# Patient Record
Sex: MALE | Race: WHITE | NOT HISPANIC OR LATINO | Employment: OTHER | URBAN - METROPOLITAN AREA
[De-identification: names, ages, dates, MRNs, and addresses within clinical notes are randomized per-mention and may not be internally consistent; named-entity substitution may affect disease eponyms.]

---

## 2017-02-20 ENCOUNTER — GENERIC CONVERSION - ENCOUNTER (OUTPATIENT)
Dept: OTHER | Facility: OTHER | Age: 62
End: 2017-02-20

## 2017-04-10 ENCOUNTER — GENERIC CONVERSION - ENCOUNTER (OUTPATIENT)
Dept: OTHER | Facility: OTHER | Age: 62
End: 2017-04-10

## 2017-05-22 ENCOUNTER — GENERIC CONVERSION - ENCOUNTER (OUTPATIENT)
Dept: OTHER | Facility: OTHER | Age: 62
End: 2017-05-22

## 2017-10-10 ENCOUNTER — GENERIC CONVERSION - ENCOUNTER (OUTPATIENT)
Dept: OTHER | Facility: OTHER | Age: 62
End: 2017-10-10

## 2017-10-20 ENCOUNTER — GENERIC CONVERSION - ENCOUNTER (OUTPATIENT)
Dept: INTERNAL MEDICINE CLINIC | Facility: CLINIC | Age: 62
End: 2017-10-20

## 2017-10-23 DIAGNOSIS — Z79.899 OTHER LONG TERM (CURRENT) DRUG THERAPY: ICD-10-CM

## 2017-10-23 DIAGNOSIS — E11.9 TYPE 2 DIABETES MELLITUS WITHOUT COMPLICATIONS (HCC): ICD-10-CM

## 2017-11-24 ENCOUNTER — GENERIC CONVERSION - ENCOUNTER (OUTPATIENT)
Dept: OTHER | Facility: OTHER | Age: 62
End: 2017-11-24

## 2017-12-22 ENCOUNTER — ALLSCRIPTS OFFICE VISIT (OUTPATIENT)
Dept: OTHER | Facility: OTHER | Age: 62
End: 2017-12-22

## 2017-12-22 DIAGNOSIS — I25.10 ATHEROSCLEROTIC HEART DISEASE OF NATIVE CORONARY ARTERY WITHOUT ANGINA PECTORIS: ICD-10-CM

## 2017-12-23 NOTE — PROGRESS NOTES
Assessment   1  Arteriosclerotic cardiovascular disease (429 2,440 9) (I25 10)   2  Controlled diabetes mellitus (250 00) (E11 9)   3  Esophageal reflux (530 81) (K21 9)   4  Generalized anxiety disorder (300 02) (F41 1)   5  Insomnia (780 52) (G47 00)   6  Ischemic cardiomyopathy (414 8) (I25 5)   7  Obstructive sleep apnea (327 23) (G47 33)   · cannot tolerate CPAP   8  S/P CABG (coronary artery bypass graft) (V45 81) (Z95 1)   9  ICD (implantable cardioverter-defibrillator) in place (V45 02) (Z95 810)    Plan   Controlled diabetes mellitus    · *VB - Foot Exam; Status:Complete;   Done: 35SZC4667 08:16AM  Insomnia    · Eszopiclone 2 MG Oral Tablet   · Lunesta 2 MG Oral Tablet (Eszopiclone)   · TraZODone HCl - 50 MG Oral Tablet; TAKE 1 TABLET qhs  Need for prophylactic vaccination and inoculation against influenza    · Fluzone Quadrivalent 0 5 ML Intramuscular Suspension Prefilled Syringe    Discussion/Summary   Discussion Summary:    ASCVD- managed by Dr Emilia Norwood is on ASA coreg (not sure if he is on metoprolol as well, he will check) Sotalol appears controlled on Farxiga Januvia Metformin and Lantus is on Lansoprazole for GERD controlled on Lexapro have asked him to start trazodone for sleep Lunesta not compliant with CPAP 6months      Counseling Documentation With Imm: The patient was counseled regarding impressions  Medication SE Review and Pt Understands Tx: Possible side effects of new medications were reviewed with the patient/guardian today  The treatment plan was reviewed with the patient/guardian  The patient/guardian understands and agrees with the treatment plan      Chief Complaint   Chief Complaint Chronic Condition  [facility]: Patient is here today for follow up of chronic conditions described in HPI        History of Present Illness   HPI: I cannot sleep at night is on Lunesta and melatonin without relief  to marijuana use which helps but he does not want to get it anymore illegally sees Dr Emilia Norwood every 4 months-s/p CABG 1994, has an ICD; EF 35% in April  last visit carotid US recommended, he ahs yet to get this done sees Dr Ivan Corey for his diabetes, A1C 7 2 a few months ago labs yesterday      Review of Systems   Complete-Male:      Constitutional: recent weight loss, but-- no fever-- and-- no chills  Cardiovascular: no chest pain-- and-- no palpitations  Respiratory: no cough  Gastrointestinal: no abdominal pain,-- no constipation-- and-- no diarrhea  Psychiatric: sleep disturbances, but-- as noted in HPI  Active Problems   1  Arteriosclerotic cardiovascular disease (429 2,440 9) (I25 10)   2  CABG   3  Controlled diabetes mellitus (250 00) (E11 9)   4  Esophageal reflux (530 81) (K21 9)   5  Generalized anxiety disorder (300 02) (F41 1)   6  Insomnia (780 52) (G47 00)   7  Ischemic cardiomyopathy (414 8) (I25 5)   8  Long term use of drug (V58 69) (Z79 899)   9  Need for prophylactic vaccination against single diseases (V05 9) (Z23)   10  Need for prophylactic vaccination and inoculation against influenza (V04 81) (Z23)   11  Obstructive sleep apnea (327 23) (G47 33)   12  Osteophyte of olecranon process (726 91) (M25 70)   13  S/P CABG (coronary artery bypass graft) (V45 81) (Z95 1)   14  S/P drug eluting coronary stent placement (V45 82) (Z95 5)   15  Screening PSA (prostate specific antigen) (V76 44) (Z12 5)   16  Special screening examination for neoplasm of prostate (V76 44) (Z12 5)   17  Squamous cell carcinoma of skin (173 92) (C44 92)    Past Medical History   1  History of Neck pain on left side (723 1) (M54 2)   2  Need for prophylactic vaccination against single diseases (V05 9) (Z23)   3  Need for prophylactic vaccination and inoculation against influenza (V04 81) (Z23)   4  History of Olecranon bursitis of right elbow (726 33) (M70 21)   5  History of Rectal hemorrhage (569 3) (K62 5)  Active Problems And Past Medical History Reviewed:     The active problems and past medical history were reviewed and updated today  Surgical History   1  CABG   2  History of Elbow Surgery  Surgical History Reviewed: The surgical history was reviewed and updated today  Family History   Mother    1  Family history of Mother  At Age ___  Father    2  Family history of Cancer   3  Family history of Coronary Artery Disease (V17 49)   4  Family history of Father  At Age ___  Family History Reviewed: The family history was reviewed and updated today  Social History    · Being A Social Drinker   · Denied: History of Drug Use   · Never A Smoker   · Non smoker (V49 89) (Z78 9)  Social History Reviewed: The social history was reviewed and updated today  Current Meds    1  Aspirin 81 MG Oral Tablet Delayed Release; TAKE 1 TABLET DAILY; Therapy: 21Dsn1518 to Recorded   2  Atorvastatin Calcium 40 MG Oral Tablet; TAKE 1 TABLET DAILY; Therapy: 80LQI5819 to (Evaluate:2014)  Requested for: 46SKO7156 Recorded   3  Coreg 12 5 MG Oral Tablet; TAKE 1 TABLET TWICE DAILY; Therapy: (Recorded:22Ygl6989) to Recorded   4  Effient 10 MG Oral Tablet; Take 1 tablet daily; Therapy: 35Jjd6167 to (Evaluate:2017) Recorded   5  Escitalopram Oxalate 20 MG Oral Tablet; Take 1 tablet daily; Therapy: 61SUB5385 to (Last Rx:2017)  Requested for: 2017 Ordered   6  Eszopiclone 2 MG Oral Tablet; Take 1 tablet at bedtime as needed for sleep  Must have 8 hours to     sleep; Therapy: 67HRU1243 to (Evaluate:2017)  Requested for: 84ANB1915; Last Rx:81Ekl0136     Ordered   7  Farxiga 10 MG Oral Tablet; 1 tab PO daily; Therapy: 15Kid9722 to (Evaluate:2017) Recorded   8  Januvia 100 MG Oral Tablet; Take 1 tablet once daily; Therapy: 61VHQ2114 to (Evaluate:2014)  Requested for: 67Yys5762 Recorded   9  Lansoprazole 30 MG Oral Capsule Delayed Release; take 1 capsule daily;      Therapy: 82QCB1349 to (Last Rx:2017)  Requested for: 52TUS4487 Ordered   10  Lantus SoloStar 100 UNIT/ML Subcutaneous Solution Pen-injector; INJECT 50 UNITS TWICE A DAY; Therapy: 42EAV7171 to (Last Rx:23Nov2017)  Requested for: 23Nov2017 Ordered   11  Lisinopril 20 MG Oral Tablet; TAKE 1 TABLET DAILY; Therapy: 34MQH7556 to (Evaluate:30Fhr9236)  Requested for: 77Cxj6574 Recorded   12  Lovaza 1 GM Oral Capsule; TAKE 2 CAPSULES TWICE A DAY; Therapy: 43DAT1530 to (Evaluate:14Jun2014)  Requested for: 87Idg3173 Recorded   13  Lunesta 2 MG Oral Tablet; take 1 tablet at bedtime as needed for sleep  BRAND NECESSARY; Therapy: 92JPW7713 to (Evaluate:26Apr2017)  Requested for: 28Oct2016; Last Rx:28Oct2016      Ordered   14  MetFORMIN HCl - 500 MG Oral Tablet; Take 2 tablets twice daily with meals  Hold if not eating; Therapy: 00EMP6105 to (457 8326)  Requested for: 33Qom1610 Recorded   15  Metoprolol Succinate ER 50 MG Oral Tablet Extended Release 24 Hour; TAKE 1 TABLET DAILY; Therapy: 45KKJ2666 to (Evaluate:16Mar2014)  Requested for: 09Fhy3366 Recorded   16  Sotalol HCl - 80 MG Oral Tablet; TAKE 1 TABLET TWICE DAILY; Therapy: (Recorded:86Thd7279) to Recorded   17  Spironolactone 25 MG Oral Tablet; TAKE 1 TABLET DAILY; Therapy: (Recorded:98Edm4507) to Recorded   18  Zetia 10 MG Oral Tablet; TAKE 1 TABLET DAILY; Therapy: 43AIF4296 to (Evaluate:84Xsq0546)  Requested for: 04Ggv4565 Recorded  Medication List Reviewed: The medication list was reviewed and updated today  Allergies   1  No Known Drug Allergies  2  No Known Environmental Allergies   3  No Known Food Allergies    Vitals   Vital Signs    Recorded: 22Dec2017 08:13AM   Temperature 98 4 F   Respiration 20   Systolic 509, RUE, Sitting   Diastolic 74, RUE, Sitting   BP CUFF SIZE Large   Height 5 ft 7 75 in   Weight 208 lb 0 8 oz   BMI Calculated 31 87   BSA Calculated 2 07   O2 Saturation 98     Physical Exam   Socks and shoes removed, Right Foot Findings: normal foot   The toes were normal  The sensory exam showed normal vibratory sensation at the level of the toes  Socks and Shoes removed, Left Foot Findings: normal foot  The toes were normal  The sensory exam showed normal vibratory sensation at the level of the toes  Monofilament Testing: normal tactile sensation with monofilament testing throughout both feet  Vascular: Pulses: 1+ in the dorsalis pedis  Pulses: 1+ in the dorsalis pedis  Assign Risk Category: 0: No loss of protective sensation, no deformity  No present risk  Constitutional      General appearance: No acute distress, well appearing and well nourished  obese  Ears, Nose, Mouth, and Throat      Otoscopic examination: Abnormal   The right external canal had a cerumen impaction  The left external canal had a cerumen impaction  Oropharynx: Normal with no erythema, edema, exudate or lesions  Pulmonary      Respiratory effort: No increased work of breathing or signs of respiratory distress  Auscultation of lungs: Clear to auscultation, equal breath sounds bilaterally, no wheezes, no rales, no rhonci  Cardiovascular      Auscultation of heart: Normal rate and rhythm, normal S1 and S2, without murmurs  Examination of extremities for edema and/or varicosities: Normal        Abdomen      Abdomen: Non-tender, no masses  Lymphatic      Palpation of lymph nodes in neck: No lymphadenopathy  Musculoskeletal      Gait and station: Normal        Psychiatric      Orientation to person, place and time: Normal        Mood and affect: Normal        Additional Exam:  carotid bruit Left           Results/Data   *VB - Foot Exam [id number] 08:16AM Iglesia Smith      Test Name Result Flag Reference   FOOT EXAM [id number]        PHQ-2 Adult Depression Screening 37Wra0570 08:15AM Anselmo Haile      Test Name Result Flag Reference   PHQ-2 Adult Depression Score 0     Over the last two weeks, how often have you been bothered by any of the following problems? Little interest or pleasure in doing things: Not at all - 0     Feeling down, depressed, or hopeless: Not at all - 0   PHQ-2 Adult Depression Screening Negative          Health Management   Diabetes Mellitus   (1) HEMOGLOBIN A1C; every 1 year; Last 83RLJ4984; Next Due: 19Ftp8341; Overdue  *VB - Eye Exam; every 1 year; Last 88WIN1801; Next Due: Y5573681; Overdue    Future Appointments      Date/Time Provider Specialty Site   06/21/2018 08:30 AM ERICA Luciano   Internal [facility]     Signatures    Electronically signed by : ERICA Morris ; Dec 22 2017 10:52AM EST                       (Author)

## 2018-01-14 NOTE — MISCELLANEOUS
Provider Comments  Provider Comments:   Pt was a n/s  Called and LMOM to make appt        Signatures   Electronically signed by : ERICA Bryant ; Sep 13 2016 12:43PM EST                       (Author)

## 2018-01-17 NOTE — PROGRESS NOTES
Assessment    1  Encounter for preventive health examination (V70 0) (Z00 00)   2  S/P CABG (coronary artery bypass graft) (V45 81) (Z95 1)   3  S/P drug eluting coronary stent placement (V45 82) (Z95 5)   4  History of Elbow Surgery   · right, bursitis   5  Non smoker (V49 89) (Z78 9)   6  Controlled diabetes mellitus (250 00) (E11 9)   7  Need for prophylactic vaccination and inoculation against influenza (V04 81) (Z23)    Plan  Controlled diabetes mellitus    · *VB - Eye Exam; Status:Active; Requested for:83Zqn8117;    · *VB - Foot Exam; Status:Active; Requested for:03Znk7549;   Esophageal reflux    · Lansoprazole 30 MG Oral Capsule Delayed Release; take 1 capsule daily  Generalized anxiety disorder    · Escitalopram Oxalate 20 MG Oral Tablet (Lexapro); Take 1 tablet daily  Insomnia    · From  Lunesta 2 MG Oral Tablet Take 1 tablet at bedtime as needed for sleep  Must have 8 hours to sleep To Eszopiclone 2 MG Oral Tablet Take 1 tablet at bedtime as  needed for sleep  Must have 8 hours to sleep  Need for prophylactic vaccination and inoculation against influenza    · Fluzone Quadrivalent 0 5 ML Intramuscular Suspension; INJECT 0 5  ML  Intramuscular; To Be Done: 07LCP1816    Discussion/Summary  Impression: health maintenance visit  Currently, he eats an adequate diet and has an inadequate exercise regimen  Colorectal cancer screening: colorectal cancer screening is current and colorectal cancer screening is managed by Dr Kait Mcdermott  The immunizations will be given as outlined in the orders  RTO 6months  The patient was counseled regarding instructions for management, impressions  Chief Complaint  PT is here today for a yearly physical       History of Present Illness  HM, Adult Male: The patient is being seen for a health maintenance evaluation  The last health maintenance visit was 2 year(s) ago  Social History: Household members include spouse  He is   Work status: retired     General Health: The patient's health since the last visit is described as fair  He has regular dental visits  He complains of vision problems  Vision care includes wearing glasses and an eye examination more than a year ago  Immunizations status: up to date The patient needs the following immunization(s): influenza vaccine  Lifestyle:  He consumes a diverse and healthy diet  He does not have any weight concerns  He does not exercise regularly  He does not use tobacco  He consumes alcohol  He reports occasional alcohol use  Reproductive health:  the patient is sexually active  Screening: Prostate cancer screening includes uncertain timing of prostate-specific antigen testing  Colorectal cancer screening includes last colonoscopy done 2011  metabolic screening reviewed and current  Cardiovascular risk factors: hypertension, diabetes, obesity and family history of cardiovascular disease, but no tobacco use  Review of Systems    Constitutional: no chills    The patient presents with complaints of recent weight loss (since starting [place])  Eyes: eyesight problems  Cardiovascular: no chest pain, no intermittent leg claudication, no palpitations and no extremity edema  Respiratory: no shortness of breath and no cough  Gastrointestinal: no abdominal pain, no diarrhea and no blood in stools  Musculoskeletal: right elbow pain  Psychiatric: sleep disturbances and would like to resume LUnesta  Active Problems    1  Arteriosclerotic cardiovascular disease (429 2,440 9) (I25 10)   2  CABG   3  Esophageal reflux (530 81) (K21 9)   4  Generalized anxiety disorder (300 02) (F41 1)   5  Insomnia (780 52) (G47 00)   6  Ischemic cardiomyopathy (414 8) (I25 5)   7  Long term use of drug (V58 69) (Z79 899)   8  Need for prophylactic vaccination against single diseases (V05 9) (Z23)   9  Need for prophylactic vaccination and inoculation against influenza (V04 81) (Z23)   10   Obstructive sleep apnea (327 23) (G47 33) 11  Osteophyte of olecranon process (726 91) (M25 70)   12  Screening PSA (prostate specific antigen) (V76 44) (Z12 5)   13  Special screening examination for neoplasm of prostate (V76 44) (Z12 5)   14  Squamous cell carcinoma of skin (173 92) (C44 92)    Past Medical History    · History of Neck pain on left side (723 1) (M54 2)   · Need for prophylactic vaccination against single diseases (V05 9) (Z23)   · Need for prophylactic vaccination and inoculation against influenza (V04 81) (Z23)   · History of Olecranon bursitis of right elbow (726 33) (M70 21)   · History of Rectal hemorrhage (569 3) (K62 5)    Surgical History    · CABG   · History of Elbow Surgery    Family History  Mother    · Family history of Mother  At Age ___  Father    · Family history of Cancer   · Family history of Coronary Artery Disease (V17 49)   · Family history of Father  At Age ___    Social History    · Being A Social Drinker   · Denied: History of Drug Use   · Never A Smoker   · Non smoker (V49 89) (Z78 9)    Current Meds   1  AmLODIPine Besylate 5 MG Oral Tablet; TAKE 1 TABLET EVERY DAY; Therapy: 39XEZ5850 to (Jhonny Lemons)  Requested for: 34KWD0513; Last   Rx:78Kqd0558 Ordered   2  Aspirin 81 MG Oral Tablet Delayed Release; TAKE 1 TABLET DAILY; Therapy: 42Ytg8873 to Recorded   3  Atorvastatin Calcium 40 MG Oral Tablet; TAKE 1 TABLET DAILY; Therapy: 28KNK6531 to (Evaluate:2014)  Requested for: 83MXA1033 Recorded   4  Coreg 12 5 MG Oral Tablet; TAKE 1 TABLET TWICE DAILY; Therapy: (Recorded:34Vro5004) to Recorded   5  Effient 10 MG Oral Tablet; Take 1 tablet daily; Therapy: 69Ndb2989 to (Evaluate:2017) Recorded   6  Escitalopram Oxalate 20 MG Oral Tablet; Take 1 tablet daily; Therapy: 30IQL3782 to (Evaluate:66Oxg1972)  Requested for: 38ULF0168; Last   Rx:20Ejx2090 Ordered   7  Farxiga 10 MG Oral Tablet; 1 tab PO daily; Therapy: 99Siq2206 to (Evaluate:2017) Recorded   8   Januvia 100 MG Oral Tablet; Take 1 tablet once daily; Therapy: 76MFU8567 to (Evaluate:16Mar2014)  Requested for: 62Bch1465 Recorded   9  Lansoprazole 30 MG Oral Capsule Delayed Release; take 1 capsule daily; Therapy: 04Iyz7738 to (Evaluate:17Oct2016)  Requested for: 32FOZ1739; Last   Rx:73Hil3615 Ordered   10  Lantus SoloStar 100 UNIT/ML Subcutaneous Solution Pen-injector; INJECT 50 UNITS    SUBCUTANEOUSLY TWICE DAILY; Therapy: 58PGR6055 to (Evaluate:06Wjs8522)  Requested for: 71Fvu9594 Recorded   11  Lisinopril 20 MG Oral Tablet; TAKE 1 TABLET DAILY; Therapy: 95VEO4409 to (Evaluate:09Ire4234)  Requested for: 94Qdn2082 Recorded   12  Lovaza 1 GM Oral Capsule; TAKE 2 CAPSULES TWICE A DAY; Therapy: 37WNL1898 to (Evaluate:14Jun2014)  Requested for: 02Vep8493 Recorded   13  Lunesta 2 MG Oral Tablet; Take 1 tablet at bedtime as needed for sleep  Must have 8    hours to sleep; Therapy: 11OCL8096 to (Evaluate:97Wbn6104)  Requested for: 93YDC6222; Last    Rx:14Jan2014 Ordered   14  Lunesta 2 MG Oral Tablet; take 1 tablet at bedtime as needed for sleep; Therapy: 09FVI2495 to (Evaluate:44Mta6155)  Requested for: 53QQT8311; Last    Rx:79Gio8851 Ordered   15  MetFORMIN HCl - 500 MG Oral Tablet; Take 2 tablets twice daily with meals  Hold if not    eating; Therapy: 56HLF6462 to (96 417368)  Requested for: 82Eud4632 Recorded   16  Metoprolol Succinate ER 50 MG Oral Tablet Extended Release 24 Hour; TAKE 1 TABLET    DAILY; Therapy: 68CHW8172 to (Evaluate:16Mar2014)  Requested for: 36Dlg1145 Recorded   17  Sotalol HCl - 80 MG Oral Tablet; TAKE 1 TABLET TWICE DAILY; Therapy: (Recorded:56Ubw2764) to Recorded   18  Spironolactone 25 MG Oral Tablet; TAKE 1 TABLET DAILY; Therapy: (Recorded:31Dcr9091) to Recorded   19  Zetia 10 MG Oral Tablet; TAKE 1 TABLET DAILY; Therapy: 37BQY8487 to (Evaluate:80Hjz5513)  Requested for: 06SPC1441 Recorded    Allergies    1  No Known Drug Allergies    2   No Known Environmental Allergies   3  No Known Food Allergies    Vitals   Recorded: 94MGE1725 96:00YZ   Systolic 179   Diastolic 70   Height 5 ft 7 75 in   Weight 210 lb 12 8 oz   BMI Calculated 32 29   BSA Calculated 2 08     Physical Exam    Constitutional   General appearance: No acute distress, well appearing and well nourished  Eyes   Conjunctiva and lids: No erythema, swelling or discharge  Ears, Nose, Mouth, and Throat   Otoscopic examination: Tympanic membranes translucent with normal light reflex  Canals patent without erythema  Oropharynx: Normal with no erythema, edema, exudate or lesions  Neck   Neck: Supple, symmetric, trachea midline, no masses  Pulmonary   Respiratory effort: No increased work of breathing or signs of respiratory distress  Auscultation of lungs: Clear to auscultation  Cardiovascular   Auscultation of heart: Normal rate and rhythm, normal S1 and S2, no murmurs  Examination of extremities for edema and/or varicosities: Normal     Abdomen   Abdomen: Non-tender, no masses  Diabetic Foot Exam: Right Foot Findings: normal foot  The toes were normal  Left Foot Findings: normal foot  The toes were normal    Monofilament Testing: normal tactile sensation with monofilament testing throughout both feet  Vascular: Pulses: 2+ in the dorsalis pedis  Pulses: 2+ in the dorsalis pedis  Assign Risk Category: 0: No loss of protective sensation, no deformity  No present risk  Psychiatric   Orientation to person, place and time: Normal     Mood and affect: Normal        Results/Data  PHQ-2 Adult Depression Screening 36Egj9831 01:37PM User, Anselmo     Test Name Result Flag Reference   PHQ-2 Adult Depression Score 0     Over the last two weeks, how often have you been bothered by any of the following problems?   Little interest or pleasure in doing things: Not at all - 0  Feeling down, depressed, or hopeless: Not at all - 0   PHQ-2 Adult Depression Screening Negative         Health Management  Diabetes Mellitus   (1) HEMOGLOBIN A1C; every 1 year; Next Due: 82Ccs7057; Overdue  *VB - Eye Exam; every 1 year; Last 00BTB1452; Next Due: X5674025;  Active    Signatures   Electronically signed by : ERICA Lan ; Sep 15 2016  2:09PM EST                       (Author)

## 2018-01-18 NOTE — MISCELLANEOUS
Provider Comments  Provider Comments:   Patient did not come to his scheduled appointment today 03/04/16 so a message was left for him to call  the office to reschedule another appointment  Signatures   Electronically signed by :  Jj Kelley MD; Mar  5 2016 10:29AM EST                       (Author)

## 2018-01-23 VITALS
OXYGEN SATURATION: 98 % | BODY MASS INDEX: 31.53 KG/M2 | RESPIRATION RATE: 20 BRPM | HEIGHT: 68 IN | TEMPERATURE: 98.4 F | DIASTOLIC BLOOD PRESSURE: 74 MMHG | WEIGHT: 208.05 LBS | SYSTOLIC BLOOD PRESSURE: 124 MMHG

## 2018-02-14 LAB
LEFT EYE DIABETIC RETINOPATHY: NORMAL
RIGHT EYE DIABETIC RETINOPATHY: NORMAL

## 2018-04-09 ENCOUNTER — OFFICE VISIT (OUTPATIENT)
Dept: INTERNAL MEDICINE CLINIC | Facility: CLINIC | Age: 63
End: 2018-04-09
Payer: COMMERCIAL

## 2018-04-09 VITALS
WEIGHT: 200 LBS | TEMPERATURE: 97.7 F | SYSTOLIC BLOOD PRESSURE: 124 MMHG | HEART RATE: 85 BPM | DIASTOLIC BLOOD PRESSURE: 78 MMHG | OXYGEN SATURATION: 95 % | BODY MASS INDEX: 30.64 KG/M2

## 2018-04-09 DIAGNOSIS — J20.8 ACUTE BRONCHITIS DUE TO OTHER SPECIFIED ORGANISMS: Primary | ICD-10-CM

## 2018-04-09 PROCEDURE — 99213 OFFICE O/P EST LOW 20 MIN: CPT | Performed by: INTERNAL MEDICINE

## 2018-04-09 RX ORDER — LANSOPRAZOLE 30 MG/1
1 CAPSULE, DELAYED RELEASE ORAL DAILY
COMMUNITY
Start: 2013-04-29 | End: 2018-11-18 | Stop reason: SDUPTHER

## 2018-04-09 RX ORDER — OMEGA-3-ACID ETHYL ESTERS 1 G/1
2 CAPSULE, LIQUID FILLED ORAL 2 TIMES DAILY
COMMUNITY
Start: 2011-11-03

## 2018-04-09 RX ORDER — METOPROLOL SUCCINATE 50 MG/1
1 TABLET, EXTENDED RELEASE ORAL DAILY
COMMUNITY
Start: 2011-11-03 | End: 2019-06-07 | Stop reason: ALTCHOICE

## 2018-04-09 RX ORDER — LISINOPRIL 20 MG/1
1 TABLET ORAL DAILY
COMMUNITY
Start: 2011-09-25 | End: 2021-04-12 | Stop reason: ALTCHOICE

## 2018-04-09 RX ORDER — SOTALOL HYDROCHLORIDE 80 MG/1
1 TABLET ORAL 2 TIMES DAILY
COMMUNITY

## 2018-04-09 RX ORDER — LORAZEPAM 1 MG/1
1 TABLET ORAL EVERY 6 HOURS
COMMUNITY
Start: 2015-08-16 | End: 2020-02-18 | Stop reason: SDUPTHER

## 2018-04-09 RX ORDER — SPIRONOLACTONE 25 MG/1
1 TABLET ORAL DAILY
COMMUNITY

## 2018-04-09 RX ORDER — PEN NEEDLE, DIABETIC 29 G X1/2"
NEEDLE, DISPOSABLE MISCELLANEOUS
COMMUNITY
Start: 2018-01-17

## 2018-04-09 RX ORDER — CARVEDILOL 12.5 MG/1
1 TABLET ORAL 2 TIMES DAILY
COMMUNITY

## 2018-04-09 RX ORDER — TRAZODONE HYDROCHLORIDE 50 MG/1
1 TABLET ORAL
COMMUNITY
Start: 2017-12-22 | End: 2019-06-07 | Stop reason: ALTCHOICE

## 2018-04-09 RX ORDER — ESCITALOPRAM OXALATE 20 MG/1
1 TABLET ORAL DAILY
COMMUNITY
Start: 2012-01-19 | End: 2018-05-07 | Stop reason: SDUPTHER

## 2018-04-09 RX ORDER — LANCETS 30 GAUGE
EACH MISCELLANEOUS
COMMUNITY
Start: 2017-07-12

## 2018-04-09 RX ORDER — CLOPIDOGREL BISULFATE 75 MG/1
75 TABLET ORAL
COMMUNITY
End: 2021-04-12 | Stop reason: ALTCHOICE

## 2018-04-09 RX ORDER — ESZOPICLONE 2 MG/1
TABLET, FILM COATED ORAL
COMMUNITY
Start: 2011-03-31 | End: 2019-12-04 | Stop reason: SDUPTHER

## 2018-04-09 RX ORDER — ATORVASTATIN CALCIUM 40 MG/1
1 TABLET, FILM COATED ORAL DAILY
COMMUNITY
Start: 2011-11-03

## 2018-04-09 RX ORDER — EZETIMIBE 10 MG/1
1 TABLET ORAL DAILY
COMMUNITY
Start: 2011-09-14

## 2018-04-09 RX ORDER — PRASUGREL 10 MG/1
10 TABLET, FILM COATED ORAL DAILY
COMMUNITY
Start: 2016-09-15 | End: 2021-04-12 | Stop reason: ALTCHOICE

## 2018-04-09 RX ORDER — AMOXICILLIN AND CLAVULANATE POTASSIUM 875; 125 MG/1; MG/1
1 TABLET, FILM COATED ORAL EVERY 12 HOURS SCHEDULED
Qty: 20 TABLET | Refills: 0 | Status: SHIPPED | OUTPATIENT
Start: 2018-04-09 | End: 2018-04-19

## 2018-04-09 RX ORDER — EZETIMIBE 10 MG/1
TABLET ORAL
COMMUNITY
Start: 2018-03-15 | End: 2019-06-07 | Stop reason: SDUPTHER

## 2018-04-09 RX ORDER — PEN NEEDLE, DIABETIC 31 GX5/16"
NEEDLE, DISPOSABLE MISCELLANEOUS
COMMUNITY
Start: 2018-01-17

## 2018-04-09 NOTE — PROGRESS NOTES
Assessment/Plan:    Inc oral fluids  Start OTC Coricidin  Call if not improving  Call if fever develops       Problem List Items Addressed This Visit     Acute bronchitis due to other specified organisms - Primary    Relevant Medications    amoxicillin-clavulanate (AUGMENTIN) 875-125 mg per tablet            Subjective:      Patient ID: Luciana Amanda is a 58 y o  male  HPI  4 days of a cough, chest congestion  No fever + chills  No sore throat sinus congestion drainage  No vomiting diarrhea  Tried Sudafed Nyquil    The following portions of the patient's history were reviewed and updated as appropriate: allergies, current medications, past family history, past medical history, past social history, past surgical history and problem list     Review of Systems   Constitutional: Negative for fatigue, fever and unexpected weight change  HENT: Negative for ear pain, hearing loss, sinus pain, sinus pressure and sore throat  Respiratory: Positive for cough  Negative for shortness of breath and wheezing  Cardiovascular: Negative for chest pain, palpitations and leg swelling  Gastrointestinal: Negative for abdominal pain, constipation, diarrhea, nausea and vomiting  Musculoskeletal: Positive for arthralgias (left elbow)  Negative for myalgias  Objective:      /78   Pulse 85   Temp 97 7 °F (36 5 °C)   Wt 90 7 kg (200 lb)   SpO2 95%   BMI 30 64 kg/m²          Physical Exam   Constitutional: He is oriented to person, place, and time  He appears well-developed and well-nourished  HENT:   Head: Normocephalic and atraumatic  Right Ear: External ear normal    Left Ear: External ear normal    Mouth/Throat: Oropharynx is clear and moist    Eyes: Conjunctivae are normal    Neck: Neck supple  Cardiovascular: Normal rate, regular rhythm and normal heart sounds  No murmur heard  Pulmonary/Chest: Effort normal and breath sounds normal  No respiratory distress  He has no wheezes  He has no rales  Musculoskeletal: Normal range of motion  Neurological: He is alert and oriented to person, place, and time  Skin: Skin is warm and dry  Psychiatric: He has a normal mood and affect   His behavior is normal  Judgment and thought content normal

## 2018-05-07 DIAGNOSIS — F32.A DEPRESSION, UNSPECIFIED DEPRESSION TYPE: Primary | ICD-10-CM

## 2018-05-08 RX ORDER — ESCITALOPRAM OXALATE 20 MG/1
20 TABLET ORAL DAILY
Qty: 90 TABLET | Refills: 1 | Status: SHIPPED | OUTPATIENT
Start: 2018-05-08 | End: 2018-12-26 | Stop reason: SDUPTHER

## 2018-09-24 ENCOUNTER — OFFICE VISIT (OUTPATIENT)
Dept: INTERNAL MEDICINE CLINIC | Facility: CLINIC | Age: 63
End: 2018-09-24
Payer: COMMERCIAL

## 2018-09-24 ENCOUNTER — APPOINTMENT (OUTPATIENT)
Dept: RADIOLOGY | Age: 63
End: 2018-09-24
Payer: COMMERCIAL

## 2018-09-24 VITALS
HEIGHT: 69 IN | DIASTOLIC BLOOD PRESSURE: 80 MMHG | SYSTOLIC BLOOD PRESSURE: 150 MMHG | HEART RATE: 81 BPM | OXYGEN SATURATION: 98 % | BODY MASS INDEX: 29.59 KG/M2 | WEIGHT: 199.8 LBS

## 2018-09-24 DIAGNOSIS — M54.2 NECK PAIN ON LEFT SIDE: Primary | ICD-10-CM

## 2018-09-24 DIAGNOSIS — M54.2 NECK PAIN ON LEFT SIDE: ICD-10-CM

## 2018-09-24 PROBLEM — J20.8 ACUTE BRONCHITIS DUE TO OTHER SPECIFIED ORGANISMS: Status: RESOLVED | Noted: 2018-04-09 | Resolved: 2018-09-24

## 2018-09-24 PROCEDURE — 99213 OFFICE O/P EST LOW 20 MIN: CPT | Performed by: INTERNAL MEDICINE

## 2018-09-24 PROCEDURE — 72050 X-RAY EXAM NECK SPINE 4/5VWS: CPT

## 2018-09-24 PROCEDURE — 3008F BODY MASS INDEX DOCD: CPT | Performed by: INTERNAL MEDICINE

## 2018-09-24 RX ORDER — METHOCARBAMOL 500 MG/1
500 TABLET, FILM COATED ORAL 3 TIMES DAILY PRN
Qty: 30 TABLET | Refills: 1 | Status: SHIPPED | OUTPATIENT
Start: 2018-09-24 | End: 2019-06-07 | Stop reason: ALTCHOICE

## 2018-09-24 RX ORDER — TRAMADOL HYDROCHLORIDE 50 MG/1
50 TABLET ORAL EVERY 6 HOURS PRN
Qty: 30 TABLET | Refills: 0 | Status: SHIPPED | OUTPATIENT
Start: 2018-09-24 | End: 2019-06-07 | Stop reason: ALTCHOICE

## 2018-09-24 NOTE — PROGRESS NOTES
Assessment/Plan:       Problem List Items Addressed This Visit     None      Visit Diagnoses     Neck pain on left side    -  Primary    Relevant Medications    methocarbamol (ROBAXIN) 500 mg tablet    traMADol (ULTRAM) 50 mg tablet    Other Relevant Orders    XR spine cervical complete 4 or 5 vw non injury    Ambulatory referral to Physical Therapy            Subjective:      Patient ID: Diamond Gomez is a 61 y o  male  HPI  1 month of neck pain on the left side radiating to the left shoulder  Started a few days after he carried paddle boards  10/10  No relief from OTC meds like Aleve and topical creams, Lidoderm patch  Denies weakness in the arms, numbness or tingling  He has trouble raising his left arm because it triggers the pain in the neck  He has trouble turning his head to the left or right      The following portions of the patient's history were reviewed and updated as appropriate: allergies, current medications, past family history, past medical history, past social history, past surgical history and problem list     Review of Systems   Constitutional: Negative for fatigue, fever and unexpected weight change  HENT: Negative for sinus pain, sinus pressure and sore throat  Respiratory: Negative for cough, shortness of breath and wheezing  Cardiovascular: Negative for chest pain, palpitations and leg swelling  Gastrointestinal: Negative for abdominal pain  Musculoskeletal: Positive for neck pain and neck stiffness  Negative for myalgias  Neurological: Negative for dizziness and headaches  Objective:      /80   Pulse 81   Ht 5' 9" (1 753 m)   Wt 90 6 kg (199 lb 12 8 oz)   SpO2 98%   BMI 29 51 kg/m²          Physical Exam   Constitutional: He is oriented to person, place, and time  He appears well-developed and well-nourished  HENT:   Head: Normocephalic and atraumatic  Eyes: Conjunctivae are normal    Neck: Neck supple     Cardiovascular: Normal rate, regular rhythm and normal heart sounds  No murmur heard  Pulmonary/Chest: Effort normal and breath sounds normal  No respiratory distress  He has no wheezes  He has no rales  Musculoskeletal: He exhibits tenderness (cervical paraspinal muscles on the left side and left trapezius muscle)  5/5 in both UE  Limited left shoulder abduction bec of pain in the neck  Limited lateral movement of the neck to both sides   Neurological: He is alert and oriented to person, place, and time  Skin: Skin is warm and dry  Psychiatric: He has a normal mood and affect   His behavior is normal  Judgment and thought content normal

## 2018-11-18 DIAGNOSIS — K21.9 GASTROESOPHAGEAL REFLUX DISEASE, ESOPHAGITIS PRESENCE NOT SPECIFIED: Primary | ICD-10-CM

## 2018-11-18 RX ORDER — LANSOPRAZOLE 30 MG/1
CAPSULE, DELAYED RELEASE ORAL
Qty: 90 CAPSULE | Refills: 3 | Status: SHIPPED | OUTPATIENT
Start: 2018-11-18 | End: 2019-04-10 | Stop reason: SDUPTHER

## 2018-12-26 DIAGNOSIS — F32.A DEPRESSION, UNSPECIFIED DEPRESSION TYPE: ICD-10-CM

## 2018-12-26 RX ORDER — ESCITALOPRAM OXALATE 20 MG/1
20 TABLET ORAL DAILY
Qty: 90 TABLET | Refills: 0 | Status: SHIPPED | OUTPATIENT
Start: 2018-12-26 | End: 2019-04-10 | Stop reason: SDUPTHER

## 2019-03-19 LAB
LEFT EYE DIABETIC RETINOPATHY: NORMAL
RIGHT EYE DIABETIC RETINOPATHY: NORMAL

## 2019-04-10 DIAGNOSIS — K21.9 GASTROESOPHAGEAL REFLUX DISEASE, ESOPHAGITIS PRESENCE NOT SPECIFIED: ICD-10-CM

## 2019-04-10 DIAGNOSIS — F32.A DEPRESSION, UNSPECIFIED DEPRESSION TYPE: ICD-10-CM

## 2019-04-10 RX ORDER — LANSOPRAZOLE 30 MG/1
30 CAPSULE, DELAYED RELEASE ORAL DAILY
Qty: 90 CAPSULE | Refills: 3 | Status: SHIPPED | OUTPATIENT
Start: 2019-04-10 | End: 2019-12-04 | Stop reason: SDUPTHER

## 2019-04-10 RX ORDER — ESCITALOPRAM OXALATE 20 MG/1
20 TABLET ORAL DAILY
Qty: 90 TABLET | Refills: 3 | Status: SHIPPED | OUTPATIENT
Start: 2019-04-10 | End: 2019-09-04 | Stop reason: SDUPTHER

## 2019-06-07 ENCOUNTER — OFFICE VISIT (OUTPATIENT)
Dept: INTERNAL MEDICINE CLINIC | Facility: CLINIC | Age: 64
End: 2019-06-07
Payer: COMMERCIAL

## 2019-06-07 VITALS
HEIGHT: 69 IN | DIASTOLIC BLOOD PRESSURE: 88 MMHG | SYSTOLIC BLOOD PRESSURE: 130 MMHG | RESPIRATION RATE: 16 BRPM | OXYGEN SATURATION: 98 % | HEART RATE: 71 BPM | WEIGHT: 192.2 LBS | BODY MASS INDEX: 28.47 KG/M2

## 2019-06-07 DIAGNOSIS — Z79.4 CONTROLLED TYPE 2 DIABETES MELLITUS WITH COMPLICATION, WITH LONG-TERM CURRENT USE OF INSULIN (HCC): ICD-10-CM

## 2019-06-07 DIAGNOSIS — Z11.59 NEED FOR HEPATITIS C SCREENING TEST: ICD-10-CM

## 2019-06-07 DIAGNOSIS — I10 BENIGN ESSENTIAL HYPERTENSION: ICD-10-CM

## 2019-06-07 DIAGNOSIS — I25.5 ISCHEMIC CARDIOMYOPATHY: ICD-10-CM

## 2019-06-07 DIAGNOSIS — E11.8 CONTROLLED TYPE 2 DIABETES MELLITUS WITH COMPLICATION, WITH LONG-TERM CURRENT USE OF INSULIN (HCC): ICD-10-CM

## 2019-06-07 DIAGNOSIS — E78.2 MIXED HYPERLIPIDEMIA: ICD-10-CM

## 2019-06-07 DIAGNOSIS — M54.2 NECK PAIN ON LEFT SIDE: Primary | ICD-10-CM

## 2019-06-07 DIAGNOSIS — E11.65 UNCONTROLLED TYPE 2 DIABETES MELLITUS WITH HYPERGLYCEMIA (HCC): ICD-10-CM

## 2019-06-07 DIAGNOSIS — K21.9 GASTROESOPHAGEAL REFLUX DISEASE, ESOPHAGITIS PRESENCE NOT SPECIFIED: ICD-10-CM

## 2019-06-07 PROCEDURE — 99214 OFFICE O/P EST MOD 30 MIN: CPT | Performed by: INTERNAL MEDICINE

## 2019-08-02 LAB
CREAT ?TM UR-SCNC: 38 UMOL/L
EXT MICROALBUMIN URINE RANDOM: 0.3
HBA1C MFR BLD HPLC: 8.8 %
MICROALBUMIN/CREAT UR: 8 MG/G{CREAT}

## 2019-09-04 DIAGNOSIS — F32.A DEPRESSION, UNSPECIFIED DEPRESSION TYPE: ICD-10-CM

## 2019-09-05 RX ORDER — ESCITALOPRAM OXALATE 20 MG/1
20 TABLET ORAL DAILY
Qty: 90 TABLET | Refills: 3 | Status: SHIPPED | OUTPATIENT
Start: 2019-09-05 | End: 2019-09-17 | Stop reason: SDUPTHER

## 2019-09-17 DIAGNOSIS — F32.A DEPRESSION, UNSPECIFIED DEPRESSION TYPE: ICD-10-CM

## 2019-09-17 RX ORDER — ESCITALOPRAM OXALATE 20 MG/1
20 TABLET ORAL DAILY
Qty: 90 TABLET | Refills: 3 | Status: SHIPPED | OUTPATIENT
Start: 2019-09-17 | End: 2020-08-11

## 2019-12-04 ENCOUNTER — OFFICE VISIT (OUTPATIENT)
Dept: INTERNAL MEDICINE CLINIC | Facility: CLINIC | Age: 64
End: 2019-12-04
Payer: COMMERCIAL

## 2019-12-04 VITALS
BODY MASS INDEX: 29.71 KG/M2 | WEIGHT: 200.6 LBS | SYSTOLIC BLOOD PRESSURE: 130 MMHG | OXYGEN SATURATION: 94 % | DIASTOLIC BLOOD PRESSURE: 80 MMHG | HEIGHT: 69 IN | HEART RATE: 70 BPM

## 2019-12-04 DIAGNOSIS — E11.65 UNCONTROLLED TYPE 2 DIABETES MELLITUS WITH HYPERGLYCEMIA (HCC): Primary | ICD-10-CM

## 2019-12-04 DIAGNOSIS — E78.2 MIXED HYPERLIPIDEMIA: ICD-10-CM

## 2019-12-04 DIAGNOSIS — G47.00 INSOMNIA, UNSPECIFIED TYPE: ICD-10-CM

## 2019-12-04 DIAGNOSIS — F41.1 GENERALIZED ANXIETY DISORDER: ICD-10-CM

## 2019-12-04 DIAGNOSIS — I25.5 ISCHEMIC CARDIOMYOPATHY: ICD-10-CM

## 2019-12-04 DIAGNOSIS — Z95.810 ICD (IMPLANTABLE CARDIOVERTER-DEFIBRILLATOR) IN PLACE: ICD-10-CM

## 2019-12-04 DIAGNOSIS — Z11.59 NEED FOR HEPATITIS C SCREENING TEST: ICD-10-CM

## 2019-12-04 DIAGNOSIS — I10 BENIGN ESSENTIAL HYPERTENSION: ICD-10-CM

## 2019-12-04 DIAGNOSIS — I25.10 ATHEROSCLEROSIS OF NATIVE CORONARY ARTERY OF NATIVE HEART WITHOUT ANGINA PECTORIS: ICD-10-CM

## 2019-12-04 DIAGNOSIS — K21.9 GASTROESOPHAGEAL REFLUX DISEASE, ESOPHAGITIS PRESENCE NOT SPECIFIED: ICD-10-CM

## 2019-12-04 PROCEDURE — 99214 OFFICE O/P EST MOD 30 MIN: CPT | Performed by: INTERNAL MEDICINE

## 2019-12-04 RX ORDER — CANDESARTAN 16 MG/1
16 TABLET ORAL DAILY
Start: 2019-12-04

## 2019-12-04 RX ORDER — FLASH GLUCOSE SENSOR
1 KIT MISCELLANEOUS
COMMUNITY
Start: 2019-10-01

## 2019-12-04 RX ORDER — LANSOPRAZOLE 30 MG/1
30 CAPSULE, DELAYED RELEASE ORAL DAILY
Qty: 90 CAPSULE | Refills: 3 | Status: SHIPPED | OUTPATIENT
Start: 2019-12-04 | End: 2020-02-06

## 2019-12-04 RX ORDER — ESZOPICLONE 2 MG/1
2 TABLET, FILM COATED ORAL
Qty: 90 TABLET | Refills: 0 | Status: SHIPPED | OUTPATIENT
Start: 2019-12-04 | End: 2020-06-05 | Stop reason: SDUPTHER

## 2019-12-04 RX ORDER — ISOSORBIDE MONONITRATE 30 MG/1
30 TABLET, EXTENDED RELEASE ORAL
Refills: 3 | COMMUNITY
Start: 2019-11-30

## 2019-12-04 NOTE — ASSESSMENT & PLAN NOTE
Stable on Lexapro  Requesting refill of Lunesta for sleep which he used to take PRN but ran out a long time ago  No improvement of sleep with OTC meds including melatonin

## 2019-12-04 NOTE — PROGRESS NOTES
Assessment/Plan:    Esophageal reflux  Controlled on lansoprazole    Uncontrolled type 2 diabetes mellitus with hyperglycemia [facility])  Managed by endocrinology  He is on Tresiba [id number] a day Trulicity weekly Jardiance 25mg daily and metformin 1g BID  Overdue for eye exam ([facility])  BMI Counseling: Body mass index is 29 62 kg/m²  The BMI is above normal  Nutrition recommendations include consuming healthier snacks, decreasing soda and/or juice intake and moderation in carbohydrate intake  Lab Results   Component Value Date    HGBA1C 8 8 08/02/2019       Benign essential hypertension  Acceptable readings on candesartan and carvedilol    Coronary atherosclerosis  Taking ASA betablocker, ARB and statin  Also on Imdur  Euvolemic on spironolactone    Ischemic cardiomyopathy  Sees Dr Moe Tinoco regularly  ICD in place    Generalized anxiety disorder  Stable on Lexapro  Requesting refill of Lunesta for sleep which he used to take PRN but ran out a long time ago  No improvement of sleep with OTC meds including melatonin    Mixed hyperlipidemia  August's TG >400  Discussed dietary changes  Continue atorvastatin Lovaza and Zetia         Problem List Items Addressed This Visit        Digestive    Esophageal reflux     Controlled on lansoprazole         Relevant Medications    lansoprazole (PREVACID) 30 mg capsule       Endocrine    Uncontrolled type 2 diabetes mellitus with hyperglycemia ([facility] [address] ) - Primary     Managed by endocrinology  He is on Tresiba [id number] a day Trulicity weekly Jardiance 25mg daily and metformin 1g BID  Overdue for eye exam [facility])  BMI Counseling: Body mass index is 29 62 kg/m²  The BMI is above normal  Nutrition recommendations include consuming healthier snacks, decreasing soda and/or juice intake and moderation in carbohydrate intake      Lab Results   Component Value Date    HGBA1C 8 8 08/02/2019            Relevant Medications    insulin degludec (TRESIBA FLEXTOUCH) 100 units/mL injection pen    Other Relevant Orders    CBC and differential       Cardiovascular and Mediastinum    Benign essential hypertension     Acceptable readings on candesartan and carvedilol         Relevant Medications    candesartan (ATACAND) 16 mg tablet    Other Relevant Orders    CBC and differential    Coronary atherosclerosis     Taking ASA betablocker, ARB and statin  Also on Imdur  Euvolemic on spironolactone         Relevant Medications    isosorbide mononitrate (IMDUR) 30 mg 24 hr tablet    Ischemic cardiomyopathy     Sees Dr Joyce Lockett regularly  ICD in place         Relevant Medications    isosorbide mononitrate (IMDUR) 30 mg 24 hr tablet       Other    Generalized anxiety disorder     Stable on Lexapro  Requesting refill of Lunesta for sleep which he used to take PRN but ran out a long time ago  No improvement of sleep with OTC meds including melatonin         Relevant Medications    eszopiclone (LUNESTA) 2 mg tablet    Mixed hyperlipidemia     August's TG >400  Discussed dietary changes  Continue atorvastatin Lovaza and Zetia         Relevant Orders    CBC and differential    ICD (implantable cardioverter-defibrillator) in place      Other Visit Diagnoses     Insomnia, unspecified type        Relevant Medications    eszopiclone (LUNESTA) 2 mg tablet    Need for hepatitis C screening test        Relevant Orders    Hepatitis C antibody            Subjective:      Patient ID: Erasmo Munoz is a 59 y o  male  HPI  Here for a follow up  Feeling well, no complaints  DM managed by endocrinology  Last A1C was 8 8   Tresiba increased and he is getting 115units daily (was on 110)  He is also on metformin [place] and Trulicity  High cholesterol and in August, TG was >400  He is taking atorvastatin Lovaza and Zetia  He denies having a high fat diet  He drinks a can of soda a day  He denies alcohol use  CAD< ischemic cardiomyopathy with ICD   He sees Dr Joyce Lockett       The following portions of the patient's history were reviewed and updated as appropriate: allergies, past family history, past medical history, past social history, past surgical history and problem list     Review of Systems   Constitutional: Negative for fatigue, fever and unexpected weight change  HENT: Negative for congestion, sinus pressure and sore throat  Respiratory: Negative for cough, shortness of breath and wheezing  Cardiovascular: Negative for chest pain, palpitations and leg swelling  Gastrointestinal: Negative for abdominal pain, blood in stool, constipation, diarrhea, nausea and vomiting  Endocrine: Negative for polydipsia and polyuria  Genitourinary: Negative for difficulty urinating and hematuria  Musculoskeletal: Positive for neck pain (sees a chiropractor)  Negative for arthralgias and myalgias  Neurological: Negative for dizziness and headaches  Psychiatric/Behavioral: Positive for sleep disturbance  Objective:      /80   Pulse 70   Ht 5' 9" (1 753 m)   Wt 91 kg (200 lb 9 6 oz)   SpO2 94%   BMI 29 62 kg/m²          Physical Exam   Constitutional: He is oriented to person, place, and time  He appears well-developed and well-nourished  HENT:   Head: Normocephalic and atraumatic  Right Ear: External ear normal    Left Ear: External ear normal    Mouth/Throat: Oropharynx is clear and moist    Eyes: Conjunctivae are normal    Neck: Neck supple  Cardiovascular: Normal rate, regular rhythm and normal heart sounds  No murmur heard  Pulmonary/Chest: Effort normal and breath sounds normal  No respiratory distress  He has no wheezes  He has no rales  Abdominal: Soft  He exhibits no distension and no mass  There is no tenderness  There is no rebound and no guarding  Neurological: He is alert and oriented to person, place, and time  Skin: Skin is warm and dry  Psychiatric: He has a normal mood and affect   His behavior is normal  Judgment and thought content normal

## 2019-12-04 NOTE — ASSESSMENT & PLAN NOTE
Managed by endocrinology  He is on Tresiba [id number] a day Trulicity weekly Jardiance 25mg daily and metformin 1g BID  Overdue for eye exam ([facility])  BMI Counseling: Body mass index is 29 62 kg/m²  The BMI is above normal  Nutrition recommendations include consuming healthier snacks, decreasing soda and/or juice intake and moderation in carbohydrate intake      Lab Results   Component Value Date    HGBA1C 8 8 08/02/2019

## 2020-02-05 DIAGNOSIS — K21.9 GASTROESOPHAGEAL REFLUX DISEASE, ESOPHAGITIS PRESENCE NOT SPECIFIED: ICD-10-CM

## 2020-02-06 RX ORDER — LANSOPRAZOLE 30 MG/1
CAPSULE, DELAYED RELEASE ORAL
Qty: 90 CAPSULE | Refills: 1 | Status: SHIPPED | OUTPATIENT
Start: 2020-02-06 | End: 2020-07-26

## 2020-02-17 ENCOUNTER — TELEPHONE (OUTPATIENT)
Dept: INTERNAL MEDICINE CLINIC | Facility: CLINIC | Age: 65
End: 2020-02-17

## 2020-02-17 NOTE — TELEPHONE ENCOUNTER
Pt's wife calling in on pt's behalf asking if you can prescribe an anti-anxiety medication    Advised this may require an OV and she is also aware you are out of the office and says it can wait/non-urgent    Please advise/send    Jarett Ayala

## 2020-02-18 DIAGNOSIS — F41.1 GENERALIZED ANXIETY DISORDER: Primary | ICD-10-CM

## 2020-02-18 RX ORDER — LORAZEPAM 1 MG/1
1 TABLET ORAL DAILY PRN
Qty: 10 TABLET | Refills: 0 | Status: SHIPPED | OUTPATIENT
Start: 2020-02-18 | End: 2020-06-05 | Stop reason: SDUPTHER

## 2020-02-18 NOTE — TELEPHONE ENCOUNTER
Please call the patient  Lorazepam is on his list so I will refill it  He should schedule a follow up earlier if his anxiety is not well controlled since his visit is not until [place]

## 2020-05-06 LAB — HBA1C MFR BLD HPLC: 9.5 %

## 2020-05-07 LAB
CREAT ?TM UR-SCNC: 49 UMOL/L
EXT MICROALBUMIN URINE RANDOM: 0.5
HCV AB S/CO SERPL IA: 0.37
HCV AB SERPL QL IA: NORMAL
MICROALBUMIN/CREAT UR: 10 MG/G{CREAT}

## 2020-05-09 LAB
LEFT EYE DIABETIC RETINOPATHY: NORMAL
RIGHT EYE DIABETIC RETINOPATHY: NORMAL

## 2020-06-05 ENCOUNTER — TELEMEDICINE (OUTPATIENT)
Dept: INTERNAL MEDICINE CLINIC | Facility: CLINIC | Age: 65
End: 2020-06-05
Payer: COMMERCIAL

## 2020-06-05 DIAGNOSIS — I25.10 ATHEROSCLEROSIS OF NATIVE CORONARY ARTERY OF NATIVE HEART WITHOUT ANGINA PECTORIS: ICD-10-CM

## 2020-06-05 DIAGNOSIS — Z95.810 ICD (IMPLANTABLE CARDIOVERTER-DEFIBRILLATOR) IN PLACE: ICD-10-CM

## 2020-06-05 DIAGNOSIS — F41.1 GENERALIZED ANXIETY DISORDER: ICD-10-CM

## 2020-06-05 DIAGNOSIS — I25.5 ISCHEMIC CARDIOMYOPATHY: ICD-10-CM

## 2020-06-05 DIAGNOSIS — E78.2 MIXED HYPERLIPIDEMIA: ICD-10-CM

## 2020-06-05 DIAGNOSIS — E11.65 UNCONTROLLED TYPE 2 DIABETES MELLITUS WITH HYPERGLYCEMIA (HCC): Primary | ICD-10-CM

## 2020-06-05 DIAGNOSIS — I10 BENIGN ESSENTIAL HYPERTENSION: ICD-10-CM

## 2020-06-05 DIAGNOSIS — G47.00 INSOMNIA, UNSPECIFIED TYPE: ICD-10-CM

## 2020-06-05 DIAGNOSIS — K21.9 GASTROESOPHAGEAL REFLUX DISEASE, ESOPHAGITIS PRESENCE NOT SPECIFIED: ICD-10-CM

## 2020-06-05 PROCEDURE — 99213 OFFICE O/P EST LOW 20 MIN: CPT | Performed by: INTERNAL MEDICINE

## 2020-06-05 RX ORDER — LORAZEPAM 1 MG/1
1 TABLET ORAL DAILY PRN
Qty: 10 TABLET | Refills: 1 | Status: SHIPPED | OUTPATIENT
Start: 2020-06-05 | End: 2020-08-17 | Stop reason: SDUPTHER

## 2020-06-05 RX ORDER — ESZOPICLONE 2 MG/1
2 TABLET, FILM COATED ORAL
Qty: 90 TABLET | Refills: 0 | Status: SHIPPED | OUTPATIENT
Start: 2020-06-05

## 2020-07-25 DIAGNOSIS — K21.9 GASTROESOPHAGEAL REFLUX DISEASE, ESOPHAGITIS PRESENCE NOT SPECIFIED: ICD-10-CM

## 2020-07-26 RX ORDER — LANSOPRAZOLE 30 MG/1
CAPSULE, DELAYED RELEASE ORAL
Qty: 90 CAPSULE | Refills: 3 | Status: SHIPPED | OUTPATIENT
Start: 2020-07-26 | End: 2021-08-09

## 2020-08-10 DIAGNOSIS — F32.A DEPRESSION, UNSPECIFIED DEPRESSION TYPE: ICD-10-CM

## 2020-08-11 RX ORDER — ESCITALOPRAM OXALATE 20 MG/1
TABLET ORAL
Qty: 90 TABLET | Refills: 3 | Status: SHIPPED | OUTPATIENT
Start: 2020-08-11 | End: 2021-10-15

## 2020-08-17 ENCOUNTER — TELEPHONE (OUTPATIENT)
Dept: INTERNAL MEDICINE CLINIC | Facility: CLINIC | Age: 65
End: 2020-08-17

## 2020-08-17 DIAGNOSIS — F41.1 GENERALIZED ANXIETY DISORDER: ICD-10-CM

## 2020-08-17 RX ORDER — LORAZEPAM 1 MG/1
1 TABLET ORAL DAILY PRN
Qty: 30 TABLET | Refills: 0 | Status: SHIPPED | OUTPATIENT
Start: 2020-08-17 | End: 2020-11-09 | Stop reason: SDUPTHER

## 2020-08-17 NOTE — TELEPHONE ENCOUNTER
Pt's wife called in and said the patient is getting his ICD pacemaker changed on 9/21 and the patient is very anxious  She said you give him a small script for lorazepam to take as needed  She said the patient has been taking one a day since he found out about the surgery on 9/21 and is very anxious  She said he will need a refill, a standing order, for at least one a day, until 9/21  [facility] on [address]  Please advise

## 2020-10-01 RX ORDER — OXYCODONE HYDROCHLORIDE AND ACETAMINOPHEN 5; 325 MG/1; MG/1
1 TABLET ORAL EVERY 6 HOURS PRN
COMMUNITY
Start: 2020-09-28 | End: 2020-10-05 | Stop reason: ALTCHOICE

## 2020-10-05 ENCOUNTER — OFFICE VISIT (OUTPATIENT)
Dept: INTERNAL MEDICINE CLINIC | Facility: CLINIC | Age: 65
End: 2020-10-05
Payer: COMMERCIAL

## 2020-10-05 VITALS
HEIGHT: 69 IN | TEMPERATURE: 98.5 F | DIASTOLIC BLOOD PRESSURE: 70 MMHG | WEIGHT: 195.8 LBS | SYSTOLIC BLOOD PRESSURE: 120 MMHG | BODY MASS INDEX: 29 KG/M2 | HEART RATE: 76 BPM | OXYGEN SATURATION: 96 %

## 2020-10-05 DIAGNOSIS — E11.65 UNCONTROLLED TYPE 2 DIABETES MELLITUS WITH HYPERGLYCEMIA (HCC): Primary | ICD-10-CM

## 2020-10-05 DIAGNOSIS — I10 BENIGN ESSENTIAL HYPERTENSION: ICD-10-CM

## 2020-10-05 DIAGNOSIS — K21.9 GASTROESOPHAGEAL REFLUX DISEASE, UNSPECIFIED WHETHER ESOPHAGITIS PRESENT: ICD-10-CM

## 2020-10-05 DIAGNOSIS — I25.5 ISCHEMIC CARDIOMYOPATHY: ICD-10-CM

## 2020-10-05 DIAGNOSIS — I25.10 ATHEROSCLEROSIS OF NATIVE CORONARY ARTERY OF NATIVE HEART WITHOUT ANGINA PECTORIS: ICD-10-CM

## 2020-10-05 DIAGNOSIS — Z95.810 ICD (IMPLANTABLE CARDIOVERTER-DEFIBRILLATOR) IN PLACE: ICD-10-CM

## 2020-10-05 DIAGNOSIS — Z12.5 SCREENING PSA (PROSTATE SPECIFIC ANTIGEN): ICD-10-CM

## 2020-10-05 DIAGNOSIS — F41.1 GENERALIZED ANXIETY DISORDER: ICD-10-CM

## 2020-10-05 DIAGNOSIS — E78.2 MIXED HYPERLIPIDEMIA: ICD-10-CM

## 2020-10-05 PROCEDURE — 99214 OFFICE O/P EST MOD 30 MIN: CPT | Performed by: INTERNAL MEDICINE

## 2020-10-15 LAB
ALBUMIN SERPL-MCNC: 4 G/DL (ref 3.6–5.1)
ALBUMIN/GLOB SERPL: 1.3 (CALC) (ref 1–2.5)
ALP SERPL-CCNC: 59 U/L (ref 35–144)
ALT SERPL-CCNC: 14 U/L (ref 9–46)
AST SERPL-CCNC: 12 U/L (ref 10–35)
BASOPHILS # BLD AUTO: 66 CELLS/UL (ref 0–200)
BASOPHILS NFR BLD AUTO: 0.8 %
BILIRUB SERPL-MCNC: 0.3 MG/DL (ref 0.2–1.2)
BUN SERPL-MCNC: 20 MG/DL (ref 7–25)
BUN/CREAT SERPL: 31 (CALC) (ref 6–22)
CALCIUM SERPL-MCNC: 9.6 MG/DL (ref 8.6–10.3)
CHLORIDE SERPL-SCNC: 103 MMOL/L (ref 98–110)
CHOLEST SERPL-MCNC: 154 MG/DL
CHOLEST/HDLC SERPL: 4.8 (CALC)
CO2 SERPL-SCNC: 26 MMOL/L (ref 20–32)
CREAT SERPL-MCNC: 0.64 MG/DL (ref 0.7–1.25)
EOSINOPHIL # BLD AUTO: 457 CELLS/UL (ref 15–500)
EOSINOPHIL NFR BLD AUTO: 5.5 %
ERYTHROCYTE [DISTWIDTH] IN BLOOD BY AUTOMATED COUNT: 12.9 % (ref 11–15)
EST. AVERAGE GLUCOSE BLD GHB EST-MCNC: 240 (CALC)
EST. AVERAGE GLUCOSE BLD GHB EST-SCNC: 13.3 (CALC)
GLOBULIN SER CALC-MCNC: 3 G/DL (CALC) (ref 1.9–3.7)
GLUCOSE SERPL-MCNC: 233 MG/DL (ref 65–99)
HBA1C MFR BLD: 10 % OF TOTAL HGB
HCT VFR BLD AUTO: 41.7 % (ref 38.5–50)
HDLC SERPL-MCNC: 32 MG/DL
HGB BLD-MCNC: 13.7 G/DL (ref 13.2–17.1)
LDLC SERPL CALC-MCNC: 89 MG/DL (CALC)
LYMPHOCYTES # BLD AUTO: 3063 CELLS/UL (ref 850–3900)
LYMPHOCYTES NFR BLD AUTO: 36.9 %
MCH RBC QN AUTO: 30.7 PG (ref 27–33)
MCHC RBC AUTO-ENTMCNC: 32.9 G/DL (ref 32–36)
MCV RBC AUTO: 93.5 FL (ref 80–100)
MONOCYTES # BLD AUTO: 540 CELLS/UL (ref 200–950)
MONOCYTES NFR BLD AUTO: 6.5 %
NEUTROPHILS # BLD AUTO: 4175 CELLS/UL (ref 1500–7800)
NEUTROPHILS NFR BLD AUTO: 50.3 %
NONHDLC SERPL-MCNC: 122 MG/DL (CALC)
PLATELET # BLD AUTO: 295 THOUSAND/UL (ref 140–400)
PMV BLD REES-ECKER: 10 FL (ref 7.5–12.5)
POTASSIUM SERPL-SCNC: 4.6 MMOL/L (ref 3.5–5.3)
PROT SERPL-MCNC: 7 G/DL (ref 6.1–8.1)
PSA SERPL-MCNC: 0.3 NG/ML
RBC # BLD AUTO: 4.46 MILLION/UL (ref 4.2–5.8)
SL AMB EGFR AFRICAN AMERICAN: 119 ML/MIN/1.73M2
SL AMB EGFR NON AFRICAN AMERICAN: 103 ML/MIN/1.73M2
SODIUM SERPL-SCNC: 138 MMOL/L (ref 135–146)
TRIGL SERPL-MCNC: 237 MG/DL
WBC # BLD AUTO: 8.3 THOUSAND/UL (ref 3.8–10.8)

## 2020-11-05 ENCOUNTER — TELEMEDICINE (OUTPATIENT)
Dept: INTERNAL MEDICINE CLINIC | Facility: CLINIC | Age: 65
End: 2020-11-05
Payer: COMMERCIAL

## 2020-11-05 DIAGNOSIS — Z11.59 ENCOUNTER FOR SCREENING FOR OTHER VIRAL DISEASES: Primary | ICD-10-CM

## 2020-11-05 DIAGNOSIS — Z11.59 ENCOUNTER FOR SCREENING FOR OTHER VIRAL DISEASES: ICD-10-CM

## 2020-11-05 PROCEDURE — 99441 PR PHYS/QHP TELEPHONE EVALUATION 5-10 MIN: CPT | Performed by: INTERNAL MEDICINE

## 2020-11-05 PROCEDURE — U0003 INFECTIOUS AGENT DETECTION BY NUCLEIC ACID (DNA OR RNA); SEVERE ACUTE RESPIRATORY SYNDROME CORONAVIRUS 2 (SARS-COV-2) (CORONAVIRUS DISEASE [COVID-19]), AMPLIFIED PROBE TECHNIQUE, MAKING USE OF HIGH THROUGHPUT TECHNOLOGIES AS DESCRIBED BY CMS-2020-01-R: HCPCS | Performed by: INTERNAL MEDICINE

## 2020-11-06 ENCOUNTER — DOCUMENTATION (OUTPATIENT)
Dept: INTERNAL MEDICINE CLINIC | Facility: CLINIC | Age: 65
End: 2020-11-06

## 2020-11-06 LAB — SARS-COV-2 RNA SPEC QL NAA+PROBE: NOT DETECTED

## 2020-11-09 DIAGNOSIS — F41.1 GENERALIZED ANXIETY DISORDER: ICD-10-CM

## 2020-11-10 RX ORDER — LORAZEPAM 1 MG/1
1 TABLET ORAL DAILY PRN
Qty: 30 TABLET | Refills: 0 | Status: SHIPPED | OUTPATIENT
Start: 2020-11-10 | End: 2021-03-23 | Stop reason: SDUPTHER

## 2021-03-23 DIAGNOSIS — F41.1 GENERALIZED ANXIETY DISORDER: ICD-10-CM

## 2021-03-23 RX ORDER — LORAZEPAM 1 MG/1
1 TABLET ORAL DAILY PRN
Qty: 30 TABLET | Refills: 0 | Status: SHIPPED | OUTPATIENT
Start: 2021-03-23 | End: 2022-07-21 | Stop reason: SDUPTHER

## 2021-04-12 ENCOUNTER — OFFICE VISIT (OUTPATIENT)
Dept: INTERNAL MEDICINE CLINIC | Facility: CLINIC | Age: 66
End: 2021-04-12
Payer: COMMERCIAL

## 2021-04-12 VITALS
OXYGEN SATURATION: 96 % | HEART RATE: 70 BPM | RESPIRATION RATE: 16 BRPM | BODY MASS INDEX: 29.77 KG/M2 | SYSTOLIC BLOOD PRESSURE: 126 MMHG | WEIGHT: 201 LBS | DIASTOLIC BLOOD PRESSURE: 74 MMHG | HEIGHT: 69 IN

## 2021-04-12 DIAGNOSIS — Z12.11 SCREENING FOR COLORECTAL CANCER: ICD-10-CM

## 2021-04-12 DIAGNOSIS — I50.22 CHRONIC SYSTOLIC CHF (CONGESTIVE HEART FAILURE) (HCC): ICD-10-CM

## 2021-04-12 DIAGNOSIS — Z95.810 ICD (IMPLANTABLE CARDIOVERTER-DEFIBRILLATOR) IN PLACE: ICD-10-CM

## 2021-04-12 DIAGNOSIS — F41.1 GENERALIZED ANXIETY DISORDER: ICD-10-CM

## 2021-04-12 DIAGNOSIS — I47.2 VENTRICULAR TACHYCARDIA (HCC): ICD-10-CM

## 2021-04-12 DIAGNOSIS — E11.65 UNCONTROLLED TYPE 2 DIABETES MELLITUS WITH HYPERGLYCEMIA (HCC): Primary | ICD-10-CM

## 2021-04-12 DIAGNOSIS — E78.2 MIXED HYPERLIPIDEMIA: ICD-10-CM

## 2021-04-12 DIAGNOSIS — Z11.4 SCREENING FOR HIV (HUMAN IMMUNODEFICIENCY VIRUS): ICD-10-CM

## 2021-04-12 DIAGNOSIS — Z12.12 SCREENING FOR COLORECTAL CANCER: ICD-10-CM

## 2021-04-12 PROBLEM — I47.20 VENTRICULAR TACHYCARDIA: Status: ACTIVE | Noted: 2021-04-12

## 2021-04-12 PROCEDURE — 99214 OFFICE O/P EST MOD 30 MIN: CPT | Performed by: INTERNAL MEDICINE

## 2021-04-12 RX ORDER — INSULIN GLARGINE 300 U/ML
130 INJECTION, SOLUTION SUBCUTANEOUS DAILY
Start: 2021-04-12

## 2021-04-12 NOTE — ASSESSMENT & PLAN NOTE
Wt Readings from Last 3 Encounters:   04/12/21 91 2 kg (201 lb)   10/05/20 88 8 kg (195 lb 12 8 oz)   12/04/19 91 kg (200 lb 9 6 oz)         Euvolemic, stable weight  F/u with cardiology

## 2021-04-12 NOTE — PROGRESS NOTES
Assessment/Plan:    Uncontrolled type 2 diabetes mellitus with hyperglycemia ([facility])    Lab Results   Component Value Date    HGBA1C 10 0 (H) 10/14/2020   BMI Counseling: Body mass index is 29 68 kg/m²  The BMI is above normal  Nutrition recommendations include decreasing overall calorie intake, consuming healthier snacks and moderation in carbohydrate intake  Continue current medications  Schedule eye exam for May      Chronic systolic CHF (congestive heart failure) ([facility])  Wt Readings from Last 3 Encounters:   04/12/21 91 2 kg (201 lb)   10/05/20 88 8 kg (195 lb 12 8 oz)   12/04/19 91 kg (200 lb 9 6 oz)         Euvolemic, stable weight  F/u with cardiology    Ventricular tachycardia ([address] )  ICD in place    Generalized anxiety disorder  Stable on Lexapro and takes lorazepam PRN    Discussed pneumococcal vaccine at next visit (since he is in the middle of getting the COVID vaccines)     Problem List Items Addressed This Visit        Endocrine    Uncontrolled type 2 diabetes mellitus with hyperglycemia ([facility] [address] ) - Primary       Lab Results   Component Value Date    HGBA1C 10 0 (H) 10/14/2020   BMI Counseling: Body mass index is 29 68 kg/m²  The BMI is above normal  Nutrition recommendations include decreasing overall calorie intake, consuming healthier snacks and moderation in carbohydrate intake     Continue current medications  Schedule eye exam for May           Relevant Medications    insulin glargine (Toujeo SoloStar) 300 units/mL CONCENTRATED U-300 injection pen (1-unit dial)       Cardiovascular and Mediastinum    Chronic systolic CHF (congestive heart failure) ([facility])     Wt Readings from Last 3 Encounters:   04/12/21 91 2 kg (201 lb)   10/05/20 88 8 kg (195 lb 12 8 oz)   12/04/19 91 kg (200 lb 9 6 oz)         Euvolemic, stable weight  F/u with cardiology         Ventricular tachycardia ([address] )     ICD in place            Other    ICD (implantable cardioverter-defibrillator) in place    Mixed hyperlipidemia Generalized anxiety disorder     Stable on Lexapro and takes lorazepam PRN           Other Visit Diagnoses     Screening for HIV (human immunodeficiency virus)        Relevant Orders    HIV 1/2 Antigen/Antibody (4th Generation) w Reflex [facility]    Screening for colorectal cancer        Relevant Orders    Ambulatory referral to Gastroenterology            Subjective:      Patient ID: Fide Corado is a 72 y o  male  HPI  Here for a follow up  Feeling well overall  No new issues  DM poorly controlled managed by endocrinology  He is on  Metformin Jardiance Trulicity and [id number] units at night  Sugars up in the 220s  He denies hypoglycemic spells  Diet is poor  Due for eye exam this May  TG remain high on Lovaza Zetia and atorvastatin  He has received his first COVID vaccine    The following portions of the patient's history were reviewed and updated as appropriate: allergies, current medications, past family history, past medical history, past social history, past surgical history and problem list     Review of Systems   Constitutional: Negative for chills and fever  HENT: Negative for congestion, rhinorrhea and sore throat  Respiratory: Negative for cough and shortness of breath  Cardiovascular: Negative for chest pain, palpitations and leg swelling  Gastrointestinal: Negative for abdominal pain, constipation and diarrhea  Genitourinary: Negative for difficulty urinating  Musculoskeletal: Positive for arthralgias (left shoulder)  Neurological: Negative for dizziness and headaches  Objective:      /74   Pulse 70   Resp 16   Ht 5' 9" (1 753 m)   Wt 91 2 kg (201 lb)   SpO2 96%   BMI 29 68 kg/m²          Physical Exam  Constitutional:       General: He is not in acute distress  Appearance: He is well-developed  He is not ill-appearing, toxic-appearing or diaphoretic  HENT:      Head: Normocephalic and atraumatic     Eyes:      Conjunctiva/sclera: Conjunctivae normal    Neck: Musculoskeletal: Neck supple  Cardiovascular:      Rate and Rhythm: Normal rate and regular rhythm  Pulses: Pulses are weak  Dorsalis pedis pulses are 1+ on the right side and 1+ on the left side  Heart sounds: Normal heart sounds  No murmur  Pulmonary:      Effort: Pulmonary effort is normal  No respiratory distress  Breath sounds: Normal breath sounds  No wheezing or rales  Abdominal:      General: There is no distension  Palpations: Abdomen is soft  There is no mass  Tenderness: There is no abdominal tenderness  There is no guarding or rebound  Musculoskeletal: Normal range of motion  Comments: Full ROM left shoulder   Feet:      Right foot:      Skin integrity: No ulcer, skin breakdown, erythema, warmth, callus or dry skin  Left foot:      Skin integrity: No ulcer, skin breakdown, erythema, warmth, callus or dry skin  Skin:     General: Skin is warm and dry  Neurological:      Mental Status: He is alert and oriented to person, place, and time  Psychiatric:         Behavior: Behavior normal          Thought Content: Thought content normal          Judgment: Judgment normal          Patient's shoes and socks removed  Right Foot/Ankle   Right Foot Inspection  Skin Exam: skin normal and skin intact no dry skin, no warmth, no callus, no erythema, no maceration, no abnormal color, no pre-ulcer, no ulcer and no callus                          Toe Exam: ROM and strength within normal limits  Sensory       Monofilament testing: intact  Vascular    The right DP pulse is 1+  Left Foot/Ankle  Left Foot Inspection  Skin Exam: skin normal and skin intactno dry skin, no warmth, no erythema, no maceration, normal color, no pre-ulcer, no ulcer and no callus                         Toe Exam: ROM and strength within normal limits                   Sensory       Monofilament: intact  Vascular    The left DP pulse is 1+  Assign Risk Category:  No deformity present;  No loss of protective sensation; Weak pulses       Risk: 1

## 2021-04-12 NOTE — ASSESSMENT & PLAN NOTE
Lab Results   Component Value Date    HGBA1C 10 0 (H) 10/14/2020   BMI Counseling: Body mass index is 29 68 kg/m²  The BMI is above normal  Nutrition recommendations include decreasing overall calorie intake, consuming healthier snacks and moderation in carbohydrate intake     Continue current medications  Schedule eye exam for May

## 2021-08-06 DIAGNOSIS — K21.9 GASTROESOPHAGEAL REFLUX DISEASE: ICD-10-CM

## 2021-08-09 RX ORDER — LANSOPRAZOLE 30 MG/1
CAPSULE, DELAYED RELEASE ORAL
Qty: 90 CAPSULE | Refills: 3 | Status: SHIPPED | OUTPATIENT
Start: 2021-08-09

## 2021-10-12 ENCOUNTER — OFFICE VISIT (OUTPATIENT)
Dept: INTERNAL MEDICINE CLINIC | Facility: CLINIC | Age: 66
End: 2021-10-12
Payer: COMMERCIAL

## 2021-10-12 VITALS
HEART RATE: 70 BPM | RESPIRATION RATE: 16 BRPM | HEIGHT: 69 IN | DIASTOLIC BLOOD PRESSURE: 78 MMHG | SYSTOLIC BLOOD PRESSURE: 130 MMHG | WEIGHT: 199 LBS | OXYGEN SATURATION: 97 % | BODY MASS INDEX: 29.47 KG/M2

## 2021-10-12 DIAGNOSIS — Z12.12 SCREENING FOR COLORECTAL CANCER: ICD-10-CM

## 2021-10-12 DIAGNOSIS — Z12.11 SCREENING FOR COLORECTAL CANCER: ICD-10-CM

## 2021-10-12 DIAGNOSIS — L03.012 PARONYCHIA OF LEFT THUMB: ICD-10-CM

## 2021-10-12 DIAGNOSIS — Z00.00 MEDICARE ANNUAL WELLNESS VISIT, SUBSEQUENT: Primary | ICD-10-CM

## 2021-10-12 DIAGNOSIS — Z12.5 SCREENING PSA (PROSTATE SPECIFIC ANTIGEN): ICD-10-CM

## 2021-10-12 PROCEDURE — G0438 PPPS, INITIAL VISIT: HCPCS | Performed by: INTERNAL MEDICINE

## 2021-10-12 PROCEDURE — 1123F ACP DISCUSS/DSCN MKR DOCD: CPT | Performed by: INTERNAL MEDICINE

## 2021-10-12 RX ORDER — INSULIN LISPRO 200 [IU]/ML
INJECTION, SOLUTION SUBCUTANEOUS
COMMUNITY
Start: 2021-09-02

## 2021-10-14 ENCOUNTER — TELEPHONE (OUTPATIENT)
Dept: INTERNAL MEDICINE CLINIC | Facility: CLINIC | Age: 66
End: 2021-10-14

## 2021-10-15 DIAGNOSIS — F32.A DEPRESSION, UNSPECIFIED DEPRESSION TYPE: ICD-10-CM

## 2021-10-15 RX ORDER — ESCITALOPRAM OXALATE 20 MG/1
TABLET ORAL
Qty: 90 TABLET | Refills: 3 | Status: SHIPPED | OUTPATIENT
Start: 2021-10-15 | End: 2022-05-31 | Stop reason: SDUPTHER

## 2021-12-30 LAB — PSA SERPL-MCNC: 0.28 NG/ML

## 2022-05-11 ENCOUNTER — TELEPHONE (OUTPATIENT)
Dept: INTERNAL MEDICINE CLINIC | Facility: CLINIC | Age: 67
End: 2022-05-11

## 2022-05-31 DIAGNOSIS — F32.A DEPRESSION, UNSPECIFIED DEPRESSION TYPE: ICD-10-CM

## 2022-06-01 RX ORDER — ESCITALOPRAM OXALATE 20 MG/1
20 TABLET ORAL DAILY
Qty: 90 TABLET | Refills: 0 | Status: SHIPPED | OUTPATIENT
Start: 2022-06-01 | End: 2022-08-03

## 2022-07-21 DIAGNOSIS — F41.1 GENERALIZED ANXIETY DISORDER: ICD-10-CM

## 2022-07-21 RX ORDER — LORAZEPAM 1 MG/1
1 TABLET ORAL DAILY PRN
Qty: 30 TABLET | Refills: 0 | Status: SHIPPED | OUTPATIENT
Start: 2022-07-21

## 2022-08-03 DIAGNOSIS — F32.A DEPRESSION, UNSPECIFIED DEPRESSION TYPE: ICD-10-CM

## 2022-08-03 RX ORDER — ESCITALOPRAM OXALATE 20 MG/1
TABLET ORAL
Qty: 90 TABLET | Refills: 3 | Status: SHIPPED | OUTPATIENT
Start: 2022-08-03 | End: 2022-09-06 | Stop reason: SDUPTHER

## 2022-08-26 ENCOUNTER — PREPPED CHART (OUTPATIENT)
Dept: URBAN - METROPOLITAN AREA CLINIC 6 | Facility: CLINIC | Age: 67
End: 2022-08-26

## 2022-09-06 DIAGNOSIS — F32.A DEPRESSION, UNSPECIFIED DEPRESSION TYPE: ICD-10-CM

## 2022-09-06 RX ORDER — ESCITALOPRAM OXALATE 20 MG/1
20 TABLET ORAL DAILY
Qty: 90 TABLET | Refills: 3 | Status: SHIPPED | OUTPATIENT
Start: 2022-09-06 | End: 2022-09-07 | Stop reason: SDUPTHER

## 2022-09-07 DIAGNOSIS — F32.A DEPRESSION, UNSPECIFIED DEPRESSION TYPE: ICD-10-CM

## 2022-09-07 RX ORDER — ESCITALOPRAM OXALATE 20 MG/1
20 TABLET ORAL DAILY
Qty: 90 TABLET | Refills: 3 | Status: SHIPPED | OUTPATIENT
Start: 2022-09-07

## 2022-10-03 ENCOUNTER — OFFICE VISIT (OUTPATIENT)
Dept: URGENT CARE | Age: 67
End: 2022-10-03
Payer: MEDICARE

## 2022-10-03 ENCOUNTER — APPOINTMENT (OUTPATIENT)
Dept: RADIOLOGY | Age: 67
End: 2022-10-03
Payer: MEDICARE

## 2022-10-03 VITALS
RESPIRATION RATE: 18 BRPM | OXYGEN SATURATION: 98 % | DIASTOLIC BLOOD PRESSURE: 76 MMHG | BODY MASS INDEX: 29.39 KG/M2 | WEIGHT: 199 LBS | SYSTOLIC BLOOD PRESSURE: 136 MMHG | TEMPERATURE: 97.1 F | HEART RATE: 70 BPM

## 2022-10-03 DIAGNOSIS — T14.90XA INJURY: Primary | ICD-10-CM

## 2022-10-03 DIAGNOSIS — T14.90XA INJURY: ICD-10-CM

## 2022-10-03 PROCEDURE — 73110 X-RAY EXAM OF WRIST: CPT

## 2022-10-03 PROCEDURE — G0463 HOSPITAL OUTPT CLINIC VISIT: HCPCS | Performed by: STUDENT IN AN ORGANIZED HEALTH CARE EDUCATION/TRAINING PROGRAM

## 2022-10-03 PROCEDURE — 99213 OFFICE O/P EST LOW 20 MIN: CPT | Performed by: STUDENT IN AN ORGANIZED HEALTH CARE EDUCATION/TRAINING PROGRAM

## 2022-10-03 NOTE — PROGRESS NOTES
[address] [employer] [facility]        NAME: Mary Gill is a 79 y o  male  : 1955    MRN: [id number]  DATE: October 3, 2022  TIME: 3:00 PM    Assessment and Plan   Injury [T14 90XA]  1  Injury  XR wrist 3+ vw right     RICE terapy     Patient Instructions       Follow up with PCP in 3-5 days  Proceed to  ER if symptoms worsen  Chief Complaint     Chief Complaint   Patient presents with    Wrist Pain    Wrist Injury     Patient stated he fell down hurting right wrist it happen 5 days ago  History of Present Illness       HPI  Patient sustained a fall onto his right wrist about 5 days ago  Since then he has been having a lot of worse pain having difficulty when he is moving it  Patient denies any weakness numbness tingling straw sensation    Review of Systems   Review of Systems  Per HPI    Current Medications       Current Outpatient Medications:     escitalopram (LEXAPRO) 20 mg tablet, Take 1 tablet (20 mg total) by mouth daily, Disp: 90 tablet, Rfl: 3    aspirin 81 MG tablet, Take 1 tablet by mouth 2 (two) times a day , Disp: , Rfl:     atorvastatin (LIPITOR) 40 mg tablet, Take 1 tablet by mouth daily, Disp: , Rfl:     B-D ULTRAFINE III SHORT PEN 31G X 8 MM MISC, , Disp: , Rfl:     BD ULTRA-FINE PEN NEEDLES 29G X 12 7MM MISC, , Disp: , Rfl:     candesartan (ATACAND) 16 mg tablet, Take 1 tablet (16 mg total) by mouth daily, Disp: , Rfl:     carvedilol (COREG) 12 5 mg tablet, Take 1 tablet by mouth 2 (two) times a day, Disp: , Rfl:     Continuous Blood Gluc Sensor (FREESTYLE DRU SENSOR SYSTEM) MISC, 1 each by Does not apply route every 14 (fourteen) days, Disp: , Rfl:     Dulaglutide (TRULICITY) 1 5 TP/8 1AE SOPN, Inject 0 5 mL (1 5 mg total) under the skin once a week, Disp: , Rfl:     Empagliflozin (JARDIANCE) 25 MG TABS, Take 1 tablet (25 mg total) by mouth every morning, Disp: , Rfl:     eszopiclone (LUNESTA) 2 mg tablet, Take 1 tablet (2 mg total) by mouth daily at bedtime as needed for sleep Take immediately before bedtime, Disp: 90 tablet, Rfl: 0    ezetimibe (ZETIA) 10 mg tablet, Take 1 tablet by mouth daily, Disp: , Rfl:     insulin glargine (Toujeo SoloStar) 300 units/mL CONCENTRATED U-300 injection pen (1-unit dial), Inject 130 Units under the skin daily, Disp: , Rfl:     Insulin Pen Needle (PEN NEEDLES 29GX1/2") 29G X 12MM MISC, 2 X daily subcutaneously dx: E11 65, Disp: , Rfl:     insuln lispro (HumaLOG KwikPen) 200 units/mL CONCENTRATED U-200 injection pen, 10 units lunch and dinner, Disp: , Rfl:     isosorbide mononitrate (IMDUR) 30 mg 24 hr tablet, Take 30 mg by mouth daily at bedtime, Disp: , Rfl: 3    Lancets MISC, test once times daily E11 65, Disp: , Rfl:     lansoprazole (PREVACID) 30 mg capsule, TAKE 1 CAPSULE BY MOUTH  DAILY, Disp: 90 capsule, Rfl: 3    LORazepam (ATIVAN) 1 mg tablet, Take 1 tablet (1 mg total) by mouth daily as needed for anxiety, Disp: 30 tablet, Rfl: 0    metFORMIN (GLUCOPHAGE) 1000 MG tablet, , Disp: , Rfl:     metFORMIN (GLUCOPHAGE) 500 mg tablet, Take 1,000 mg by mouth 2 (two) times a day, Disp: , Rfl:     Misc Natural Products (BLOOD SUGAR 360 PO), ONETOUCH ULTRA TEST STRIPS Test once daily E11 65, Disp: , Rfl:     omega-3-acid ethyl esters (LOVAZA) 1 g capsule, Take 2 g by mouth 2 (two) times a day , Disp: , Rfl:     sotalol (BETAPACE) 80 mg tablet, Take 1 tablet by mouth 2 (two) times a day, Disp: , Rfl:     spironolactone (ALDACTONE) 25 mg tablet, Take 1 tablet by mouth daily, Disp: , Rfl:     Current Allergies     Allergies as of 10/03/2022 - Reviewed 10/03/2022   Allergen Reaction Noted    Ace inhibitors Cough 10/06/2020            The following portions of the patient's history were reviewed and updated as appropriate: allergies, current medications, past family history, past medical history, past social history, past surgical history and problem list      Past Medical History:   Diagnosis Date    Rectal hemorrhage     last assessed: May 29, 2015       Past Surgical History:   Procedure Laterality Date    CORONARY ARTERY BYPASS GRAFT      ELBOW SURGERY Right     Bursitis - last assessed: Sept 15, 2016       Family History   Problem Relation Age of Onset    No Known Problems Mother     Cancer Father     Coronary artery disease Father          Medications have been verified  Objective   /76   Pulse 70   Temp (!) 97 1 °F (36 2 °C) (Temporal)   Resp 18   Wt 90 3 kg (199 lb)   SpO2 98%   BMI 29 39 kg/m²   No LMP for male patient  Physical Exam     Physical Exam  Constitutional:       General: He is not in acute distress  Appearance: He is well-developed  He is not diaphoretic  HENT:      Head: Normocephalic and atraumatic  Right Ear: External ear normal       Left Ear: External ear normal    Cardiovascular:      Rate and Rhythm: Normal rate and regular rhythm  Heart sounds: Normal heart sounds  Pulmonary:      Effort: Pulmonary effort is normal  No respiratory distress  Breath sounds: Normal breath sounds  No wheezing  Abdominal:      General: Bowel sounds are normal  There is no distension  Palpations: Abdomen is soft  There is no mass  Tenderness: There is no abdominal tenderness  Musculoskeletal:         General: Tenderness present  Cervical back: Normal range of motion  Comments: Slightly limited range of motion to the right wrist, no swelling no erythema or bruising, sent strength of sufficient   Lymphadenopathy:      Cervical: No cervical adenopathy  Neurological:      Mental Status: He is alert and oriented to person, place, and time

## 2022-10-17 ENCOUNTER — TELEPHONE (OUTPATIENT)
Dept: INTERNAL MEDICINE CLINIC | Facility: CLINIC | Age: 67
End: 2022-10-17

## 2022-10-17 DIAGNOSIS — K21.9 GASTROESOPHAGEAL REFLUX DISEASE: ICD-10-CM

## 2022-10-17 RX ORDER — LANSOPRAZOLE 30 MG/1
30 CAPSULE, DELAYED RELEASE ORAL DAILY
Qty: 90 CAPSULE | Refills: 3 | Status: SHIPPED | OUTPATIENT
Start: 2022-10-17

## 2022-10-17 NOTE — TELEPHONE ENCOUNTER
Request for Lansoprazole (prevacid) 30 mg 1 capsule daily, 90 day supply to [facility]

## 2022-10-28 ENCOUNTER — RA CDI HCC (OUTPATIENT)
Dept: OTHER | Facility: HOSPITAL | Age: 67
End: 2022-10-28

## 2022-10-28 NOTE — PROGRESS NOTES
I11 0  [facility]  coding opportunities          Chart Reviewed number of suggestions sent to Provider: 1     Patients Insurance     Medicare Insurance: [employer]

## 2023-01-20 ENCOUNTER — RA CDI HCC (OUTPATIENT)
Dept: OTHER | Facility: HOSPITAL | Age: 68
End: 2023-01-20

## 2023-01-20 NOTE — PROGRESS NOTES
I11 0 for 2023  [facility]  coding opportunities          Chart Reviewed number of suggestions sent to Provider: 1     Patients Insurance     Medicare Insurance: [employer]

## 2023-01-27 ENCOUNTER — OFFICE VISIT (OUTPATIENT)
Dept: INTERNAL MEDICINE CLINIC | Facility: CLINIC | Age: 68
End: 2023-01-27

## 2023-01-27 VITALS
WEIGHT: 202 LBS | BODY MASS INDEX: 29.92 KG/M2 | SYSTOLIC BLOOD PRESSURE: 112 MMHG | DIASTOLIC BLOOD PRESSURE: 60 MMHG | OXYGEN SATURATION: 99 % | HEART RATE: 69 BPM | HEIGHT: 69 IN

## 2023-01-27 DIAGNOSIS — Z00.00 MEDICARE ANNUAL WELLNESS VISIT, SUBSEQUENT: Primary | ICD-10-CM

## 2023-01-27 DIAGNOSIS — I50.22 CHRONIC SYSTOLIC CHF (CONGESTIVE HEART FAILURE) (HCC): ICD-10-CM

## 2023-01-27 DIAGNOSIS — F41.1 GENERALIZED ANXIETY DISORDER: ICD-10-CM

## 2023-01-27 DIAGNOSIS — E11.65 UNCONTROLLED TYPE 2 DIABETES MELLITUS WITH HYPERGLYCEMIA (HCC): ICD-10-CM

## 2023-01-27 LAB
CREAT UR-MCNC: 24 MG/DL
HBA1C MFR BLD HPLC: 12.3 %
MICROALBUMIN UR-MCNC: <5 MG/L (ref 0–20)
MICROALBUMIN/CREAT 24H UR: <21 MG/G CREATININE (ref 0–30)

## 2023-01-27 PROCEDURE — 3046F HEMOGLOBIN A1C LEVEL >9.0%: CPT | Performed by: INTERNAL MEDICINE

## 2023-01-27 RX ORDER — DULAGLUTIDE 3 MG/.5ML
3 INJECTION, SOLUTION SUBCUTANEOUS
Start: 2023-01-27

## 2023-01-27 RX ORDER — LORAZEPAM 1 MG/1
1 TABLET ORAL DAILY PRN
Qty: 30 TABLET | Refills: 0 | Status: SHIPPED | OUTPATIENT
Start: 2023-01-27

## 2023-01-27 NOTE — PATIENT INSTRUCTIONS
Medicare Preventive Visit Patient Instructions  Thank you for completing your Welcome to Medicare Visit or Medicare Annual Wellness Visit today  Your next wellness visit will be due in one year (1/28/2024)  The screening/preventive services that you may require over the next 5-10 years are detailed below  Some tests may not apply to you based off risk factors and/or age  Screening tests ordered at today's visit but not completed yet may show as past due  Also, please note that scanned in results may not display below  Preventive Screenings:  Service Recommendations Previous Testing/Comments   Colorectal Cancer Screening  · Colonoscopy    · Fecal Occult Blood Test (FOBT)/Fecal Immunochemical Test (FIT)  · Fecal DNA/Cologuard Test  · Flexible Sigmoidoscopy Age: 39-70 years old   Colonoscopy: every 10 years (May be performed more frequently if at higher risk)  OR  FOBT/FIT: every 1 year  OR  Cologuard: every 3 years  OR  Sigmoidoscopy: every 5 years  Screening may be recommended earlier than age 39 if at higher risk for colorectal cancer  Also, an individualized decision between you and your healthcare provider will decide whether screening between the ages of 74-80 would be appropriate   Colonoscopy: 10/20/2021  FOBT/FIT: Not on file  Cologuard: 10/20/2021  Sigmoidoscopy: Not on file          Prostate Cancer Screening Individualized decision between patient and health care provider in men between ages of 53-78   Medicare will cover every 12 months beginning on the day after your 50th birthday PSA: 0 28 ng/mL           Hepatitis C Screening Once for adults born between 1945 and 1965  More frequently in patients at high risk for Hepatitis C Hep C Antibody: 05/06/2020        Diabetes Screening 1-2 times per year if you're at risk for diabetes or have pre-diabetes Fasting glucose: No results in last 5 years (No results in last 5 years)  A1C: 10 0 % of total Hgb (10/14/2020)      Cholesterol Screening Once every 5 years if you don't have a lipid disorder  May order more often based on risk factors  Lipid panel: 07/21/2021         Other Preventive Screenings Covered by Medicare:  1  Abdominal Aortic Aneurysm (AAA) Screening: covered once if your at risk  You're considered to be at risk if you have a family history of AAA or a male between the age of 73-68 who smoking at least 100 cigarettes in your lifetime  2  Lung Cancer Screening: covers low dose CT scan once per year if you meet all of the following conditions: (1) Age 50-69; (2) No signs or symptoms of lung cancer; (3) Current smoker or have quit smoking within the last 15 years; (4) You have a tobacco smoking history of at least 20 pack years (packs per day x number of years you smoked); (5) You get a written order from a healthcare provider  3  Glaucoma Screening: covered annually if you're considered high risk: (1) You have diabetes OR (2) Family history of glaucoma OR (3)  aged 48 and older OR (3)  American aged 72 and older  3  Osteoporosis Screening: covered every 2 years if you meet one of the following conditions: (1) Have a vertebral abnormality; (2) On glucocorticoid therapy for more than 3 months; (3) Have primary hyperparathyroidism; (4) On osteoporosis medications and need to assess response to drug therapy  5  HIV Screening: covered annually if you're between the age of 12-76  Also covered annually if you are younger than 13 and older than 72 with risk factors for HIV infection  For pregnant patients, it is covered up to 3 times per pregnancy      Immunizations:  Immunization Recommendations   Influenza Vaccine Annual influenza vaccination during flu season is recommended for all persons aged >= 6 months who do not have contraindications   Pneumococcal Vaccine   * Pneumococcal conjugate vaccine = PCV13 (Prevnar 13), PCV15 (Vaxneuvance), PCV20 (Prevnar 20)  * Pneumococcal polysaccharide vaccine = PPSV23 (Pneumovax) Adults 25-60 years old: 1-3 doses may be recommended based on certain risk factors  Adults 72 years old: 1-2 doses may be recommended based off what pneumonia vaccine you previously received   Hepatitis B Vaccine 3 dose series if at intermediate or high risk (ex: diabetes, end stage renal disease, liver disease)   Tetanus (Td) Vaccine - COST NOT COVERED BY MEDICARE PART B Following completion of primary series, a booster dose should be given every 10 years to maintain immunity against tetanus  Td may also be given as tetanus wound prophylaxis  Tdap Vaccine - COST NOT COVERED BY MEDICARE PART B Recommended at least once for all adults  For pregnant patients, recommended with each pregnancy  Shingles Vaccine (Shingrix) - COST NOT COVERED BY MEDICARE PART B  2 shot series recommended in those aged 48 and above     Health Maintenance Due:      Topic Date Due   • Colorectal Cancer Screening  10/20/2024   • Hepatitis C Screening  Completed     Immunizations Due:      Topic Date Due   • COVID-19 Vaccine (1) Never done   • Pneumococcal Vaccine: 65+ Years (1 - PCV) Never done   • Influenza Vaccine (1) 09/01/2022     Advance Directives   What are advance directives? Advance directives are legal documents that state your wishes and plans for medical care  These plans are made ahead of time in case you lose your ability to make decisions for yourself  Advance directives can apply to any medical decision, such as the treatments you want, and if you want to donate organs  What are the types of advance directives? There are many types of advance directives, and each state has rules about how to use them  You may choose a combination of any of the following:  · Living will: This is a written record of the treatment you want  You can also choose which treatments you do not want, which to limit, and which to stop at a certain time  This includes surgery, medicine, IV fluid, and tube feedings     · Durable power of  for healthcare [facility]): This is a written record that states who you want to make healthcare choices for you when you are unable to make them for yourself  This person, called a proxy, is usually a family member or a friend  You may choose more than 1 proxy  · Do not resuscitate (DNR) order:  A DNR order is used in case your heart stops beating or you stop breathing  It is a request not to have certain forms of treatment, such as CPR  A DNR order may be included in other types of advance directives  · Medical directive: This covers the care that you want if you are in a coma, near death, or unable to make decisions for yourself  You can list the treatments you want for each condition  Treatment may include pain medicine, surgery, blood transfusions, dialysis, IV or tube feedings, and a ventilator (breathing machine)  · Values history: This document has questions about your views, beliefs, and how you feel and think about life  This information can help others choose the care that you would choose  Why are advance directives important? An advance directive helps you control your care  Although spoken wishes may be used, it is better to have your wishes written down  Spoken wishes can be misunderstood, or not followed  Treatments may be given even if you do not want them  An advance directive may make it easier for your family to make difficult choices about your care  Weight Management   Why it is important to manage your weight:  Being overweight increases your risk of health conditions such as heart disease, high blood pressure, type 2 diabetes, and certain types of cancer  It can also increase your risk for osteoarthritis, sleep apnea, and other respiratory problems  Aim for a slow, steady weight loss  Even a small amount of weight loss can lower your risk of health problems  How to lose weight safely:  A safe and healthy way to lose weight is to eat fewer calories and get regular exercise   You can lose up about 1 pound a week by decreasing the number of calories you eat by 500 calories each day  Healthy meal plan for weight management:  A healthy meal plan includes a variety of foods, contains fewer calories, and helps you stay healthy  A healthy meal plan includes the following:  · Eat whole-grain foods more often  A healthy meal plan should contain fiber  Fiber is the part of grains, fruits, and vegetables that is not broken down by your body  Whole-grain foods are healthy and provide extra fiber in your diet  Some examples of whole-grain foods are whole-wheat breads and pastas, oatmeal, brown rice, and bulgur  · Eat a variety of vegetables every day  Include dark, leafy greens such as spinach, kale, nestor greens, and mustard greens  Eat yellow and orange vegetables such as carrots, sweet potatoes, and winter squash  · Eat a variety of fruits every day  Choose fresh or canned fruit (canned in its own juice or light syrup) instead of juice  Fruit juice has very little or no fiber  · Eat low-fat dairy foods  Drink fat-free (skim) milk or 1% milk  Eat fat-free yogurt and low-fat cottage cheese  Try low-fat cheeses such as mozzarella and other reduced-fat cheeses  · Choose meat and other protein foods that are low in fat  Choose beans or other legumes such as split peas or lentils  Choose fish, skinless poultry (chicken or turkey), or lean cuts of red meat (beef or pork)  Before you cook meat or poultry, cut off any visible fat  · Use less fat and oil  Try baking foods instead of frying them  Add less fat, such as margarine, sour cream, regular salad dressing and mayonnaise to foods  Eat fewer high-fat foods  Some examples of high-fat foods include french fries, doughnuts, ice cream, and cakes  · Eat fewer sweets  Limit foods and drinks that are high in sugar  This includes candy, cookies, regular soda, and sweetened drinks  Exercise:  Exercise at least 30 minutes per day on most days of the week   Some examples of exercise include walking, biking, dancing, and swimming  You can also fit in more physical activity by taking the stairs instead of the elevator or parking farther away from stores  Ask your healthcare provider about the best exercise plan for you  © Copyright [employer] 2018 Information is for End User's use only and may not be sold, redistributed or otherwise used for commercial purposes   All illustrations and images included in CareNotes® are the copyrighted property of A D A M , Inc  or [address] [place] [address]

## 2023-01-27 NOTE — PROGRESS NOTES
Assessment and Plan:   I asked that he match our med list with what he is taking at home  Best to make a MyChart account as well  F/U with endocrine as scheduled  Urine microalbumin obtained today  I will defer A1C testing to endo if it was not done today when he went for labs  He just saw his cardiologist and went for blood work today  Will obtain copy of these labs  Eye exam report requested [facility])      Problem List Items Addressed This Visit        Endocrine    Uncontrolled type 2 diabetes mellitus with hyperglycemia ([address] )    Relevant Medications    dulaglutide (Trulicity) 3 TT/8 0AQ injection    Other Relevant Orders    Urine Microalbumin/creatinine ratio       Cardiovascular and Mediastinum    Chronic systolic CHF (congestive heart failure) (HCC)       Other    Generalized anxiety disorder    Relevant Medications    LORazepam (ATIVAN) 1 mg tablet   Other Visit Diagnoses     Medicare annual wellness visit, subsequent    -  Primary           Preventive health issues were discussed with patient, and age appropriate screening tests were ordered as noted in patient's After Visit Summary  Personalized health advice and appropriate referrals for health education or preventive services given if needed, as noted in patient's After Visit Summary  History of Present Illness:     Patient presents for a Medicare Wellness Visit    HPI   Patient Care Team:  Jess Scanlon MD as PCP - [address], DO  MD Willam Johnson MD Junie Hammers, MD     Review of Systems:     Review of Systems   Constitutional: Negative for chills, fever and unexpected weight change  Respiratory: Negative for cough and shortness of breath  Cardiovascular: Negative for chest pain, palpitations and leg swelling  Denies claudication   Gastrointestinal: Negative for abdominal pain, constipation and diarrhea  Genitourinary: Negative for difficulty urinating     Neurological: Negative for dizziness and headaches  Psychiatric/Behavioral: Positive for sleep disturbance  The patient is nervous/anxious (son going through drug and marital issues)  Problem List:     Patient Active Problem List   Diagnosis   • Benign essential hypertension   • Uncontrolled type 2 diabetes mellitus with hyperglycemia (HCC)   • Coronary atherosclerosis   • Esophageal reflux   • Generalized anxiety disorder   • Ischemic cardiomyopathy   • Mixed hyperlipidemia   • ICD (implantable cardioverter-defibrillator) in place   • Chronic systolic CHF (congestive heart failure) ([facility])   • Ventricular tachycardia      Past Medical and Surgical History:     Past Medical History:   Diagnosis Date   • Rectal hemorrhage     last assessed: May 29, 2015     Past Surgical History:   Procedure Laterality Date   • CORONARY ARTERY BYPASS GRAFT     • ELBOW SURGERY Right     Bursitis - last assessed: Sept 15, 2016      Family History:     Family History   Problem Relation Age of Onset   • No Known Problems Mother    • Cancer Father    • Coronary artery disease Father       Social History:     Social History     Socioeconomic History   • Marital status: /Civil Union     Spouse name: None   • Number of children: None   • Years of education: None   • Highest education level: None   Occupational History   • None   Tobacco Use   • Smoking status: Never   • Smokeless tobacco: Never   Substance and Sexual Activity   • Alcohol use: No     Comment: being a social drinker noted in "[employer]"    • Drug use: No   • Sexual activity: None   Other Topics Concern   • None   Social History Narrative   • None     Social Determinants of Health     Financial Resource Strain: Low Risk    • Difficulty of Paying Living Expenses: Not hard at all   Food Insecurity: Not on file   Transportation Needs: No Transportation Needs   • Lack of Transportation (Medical): No   • Lack of Transportation (Non-Medical):  No   Physical Activity: Not on file   Stress: Not on file Social Connections: Not on file   Intimate Partner Violence: Not on file   Housing Stability: Not on file      Medications and Allergies:     Current Outpatient Medications   Medication Sig Dispense Refill   • aspirin 81 MG tablet Take 1 tablet by mouth 2 (two) times a day      • atorvastatin (LIPITOR) 40 mg tablet Take 1 tablet by mouth daily     • B-D ULTRAFINE III SHORT PEN 31G X 8 MM MISC      • BD ULTRA-FINE PEN NEEDLES 29G X 12 7MM MISC      • candesartan (ATACAND) 16 mg tablet Take 1 tablet (16 mg total) by mouth daily     • carvedilol (COREG) 12 5 mg tablet Take 1 tablet by mouth 2 (two) times a day     • Continuous Blood Gluc Sensor ([address] [place] [address]) MISC 1 each by Does not apply route every 14 (fourteen) days     • dulaglutide (Trulicity) 3 XT/1 0ZF injection Inject 0 5 mL (3 mg total) under the skin every 7 days     • Empagliflozin (JARDIANCE) 25 MG TABS Take 1 tablet (25 mg total) by mouth every morning     • escitalopram (LEXAPRO) 20 mg tablet Take 1 tablet (20 mg total) by mouth daily 90 tablet 3   • ezetimibe (ZETIA) 10 mg tablet Take 1 tablet by mouth daily     • fenofibrate (TRICOR) 145 mg tablet Take 145 mg by mouth daily     • insulin glargine (Toujeo SoloStar) 300 units/mL CONCENTRATED U-300 injection pen (1-unit dial) Inject 130 Units under the skin daily     • Insulin Pen Needle (PEN NEEDLES 29GX1/2") 29G X 12MM MISC 2 X daily subcutaneously dx: E11 65     • insuln lispro (HumaLOG KwikPen) 200 units/mL CONCENTRATED U-200 injection pen 30 Units Once a day with lunch or supper     • isosorbide mononitrate (IMDUR) 30 mg 24 hr tablet Take 30 mg by mouth daily at bedtime  3   • Lancets MISC test once times daily E11 65     • lansoprazole (PREVACID) 30 mg capsule Take 1 capsule (30 mg total) by mouth daily 90 capsule 3   • LORazepam (ATIVAN) 1 mg tablet Take 1 tablet (1 mg total) by mouth daily as needed for anxiety 30 tablet 0   • metFORMIN (GLUCOPHAGE) 500 mg tablet Take 1,000 mg by mouth 2 (two) times a day     • Misc Natural Products (BLOOD SUGAR 360 PO) ONETOUCH ULTRA TEST STRIPS Test once daily E11 65     • omega-3-acid ethyl esters (LOVAZA) 1 g capsule Take 2 g by mouth 2 (two) times a day      • sotalol (BETAPACE) 80 mg tablet Take 1 tablet by mouth 2 (two) times a day     • spironolactone (ALDACTONE) 25 mg tablet Take 1 tablet by mouth daily       No current facility-administered medications for this visit  Allergies   Allergen Reactions   • Ace Inhibitors Cough      Immunizations:     Immunization History   Administered Date(s) Administered   • Fluzone Split Quad 0 5 mL 10/26/2016   • Influenza Quadrivalent Preservative Free 3 years and older IM 09/18/2014, 09/15/2016, 12/22/2017   • Influenza, seasonal, injectable 09/17/2013   • Tdap 09/17/2013      Health Maintenance:         Topic Date Due   • Colorectal Cancer Screening  10/20/2024   • Hepatitis C Screening  Completed         Topic Date Due   • COVID-19 Vaccine (1) Never done   • Influenza Vaccine (1) 09/01/2022      Medicare Screening Tests and Risk Assessments:     Curry Sol is here for his Subsequent Wellness visit  Health Risk Assessment:   Patient rates overall health as very good  Patient feels that their physical health rating is same  Patient is satisfied with their life  Eyesight was rated as same  Hearing was rated as same  Patient feels that their emotional and mental health rating is same  Patients states they are never, rarely angry  Patient states they are never, rarely unusually tired/fatigued  Pain experienced in the last 7 days has been none  Patient states that he has experienced no weight loss or gain in last 6 months  Depression Screening:   PHQ-2 Score: 0      Fall Risk Screening:    In the past year, patient has experienced: history of falling in past year    Number of falls: 1  Injured during fall?: Yes    Feels unsteady when standing or walking?: No    Worried about falling?: No      Home Safety:  Patient does not have trouble with stairs inside or outside of their home  Patient has working smoke alarms and has working carbon monoxide detector  Home safety hazards include: none  Nutrition:   Current diet is Regular  Medications:   Patient is not currently taking any over-the-counter supplements  Patient is able to manage medications  Activities of Daily Living (ADLs)/Instrumental Activities of Daily Living (IADLs):   Walk and transfer into and out of bed and chair?: Yes  Dress and groom yourself?: Yes    Bathe or shower yourself?: Yes    Feed yourself? Yes  Do your laundry/housekeeping?: Yes  Manage your money, pay your bills and track your expenses?: Yes  Make your own meals?: Yes    Do your own shopping?: Yes    Previous Hospitalizations:   Any hospitalizations or ED visits within the last 12 months?: No      Advance Care Planning:   Living will: Yes    Durable POA for healthcare: Yes    Advanced directive: Yes      Cognitive Screening:   Provider or family/friend/caregiver concerned regarding cognition?: No    PREVENTIVE SCREENINGS      Cardiovascular Screening:    General: Screening Not Indicated and History Lipid Disorder    Due for: Lipid Panel      Diabetes Screening:     General: Screening Not Indicated and History Diabetes      Colorectal Cancer Screening:     General: Screening Current      Abdominal Aortic Aneurysm (AAA) Screening:    Risk factors include: age between 73-67 yo        General: Screening Not Indicated      Lung Cancer Screening:     General: Screening Not Indicated      Hepatitis C Screening:    General: Screening Current    Screening, Brief Intervention, and Referral to Treatment (SBIRT)    Screening  Typical number of drinks in a day: 0  Typical number of drinks in a week: 0  Interpretation: Low risk drinking behavior      Single Item Drug Screening:  How often have you used an illegal drug (including marijuana) or a prescription medication for non-medical reasons in the past year? never    Single Item Drug Screen Score: 0  Interpretation: Negative screen for possible drug use disorder    No results found  Physical Exam:     /60 (BP Location: Left arm, Patient Position: Sitting, Cuff Size: Large)   Pulse 69   Ht 5' 9" (1 753 m)   Wt 91 6 kg (202 lb)   SpO2 99%   BMI 29 83 kg/m²     Physical Exam  Vitals and nursing note reviewed  Constitutional:       General: He is not in acute distress  Appearance: He is well-developed  He is not ill-appearing, toxic-appearing or diaphoretic  Eyes:      Conjunctiva/sclera: Conjunctivae normal    Cardiovascular:      Rate and Rhythm: Normal rate and regular rhythm  Pulses: Pulses are weak  Dorsalis pedis pulses are 1+ on the right side and 1+ on the left side  Heart sounds: No murmur heard  Pulmonary:      Effort: No respiratory distress  Breath sounds: Wheezing present  Abdominal:      Palpations: Abdomen is soft  Tenderness: There is no abdominal tenderness  Musculoskeletal:         General: No swelling  Cervical back: Neck supple  Feet:      Right foot:      Skin integrity: No ulcer, skin breakdown, erythema, warmth, callus or dry skin  Left foot:      Skin integrity: No ulcer, skin breakdown, erythema, warmth, callus or dry skin  Skin:     General: Skin is warm and dry  Capillary Refill: Capillary refill takes less than 2 seconds  Neurological:      Mental Status: He is alert  Psychiatric:         Mood and Affect: Mood normal          Behavior: Behavior normal           Patient's shoes and socks removed  Right Foot/Ankle   Right Foot Inspection  Skin Exam: skin normal and skin intact  No dry skin, no warmth, no callus, no erythema, no maceration, no abnormal color, no pre-ulcer, no ulcer and no callus  Toe Exam: ROM and strength within normal limits  Sensory   Monofilament testing: intact    Vascular  The right DP pulse is 1+       Left Foot/Ankle  Left Foot Inspection  Skin Exam: skin normal and skin intact  No dry skin, no warmth, no erythema, no maceration, normal color, no pre-ulcer, no ulcer and no callus  Toe Exam: ROM and strength within normal limits  Sensory   Monofilament testing: intact    Vascular  The left DP pulse is 1+       Assign Risk Category  No deformity present  No loss of protective sensation  Weak pulses  Risk: 2      Manuela Yanez MD

## 2023-01-30 ENCOUNTER — TELEPHONE (OUTPATIENT)
Dept: ADMINISTRATIVE | Facility: OTHER | Age: 68
End: 2023-01-30

## 2023-01-30 NOTE — LETTER
Diabetic Eye Exam Form / Final Attempt    Date Requested: 23  Patient: Guadalupe Brown  Patient : 1955   Referring Provider: Libia Smith MD      DIABETIC Eye Exam Date _______________________________      Type of Exam MUST be documented for Diabetic Eye Exams  Please CHECK ONE  Retinal Exam       Dilated Retinal Exam       OCT       Optomap-Iris Exam      Fundus Photography       Left Eye - Please check Retinopathy or No Retinopathy        Exam did show retinopathy    Exam did not show retinopathy       Right Eye - Please check Retinopathy or No Retinopathy       Exam did show retinopathy    Exam did not show retinopathy       Comments __________________________________________________________    Practice Providing Exam ______________________________________________    Exam Performed By (print name) _______________________________________      Provider Signature ___________________________________________________      These reports are needed for  compliance  Please fax this completed form and a copy of the Diabetic Eye Exam report to our office located at [place] [address] as soon as possible via Fax [phone] [address]: Phone [phone]  We thank you for your assistance in treating our mutual patient

## 2023-01-30 NOTE — TELEPHONE ENCOUNTER
----- Message from Renee Maxwell MD sent at 1/27/2023  3:10 PM EST -----  01/27/23 3:11 PM    Hello, our patient Fred Mojica has had Diabetic Eye Exam completed/performed  Please assist in updating the patient chart by making an External outreach to Dr Michelle Jay facility located in [place]   The date of service is 2022      Thank you,  Renee Maxwell MD  [facility]

## 2023-01-31 NOTE — TELEPHONE ENCOUNTER
Upon review of the In Basket request and the patient's chart, initial outreach has been made via fax to facility  Please see Contacts section for details       Thank you  Wilmer Bautista MA

## 2023-02-23 NOTE — TELEPHONE ENCOUNTER
As a follow-up, a second attempt has been made for outreach via telephone call to facility  Please see Contacts section for details      Thank you  Maurizio Woodward, MA

## 2023-02-23 NOTE — TELEPHONE ENCOUNTER
As a final attempt, a third outreach has been made via fax to facility  Please see Contacts section for details  This encounter will be closed and completed by end of day  Should we receive the requested information because of previous outreach attempts, the requested patient's chart will be updated appropriately       Thank you  Kaelyn Morales MA       Office called back stating not there in 2022 - has appt in march of this year

## 2023-03-05 DIAGNOSIS — F41.1 GENERALIZED ANXIETY DISORDER: ICD-10-CM

## 2023-03-06 RX ORDER — LORAZEPAM 1 MG/1
TABLET ORAL
Qty: 30 TABLET | Refills: 3 | Status: SHIPPED | OUTPATIENT
Start: 2023-03-06

## 2023-03-31 ENCOUNTER — ESTABLISHED COMPREHENSIVE EXAM (OUTPATIENT)
Dept: URBAN - METROPOLITAN AREA CLINIC 6 | Facility: CLINIC | Age: 68
End: 2023-03-31

## 2023-03-31 DIAGNOSIS — H25.043: ICD-10-CM

## 2023-03-31 DIAGNOSIS — E11.9: ICD-10-CM

## 2023-03-31 DIAGNOSIS — Z79.4: ICD-10-CM

## 2023-03-31 DIAGNOSIS — H40.013: ICD-10-CM

## 2023-03-31 LAB
LEFT EYE DIABETIC RETINOPATHY: NORMAL
RIGHT EYE DIABETIC RETINOPATHY: NORMAL

## 2023-03-31 PROCEDURE — 92133 CPTRZD OPH DX IMG PST SGM ON: CPT

## 2023-03-31 PROCEDURE — 92014 COMPRE OPH EXAM EST PT 1/>: CPT

## 2023-03-31 PROCEDURE — 76514 ECHO EXAM OF EYE THICKNESS: CPT

## 2023-03-31 ASSESSMENT — VISUAL ACUITY
OD_PH: 20/25
OD_SC: 20/40-2
OS_SC: 20/30

## 2023-03-31 ASSESSMENT — PACHYMETRY
OS_CT_UM: 521
OD_CT_UM: 515

## 2023-03-31 ASSESSMENT — TONOMETRY
OS_IOP_MMHG: 14
OD_IOP_MMHG: 15

## 2023-05-03 LAB
ALBUMIN/CREAT UR: 10 MCG/MG CREAT
CREAT UR-MCNC: 72 MG/DL (ref 20–320)
MICROALBUMIN UR-MCNC: 0.7 MG/DL

## 2023-05-21 ENCOUNTER — RA CDI HCC (OUTPATIENT)
Dept: OTHER | Facility: HOSPITAL | Age: 68
End: 2023-05-21

## 2023-05-21 NOTE — PROGRESS NOTES
I11 0  [facility] 75  coding opportunities          Chart Reviewed number of suggestions sent to Provider: 1     Patients Insurance     Medicare Insurance: [employer]

## 2023-08-13 ENCOUNTER — ANESTHESIA (EMERGENCY)
Dept: PERIOP | Facility: HOSPITAL | Age: 68
End: 2023-08-13
Payer: COMMERCIAL

## 2023-08-13 ENCOUNTER — APPOINTMENT (EMERGENCY)
Dept: RADIOLOGY | Facility: HOSPITAL | Age: 68
End: 2023-08-13
Payer: COMMERCIAL

## 2023-08-13 ENCOUNTER — HOSPITAL ENCOUNTER (INPATIENT)
Facility: HOSPITAL | Age: 68
LOS: 18 days | Discharge: HOME HEALTH | DRG: 853 | End: 2023-08-31
Attending: SURGERY | Admitting: SURGERY
Payer: COMMERCIAL

## 2023-08-13 ENCOUNTER — APPOINTMENT (OUTPATIENT)
Dept: RADIOLOGY | Facility: HOSPITAL | Age: 68
DRG: 853 | End: 2023-08-13
Payer: COMMERCIAL

## 2023-08-13 ENCOUNTER — ANESTHESIA EVENT (EMERGENCY)
Dept: PERIOP | Facility: HOSPITAL | Age: 68
End: 2023-08-13
Payer: COMMERCIAL

## 2023-08-13 ENCOUNTER — HOSPITAL ENCOUNTER (EMERGENCY)
Facility: HOSPITAL | Age: 68
Discharge: ANOTHER HOSPITAL | End: 2023-08-13
Attending: EMERGENCY MEDICINE
Payer: COMMERCIAL

## 2023-08-13 ENCOUNTER — APPOINTMENT (EMERGENCY)
Dept: CT IMAGING | Facility: HOSPITAL | Age: 68
End: 2023-08-13
Payer: COMMERCIAL

## 2023-08-13 VITALS
OXYGEN SATURATION: 96 % | HEIGHT: 69 IN | DIASTOLIC BLOOD PRESSURE: 62 MMHG | HEART RATE: 82 BPM | RESPIRATION RATE: 20 BRPM | SYSTOLIC BLOOD PRESSURE: 135 MMHG | BODY MASS INDEX: 29.71 KG/M2 | TEMPERATURE: 97.7 F | WEIGHT: 200.62 LBS

## 2023-08-13 DIAGNOSIS — F32.A DEPRESSION, UNSPECIFIED DEPRESSION TYPE: ICD-10-CM

## 2023-08-13 DIAGNOSIS — E11.65 UNCONTROLLED TYPE 2 DIABETES MELLITUS WITH HYPERGLYCEMIA (HCC): ICD-10-CM

## 2023-08-13 DIAGNOSIS — N49.3 FOURNIER'S GANGRENE: ICD-10-CM

## 2023-08-13 DIAGNOSIS — M79.89 NECROTIZING SOFT TISSUE INFECTION: Primary | ICD-10-CM

## 2023-08-13 DIAGNOSIS — T82.7XXD INFECTION INVOLVING IMPLANTABLE CARDIOVERTER-DEFIBRILLATOR (ICD), SUBSEQUENT ENCOUNTER: ICD-10-CM

## 2023-08-13 DIAGNOSIS — I47.20 VENTRICULAR TACHYCARDIA (HCC): ICD-10-CM

## 2023-08-13 DIAGNOSIS — N49.3 FOURNIER GANGRENE: Primary | ICD-10-CM

## 2023-08-13 DIAGNOSIS — Z95.810 ICD (IMPLANTABLE CARDIOVERTER-DEFIBRILLATOR) IN PLACE: ICD-10-CM

## 2023-08-13 PROBLEM — K80.20 CHOLELITHIASIS: Status: ACTIVE | Noted: 2023-08-13

## 2023-08-13 PROBLEM — Z86.79 HISTORY OF ISCHEMIC CARDIOMYOPATHY: Status: ACTIVE | Noted: 2023-08-13

## 2023-08-13 LAB
2HR DELTA HS TROPONIN: 2 NG/L
ABO GROUP BLD: NORMAL
ALBUMIN SERPL BCP-MCNC: 2.3 G/DL (ref 3.5–5)
ALBUMIN SERPL BCP-MCNC: 3.2 G/DL (ref 3.5–5)
ALBUMIN SERPL BCP-MCNC: 4 G/DL (ref 3.5–5)
ALP SERPL-CCNC: 67 U/L (ref 34–104)
ALP SERPL-CCNC: 78 U/L (ref 46–116)
ALP SERPL-CCNC: 86 U/L (ref 34–104)
ALT SERPL W P-5'-P-CCNC: 11 U/L (ref 12–78)
ALT SERPL W P-5'-P-CCNC: 7 U/L (ref 7–52)
ALT SERPL W P-5'-P-CCNC: 9 U/L (ref 7–52)
ANION GAP SERPL CALCULATED.3IONS-SCNC: 13 MMOL/L
ANION GAP SERPL CALCULATED.3IONS-SCNC: 20 MMOL/L
ANION GAP SERPL CALCULATED.3IONS-SCNC: 8 MMOL/L
APTT PPP: 29 SECONDS (ref 23–37)
AST SERPL W P-5'-P-CCNC: 10 U/L (ref 13–39)
AST SERPL W P-5'-P-CCNC: 8 U/L (ref 13–39)
AST SERPL W P-5'-P-CCNC: 9 U/L (ref 5–45)
ATRIAL RATE: 97 BPM
ATRIAL RATE: 98 BPM
BACTERIA UR QL AUTO: ABNORMAL /HPF
BACTERIA UR QL AUTO: NORMAL /HPF
BASE EX.OXY STD BLDV CALC-SCNC: 72.9 % (ref 60–80)
BASE EX.OXY STD BLDV CALC-SCNC: 92.4 % (ref 60–80)
BASE EXCESS BLDA CALC-SCNC: -10 MMOL/L (ref -2–3)
BASE EXCESS BLDA CALC-SCNC: -5 MMOL/L
BASE EXCESS BLDA CALC-SCNC: -7 MMOL/L (ref -2–3)
BASE EXCESS BLDA CALC-SCNC: -8.6 MMOL/L
BASE EXCESS BLDV CALC-SCNC: -4.9 MMOL/L
BASE EXCESS BLDV CALC-SCNC: -5.3 MMOL/L
BASOPHILS # BLD AUTO: 0.03 THOUSANDS/ÂΜL (ref 0–0.1)
BASOPHILS # BLD AUTO: 0.04 THOUSANDS/ÂΜL (ref 0–0.1)
BASOPHILS NFR BLD AUTO: 0 % (ref 0–1)
BASOPHILS NFR BLD AUTO: 0 % (ref 0–1)
BETA-HYDROXYBUTYRATE: 1.3 MMOL/L
BETA-HYDROXYBUTYRATE: 4.9 MMOL/L
BILIRUB SERPL-MCNC: 0.51 MG/DL (ref 0.2–1)
BILIRUB SERPL-MCNC: 0.64 MG/DL (ref 0.2–1)
BILIRUB SERPL-MCNC: 0.94 MG/DL (ref 0.2–1)
BILIRUB UR QL STRIP: NEGATIVE
BILIRUB UR QL STRIP: NEGATIVE
BLD GP AB SCN SERPL QL: NEGATIVE
BNP SERPL-MCNC: 366 PG/ML (ref 0–100)
BUN SERPL-MCNC: 15 MG/DL (ref 5–25)
BUN SERPL-MCNC: 15 MG/DL (ref 5–25)
BUN SERPL-MCNC: 17 MG/DL (ref 5–25)
CA-I BLD-SCNC: 1.17 MMOL/L (ref 1.12–1.32)
CA-I BLD-SCNC: 1.19 MMOL/L (ref 1.12–1.32)
CALCIUM ALBUM COR SERPL-MCNC: 8.7 MG/DL (ref 8.3–10.1)
CALCIUM ALBUM COR SERPL-MCNC: 9.7 MG/DL (ref 8.3–10.1)
CALCIUM SERPL-MCNC: 8.1 MG/DL (ref 8.4–10.2)
CALCIUM SERPL-MCNC: 8.3 MG/DL (ref 8.3–10.1)
CALCIUM SERPL-MCNC: 9.8 MG/DL (ref 8.4–10.2)
CARDIAC TROPONIN I PNL SERPL HS: 10 NG/L
CARDIAC TROPONIN I PNL SERPL HS: 10 NG/L (ref 8–18)
CARDIAC TROPONIN I PNL SERPL HS: 12 NG/L
CHLORIDE SERPL-SCNC: 101 MMOL/L (ref 96–108)
CHLORIDE SERPL-SCNC: 105 MMOL/L (ref 96–108)
CHLORIDE SERPL-SCNC: 90 MMOL/L (ref 96–108)
CLARITY UR: CLEAR
CLARITY UR: CLEAR
CO2 SERPL-SCNC: 18 MMOL/L (ref 21–32)
CO2 SERPL-SCNC: 19 MMOL/L (ref 21–32)
CO2 SERPL-SCNC: 22 MMOL/L (ref 21–32)
COLOR UR: ABNORMAL
COLOR UR: ABNORMAL
CREAT SERPL-MCNC: 0.57 MG/DL (ref 0.6–1.3)
CREAT SERPL-MCNC: 0.73 MG/DL (ref 0.6–1.3)
CREAT SERPL-MCNC: 0.81 MG/DL (ref 0.6–1.3)
EOSINOPHIL # BLD AUTO: 0 THOUSAND/ÂΜL (ref 0–0.61)
EOSINOPHIL # BLD AUTO: 0.01 THOUSAND/ÂΜL (ref 0–0.61)
EOSINOPHIL NFR BLD AUTO: 0 % (ref 0–6)
EOSINOPHIL NFR BLD AUTO: 0 % (ref 0–6)
ERYTHROCYTE [DISTWIDTH] IN BLOOD BY AUTOMATED COUNT: 13.2 % (ref 11.6–15.1)
GFR SERPL CREATININE-BSD FRML MDRD: 106 ML/MIN/1.73SQ M
GFR SERPL CREATININE-BSD FRML MDRD: 91 ML/MIN/1.73SQ M
GFR SERPL CREATININE-BSD FRML MDRD: 95 ML/MIN/1.73SQ M
GLUCOSE SERPL-MCNC: 274 MG/DL (ref 65–140)
GLUCOSE SERPL-MCNC: 279 MG/DL (ref 65–140)
GLUCOSE SERPL-MCNC: 291 MG/DL (ref 65–140)
GLUCOSE SERPL-MCNC: 316 MG/DL (ref 65–140)
GLUCOSE SERPL-MCNC: 323 MG/DL (ref 65–140)
GLUCOSE SERPL-MCNC: 412 MG/DL (ref 65–140)
GLUCOSE SERPL-MCNC: 422 MG/DL (ref 65–140)
GLUCOSE UR STRIP-MCNC: ABNORMAL MG/DL
GLUCOSE UR STRIP-MCNC: ABNORMAL MG/DL
GRAN CASTS #/AREA URNS LPF: ABNORMAL /[LPF]
HCO3 BLDA-SCNC: 17.1 MMOL/L (ref 22–28)
HCO3 BLDA-SCNC: 17.8 MMOL/L (ref 22–28)
HCO3 BLDA-SCNC: 20.4 MMOL/L (ref 22–28)
HCO3 BLDA-SCNC: 21.7 MMOL/L (ref 22–28)
HCO3 BLDV-SCNC: 17.6 MMOL/L (ref 24–30)
HCO3 BLDV-SCNC: 20.7 MMOL/L (ref 24–30)
HCT VFR BLD AUTO: 35.5 % (ref 36.5–49.3)
HCT VFR BLD AUTO: 37.2 % (ref 36.5–49.3)
HCT VFR BLD AUTO: 42.1 % (ref 36.5–49.3)
HCT VFR BLD CALC: 32 % (ref 36.5–49.3)
HCT VFR BLD CALC: 39 % (ref 36.5–49.3)
HGB BLD-MCNC: 11.8 G/DL (ref 12–17)
HGB BLD-MCNC: 12.2 G/DL (ref 12–17)
HGB BLD-MCNC: 14 G/DL (ref 12–17)
HGB BLDA-MCNC: 10.9 G/DL (ref 12–17)
HGB BLDA-MCNC: 13.3 G/DL (ref 12–17)
HGB UR QL STRIP.AUTO: ABNORMAL
HGB UR QL STRIP.AUTO: ABNORMAL
HYALINE CASTS #/AREA URNS LPF: ABNORMAL /LPF
IMM GRANULOCYTES # BLD AUTO: 0.09 THOUSAND/UL (ref 0–0.2)
IMM GRANULOCYTES # BLD AUTO: 0.13 THOUSAND/UL (ref 0–0.2)
IMM GRANULOCYTES NFR BLD AUTO: 1 % (ref 0–2)
IMM GRANULOCYTES NFR BLD AUTO: 1 % (ref 0–2)
INR PPP: 1.27 (ref 0.84–1.19)
INR PPP: 1.29 (ref 0.84–1.19)
KETONES UR STRIP-MCNC: ABNORMAL MG/DL
KETONES UR STRIP-MCNC: ABNORMAL MG/DL
LACTATE SERPL-SCNC: 1.4 MMOL/L (ref 0.5–2)
LACTATE SERPL-SCNC: 2.5 MMOL/L (ref 0.5–2)
LACTATE SERPL-SCNC: 2.8 MMOL/L (ref 0.5–2)
LEUKOCYTE ESTERASE UR QL STRIP: ABNORMAL
LEUKOCYTE ESTERASE UR QL STRIP: ABNORMAL
LYMPHOCYTES # BLD AUTO: 1.27 THOUSANDS/ÂΜL (ref 0.6–4.47)
LYMPHOCYTES # BLD AUTO: 2.5 THOUSANDS/ÂΜL (ref 0.6–4.47)
LYMPHOCYTES NFR BLD AUTO: 18 % (ref 14–44)
LYMPHOCYTES NFR BLD AUTO: 9 % (ref 14–44)
MAGNESIUM SERPL-MCNC: 1.5 MG/DL (ref 1.9–2.7)
MAGNESIUM SERPL-MCNC: 1.6 MG/DL (ref 1.9–2.7)
MCH RBC QN AUTO: 30.3 PG (ref 26.8–34.3)
MCH RBC QN AUTO: 30.4 PG (ref 26.8–34.3)
MCH RBC QN AUTO: 30.4 PG (ref 26.8–34.3)
MCHC RBC AUTO-ENTMCNC: 32.8 G/DL (ref 31.4–37.4)
MCHC RBC AUTO-ENTMCNC: 33.2 G/DL (ref 31.4–37.4)
MCHC RBC AUTO-ENTMCNC: 33.3 G/DL (ref 31.4–37.4)
MCV RBC AUTO: 91 FL (ref 82–98)
MCV RBC AUTO: 92 FL (ref 82–98)
MCV RBC AUTO: 92 FL (ref 82–98)
MONOCYTES # BLD AUTO: 1.34 THOUSAND/ÂΜL (ref 0.17–1.22)
MONOCYTES # BLD AUTO: 1.38 THOUSAND/ÂΜL (ref 0.17–1.22)
MONOCYTES NFR BLD AUTO: 10 % (ref 4–12)
MONOCYTES NFR BLD AUTO: 9 % (ref 4–12)
MUCOUS THREADS UR QL AUTO: ABNORMAL
NEUTROPHILS # BLD AUTO: 10.19 THOUSANDS/ÂΜL (ref 1.85–7.62)
NEUTROPHILS # BLD AUTO: 11.5 THOUSANDS/ÂΜL (ref 1.85–7.62)
NEUTS SEG NFR BLD AUTO: 71 % (ref 43–75)
NEUTS SEG NFR BLD AUTO: 81 % (ref 43–75)
NITRITE UR QL STRIP: NEGATIVE
NITRITE UR QL STRIP: NEGATIVE
NON-SQ EPI CELLS URNS QL MICRO: ABNORMAL /HPF
NON-SQ EPI CELLS URNS QL MICRO: NORMAL /HPF
NRBC BLD AUTO-RTO: 0 /100 WBCS
NRBC BLD AUTO-RTO: 0 /100 WBCS
O2 CT BLDA-SCNC: 17.5 ML/DL (ref 16–23)
O2 CT BLDA-SCNC: 17.7 ML/DL (ref 16–23)
O2 CT BLDV-SCNC: 12.8 ML/DL
O2 CT BLDV-SCNC: 18.2 ML/DL
OXYHGB MFR BLDA: 97.3 % (ref 94–97)
OXYHGB MFR BLDA: 98 % (ref 94–97)
P AXIS: 35 DEGREES
P AXIS: 63 DEGREES
PCO2 BLD: 19 MMOL/L (ref 21–32)
PCO2 BLD: 22 MMOL/L (ref 21–32)
PCO2 BLD: 45.2 MM HG (ref 36–44)
PCO2 BLD: 45.4 MM HG (ref 36–44)
PCO2 BLDA: 36.1 MM HG (ref 36–44)
PCO2 BLDA: 47.1 MM HG (ref 36–44)
PCO2 BLDV: 26.7 MM HG (ref 42–50)
PCO2 BLDV: 41.8 MM HG (ref 42–50)
PH BLD: 7.2 [PH] (ref 7.35–7.45)
PH BLD: 7.26 [PH] (ref 7.35–7.45)
PH BLDA: 7.28 [PH] (ref 7.35–7.45)
PH BLDA: 7.29 [PH] (ref 7.35–7.45)
PH BLDV: 7.31 [PH] (ref 7.3–7.4)
PH BLDV: 7.44 [PH] (ref 7.3–7.4)
PH UR STRIP.AUTO: 5 [PH]
PH UR STRIP.AUTO: 6 [PH]
PHOSPHATE SERPL-MCNC: 3 MG/DL (ref 2.3–4.1)
PLATELET # BLD AUTO: 251 THOUSANDS/UL (ref 149–390)
PLATELET # BLD AUTO: 282 THOUSANDS/UL (ref 149–390)
PLATELET # BLD AUTO: 316 THOUSANDS/UL (ref 149–390)
PMV BLD AUTO: 9.3 FL (ref 8.9–12.7)
PMV BLD AUTO: 9.6 FL (ref 8.9–12.7)
PMV BLD AUTO: 9.8 FL (ref 8.9–12.7)
PO2 BLD: 206 MM HG (ref 75–129)
PO2 BLD: >400 MM HG (ref 75–129)
PO2 BLDA: 135.6 MM HG (ref 75–129)
PO2 BLDA: 142.9 MM HG (ref 75–129)
PO2 BLDV: 43.3 MM HG (ref 35–45)
PO2 BLDV: 72.8 MM HG (ref 35–45)
POTASSIUM BLD-SCNC: 3.7 MMOL/L (ref 3.5–5.3)
POTASSIUM BLD-SCNC: 3.7 MMOL/L (ref 3.5–5.3)
POTASSIUM SERPL-SCNC: 3.6 MMOL/L (ref 3.5–5.3)
POTASSIUM SERPL-SCNC: 4 MMOL/L (ref 3.5–5.3)
POTASSIUM SERPL-SCNC: 4 MMOL/L (ref 3.5–5.3)
PR INTERVAL: 116 MS
PR INTERVAL: 126 MS
PROCALCITONIN SERPL-MCNC: 0.91 NG/ML
PROT SERPL-MCNC: 6.3 G/DL (ref 6.4–8.4)
PROT SERPL-MCNC: 6.6 G/DL (ref 6.4–8.4)
PROT SERPL-MCNC: 8.2 G/DL (ref 6.4–8.4)
PROT UR STRIP-MCNC: ABNORMAL MG/DL
PROT UR STRIP-MCNC: ABNORMAL MG/DL
PROTHROMBIN TIME: 15.9 SECONDS (ref 11.6–14.5)
PROTHROMBIN TIME: 16.1 SECONDS (ref 11.6–14.5)
QRS AXIS: 21 DEGREES
QRS AXIS: 36 DEGREES
QRSD INTERVAL: 110 MS
QRSD INTERVAL: 112 MS
QT INTERVAL: 356 MS
QT INTERVAL: 362 MS
QTC INTERVAL: 454 MS
QTC INTERVAL: 459 MS
RBC # BLD AUTO: 3.88 MILLION/UL (ref 3.88–5.62)
RBC # BLD AUTO: 4.03 MILLION/UL (ref 3.88–5.62)
RBC # BLD AUTO: 4.61 MILLION/UL (ref 3.88–5.62)
RBC #/AREA URNS AUTO: ABNORMAL /HPF
RBC #/AREA URNS AUTO: NORMAL /HPF
RH BLD: POSITIVE
SAO2 % BLD FROM PO2: 100 % (ref 60–85)
SODIUM BLD-SCNC: 132 MMOL/L (ref 136–145)
SODIUM BLD-SCNC: 133 MMOL/L (ref 136–145)
SODIUM SERPL-SCNC: 129 MMOL/L (ref 135–147)
SODIUM SERPL-SCNC: 132 MMOL/L (ref 135–147)
SODIUM SERPL-SCNC: 135 MMOL/L (ref 135–147)
SP GR UR STRIP.AUTO: 1.01 (ref 1–1.03)
SP GR UR STRIP.AUTO: 1.04 (ref 1–1.03)
SPECIMEN EXPIRATION DATE: NORMAL
SPECIMEN SOURCE: ABNORMAL
T WAVE AXIS: 55 DEGREES
T WAVE AXIS: 69 DEGREES
UROBILINOGEN UR QL STRIP.AUTO: 0.2 E.U./DL
UROBILINOGEN UR STRIP-ACNC: <2 MG/DL
VENTRICULAR RATE: 97 BPM
VENTRICULAR RATE: 98 BPM
WBC # BLD AUTO: 11.68 THOUSAND/UL (ref 4.31–10.16)
WBC # BLD AUTO: 14.21 THOUSAND/UL (ref 4.31–10.16)
WBC # BLD AUTO: 14.27 THOUSAND/UL (ref 4.31–10.16)
WBC #/AREA URNS AUTO: ABNORMAL /HPF
WBC #/AREA URNS AUTO: NORMAL /HPF

## 2023-08-13 PROCEDURE — 82803 BLOOD GASES ANY COMBINATION: CPT

## 2023-08-13 PROCEDURE — 36415 COLL VENOUS BLD VENIPUNCTURE: CPT | Performed by: EMERGENCY MEDICINE

## 2023-08-13 PROCEDURE — 87185 SC STD ENZYME DETCJ PER NZM: CPT | Performed by: STUDENT IN AN ORGANIZED HEALTH CARE EDUCATION/TRAINING PROGRAM

## 2023-08-13 PROCEDURE — 82805 BLOOD GASES W/O2 SATURATION: CPT

## 2023-08-13 PROCEDURE — 99291 CRITICAL CARE FIRST HOUR: CPT | Performed by: EMERGENCY MEDICINE

## 2023-08-13 PROCEDURE — 96366 THER/PROPH/DIAG IV INF ADDON: CPT

## 2023-08-13 PROCEDURE — 80053 COMPREHEN METABOLIC PANEL: CPT | Performed by: EMERGENCY MEDICINE

## 2023-08-13 PROCEDURE — 82805 BLOOD GASES W/O2 SATURATION: CPT | Performed by: ANESTHESIOLOGY

## 2023-08-13 PROCEDURE — 94760 N-INVAS EAR/PLS OXIMETRY 1: CPT

## 2023-08-13 PROCEDURE — 85014 HEMATOCRIT: CPT

## 2023-08-13 PROCEDURE — 71045 X-RAY EXAM CHEST 1 VIEW: CPT

## 2023-08-13 PROCEDURE — 86901 BLOOD TYPING SEROLOGIC RH(D): CPT

## 2023-08-13 PROCEDURE — 85027 COMPLETE CBC AUTOMATED: CPT | Performed by: ANESTHESIOLOGY

## 2023-08-13 PROCEDURE — NC001 PR NO CHARGE: Performed by: SURGERY

## 2023-08-13 PROCEDURE — 83605 ASSAY OF LACTIC ACID: CPT | Performed by: EMERGENCY MEDICINE

## 2023-08-13 PROCEDURE — 82805 BLOOD GASES W/O2 SATURATION: CPT | Performed by: EMERGENCY MEDICINE

## 2023-08-13 PROCEDURE — 84132 ASSAY OF SERUM POTASSIUM: CPT

## 2023-08-13 PROCEDURE — 85730 THROMBOPLASTIN TIME PARTIAL: CPT | Performed by: EMERGENCY MEDICINE

## 2023-08-13 PROCEDURE — 82948 REAGENT STRIP/BLOOD GLUCOSE: CPT

## 2023-08-13 PROCEDURE — 84484 ASSAY OF TROPONIN QUANT: CPT | Performed by: ANESTHESIOLOGY

## 2023-08-13 PROCEDURE — 80053 COMPREHEN METABOLIC PANEL: CPT

## 2023-08-13 PROCEDURE — 86850 RBC ANTIBODY SCREEN: CPT

## 2023-08-13 PROCEDURE — 74177 CT ABD & PELVIS W/CONTRAST: CPT

## 2023-08-13 PROCEDURE — 87154 CUL TYP ID BLD PTHGN 6+ TRGT: CPT | Performed by: EMERGENCY MEDICINE

## 2023-08-13 PROCEDURE — G1004 CDSM NDSC: HCPCS

## 2023-08-13 PROCEDURE — 85025 COMPLETE CBC W/AUTO DIFF WBC: CPT | Performed by: EMERGENCY MEDICINE

## 2023-08-13 PROCEDURE — 99245 OFF/OP CONSLTJ NEW/EST HI 55: CPT | Performed by: STUDENT IN AN ORGANIZED HEALTH CARE EDUCATION/TRAINING PROGRAM

## 2023-08-13 PROCEDURE — 96367 TX/PROPH/DG ADDL SEQ IV INF: CPT

## 2023-08-13 PROCEDURE — 83735 ASSAY OF MAGNESIUM: CPT | Performed by: ANESTHESIOLOGY

## 2023-08-13 PROCEDURE — 87181 SC STD AGAR DILUTION PER AGT: CPT | Performed by: EMERGENCY MEDICINE

## 2023-08-13 PROCEDURE — 84145 PROCALCITONIN (PCT): CPT | Performed by: EMERGENCY MEDICINE

## 2023-08-13 PROCEDURE — 93005 ELECTROCARDIOGRAM TRACING: CPT

## 2023-08-13 PROCEDURE — 83605 ASSAY OF LACTIC ACID: CPT

## 2023-08-13 PROCEDURE — 87205 SMEAR GRAM STAIN: CPT | Performed by: STUDENT IN AN ORGANIZED HEALTH CARE EDUCATION/TRAINING PROGRAM

## 2023-08-13 PROCEDURE — 84295 ASSAY OF SERUM SODIUM: CPT

## 2023-08-13 PROCEDURE — 86901 BLOOD TYPING SEROLOGIC RH(D): CPT | Performed by: STUDENT IN AN ORGANIZED HEALTH CARE EDUCATION/TRAINING PROGRAM

## 2023-08-13 PROCEDURE — 85610 PROTHROMBIN TIME: CPT | Performed by: EMERGENCY MEDICINE

## 2023-08-13 PROCEDURE — 94002 VENT MGMT INPAT INIT DAY: CPT

## 2023-08-13 PROCEDURE — 83735 ASSAY OF MAGNESIUM: CPT | Performed by: EMERGENCY MEDICINE

## 2023-08-13 PROCEDURE — 87040 BLOOD CULTURE FOR BACTERIA: CPT

## 2023-08-13 PROCEDURE — 99285 EMERGENCY DEPT VISIT HI MDM: CPT

## 2023-08-13 PROCEDURE — 87186 SC STD MICRODIL/AGAR DIL: CPT | Performed by: STUDENT IN AN ORGANIZED HEALTH CARE EDUCATION/TRAINING PROGRAM

## 2023-08-13 PROCEDURE — 87077 CULTURE AEROBIC IDENTIFY: CPT | Performed by: STUDENT IN AN ORGANIZED HEALTH CARE EDUCATION/TRAINING PROGRAM

## 2023-08-13 PROCEDURE — 86900 BLOOD TYPING SEROLOGIC ABO: CPT | Performed by: STUDENT IN AN ORGANIZED HEALTH CARE EDUCATION/TRAINING PROGRAM

## 2023-08-13 PROCEDURE — 82330 ASSAY OF CALCIUM: CPT

## 2023-08-13 PROCEDURE — 82947 ASSAY GLUCOSE BLOOD QUANT: CPT

## 2023-08-13 PROCEDURE — 5A1945Z RESPIRATORY VENTILATION, 24-96 CONSECUTIVE HOURS: ICD-10-PCS | Performed by: SURGERY

## 2023-08-13 PROCEDURE — 81001 URINALYSIS AUTO W/SCOPE: CPT | Performed by: EMERGENCY MEDICINE

## 2023-08-13 PROCEDURE — 85610 PROTHROMBIN TIME: CPT

## 2023-08-13 PROCEDURE — 84100 ASSAY OF PHOSPHORUS: CPT | Performed by: ANESTHESIOLOGY

## 2023-08-13 PROCEDURE — 83880 ASSAY OF NATRIURETIC PEPTIDE: CPT | Performed by: ANESTHESIOLOGY

## 2023-08-13 PROCEDURE — 82010 KETONE BODYS QUAN: CPT

## 2023-08-13 PROCEDURE — 93010 ELECTROCARDIOGRAM REPORT: CPT | Performed by: INTERNAL MEDICINE

## 2023-08-13 PROCEDURE — 11004 DBRDMT SKIN XTRNL GENT&PER: CPT | Performed by: STUDENT IN AN ORGANIZED HEALTH CARE EDUCATION/TRAINING PROGRAM

## 2023-08-13 PROCEDURE — 86850 RBC ANTIBODY SCREEN: CPT | Performed by: STUDENT IN AN ORGANIZED HEALTH CARE EDUCATION/TRAINING PROGRAM

## 2023-08-13 PROCEDURE — 80053 COMPREHEN METABOLIC PANEL: CPT | Performed by: ANESTHESIOLOGY

## 2023-08-13 PROCEDURE — 82010 KETONE BODYS QUAN: CPT | Performed by: EMERGENCY MEDICINE

## 2023-08-13 PROCEDURE — 87147 CULTURE TYPE IMMUNOLOGIC: CPT | Performed by: STUDENT IN AN ORGANIZED HEALTH CARE EDUCATION/TRAINING PROGRAM

## 2023-08-13 PROCEDURE — 96375 TX/PRO/DX INJ NEW DRUG ADDON: CPT

## 2023-08-13 PROCEDURE — 87040 BLOOD CULTURE FOR BACTERIA: CPT | Performed by: EMERGENCY MEDICINE

## 2023-08-13 PROCEDURE — 87075 CULTR BACTERIA EXCEPT BLOOD: CPT | Performed by: STUDENT IN AN ORGANIZED HEALTH CARE EDUCATION/TRAINING PROGRAM

## 2023-08-13 PROCEDURE — 96368 THER/DIAG CONCURRENT INF: CPT

## 2023-08-13 PROCEDURE — 87070 CULTURE OTHR SPECIMN AEROBIC: CPT | Performed by: STUDENT IN AN ORGANIZED HEALTH CARE EDUCATION/TRAINING PROGRAM

## 2023-08-13 PROCEDURE — 86900 BLOOD TYPING SEROLOGIC ABO: CPT

## 2023-08-13 PROCEDURE — 81001 URINALYSIS AUTO W/SCOPE: CPT

## 2023-08-13 PROCEDURE — 87077 CULTURE AEROBIC IDENTIFY: CPT | Performed by: EMERGENCY MEDICINE

## 2023-08-13 PROCEDURE — 85025 COMPLETE CBC W/AUTO DIFF WBC: CPT

## 2023-08-13 PROCEDURE — 96365 THER/PROPH/DIAG IV INF INIT: CPT

## 2023-08-13 PROCEDURE — 84484 ASSAY OF TROPONIN QUANT: CPT | Performed by: EMERGENCY MEDICINE

## 2023-08-13 RX ORDER — MORPHINE SULFATE 10 MG/ML
6 INJECTION, SOLUTION INTRAMUSCULAR; INTRAVENOUS ONCE
Status: COMPLETED | OUTPATIENT
Start: 2023-08-13 | End: 2023-08-13

## 2023-08-13 RX ORDER — LINEZOLID 2 MG/ML
600 INJECTION, SOLUTION INTRAVENOUS ONCE
Status: COMPLETED | OUTPATIENT
Start: 2023-08-13 | End: 2023-08-13

## 2023-08-13 RX ORDER — MAGNESIUM HYDROXIDE 1200 MG/15ML
LIQUID ORAL AS NEEDED
Status: DISCONTINUED | OUTPATIENT
Start: 2023-08-13 | End: 2023-08-13 | Stop reason: HOSPADM

## 2023-08-13 RX ORDER — SODIUM CHLORIDE, SODIUM LACTATE, POTASSIUM CHLORIDE, CALCIUM CHLORIDE 600; 310; 30; 20 MG/100ML; MG/100ML; MG/100ML; MG/100ML
125 INJECTION, SOLUTION INTRAVENOUS CONTINUOUS
Status: DISCONTINUED | OUTPATIENT
Start: 2023-08-13 | End: 2023-08-15

## 2023-08-13 RX ORDER — POTASSIUM CHLORIDE 14.9 MG/ML
INJECTION INTRAVENOUS CONTINUOUS PRN
Status: DISCONTINUED | OUTPATIENT
Start: 2023-08-13 | End: 2023-08-13

## 2023-08-13 RX ORDER — ROCURONIUM BROMIDE 10 MG/ML
INJECTION, SOLUTION INTRAVENOUS AS NEEDED
Status: DISCONTINUED | OUTPATIENT
Start: 2023-08-13 | End: 2023-08-13

## 2023-08-13 RX ORDER — FENTANYL CITRATE 50 UG/ML
INJECTION, SOLUTION INTRAMUSCULAR; INTRAVENOUS AS NEEDED
Status: DISCONTINUED | OUTPATIENT
Start: 2023-08-13 | End: 2023-08-13

## 2023-08-13 RX ORDER — CHLORHEXIDINE GLUCONATE 0.12 MG/ML
15 RINSE ORAL EVERY 12 HOURS SCHEDULED
Status: DISCONTINUED | OUTPATIENT
Start: 2023-08-13 | End: 2023-08-14

## 2023-08-13 RX ORDER — SODIUM CHLORIDE 9 MG/ML
INJECTION, SOLUTION INTRAVENOUS CONTINUOUS PRN
Status: DISCONTINUED | OUTPATIENT
Start: 2023-08-13 | End: 2023-08-13

## 2023-08-13 RX ORDER — CHLORHEXIDINE GLUCONATE 0.12 MG/ML
15 RINSE ORAL EVERY 12 HOURS SCHEDULED
Status: DISCONTINUED | OUTPATIENT
Start: 2023-08-13 | End: 2023-08-13 | Stop reason: SDUPTHER

## 2023-08-13 RX ORDER — ETOMIDATE 2 MG/ML
INJECTION INTRAVENOUS AS NEEDED
Status: DISCONTINUED | OUTPATIENT
Start: 2023-08-13 | End: 2023-08-13

## 2023-08-13 RX ORDER — HYDROMORPHONE HYDROCHLORIDE 2 MG/ML
INJECTION, SOLUTION INTRAMUSCULAR; INTRAVENOUS; SUBCUTANEOUS AS NEEDED
Status: DISCONTINUED | OUTPATIENT
Start: 2023-08-13 | End: 2023-08-13

## 2023-08-13 RX ORDER — ONDANSETRON 2 MG/ML
4 INJECTION INTRAMUSCULAR; INTRAVENOUS EVERY 6 HOURS PRN
Status: DISCONTINUED | OUTPATIENT
Start: 2023-08-13 | End: 2023-08-31 | Stop reason: HOSPADM

## 2023-08-13 RX ORDER — LINEZOLID 2 MG/ML
600 INJECTION, SOLUTION INTRAVENOUS EVERY 12 HOURS
Status: DISCONTINUED | OUTPATIENT
Start: 2023-08-14 | End: 2023-08-15

## 2023-08-13 RX ORDER — CEFEPIME HYDROCHLORIDE 2 G/50ML
2000 INJECTION, SOLUTION INTRAVENOUS ONCE
Status: COMPLETED | OUTPATIENT
Start: 2023-08-13 | End: 2023-08-13

## 2023-08-13 RX ORDER — NOREPINEPHRINE BITARTRATE 1 MG/ML
INJECTION, SOLUTION INTRAVENOUS
Status: DISPENSED
Start: 2023-08-13 | End: 2023-08-14

## 2023-08-13 RX ORDER — FENTANYL CITRATE 50 UG/ML
50 INJECTION, SOLUTION INTRAMUSCULAR; INTRAVENOUS ONCE
Status: COMPLETED | OUTPATIENT
Start: 2023-08-13 | End: 2023-08-13

## 2023-08-13 RX ORDER — LIDOCAINE HYDROCHLORIDE 20 MG/ML
1 JELLY TOPICAL ONCE
Status: DISCONTINUED | OUTPATIENT
Start: 2023-08-13 | End: 2023-08-13

## 2023-08-13 RX ORDER — MIDAZOLAM HYDROCHLORIDE 2 MG/2ML
INJECTION, SOLUTION INTRAMUSCULAR; INTRAVENOUS AS NEEDED
Status: DISCONTINUED | OUTPATIENT
Start: 2023-08-13 | End: 2023-08-13

## 2023-08-13 RX ORDER — PROPOFOL 10 MG/ML
INJECTION, EMULSION INTRAVENOUS CONTINUOUS PRN
Status: DISCONTINUED | OUTPATIENT
Start: 2023-08-13 | End: 2023-08-13

## 2023-08-13 RX ORDER — METRONIDAZOLE 500 MG/100ML
500 INJECTION, SOLUTION INTRAVENOUS ONCE
Status: COMPLETED | OUTPATIENT
Start: 2023-08-13 | End: 2023-08-13

## 2023-08-13 RX ORDER — HYDROMORPHONE HCL/PF 1 MG/ML
1 SYRINGE (ML) INJECTION EVERY 4 HOURS PRN
Status: DISCONTINUED | OUTPATIENT
Start: 2023-08-13 | End: 2023-08-15

## 2023-08-13 RX ORDER — PROPOFOL 10 MG/ML
5-50 INJECTION, EMULSION INTRAVENOUS
Status: DISCONTINUED | OUTPATIENT
Start: 2023-08-13 | End: 2023-08-15

## 2023-08-13 RX ORDER — ACETAMINOPHEN 160 MG
TABLET,DISINTEGRATING ORAL AS NEEDED
Status: DISCONTINUED | OUTPATIENT
Start: 2023-08-13 | End: 2023-08-13 | Stop reason: HOSPADM

## 2023-08-13 RX ORDER — CLINDAMYCIN PHOSPHATE 600 MG/50ML
600 INJECTION INTRAVENOUS ONCE
Status: DISCONTINUED | OUTPATIENT
Start: 2023-08-13 | End: 2023-08-13

## 2023-08-13 RX ORDER — PROPOFOL 10 MG/ML
INJECTION, EMULSION INTRAVENOUS AS NEEDED
Status: DISCONTINUED | OUTPATIENT
Start: 2023-08-13 | End: 2023-08-13

## 2023-08-13 RX ORDER — CHLORHEXIDINE GLUCONATE 0.12 MG/ML
15 RINSE ORAL ONCE
Status: CANCELLED | OUTPATIENT
Start: 2023-08-13 | End: 2023-08-13

## 2023-08-13 RX ORDER — HYDROMORPHONE HCL/PF 1 MG/ML
0.5 SYRINGE (ML) INJECTION EVERY 4 HOURS PRN
Status: DISCONTINUED | OUTPATIENT
Start: 2023-08-13 | End: 2023-08-15

## 2023-08-13 RX ORDER — FENTANYL CITRATE/PF 50 MCG/ML
50 SYRINGE (ML) INJECTION ONCE
Status: COMPLETED | OUTPATIENT
Start: 2023-08-13 | End: 2023-08-13

## 2023-08-13 RX ORDER — FENTANYL CITRATE 50 UG/ML
INJECTION, SOLUTION INTRAMUSCULAR; INTRAVENOUS
Status: COMPLETED
Start: 2023-08-13 | End: 2023-08-13

## 2023-08-13 RX ORDER — METRONIDAZOLE 500 MG/100ML
500 INJECTION, SOLUTION INTRAVENOUS EVERY 8 HOURS
Status: DISCONTINUED | OUTPATIENT
Start: 2023-08-14 | End: 2023-08-13

## 2023-08-13 RX ORDER — HEPARIN SODIUM 5000 [USP'U]/ML
5000 INJECTION, SOLUTION INTRAVENOUS; SUBCUTANEOUS EVERY 8 HOURS SCHEDULED
Status: DISCONTINUED | OUTPATIENT
Start: 2023-08-13 | End: 2023-08-14

## 2023-08-13 RX ORDER — SUCCINYLCHOLINE/SOD CL,ISO/PF 100 MG/5ML
SYRINGE (ML) INTRAVENOUS AS NEEDED
Status: DISCONTINUED | OUTPATIENT
Start: 2023-08-13 | End: 2023-08-13

## 2023-08-13 RX ADMIN — IOHEXOL 100 ML: 350 INJECTION, SOLUTION INTRAVENOUS at 16:21

## 2023-08-13 RX ADMIN — Medication 100 MG: at 18:51

## 2023-08-13 RX ADMIN — PROPOFOL 40 MCG/KG/MIN: 10 INJECTION, EMULSION INTRAVENOUS at 19:55

## 2023-08-13 RX ADMIN — FENTANYL CITRATE 50 MCG: 50 INJECTION INTRAMUSCULAR; INTRAVENOUS at 21:49

## 2023-08-13 RX ADMIN — MIDAZOLAM HYDROCHLORIDE 2 MG: 1 INJECTION, SOLUTION INTRAMUSCULAR; INTRAVENOUS at 18:41

## 2023-08-13 RX ADMIN — SODIUM CHLORIDE 1000 ML: 0.9 INJECTION, SOLUTION INTRAVENOUS at 15:29

## 2023-08-13 RX ADMIN — INSULIN HUMAN 5 UNITS: 100 INJECTION, SOLUTION PARENTERAL at 19:15

## 2023-08-13 RX ADMIN — LINEZOLID 600 MG: 600 INJECTION, SOLUTION INTRAVENOUS at 19:18

## 2023-08-13 RX ADMIN — MORPHINE SULFATE 6 MG: 10 INJECTION INTRAVENOUS at 15:32

## 2023-08-13 RX ADMIN — SODIUM CHLORIDE: 9 INJECTION, SOLUTION INTRAVENOUS at 18:42

## 2023-08-13 RX ADMIN — ROCURONIUM BROMIDE 50 MG: 10 INJECTION, SOLUTION INTRAVENOUS at 18:57

## 2023-08-13 RX ADMIN — HEPARIN SODIUM 5000 UNITS: 5000 INJECTION INTRAVENOUS; SUBCUTANEOUS at 23:00

## 2023-08-13 RX ADMIN — ROCURONIUM BROMIDE 50 MG: 10 INJECTION, SOLUTION INTRAVENOUS at 19:44

## 2023-08-13 RX ADMIN — FENTANYL CITRATE 100 MCG: 50 INJECTION, SOLUTION INTRAMUSCULAR; INTRAVENOUS at 18:49

## 2023-08-13 RX ADMIN — PROPOFOL 50 MCG/KG/MIN: 10 INJECTION, EMULSION INTRAVENOUS at 23:31

## 2023-08-13 RX ADMIN — SODIUM CHLORIDE: 0.9 INJECTION, SOLUTION INTRAVENOUS at 18:30

## 2023-08-13 RX ADMIN — FENTANYL CITRATE 50 MCG: 50 INJECTION, SOLUTION INTRAMUSCULAR; INTRAVENOUS at 23:25

## 2023-08-13 RX ADMIN — POTASSIUM CHLORIDE: 200 INJECTION, SOLUTION INTRAVENOUS at 19:25

## 2023-08-13 RX ADMIN — CHLORHEXIDINE GLUCONATE 15 ML: 1.2 SOLUTION ORAL at 23:00

## 2023-08-13 RX ADMIN — SODIUM CHLORIDE 5 UNITS/HR: 9 INJECTION, SOLUTION INTRAVENOUS at 19:15

## 2023-08-13 RX ADMIN — CEFEPIME HYDROCHLORIDE 2000 MG: 2 INJECTION, SOLUTION INTRAVENOUS at 15:31

## 2023-08-13 RX ADMIN — ETOMIDATE 15 MG: 20 INJECTION, SOLUTION INTRAVENOUS at 18:51

## 2023-08-13 RX ADMIN — FENTANYL CITRATE 50 MCG: 50 INJECTION INTRAMUSCULAR; INTRAVENOUS at 23:25

## 2023-08-13 RX ADMIN — SODIUM CHLORIDE 4 UNITS/HR: 9 INJECTION, SOLUTION INTRAVENOUS at 22:57

## 2023-08-13 RX ADMIN — FENTANYL CITRATE 50 MCG: 50 INJECTION, SOLUTION INTRAMUSCULAR; INTRAVENOUS at 23:53

## 2023-08-13 RX ADMIN — SODIUM CHLORIDE, SODIUM LACTATE, POTASSIUM CHLORIDE, AND CALCIUM CHLORIDE 125 ML/HR: .6; .31; .03; .02 INJECTION, SOLUTION INTRAVENOUS at 23:18

## 2023-08-13 RX ADMIN — METRONIDAZOLE 500 MG: 500 INJECTION, SOLUTION INTRAVENOUS at 18:10

## 2023-08-13 RX ADMIN — HYDROMORPHONE HYDROCHLORIDE 2 MG: 2 INJECTION, SOLUTION INTRAMUSCULAR; INTRAVENOUS; SUBCUTANEOUS at 19:18

## 2023-08-13 RX ADMIN — VANCOMYCIN HYDROCHLORIDE 1750 MG: 1 INJECTION, POWDER, LYOPHILIZED, FOR SOLUTION INTRAVENOUS at 16:08

## 2023-08-13 RX ADMIN — PROPOFOL 50 MG: 10 INJECTION, EMULSION INTRAVENOUS at 19:15

## 2023-08-13 NOTE — CONSULTS
Consultation - General Surgery   Eleazar Boateng 79 y.o. male MRN: [id number]  Unit/Bed#: ED 08 Encounter: [id number]    ASSESSMENT:  55-year-old male with necrotizing soft tissue infection and right perineum    PLAN:  -Patient presents due to right groin/perineal and scrotal swelling for the last 4 days  -Initially had nausea and vomiting and noticed swelling in this area that has slowly progressed  -He has significant tenderness in the right groin area  -States he has never had any infection like this before  -In the right perineal area, right groin, and toward the portion of the right testicle/scrotum there is significant redness and induration along with crepitus consistent with a necrotizing soft tissue infection  -CT scan of the abdomen and pelvis report and images reviewed, consistent with necrotizing soft tissue infection of the perineum  -White blood cell count noted to be 14.27 with an 81% shift  -Lactic acid 2.8  -Point-of-care glucose noted to be 412, last hemoglobin A1c on 1/27/2023 noted to be 12.3  -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 Soledad Alvarez and Dr. Milvia Castano    All risks, benefits, alternatives of the procedure were discussed at length with the patient. Risks include bleeding, infection, damage to surrounding structures, need for multiple debridements, death. All questions were answered to satisfaction.   The patient voiced understanding and signed consent.  _______________________________________________________________  Physician Requesting Consult: Trudy Alston DO    Reason for Consult / Principal Problem: Jamarcus's gangrene    HPI: Pau Gomez is a 79y.o. year old male who presents with right perineal pain and swelling for 4 days. Patient states he has had nausea and vomiting the last few days along with increased swelling and pain in his right perineum/groin area. He has never had an infection or cyst in this area that he knows of. He denies any drainage in the area. Historical Information   Past Medical History:   Diagnosis Date   • Rectal hemorrhage     last assessed: May 29, 2015     Past Surgical History:   Procedure Laterality Date   • CORONARY ARTERY BYPASS GRAFT     • ELBOW SURGERY Right     Bursitis - last assessed: Sept 15, 2016     Social History   Social History     Substance and Sexual Activity   Alcohol Use No    Comment: being a social drinker noted in "[employer]"      Social History     Substance and Sexual Activity   Drug Use No     Social History     Tobacco Use   Smoking Status Never   Smokeless Tobacco Never     Family History: non-contributory}    Meds/Allergies   Home meds:   Prior to Admission medications    Medication Sig Start Date End Date Taking?  Authorizing Provider   aspirin 81 MG tablet Take 1 tablet by mouth 2 (two) times a day  9/13/12   Historical Provider, MD   atorvastatin (LIPITOR) 40 mg tablet Take 1 tablet by mouth daily 11/3/11   Historical Provider, MD PARIKH-MAINE ULTRAFINE III SHORT PEN 31G X 8 MM MISC  1/17/18   Historical Provider, MD   BD ULTRA-FINE PEN NEEDLES 29G X 12.7MM MISC  1/17/18   Historical Provider, MD   candesartan (ATACAND) 16 mg tablet Take 1 tablet (16 mg total) by mouth daily 12/4/19   Nader Deleon MD   carvedilol (COREG) 12.5 mg tablet Take 1 tablet by mouth 2 (two) times a day    Historical Provider, MD   Continuous Blood Gluc Sensor ([address] [place] [address]) MISC 1 each by Does not apply route every 14 (fourteen) days 10/1/19   Historical Provider, MD   dulaglutide (Trulicity) 3 RV/3.5II injection Inject 0.5 mL (3 mg total) under the skin every 7 days 1/27/23   Soumya Rivera MD   Empagliflozin (JARDIANCE) 25 MG TABS Take 1 tablet (25 mg total) by mouth every morning 6/7/19   Soumya Rivera MD   escitalopram (LEXAPRO) 20 mg tablet Take 1 tablet (20 mg total) by mouth daily 9/7/22   Soumya Rivera MD   ezetimibe (ZETIA) 10 mg tablet Take 1 tablet by mouth daily 9/14/11   Historical Provider, MD   fenofibrate (TRICOR) 145 mg tablet Take 145 mg by mouth daily 8/9/22   Historical Provider, MD   insulin glargine (Toujeo SoloStar) 300 units/mL CONCENTRATED U-300 injection pen (1-unit dial) Inject 130 Units under the skin daily 4/12/21   Soumya Rivera MD   Insulin Pen Needle (PEN NEEDLES 29GX1/2") 29G X 12MM MISC 2 X daily subcutaneously dx: E11.65 1/17/18   Historical Provider, MD   insuln lispro (HumaLOG KwikPen) 200 units/mL CONCENTRATED U-200 injection pen 30 Units Once a day with lunch or supper 9/2/21   Historical Provider, MD   isosorbide mononitrate (IMDUR) 30 mg 24 hr tablet Take 30 mg by mouth daily at bedtime 11/30/19   Historical Provider, MD   Lancets MISC test once times daily E11.65 7/12/17   Historical Provider, MD   lansoprazole (PREVACID) 30 mg capsule Take 1 capsule (30 mg total) by mouth daily 10/17/22   Soumya Rivera MD   LORazepam (ATIVAN) 1 mg tablet TAKE 1 TABLET DAILY AS NEEDED FOR ANXIETY 3/6/23   Soumya Rivera MD   metFORMIN (GLUCOPHAGE) 500 mg tablet Take 1,000 mg by mouth 2 (two) times a day 5/6/11   Historical Provider, MD   Misc Natural Products (BLOOD SUGAR 360 PO) ONETOUCH ULTRA TEST STRIPS Test once daily E11.65 7/12/17   Historical Provider, MD   omega-3-acid ethyl esters (LOVAZA) 1 g capsule Take 2 g by mouth 2 (two) times a day  11/3/11   Historical Provider, MD   sotalol (BETAPACE) 80 mg tablet Take 1 tablet by mouth 2 (two) times a day    Historical Provider, MD   spironolactone (ALDACTONE) 25 mg tablet Take 1 tablet by mouth daily    Historical Provider, MD     Scheduled Meds:  Current Facility-Administered Medications   Medication Dose Route Frequency Provider Last Rate   • linezolid  600 mg Intravenous Once Delmar Schwab, DO     • metroNIDAZOLE  500 mg Intravenous Once Delmar Schwab,  mg (08/13/23 1810)     Continuous Infusions:   PRN Meds:      ALLERGIES:   Allergies   Allergen Reactions   • Ace Inhibitors Cough       Review of Systems:  Constitutional: Denies weight change, night sweats  HEENT: Denies headaches, hearing change, vision change, nasal congestion, sore throat  Cardiovascular: Denies chest pain, shortness of breath, dyspnea on exertion  Respiratory: Denies cough, dyspnea  Gastrointestinal: Denies abdominal pain; Positive bowel movement, flatus, nausea, vomiting  Genitourinary: Denies dysuria, hematuria  Musculoskeletal: Positive right groin/perineal pain and swelling  Neuro: Denies weakness, numbness, loss of consciousness  Heme: Denies easy bruising, bleeding  Endocrine: Denies polyuria, polydipsia      Objective   Vitals:  Blood pressure 135/62, pulse 97, temperature (!) 97.4 °F (36.3 °C), resp. rate 20, height 5' 9" (1.753 m), weight 91 kg (200 lb 9.9 oz), SpO2 93 %. Body mass index is 29.63 kg/m². I/Os:  I/O     None          Invasive Lines/Tubes:  Invasive Devices     Peripheral Intravenous Line  Duration           Peripheral IV Right;Ventral (anterior) Antecubital -- days    Peripheral IV 08/13/23 Left;Proximal;Ventral (anterior) Forearm <1 day                Physical Exam  Constitutional:       Appearance: Normal appearance. HENT:      Head: Normocephalic and atraumatic. Nose: Nose normal.   Eyes:      General: No scleral icterus. Conjunctiva/sclera: Conjunctivae normal.   Cardiovascular:      Rate and Rhythm: Normal rate and regular rhythm. Heart sounds: Normal heart sounds. Pulmonary:      Effort: Pulmonary effort is normal.      Breath sounds: Normal breath sounds. Abdominal:      General: There is no distension.       Palpations: Abdomen is soft. Tenderness: There is no abdominal tenderness. Musculoskeletal:         General: No signs of injury. Skin:     General: In the right perineal area, right groin, and toward the portion of the right testicle/scrotum there is significant redness and induration along with crepitus consistent with a necrotizing soft tissue infection  Neurological:      General: No focal deficit present. Mental Status: Patient is alert and oriented to person, place, and time. Psychiatric:         Mood and Affect: Mood normal.         Behavior: Behavior normal.    Lab Results and Cultures:   CBC:   Results from last 7 days   Lab Units 08/13/23  1519   WBC Thousand/uL 14.27*   HEMOGLOBIN g/dL 14.0   HEMATOCRIT % 42.1   PLATELETS Thousands/uL 316     BMP/CMP:  Results from last 7 days   Lab Units 08/13/23  1519   POTASSIUM mmol/L 4.0   CHLORIDE mmol/L 90*   CO2 mmol/L 19*   BUN mg/dL 17   CREATININE mg/dL 0.81   CALCIUM mg/dL 9.8     Coags:   Results from last 7 days   Lab Units 08/13/23  1528   INR  1.29*   PTT seconds 29     Lipid panel:     HgbA1c:   Lab Results   Component Value Date    HGBA1C 12.3 01/27/2023    HGBA1C 10.0 (H) 10/14/2020    HGBA1C 9.5 (H) 05/06/2020    HGBA1C 9.5 05/06/2020    HGBA1C 8.8 08/02/2019    HGBA1C 9.9 (H) 07/16/2018       Urinalysis:   Lab Results   Component Value Date    COLORU Light Yellow 08/13/2023    COLORU Yellow 05/20/2015    CLARITYU Clear 08/13/2023    CLARITYU Clear 05/20/2015    SPECGRAV 1.010 08/13/2023    SPECGRAV >=1.030 05/20/2015    PHUR 5.0 08/13/2023    PHUR 6.0 05/20/2015    LEUKOCYTESUR (A) 08/13/2023     Elevated glucose may cause decreased leukocyte values.  See urine microscopic for UWBC result    LEUKOCYTESUR Negative 05/20/2015    NITRITE Negative 08/13/2023    NITRITE Negative 05/20/2015    PROTEINUA Negative 05/20/2015    GLUCOSEU >=1000 (1%) (A) 08/13/2023    GLUCOSEU Negative 05/20/2015    KETONESU 40 (2+) (A) 08/13/2023    KETONESU Negative 05/20/2015    BILIRUBINUR Negative 08/13/2023    BILIRUBINUR Negative 05/20/2015    BLOODU Trace-Intact (A) 08/13/2023    BLOODU Negative 05/20/2015   ,   Urine Culture: No results found for: "URINECX"  Wound Culure: No results found for: "WOUNDCULT"  Blood Culture: No results found for: "Jorge Oreland"     Imaging Studies: I have personally reviewed pertinent films in PACS  EKG, Pathology, and Other Studies: I have personally reviewed pertinent reports.     VTE Prophylaxis: Reason for no pharmacologic prophylaxis OR    Inez Mean, DO  8/13/2023

## 2023-08-13 NOTE — Clinical Note
The site was marked. Prepped: right chest. Prepped with: ChloraPrep. The site was clipped. The patient was draped.

## 2023-08-13 NOTE — ANESTHESIA PROCEDURE NOTES
Central Line Insertion    Performed by: Ariella Villanueva DO  Authorized by: Ariella Villanueva DO    Date/Time: 8/13/2023 7:00 PM  Catheter Type:  triple lumen  Consent: Verbal consent obtained. Written consent obtained. Risks and benefits: risks, benefits and alternatives were discussed  Consent given by: patient and spouse  Patient understanding: patient states understanding of the procedure being performed  Patient consent: the patient's understanding of the procedure matches consent given  Procedure consent: procedure consent matches procedure scheduled  Relevant documents: relevant documents present and verified  Test results: test results available and properly labeled  Patient identity confirmed: arm band and hospital-assigned identification number  Time out: Immediately prior to procedure a "time out" was called to verify the correct patient, procedure, equipment, support staff and site/side marked as required. Indications: vascular access  Catheter size: 7 Fr  Patient position: Trendelenburg  Anesthesia: see MAR for details    Sedation:  Patient sedated: yes  Analgesia: see MAR for details  Vitals: Vital signs were monitored during sedation.     Assessment: blood return through all ports and free fluid flow  Preparation: skin prepped with ChloraPrep  Skin prep agent dried: skin prep agent completely dried prior to procedure  Sterile barriers: all five maximum sterile barriers used - cap, mask, sterile gown, sterile gloves, and large sterile sheet  Hand hygiene: hand hygiene performed prior to central venous catheter insertion  sterile gel and probe cover used in ultrasound-guided central venous catheter insertionultrasound permanent image saved  Pre-procedure: landmarks identified  Vessel of Catheter Tip End: SVC  Number of attempts: 1  Successful placement: yes  Post-procedure: chlorhexidine patch applied and line sutured  Patient tolerance: patient tolerated the procedure well with no immediate complications

## 2023-08-13 NOTE — ED PROVIDER NOTES
History  Chief Complaint   Patient presents with   • Abscess     Patient reports abscess to inner right thigh x several days. Patient c/o chills and vomiting. Patient is a 26-year-old male past medical history of hypertension, hyperlipidemia, diabetes, CAD, ventricular tachycardia with ICD, anxiety, GERD, CHF presenting with groin pain. Patient notes pain and enlarging "cyst" to the area between his scrotum and anus for the past 3 days. Notes vomiting for 4 days 2-3 episodes a day which she states resolved 1 to 2 days ago but still notes nausea and chills but denies fevers. Has not been able to tolerate his medications and has not been taking his insulin because he states he is not eating. Blood sugar was 426. States he has never had the symptoms before and is never had DKA before. Denies any fevers, abdominal pain, rectal pain, chest pain, shortness of breath, dizziness, rashes, vision changes, dysuria. Prior to Admission Medications   Prescriptions Last Dose Informant Patient Reported? Taking?    B-D ULTRAFINE III SHORT PEN 31G X 8 MM MISC   Yes No   BD ULTRA-FINE PEN NEEDLES 29G X 12.7MM MISC   Yes No   Continuous Blood Gluc Sensor ([address] [place] [address]) MISC   Yes No   Si each by Does not apply route every 14 (fourteen) days   Empagliflozin (JARDIANCE) 25 MG TABS   No No   Sig: Take 1 tablet (25 mg total) by mouth every morning   Insulin Pen Needle (PEN NEEDLES 29GX1/2") 29G X 12MM MISC   Yes No   Si X daily subcutaneously dx: E11.65   LORazepam (ATIVAN) 1 mg tablet   No No   Sig: TAKE 1 TABLET DAILY AS NEEDED FOR ANXIETY   Lancets MISC   Yes No   Sig: test once times daily E11.65   Misc Natural Products (BLOOD SUGAR 360 PO)   Yes No   Sig: ONETOUCH ULTRA TEST STRIPS Test once daily E11.65   aspirin 81 MG tablet   Yes No   Sig: Take 1 tablet by mouth 2 (two) times a day    atorvastatin (LIPITOR) 40 mg tablet   Yes No   Sig: Take 1 tablet by mouth daily   candesartan (ATACAND) 16 mg tablet   No No   Sig: Take 1 tablet (16 mg total) by mouth daily   carvedilol (COREG) 12.5 mg tablet   Yes No   Sig: Take 1 tablet by mouth 2 (two) times a day   dulaglutide (Trulicity) 3 TS/1.9TJ injection   No No   Sig: Inject 0.5 mL (3 mg total) under the skin every 7 days   escitalopram (LEXAPRO) 20 mg tablet   No No   Sig: Take 1 tablet (20 mg total) by mouth daily   ezetimibe (ZETIA) 10 mg tablet   Yes No   Sig: Take 1 tablet by mouth daily   fenofibrate (TRICOR) 145 mg tablet   Yes No   Sig: Take 145 mg by mouth daily   insulin glargine (Toujeo SoloStar) 300 units/mL CONCENTRATED U-300 injection pen (1-unit dial)   No No   Sig: Inject 130 Units under the skin daily   insuln lispro (HumaLOG KwikPen) 200 units/mL CONCENTRATED U-200 injection pen   Yes No   Si Units Once a day with lunch or supper   isosorbide mononitrate (IMDUR) 30 mg 24 hr tablet   Yes No   Sig: Take 30 mg by mouth daily at bedtime   lansoprazole (PREVACID) 30 mg capsule   No No   Sig: Take 1 capsule (30 mg total) by mouth daily   metFORMIN (GLUCOPHAGE) 500 mg tablet   Yes No   Sig: Take 1,000 mg by mouth 2 (two) times a day   omega-3-acid ethyl esters (LOVAZA) 1 g capsule   Yes No   Sig: Take 2 g by mouth 2 (two) times a day    sotalol (BETAPACE) 80 mg tablet   Yes No   Sig: Take 1 tablet by mouth 2 (two) times a day   spironolactone (ALDACTONE) 25 mg tablet   Yes No   Sig: Take 1 tablet by mouth daily      Facility-Administered Medications: None       Past Medical History:   Diagnosis Date   • Rectal hemorrhage     last assessed: May 29, 2015       Past Surgical History:   Procedure Laterality Date   • CORONARY ARTERY BYPASS GRAFT     • ELBOW SURGERY Right     Bursitis - last assessed: Sept 15, 2016       Family History   Problem Relation Age of Onset   • No Known Problems Mother    • Cancer Father    • Coronary artery disease Father      I have reviewed and agree with the history as documented.     E-Cigarette/Vaping E-Cigarette/Vaping Substances     Social History     Tobacco Use   • Smoking status: Never   • Smokeless tobacco: Never   Substance Use Topics   • Alcohol use: No     Comment: being a social drinker noted in "allscripts"    • Drug use: No       Review of Systems   All other systems reviewed and are negative. Physical Exam  Physical Exam  Vitals reviewed. Constitutional:       General: He is not in acute distress. Appearance: Normal appearance. He is not ill-appearing. HENT:      Mouth/Throat:      Mouth: Mucous membranes are moist.   Eyes:      Conjunctiva/sclera: Conjunctivae normal.   Cardiovascular:      Rate and Rhythm: Regular rhythm. Tachycardia present. Heart sounds: Normal heart sounds. Pulmonary:      Effort: Pulmonary effort is normal.      Breath sounds: Normal breath sounds. Abdominal:      General: Abdomen is flat. Palpations: Abdomen is soft. Tenderness: There is no abdominal tenderness. Genitourinary:     Comments: Indurated tenderness to the perineum without overlying erythema, no crepitus or fluctuance, no tenderness to the scrotum or testicles or penis  Musculoskeletal:         General: No swelling. Normal range of motion. Cervical back: Neck supple. Right lower leg: No edema. Left lower leg: No edema. Skin:     General: Skin is warm and dry. Neurological:      General: No focal deficit present. Mental Status: He is alert.    Psychiatric:         Mood and Affect: Mood normal.         Vital Signs  ED Triage Vitals   Temperature Pulse Respirations Blood Pressure SpO2   08/13/23 1427 08/13/23 1427 08/13/23 1427 08/13/23 1427 08/13/23 1427   (!) 97.4 °F (36.3 °C) 100 18 (!) 182/88 98 %      Temp src Heart Rate Source Patient Position - Orthostatic VS BP Location FiO2 (%)   -- 08/13/23 1427 08/13/23 1427 08/13/23 1427 --    Monitor Lying Right arm       Pain Score       08/13/23 1532       10 - Worst Possible Pain           Vitals:    08/13/23 1600 08/13/23 1630 08/13/23 1700 08/13/23 1730   BP: 160/71 (!) 179/85 (!) 157/113 135/62   Pulse: 98 101 102 97   Patient Position - Orthostatic VS:             Visual Acuity      ED Medications  Medications   vancomycin (VANCOCIN) 1,750 mg in sodium chloride 0.9 % 500 mL IVPB (1,750 mg Intravenous New Bag 8/13/23 1608)   linezolid (ZYVOX) IVPB (premix in dextrose) 600 mg 300 mL (has no administration in time range)   metroNIDAZOLE (FLAGYL) IVPB (premix) 500 mg 100 mL (has no administration in time range)   sodium chloride 0.9 % bolus 1,000 mL (1,000 mL Intravenous New Bag 8/13/23 1529)   cefepime (MAXIPIME) IVPB (premix in dextrose) 2,000 mg 50 mL (0 mg Intravenous Stopped 8/13/23 1555)   morphine injection 6 mg (6 mg Intravenous Given 8/13/23 1532)   iohexol (OMNIPAQUE) 350 MG/ML injection (SINGLE-DOSE) 100 mL (100 mL Intravenous Given 8/13/23 1621)       Diagnostic Studies  Results Reviewed     Procedure Component Value Units Date/Time    Urine Microscopic [[id number]]  (Normal) Collected: 08/13/23 1711    Lab Status: Final result Specimen: Urine, Clean Catch Updated: 08/13/23 1752     RBC, UA 0-1 /hpf      WBC, UA None Seen /hpf      Epithelial Cells None Seen /hpf      Bacteria, UA None Seen /hpf     HS Troponin I 2hr [[id number]] Collected: 08/13/23 1744    Lab Status: In process Specimen: Blood from Arm, Right Updated: 08/13/23 1750    UA w Reflex to Microscopic w Reflex to Culture [[id number]]  (Abnormal) Collected: 08/13/23 1711    Lab Status: Final result Specimen: Urine, Clean Catch Updated: 08/13/23 1744     Color, UA Light Yellow     Clarity, UA Clear     Specific Gravity, UA 1.010     pH, UA 5.0     Leukocytes, UA Elevated glucose may cause decreased leukocyte values.  See urine microscopic for UWBC result     Nitrite, UA Negative     Protein, UA Trace mg/dl      Glucose, UA >=1000 (1%) mg/dl      Ketones, UA 40 (2+) mg/dl      Urobilinogen, UA 0.2 E.U./dl      Bilirubin, UA Negative     Occult Blood, UA Trace-Intact    Lactic acid [[id number]]  (Abnormal) Collected: 08/13/23 1519    Lab Status: Final result Specimen: Blood from Arm, Left Updated: 08/13/23 1621     LACTIC ACID 2.8 mmol/L     Narrative:      Result may be elevated if tourniquet was used during collection.     Lactic acid 2 Hours [[id number]]     Lab Status: No result Specimen: Blood     Blood gas, Venous [[id number]]  (Abnormal) Collected: 08/13/23 1554    Lab Status: Final result Specimen: Blood from Arm, Left Updated: 08/13/23 1610     pH, Jesus 7.438     pCO2, Jesus 26.7 mm Hg      pO2, Jesus 72.8 mm Hg      HCO3, Jesus 17.6 mmol/L      Base Excess, Jesus -4.9 mmol/L      O2 Content, Jesus 18.2 ml/dL      O2 HGB, VENOUS 92.4 %     Procalcitonin [[id number]]  (Abnormal) Collected: 08/13/23 1519    Lab Status: Final result Specimen: Blood from Arm, Left Updated: 08/13/23 1606     Procalcitonin 0.91 ng/ml     HS Troponin 0hr (reflex protocol) [[id number]]  (Normal) Collected: 08/13/23 1519    Lab Status: Final result Specimen: Blood from Arm, Left Updated: 08/13/23 1605     hs TnI 0hr 10 ng/L     HS Troponin I 4hr [[id number]]     Lab Status: No result Specimen: Blood     Comprehensive metabolic panel [[id number]]  (Abnormal) Collected: 08/13/23 1519    Lab Status: Final result Specimen: Blood from Arm, Left Updated: 08/13/23 1556     Sodium 129 mmol/L      Potassium 4.0 mmol/L      Chloride 90 mmol/L      CO2 19 mmol/L      ANION GAP 20 mmol/L      BUN 17 mg/dL      Creatinine 0.81 mg/dL      Glucose 422 mg/dL      Calcium 9.8 mg/dL      AST 10 U/L      ALT 9 U/L      Alkaline Phosphatase 86 U/L      Total Protein 8.2 g/dL      Albumin 4.0 g/dL      Total Bilirubin 0.94 mg/dL      eGFR 91 ml/min/1.73sq m     Narrative:      [place] guidelines for Chronic Kidney Disease (CKD):   •  Stage 1 with normal or high GFR (GFR > 90 mL/min/1.73 square meters)  •  Stage 2 Mild CKD (GFR = 60-89 mL/min/1.73 square meters)  •  Stage 3A Moderate CKD (GFR = 45-59 mL/min/1.73 square meters)  •  Stage 3B Moderate CKD (GFR = 30-44 mL/min/1.73 square meters)  •  Stage 4 Severe CKD (GFR = 15-29 mL/min/1.73 square meters)  •  Stage 5 End Stage CKD (GFR <15 mL/min/1.73 square meters)  Note: GFR calculation is accurate only with a steady state creatinine    Magnesium [[id number]]  (Abnormal) Collected: 08/13/23 1519    Lab Status: Final result Specimen: Blood from Arm, Left Updated: 08/13/23 1556     Magnesium 1.6 mg/dL     Protime-INR [[id number]]  (Abnormal) Collected: 08/13/23 1528    Lab Status: Final result Specimen: Blood from Arm, Left Updated: 08/13/23 1553     Protime 15.9 seconds      INR 1.29    APTT [[id number]]  (Normal) Collected: 08/13/23 1528    Lab Status: Final result Specimen: Blood from Arm, Left Updated: 08/13/23 1553     PTT 29 seconds     Beta Hydroxybutyrate [[id number]]  (Abnormal) Collected: 08/13/23 1519    Lab Status: Final result Specimen: Blood from Arm, Left Updated: 08/13/23 1542     BETA-HYDROXYBUTYRATE 4.9 mmol/L     CBC and differential [[id number]]  (Abnormal) Collected: 08/13/23 1519    Lab Status: Final result Specimen: Blood from Arm, Left Updated: 08/13/23 1537     WBC 14.27 Thousand/uL      RBC 4.61 Million/uL      Hemoglobin 14.0 g/dL      Hematocrit 42.1 %      MCV 91 fL      MCH 30.4 pg      MCHC 33.3 g/dL      RDW 13.2 %      MPV 9.8 fL      Platelets 975 Thousands/uL      nRBC 0 /100 WBCs      Neutrophils Relative 81 %      Immat GRANS % 1 %      Lymphocytes Relative 9 %      Monocytes Relative 9 %      Eosinophils Relative 0 %      Basophils Relative 0 %      Neutrophils Absolute 11.50 Thousands/µL      Immature Grans Absolute 0.13 Thousand/uL      Lymphocytes Absolute 1.27 Thousands/µL      Monocytes Absolute 1.34 Thousand/µL      Eosinophils Absolute 0.00 Thousand/µL      Basophils Absolute 0.03 Thousands/µL     Blood culture #1 [[id number]] Collected: 08/13/23 1528    Lab Status:  In process Specimen: Blood from Hand, Left Updated: 08/13/23 1533    Blood culture #2 [[id number]] Collected: 08/13/23 1519    Lab Status: In process Specimen: Blood from Arm, Left Updated: 08/13/23 1525    Fingerstick Glucose (POCT) [[id number]]  (Abnormal) Collected: 08/13/23 1516    Lab Status: Final result Updated: 08/13/23 1518     POC Glucose 412 mg/dl                  CT abdomen pelvis with contrast   Final Result by Sarmad Obrien MD (08/13 1706)      1. 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 No abscess. 2.  Cholelithiasis. I personally discussed this study with Emily Camilo via [employer] on 8/13/2023 4:56 PM.                  Workstation performed: [id number]                    Procedures  ECG 12 Lead Documentation Only    Date/Time: 8/13/2023 2:59 PM    Performed by: Emily Camilo DO  Authorized by: Emily Camilo DO    Patient location:  ED  Previous ECG:     Previous ECG:  Compared to current    Comparison ECG info: Increased ectopy otherwise unchanged  Interpretation:     Interpretation: non-specific    Rate:     ECG rate assessment: normal    Rhythm:     Rhythm: paced    Pacing:     Capture:  Intermittent  Ectopy:     Ectopy: PVCs      PVCs:  Frequent  QRS:     QRS axis:  Normal    QRS intervals:   Wide  Conduction:     Conduction: normal    ST segments:     ST segments:  Normal  T waves:     T waves: normal      CriticalCare Time    Date/Time: 8/13/2023 5:53 PM    Performed by: Emily Camilo DO  Authorized by: Emily Camilo DO    Critical care provider statement:     Critical care time (minutes):  45    Critical care was necessary to treat or prevent imminent or life-threatening deterioration of the following conditions:  Sepsis and shock    Critical care was time spent personally by me on the following activities:  Obtaining history from patient or surrogate, development of treatment plan with patient or surrogate, discussions with consultants, evaluation of patient's response to treatment, examination of patient, interpretation of cardiac output measurements, ordering and performing treatments and interventions, ordering and review of radiographic studies, ordering and review of laboratory studies, re-evaluation of patient's condition and review of old charts             ED Course  ED Course as of 08/13/23 1753   Sun Aug 13, 2023   1700 Patient with Jamarcus's gangrene, have given antibiotics, discussed with urology and discussing with general surgery for source control and transfer to [facility]. [facility] surgery aware and states that they will coordinate  for debridement here prior to transfer. SBIRT 20yo+    Flowsheet Row Most Recent Value   Initial Alcohol Screen: US AUDIT-C     1. How often do you have a drink containing alcohol? 0 Filed at: 08/13/2023 1429   2. How many drinks containing alcohol do you have on a typical day you are drinking? 0 Filed at: 08/13/2023 1429   3b. FEMALE Any Age, or MALE 65+: How often do you have 4 or more drinks on one occassion? 0 Filed at: 08/13/2023 1429   Audit-C Score 0 Filed at: 08/13/2023 1429   NANNETTE: How many times in the past year have you. .. Used an illegal drug or used a prescription medication for non-medical reasons? Never Filed at: 08/13/2023 1429                    Medical Decision Making  Patient is a 63-year-old male past medical Struve hypertension, hyperlipidemia, CAD, CHF, ventricular tachycardia with ICD, diabetes presenting with perineal pain, hyperglycemia. Patient is well-appearing at bedside with stable vitals and in no acute distress. He has low-grade tachycardia and is currently hypertensive but denying any chest pain, shortness of breath or dizziness. He has nontender abdomen but does have tenderness and induration of the perineum without overlying erythema, no crepitus or fluctuance, unremarkable exam of the testicles and penis.   Concern for perineal infection, abscess versus cellulitis and will obtain CT abdomen pelvis, obtain septic work-up, including labs to assess for signs of DKA, sepsis, electrolyte abnormalities or anemia, give fluids, pain control, antibiotics for likely infection and continue to monitor. Amount and/or Complexity of Data Reviewed  Labs: ordered. Radiology: ordered. Risk  Prescription drug management. Disposition  Final diagnoses:   Jamarcus gangrene     Time reflects when diagnosis was documented in both MDM as applicable and the Disposition within this note     Time User Action Codes Description Comment    8/13/2023  5:11 PM Sean Agrawal Add [N49.3] Jamarcus gangrene     8/13/2023  5:22 PM Zarate Ratel Modify [N49.3] Jamarcus gangrene       ED Disposition     ED Disposition   Send to OR    Condition   --    Date/Time   Sun Aug 13, 2023  5:22 PM    Comment   --         Follow-up Information    None         Patient's Medications   Discharge Prescriptions    No medications on file       No discharge procedures on file.     PDMP Review       Value Time User    PDMP Reviewed  Yes 3/6/2023  9:11 AM Vik Nino MD          ED Provider  Electronically Signed by           Indigo Gorman DO  08/13/23 4816

## 2023-08-13 NOTE — Clinical Note
The ICD [facility] [place] [address] MRI DF4 - [id number] device was inserted. The leads were placed into the connector and visually verified to be in correct position. Injury current obtained.

## 2023-08-13 NOTE — ANESTHESIA PROCEDURE NOTES
Arterial Line Insertion    Performed by: Harjinder Franco CRNA  Authorized by: Thony Bañuelos DO  Consent: Verbal consent obtained. Written consent obtained. Risks and benefits: risks, benefits and alternatives were discussed  Consent given by: patient and spouse  Patient understanding: patient states understanding of the procedure being performed  Patient consent: the patient's understanding of the procedure matches consent given  Procedure consent: procedure consent matches procedure scheduled  Relevant documents: relevant documents present and verified  Test results: test results available and properly labeled  Patient identity confirmed: arm band and hospital-assigned identification number  Time out: Immediately prior to procedure a "time out" was called to verify the correct patient, procedure, equipment, support staff and site/side marked as required. Preparation: Patient was prepped and draped in the usual sterile fashion.   Indications: multiple ABGs and hemodynamic monitoring  Orientation:  Right  Location: radial artery  Sedation:  Patient sedated: yes  Analgesia: see MAR for details    Procedure Details:  Rakesh's test normal: yes  Needle gauge: 20  Seldinger technique: Seldinger technique used  Number of attempts: 2    Post-procedure:  Post-procedure: dressing applied  Post-procedure CNS: normal  Patient tolerance: patient tolerated the procedure well with no immediate complications

## 2023-08-13 NOTE — ANESTHESIA PREPROCEDURE EVALUATION
Procedure:  EXCISIONAL DEBRIDEMENT Perineum (Buttocks)    78 y/o M poorly controlled DM, ICM w/ sys HF (last EF 25%, no recent Echo), S/P CABG, last cath demonstrates  RCA and Circ w/ occluded SVGs, LIMA to LAD patent and appears to be only perfusing coronary art. He is in DKA. CT reviewed and has full stomach. [address] Relevant Problems   CARDIO   (+) Benign essential hypertension   (+) Coronary atherosclerosis   (+) Mixed hyperlipidemia   (+) Ventricular tachycardia (HCC)      GI/HEPATIC   (+) Esophageal reflux      NEURO/PSYCH   (+) Generalized anxiety disorder        Physical Exam    Airway    Mallampati score: II  TM Distance: >3 FB  Neck ROM: full     Dental   No notable dental hx     Cardiovascular  Cardiovascular exam normal    Pulmonary  Pulmonary exam normal     Other Findings    Consent obtained for GETA, CVC, Art line, MARCELLA. High risk of organ failure and death during or post procedure as a result of his chronic and acute illnesses discussed. Taken urgently to OR. Anesthesia Plan  ASA Score- 4 Emergent    Anesthesia Type- general with ASA Monitors. Additional Monitors: arterial line and central venous line. Airway Plan: ETT. Plan Factors-Exercise tolerance (METS): <4 METS. Chart reviewed. EKG reviewed. Imaging results reviewed. Existing labs reviewed. Patient summary reviewed. Induction- intravenous and rapid sequence induction. Postoperative Plan- Plan for postoperative opioid use. Informed Consent- Anesthetic plan and risks discussed with patient and spouse. I personally reviewed this patient with the CRNA. Discussed and agreed on the Anesthesia Plan with the CRNA. Shadi Baker

## 2023-08-14 ENCOUNTER — ANESTHESIA EVENT (INPATIENT)
Dept: PERIOP | Facility: HOSPITAL | Age: 68
End: 2023-08-14
Payer: COMMERCIAL

## 2023-08-14 ENCOUNTER — APPOINTMENT (OUTPATIENT)
Dept: RADIOLOGY | Facility: HOSPITAL | Age: 68
DRG: 853 | End: 2023-08-14
Payer: COMMERCIAL

## 2023-08-14 ENCOUNTER — ANESTHESIA (INPATIENT)
Dept: PERIOP | Facility: HOSPITAL | Age: 68
End: 2023-08-14
Payer: COMMERCIAL

## 2023-08-14 ENCOUNTER — APPOINTMENT (INPATIENT)
Dept: NON INVASIVE DIAGNOSTICS | Facility: HOSPITAL | Age: 68
DRG: 853 | End: 2023-08-14
Payer: COMMERCIAL

## 2023-08-14 LAB
ABO GROUP BLD: NORMAL
ANION GAP SERPL CALCULATED.3IONS-SCNC: 9 MMOL/L
AORTIC ROOT: 3.2 CM
APICAL FOUR CHAMBER EJECTION FRACTION: 52 %
ASCENDING AORTA: 2.9 CM
ATRIAL RATE: 109 BPM
ATRIAL RATE: 90 BPM
ATRIAL RATE: 91 BPM
AV LVOT MEAN GRADIENT: 2 MMHG
AV LVOT PEAK GRADIENT: 3 MMHG
BASE EXCESS BLDA CALC-SCNC: 1.1 MMOL/L
BASOPHILS # BLD AUTO: 0.05 THOUSANDS/ÂΜL (ref 0–0.1)
BASOPHILS NFR BLD AUTO: 0 % (ref 0–1)
BLD GP AB SCN SERPL QL: NEGATIVE
BUN SERPL-MCNC: 14 MG/DL (ref 5–25)
CALCIUM SERPL-MCNC: 8.2 MG/DL (ref 8.3–10.1)
CHLORIDE SERPL-SCNC: 104 MMOL/L (ref 96–108)
CO2 SERPL-SCNC: 22 MMOL/L (ref 21–32)
CREAT SERPL-MCNC: 0.61 MG/DL (ref 0.6–1.3)
DOP CALC LVOT AREA: 3.14 CM2
DOP CALC LVOT CARDIAC INDEX: 1.6 L/MIN/M2
DOP CALC LVOT CARDIAC OUTPUT: 3.31 L/MIN
DOP CALC LVOT DIAMETER: 2 CM
DOP CALC LVOT PEAK VEL VTI: 12.61 CM
DOP CALC LVOT PEAK VEL: 0.88 M/S
DOP CALC LVOT STROKE INDEX: 18.4 ML/M2
DOP CALC LVOT STROKE VOLUME: 39.6 CM3
E WAVE DECELERATION TIME: 177 MS
EOSINOPHIL # BLD AUTO: 0.03 THOUSAND/ÂΜL (ref 0–0.61)
EOSINOPHIL NFR BLD AUTO: 0 % (ref 0–6)
ERYTHROCYTE [DISTWIDTH] IN BLOOD BY AUTOMATED COUNT: 13.4 % (ref 11.6–15.1)
FRACTIONAL SHORTENING: 20 % (ref 28–44)
GFR SERPL CREATININE-BSD FRML MDRD: 103 ML/MIN/1.73SQ M
GLUCOSE SERPL-MCNC: 140 MG/DL (ref 65–140)
GLUCOSE SERPL-MCNC: 150 MG/DL (ref 65–140)
GLUCOSE SERPL-MCNC: 160 MG/DL (ref 65–140)
GLUCOSE SERPL-MCNC: 166 MG/DL (ref 65–140)
GLUCOSE SERPL-MCNC: 172 MG/DL (ref 65–140)
GLUCOSE SERPL-MCNC: 172 MG/DL (ref 65–140)
GLUCOSE SERPL-MCNC: 185 MG/DL (ref 65–140)
GLUCOSE SERPL-MCNC: 193 MG/DL (ref 65–140)
GLUCOSE SERPL-MCNC: 202 MG/DL (ref 65–140)
GLUCOSE SERPL-MCNC: 224 MG/DL (ref 65–140)
GLUCOSE SERPL-MCNC: 234 MG/DL (ref 65–140)
GLUCOSE SERPL-MCNC: 251 MG/DL (ref 65–140)
HCO3 BLDA-SCNC: 23.8 MMOL/L (ref 22–28)
HCT VFR BLD AUTO: 34.9 % (ref 36.5–49.3)
HGB BLD-MCNC: 11.9 G/DL (ref 12–17)
IMM GRANULOCYTES # BLD AUTO: 0.09 THOUSAND/UL (ref 0–0.2)
IMM GRANULOCYTES NFR BLD AUTO: 1 % (ref 0–2)
INTERVENTRICULAR SEPTUM IN DIASTOLE (PARASTERNAL SHORT AXIS VIEW): 1.1 CM
INTERVENTRICULAR SEPTUM: 1.1 CM (ref 0.6–1.1)
LAAS-AP2: 17.8 CM2
LAAS-AP4: 13.8 CM2
LACTATE SERPL-SCNC: 1.6 MMOL/L (ref 0.5–2)
LEFT ATRIUM SIZE: 5.1 CM
LEFT ATRIUM VOLUME (MOD BIPLANE): 40 ML
LEFT INTERNAL DIMENSION IN SYSTOLE: 4.1 CM (ref 2.1–4)
LEFT VENTRICLE DIASTOLIC VOLUME (MOD BIPLANE): 107 ML
LEFT VENTRICLE SYSTOLIC VOLUME (MOD BIPLANE): 56 ML
LEFT VENTRICULAR INTERNAL DIMENSION IN DIASTOLE: 5.1 CM (ref 3.5–6)
LEFT VENTRICULAR POSTERIOR WALL IN END DIASTOLE: 1.2 CM
LEFT VENTRICULAR STROKE VOLUME: 48 ML
LV EF: 48 %
LVSV (TEICH): 48 ML
LYMPHOCYTES # BLD AUTO: 2.23 THOUSANDS/ÂΜL (ref 0.6–4.47)
LYMPHOCYTES NFR BLD AUTO: 18 % (ref 14–44)
MAGNESIUM SERPL-MCNC: 1.6 MG/DL (ref 1.6–2.6)
MCH RBC QN AUTO: 31.1 PG (ref 26.8–34.3)
MCHC RBC AUTO-ENTMCNC: 34.1 G/DL (ref 31.4–37.4)
MCV RBC AUTO: 91 FL (ref 82–98)
MONOCYTES # BLD AUTO: 1.21 THOUSAND/ÂΜL (ref 0.17–1.22)
MONOCYTES NFR BLD AUTO: 10 % (ref 4–12)
MV E'TISSUE VEL-LAT: 10 CM/S
MV E'TISSUE VEL-SEP: 4 CM/S
MV PEAK A VEL: 0.59 M/S
MV PEAK E VEL: 46 CM/S
MV STENOSIS PRESSURE HALF TIME: 51 MS
MV VALVE AREA P 1/2 METHOD: 4.31 CM2
NEUTROPHILS # BLD AUTO: 9.08 THOUSANDS/ÂΜL (ref 1.85–7.62)
NEUTS SEG NFR BLD AUTO: 71 % (ref 43–75)
NRBC BLD AUTO-RTO: 0 /100 WBCS
O2 CT BLDA-SCNC: 16.8 ML/DL (ref 16–23)
OXYHGB MFR BLDA: 97.2 % (ref 94–97)
P AXIS: 19 DEGREES
P AXIS: 25 DEGREES
P AXIS: 87 DEGREES
PCO2 BLDA: 31.7 MM HG (ref 36–44)
PH BLDA: 7.49 [PH] (ref 7.35–7.45)
PLATELET # BLD AUTO: 285 THOUSANDS/UL (ref 149–390)
PMV BLD AUTO: 9.8 FL (ref 8.9–12.7)
PO2 BLDA: 108.6 MM HG (ref 75–129)
POTASSIUM SERPL-SCNC: 3.7 MMOL/L (ref 3.5–5.3)
PR INTERVAL: 121 MS
PR INTERVAL: 133 MS
PR INTERVAL: 146 MS
QRS AXIS: 13 DEGREES
QRS AXIS: 23 DEGREES
QRS AXIS: 25 DEGREES
QRSD INTERVAL: 108 MS
QRSD INTERVAL: 113 MS
QRSD INTERVAL: 113 MS
QT INTERVAL: 367 MS
QT INTERVAL: 375 MS
QT INTERVAL: 392 MS
QTC INTERVAL: 459 MS
QTC INTERVAL: 483 MS
QTC INTERVAL: 495 MS
RBC # BLD AUTO: 3.83 MILLION/UL (ref 3.88–5.62)
RH BLD: POSITIVE
RIGHT ATRIUM AREA SYSTOLE A4C: 16 CM2
RIGHT VENTRICLE ID DIMENSION: 4.1 CM
SL CV LEFT ATRIUM LENGTH A2C: 5.5 CM
SL CV LV EF: 40
SL CV PED ECHO LEFT VENTRICLE DIASTOLIC VOLUME (MOD BIPLANE) 2D: 123 ML
SL CV PED ECHO LEFT VENTRICLE SYSTOLIC VOLUME (MOD BIPLANE) 2D: 75 ML
SODIUM SERPL-SCNC: 135 MMOL/L (ref 135–147)
SPECIMEN EXPIRATION DATE: NORMAL
SPECIMEN SOURCE: ABNORMAL
T WAVE AXIS: 126 DEGREES
T WAVE AXIS: 3 DEGREES
T WAVE AXIS: 6 DEGREES
TRICUSPID ANNULAR PLANE SYSTOLIC EXCURSION: 1.4 CM
TRICUSPID VALVE PEAK REGURGITATION VELOCITY: 1.42 M/S
VENTRICULAR RATE: 109 BPM
VENTRICULAR RATE: 90 BPM
VENTRICULAR RATE: 91 BPM
WBC # BLD AUTO: 12.69 THOUSAND/UL (ref 4.31–10.16)

## 2023-08-14 PROCEDURE — 94760 N-INVAS EAR/PLS OXIMETRY 1: CPT

## 2023-08-14 PROCEDURE — 94003 VENT MGMT INPAT SUBQ DAY: CPT

## 2023-08-14 PROCEDURE — 4A133B1 MONITORING OF ARTERIAL PRESSURE, PERIPHERAL, PERCUTANEOUS APPROACH: ICD-10-PCS | Performed by: SURGERY

## 2023-08-14 PROCEDURE — 83605 ASSAY OF LACTIC ACID: CPT

## 2023-08-14 PROCEDURE — 82948 REAGENT STRIP/BLOOD GLUCOSE: CPT

## 2023-08-14 PROCEDURE — 0JBB0ZZ EXCISION OF PERINEUM SUBCUTANEOUS TISSUE AND FASCIA, OPEN APPROACH: ICD-10-PCS | Performed by: SURGERY

## 2023-08-14 PROCEDURE — 80048 BASIC METABOLIC PNL TOTAL CA: CPT

## 2023-08-14 PROCEDURE — C9113 INJ PANTOPRAZOLE SODIUM, VIA: HCPCS | Performed by: PHYSICIAN ASSISTANT

## 2023-08-14 PROCEDURE — 93306 TTE W/DOPPLER COMPLETE: CPT | Performed by: INTERNAL MEDICINE

## 2023-08-14 PROCEDURE — 4A133J1 MONITORING OF ARTERIAL PULSE, PERIPHERAL, PERCUTANEOUS APPROACH: ICD-10-PCS | Performed by: SURGERY

## 2023-08-14 PROCEDURE — 93010 ELECTROCARDIOGRAM REPORT: CPT | Performed by: INTERNAL MEDICINE

## 2023-08-14 PROCEDURE — 99232 SBSQ HOSP IP/OBS MODERATE 35: CPT | Performed by: SURGERY

## 2023-08-14 PROCEDURE — 82805 BLOOD GASES W/O2 SATURATION: CPT | Performed by: PHYSICIAN ASSISTANT

## 2023-08-14 PROCEDURE — 85025 COMPLETE CBC W/AUTO DIFF WBC: CPT

## 2023-08-14 PROCEDURE — C8929 TTE W OR WO FOL WCON,DOPPLER: HCPCS

## 2023-08-14 PROCEDURE — 36620 INSERTION CATHETER ARTERY: CPT

## 2023-08-14 PROCEDURE — NC001 PR NO CHARGE: Performed by: SURGERY

## 2023-08-14 PROCEDURE — 03HY32Z INSERTION OF MONITORING DEVICE INTO UPPER ARTERY, PERCUTANEOUS APPROACH: ICD-10-PCS | Performed by: SURGERY

## 2023-08-14 PROCEDURE — 99291 CRITICAL CARE FIRST HOUR: CPT | Performed by: SURGERY

## 2023-08-14 PROCEDURE — 11004 DBRDMT SKIN XTRNL GENT&PER: CPT | Performed by: SURGERY

## 2023-08-14 PROCEDURE — 83735 ASSAY OF MAGNESIUM: CPT

## 2023-08-14 PROCEDURE — 93005 ELECTROCARDIOGRAM TRACING: CPT

## 2023-08-14 PROCEDURE — NC001 PR NO CHARGE

## 2023-08-14 PROCEDURE — 71045 X-RAY EXAM CHEST 1 VIEW: CPT

## 2023-08-14 PROCEDURE — NC001 PR NO CHARGE: Performed by: PHYSICIAN ASSISTANT

## 2023-08-14 RX ORDER — CHLORHEXIDINE GLUCONATE 0.12 MG/ML
15 RINSE ORAL ONCE
Status: COMPLETED | OUTPATIENT
Start: 2023-08-14 | End: 2023-08-14

## 2023-08-14 RX ORDER — MAGNESIUM HYDROXIDE 1200 MG/15ML
LIQUID ORAL AS NEEDED
Status: DISCONTINUED | OUTPATIENT
Start: 2023-08-14 | End: 2023-08-14 | Stop reason: HOSPADM

## 2023-08-14 RX ORDER — POTASSIUM CHLORIDE 20MEQ/15ML
40 LIQUID (ML) ORAL ONCE
Status: COMPLETED | OUTPATIENT
Start: 2023-08-14 | End: 2023-08-14

## 2023-08-14 RX ORDER — LIDOCAINE HYDROCHLORIDE 10 MG/ML
5 INJECTION, SOLUTION EPIDURAL; INFILTRATION; INTRACAUDAL; PERINEURAL ONCE
Status: COMPLETED | OUTPATIENT
Start: 2023-08-14 | End: 2023-08-14

## 2023-08-14 RX ORDER — SODIUM CHLORIDE, SODIUM LACTATE, POTASSIUM CHLORIDE, CALCIUM CHLORIDE 600; 310; 30; 20 MG/100ML; MG/100ML; MG/100ML; MG/100ML
INJECTION, SOLUTION INTRAVENOUS CONTINUOUS PRN
Status: DISCONTINUED | OUTPATIENT
Start: 2023-08-14 | End: 2023-08-14

## 2023-08-14 RX ORDER — MAGNESIUM SULFATE HEPTAHYDRATE 40 MG/ML
2 INJECTION, SOLUTION INTRAVENOUS ONCE
Status: COMPLETED | OUTPATIENT
Start: 2023-08-14 | End: 2023-08-14

## 2023-08-14 RX ORDER — ROCURONIUM BROMIDE 10 MG/ML
INJECTION, SOLUTION INTRAVENOUS AS NEEDED
Status: DISCONTINUED | OUTPATIENT
Start: 2023-08-14 | End: 2023-08-14

## 2023-08-14 RX ORDER — FENTANYL CITRATE-0.9 % NACL/PF 10 MCG/ML
50 PLASTIC BAG, INJECTION (ML) INTRAVENOUS CONTINUOUS
Status: DISCONTINUED | OUTPATIENT
Start: 2023-08-14 | End: 2023-08-14

## 2023-08-14 RX ORDER — LIDOCAINE HYDROCHLORIDE 10 MG/ML
INJECTION, SOLUTION EPIDURAL; INFILTRATION; INTRACAUDAL; PERINEURAL
Status: COMPLETED
Start: 2023-08-14 | End: 2023-08-14

## 2023-08-14 RX ORDER — SENNOSIDES 8.6 MG
1 TABLET ORAL
Status: DISCONTINUED | OUTPATIENT
Start: 2023-08-15 | End: 2023-08-31 | Stop reason: HOSPADM

## 2023-08-14 RX ORDER — FENTANYL CITRATE 50 UG/ML
INJECTION, SOLUTION INTRAMUSCULAR; INTRAVENOUS AS NEEDED
Status: DISCONTINUED | OUTPATIENT
Start: 2023-08-14 | End: 2023-08-14

## 2023-08-14 RX ORDER — PANTOPRAZOLE SODIUM 40 MG/10ML
40 INJECTION, POWDER, LYOPHILIZED, FOR SOLUTION INTRAVENOUS ONCE
Status: COMPLETED | OUTPATIENT
Start: 2023-08-14 | End: 2023-08-14

## 2023-08-14 RX ORDER — ENOXAPARIN SODIUM 100 MG/ML
40 INJECTION SUBCUTANEOUS
Status: DISCONTINUED | OUTPATIENT
Start: 2023-08-14 | End: 2023-08-23

## 2023-08-14 RX ADMIN — SODIUM CHLORIDE, SODIUM LACTATE, POTASSIUM CHLORIDE, AND CALCIUM CHLORIDE 125 ML/HR: .6; .31; .03; .02 INJECTION, SOLUTION INTRAVENOUS at 07:55

## 2023-08-14 RX ADMIN — NOREPINEPHRINE BITARTRATE 7 MCG/MIN: 1 INJECTION, SOLUTION, CONCENTRATE INTRAVENOUS at 01:52

## 2023-08-14 RX ADMIN — PROPOFOL 40 MCG/KG/MIN: 10 INJECTION, EMULSION INTRAVENOUS at 11:27

## 2023-08-14 RX ADMIN — PROPOFOL 40 MCG/KG/MIN: 10 INJECTION, EMULSION INTRAVENOUS at 06:56

## 2023-08-14 RX ADMIN — HYDROMORPHONE HYDROCHLORIDE 0.5 MG: 1 INJECTION, SOLUTION INTRAMUSCULAR; INTRAVENOUS; SUBCUTANEOUS at 05:04

## 2023-08-14 RX ADMIN — PANTOPRAZOLE SODIUM 40 MG: 40 INJECTION, POWDER, FOR SOLUTION INTRAVENOUS at 11:10

## 2023-08-14 RX ADMIN — FENTANYL CITRATE 50 MCG: 50 INJECTION, SOLUTION INTRAMUSCULAR; INTRAVENOUS at 14:17

## 2023-08-14 RX ADMIN — PIPERACILLIN SODIUM AND TAZOBACTAM SODIUM 4.5 G: 36; 4.5 INJECTION, POWDER, LYOPHILIZED, FOR SOLUTION INTRAVENOUS at 00:19

## 2023-08-14 RX ADMIN — LINEZOLID 600 MG: 600 INJECTION, SOLUTION INTRAVENOUS at 11:08

## 2023-08-14 RX ADMIN — HYDROMORPHONE HYDROCHLORIDE 0.5 MG/HR: 10 INJECTION, SOLUTION INTRAMUSCULAR; INTRAVENOUS; SUBCUTANEOUS at 11:28

## 2023-08-14 RX ADMIN — PERFLUTREN 0.6 ML/MIN: 6.52 INJECTION, SUSPENSION INTRAVENOUS at 07:45

## 2023-08-14 RX ADMIN — SODIUM CHLORIDE, SODIUM LACTATE, POTASSIUM CHLORIDE, AND CALCIUM CHLORIDE 125 ML/HR: .6; .31; .03; .02 INJECTION, SOLUTION INTRAVENOUS at 15:49

## 2023-08-14 RX ADMIN — NOREPINEPHRINE BITARTRATE 5 MCG/MIN: 1 INJECTION, SOLUTION, CONCENTRATE INTRAVENOUS at 17:54

## 2023-08-14 RX ADMIN — LIDOCAINE HYDROCHLORIDE 5 ML: 10 INJECTION, SOLUTION EPIDURAL; INFILTRATION; INTRACAUDAL; PERINEURAL at 22:00

## 2023-08-14 RX ADMIN — FENTANYL CITRATE 50 MCG: 50 INJECTION, SOLUTION INTRAMUSCULAR; INTRAVENOUS at 14:24

## 2023-08-14 RX ADMIN — PIPERACILLIN SODIUM AND TAZOBACTAM SODIUM 4.5 G: 36; 4.5 INJECTION, POWDER, LYOPHILIZED, FOR SOLUTION INTRAVENOUS at 06:01

## 2023-08-14 RX ADMIN — POTASSIUM CHLORIDE 40 MEQ: 1.5 SOLUTION ORAL at 06:57

## 2023-08-14 RX ADMIN — PIPERACILLIN SODIUM AND TAZOBACTAM SODIUM 4.5 G: 36; 4.5 INJECTION, POWDER, LYOPHILIZED, FOR SOLUTION INTRAVENOUS at 20:44

## 2023-08-14 RX ADMIN — SODIUM CHLORIDE, SODIUM LACTATE, POTASSIUM CHLORIDE, AND CALCIUM CHLORIDE: .6; .31; .03; .02 INJECTION, SOLUTION INTRAVENOUS at 13:52

## 2023-08-14 RX ADMIN — PROPOFOL 35 MCG/KG/MIN: 10 INJECTION, EMULSION INTRAVENOUS at 01:51

## 2023-08-14 RX ADMIN — PROPOFOL 35 MCG/KG/MIN: 10 INJECTION, EMULSION INTRAVENOUS at 18:38

## 2023-08-14 RX ADMIN — MAGNESIUM SULFATE HEPTAHYDRATE 2 G: 40 INJECTION, SOLUTION INTRAVENOUS at 05:13

## 2023-08-14 RX ADMIN — PIPERACILLIN SODIUM AND TAZOBACTAM SODIUM 4.5 G: 36; 4.5 INJECTION, POWDER, LYOPHILIZED, FOR SOLUTION INTRAVENOUS at 12:31

## 2023-08-14 RX ADMIN — CHLORHEXIDINE GLUCONATE 15 ML: 1.2 SOLUTION ORAL at 07:56

## 2023-08-14 RX ADMIN — ROCURONIUM BROMIDE 50 MG: 10 INJECTION, SOLUTION INTRAVENOUS at 13:52

## 2023-08-14 RX ADMIN — ENOXAPARIN SODIUM 40 MG: 40 INJECTION SUBCUTANEOUS at 08:06

## 2023-08-14 NOTE — PROGRESS NOTES
Progress Note - General Surgery   Re Corona 79 y.o. male MRN: [id number]  Unit/Bed#: MICU 12 Encounter: [id number]    Assessment:  70yo M with DKA and sheyla gangrene  S/p excisional debridement of perineum 8/13 at [facility], Tmax 100.1   over 9 hours  Gtt: levo 5, propofol, insulin  Vent: P-CMV 20/15/6/40%    WBC 12.69 (14.21)  hgb 11.9 (12.2)  creatinine 0.61 (0.73)    Plan:  - intubated, wean vent as tolerated  - wean pressors as tolerated  - cont linezolin/zosyn, clindamycin shortage  - plan for OR today for wash out  - cont insulin gtt for DKA  - appreciate care per ICU      Subjective/Objective   Subjective:   No acute events overnight. Intubated. Objective:     Blood pressure 101/55, pulse 88, temperature 100.1 °F (37.8 °C), temperature source Oral, resp. rate 20, height 5' 9" (1.753 m), weight 90.7 kg (200 lb), SpO2 96 %. ,Body mass index is 29.53 kg/m². Intake/Output Summary (Last 24 hours) at 8/14/2023 0914  Last data filed at 8/14/2023 0755  Gross per 24 hour   Intake 1496.51 ml   Output 800 ml   Net 696.51 ml       Invasive Devices     Central Venous Catheter Line  Duration           CVC Central Lines 08/13/23 Triple <1 day          Peripheral Intravenous Line  Duration           Peripheral IV Right;Ventral (anterior) Antecubital -- days    Peripheral IV 08/13/23 Left;Proximal;Ventral (anterior) Forearm <1 day          Arterial Line  Duration           Arterial Line 08/13/23 Right Radial <1 day          Drain  Duration           NG/OG/Enteral Tube Nasogastric 18 Fr Left nare <1 day    Urethral Catheter Latex;Straight-tip 16 Fr. <1 day          Airway  Duration           ETT  Cuffed;Oral;Straight; Inflated; Hi-Lo 8 mm <1 day                Physical Exam:  General: No acute distress but ill appearing  Neuro: sedated  HEENT: moist mucous membranes, ETT in place  CV: Well perfused, regular rate and rhythm  Lungs: Normal work of breathing, no increased respiratory effort  Abdomen: Soft, non-tender, non-distended.   Extremities: No edema, clubbing or cyanosis  : clean dressing in place, wound to be carefully examined in the OR today  Skin: Warm, dry        Wanda Jewell MD  General Surgery PGY2

## 2023-08-14 NOTE — ANESTHESIA POSTPROCEDURE EVALUATION
Post-Op Assessment Note    CV Status:  Stable  Pain Score: 0    Pain management: adequate     Hydration Status:  Euvolemic   PONV Controlled:  None   Airway Patency:  Patent  Airway: intubated      Post Op Vitals Reviewed: Yes      Staff: Anesthesiologist, CRNA         There were no known notable events for this encounter.     BP   1128/75   Temp  99   Pulse  90   Resp  15   SpO2   100

## 2023-08-14 NOTE — RESPIRATORY THERAPY NOTE
08/13/23 2240   Respiratory Assessment   Resp Comments Patient received from outside facility orally intubated. He was placed on mechanical ventilation, previous settings from outside facility. O2 Device G5   Vent Information   Vent ID [id number]   Vent type Dubon G5   Dubon C3/G5 Vent Mode APVcmv   $ Vent Charge-INITIAL Yes   Ventilator Start Yes   $ Pulse Oximetry Spot Check Charge Completed   APVcmv Settings   Resp Rate (BPM) 20 BPM   VT (mL) 420 mL   Insp Time (sec) 0.8 sec   FIO2 (%) 40 %   PEEP (cmH2O) 6 cmH2O   I:E Ratio 1:2.7   Insp Resistance 19   P-ramp (ms) 50 (ms)   Humidification Heater   Heater Temp 95 °F (35 °C)   APVcmv Actuals   Resp Rate (BPM) 20 BPM   VT (mL) 420 mL   MV (Obs) 8.4   MAP (cmH2O) 10 cmH2O   Peak Pressure (cmH2O) 22 cmH2O   I:E Ratio (Obs) 1:2.7   Static Compliance (mL/cmH20) 55 mL/cmH2O   Heater Temperature (Obs) 95 °F (35 °C)   APVcmv ALARMS   High Peak Pressure (cmH2O) 40 cmH2O   Low Peak Pressure (cmH2O) 5 cm H2O   High Resp Rate (BPM) 40 BPM   High MV (L/min) 20 L/min   Low MV (L/min) 4 L/min   High VT (mL) 1000 mL   Low VT (mL) 250 mL   Apnea Time (s) 20 S   Maintenance   Alarm (pink) cable attached No   Resuscitation bag with peep valve at bedside Yes   Water bag changed No   Circuit changed No   IHI Ventilator Associated Pneumonia Bundle   Daily Assessment of Readiness to Extubate Not applicable (Comment)   Head of Bed Elevated HOB 30   ETT  Cuffed;Oral;Straight; Inflated; Hi-Lo 8 mm   Placement Date/Time: 08/13/23 1852   Mask Ventilation: Mask ventilation not attempted (0)  Preoxygenated: Yes  Technique: Rapid sequence;Stylet; Video laryngoscopy  Type: Cuffed;Oral;Straight; Inflated; Hi-Lo  Tube Size: 8 mm  Laryngoscope: Claire Johnston. ..    Secured at (cm) 23   Measured from [address]   Repositioned Left to [facility] by Commercial tube hernandez   Cuff Pressure (cm H2O)   (MLT)   HI-LO Suction  Intermittent suction   HI-LO Secretions Small   HI-LO Intervention Patent

## 2023-08-14 NOTE — PROGRESS NOTES
Post Op Check:    Patient is status post washout and debridement of perineal wound for necrotizing soft tissue infection. Patient was seen and examined at bedside. Patient is currently sedated and intubated on a ventilator. Vent: Pressure control, 18 breaths/min, pressure set at 15 cmH2O, PEEP set at 6 cmH2O, FiO2 40%. Pressors: Patient currently on 5 of levo. ABG: pH of 7.493, base excess 1.1 mmol/L  Lactate: 1.6 mmol/L at 2 AM this morning  NGT: 300 cc output of dark brown liquid  UOP: 575 cc since 7:00 am this morning. Vitals:Blood pressure 100/50, pulse 84, temperature 97.9 °F (36.6 °C), resp. rate 18, height 5' 9" (1.753 m), weight 90.7 kg (200 lb), SpO2 97 %. Temp (24hrs), Av.9 °F (37.2 °C), Min:96.1 °F (35.6 °C), Max:101 °F (38.3 °C)      General: NAD  HENT: NCAT MMM  Neck: supple, no JVD  CV: nl rate  Lungs: nl wob. No resp distress  ABD: Soft, nontender, nondistended  Extrem: No CCE  Neuro: AAOx3   Surgical dressing clean, dry, intact, without signs of saturation.     Respiratory    Lab Data (Last 4 hours)    None         O2/Vent Data (Last 4 hours)    None                I/O        0701   0700  0701   0700  0701  08/15 0700    I.V. (mL/kg)  1074.6 (11.8) 1818.3 (20)    IV Piggyback  100 150    Total Intake(mL/kg)  1174.6 (13) 1968.3 (21.7)    Urine (mL/kg/hr)  600 575 (0.6)    Emesis/NG output  100     Total Output  700 575    Net  +474.6 +1393.3                 Invasive Devices     Central Venous Catheter Line  Duration           CVC Central Lines 23 Triple <1 day          Peripheral Intravenous Line  Duration           Peripheral IV Right;Ventral (anterior) Antecubital -- days    Peripheral IV 23 Left;Proximal;Ventral (anterior) Forearm 1 day          Arterial Line  Duration           Arterial Line 23 Right Radial <1 day          Drain  Duration           NG/OG/Enteral Tube Nasogastric 18 Fr Left nare <1 day    Urethral Catheter Latex;Straight-tip 16 Fr. <1 day          Airway  Duration           ETT  Cuffed;Oral;Straight; Inflated; Hi-Lo 8 mm <1 day                  Plan:  Diet Enteral/Parenteral; Tube Feeding No Oral Diet; Jevity 1.2 Harvinder; Continuous; 10  Continue to monitor  Pain and nausea control PRRICHAR Strauss MD  8/14/2023 5:28 PM

## 2023-08-14 NOTE — H&P
H+P - General Surgery  : Red Surgery Resident role on TigerCkb Blakely 79 y.o. male MRN: [id number]  Unit/Bed#: [facility] 12 Encounter: [id number]        Assessment:  79y.o. year old male with necrotizing soft tissue infection of the right  perineum at [facility] on 8/13, now being transferred to [facility] for further management by general surgery    Off pressors at this time     Vent CMV 20/420/6/40%      ABG 7.282, 47.1, 135.6, 21.7, -5  WBC 11.7 from 14.3  Hemoglobin 11.8 from 14  Creatinine . 57 from 0.81    Plan:  Admit to general surgery  Follow up ICU labs  Continue IV antibiotics, zosyn/ Linezolid   OR tomorrow for second look, booked  Remain intubated for the OR tomorrow  Wean propofol if hypotensive  Insulin drip  IV fluids  Trend endpoints  Pain control as needed  Antiemetic as needed  DVT prophylaxis  Rest of care per surgical critical care      HPI:  Berta Blakely is a 79 y.o. male who presented to [facility] with right perineal pain and swelling for 4 days. On admission patient reported nausea and vomiting for the last few days and increased swelling and pain in his right groin. In the emergency department patient denied drainage from the area. Patient initially had a lactate of 2.8, WBC of 14.27, glucose 412 and A1c of 12.3. Patient was taken to the operating room for debridement and found to have a necrotizing soft tissue infection that spanned from the right groin into the scrotum and into the right perirectal and right buttock area. Following in the operating room, patient was transferred to [facility] for further management by general surgery and surgical critical care. Physical Exam  Vitals and nursing note reviewed. Constitutional:       Appearance: He is well-developed. He is obese. He is ill-appearing and toxic-appearing. HENT:      Head: Normocephalic and atraumatic. Eyes:      General: No scleral icterus. Conjunctiva/sclera: Conjunctivae normal.   Cardiovascular:      Rate and Rhythm: Normal rate. Pulmonary:      Comments: Intubated CMV 20/420/6/40%  Abdominal:      General: There is distension. Palpations: Abdomen is soft. There is no mass. Tenderness: There is no abdominal tenderness. Hernia: No hernia is present. Musculoskeletal:      Cervical back: Neck supple. Right lower leg: Edema present. Left lower leg: Edema present. Skin:     General: Skin is warm and dry. Neurological:      Comments: Intubated, sedated         Objective       No intake or output data in the 24 hours ending 08/13/23 2116    First Vitals:        Current Vitals:        Invasive Devices     Central Venous Catheter Line  Duration           CVC Central Lines 08/13/23 Triple <1 day          Peripheral Intravenous Line  Duration           Peripheral IV Right;Ventral (anterior) Antecubital -- days    Peripheral IV 08/13/23 Left;Proximal;Ventral (anterior) Forearm <1 day          Arterial Line  Duration           Arterial Line 08/13/23 Right Radial <1 day          Drain  Duration           NG/OG/Enteral Tube Nasogastric 18 Fr Left nare <1 day    Urethral Catheter Latex;Straight-tip 16 Fr. <1 day          Airway  Duration           ETT  Cuffed;Oral;Straight; Inflated; Hi-Lo 8 mm <1 day                Imaging: I have personally reviewed pertinent reports. CT abdomen pelvis with contrast    Result Date: 8/13/2023  Impression: 1. 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 No abscess. 2.  Cholelithiasis. I personally discussed this study with Ayaz Tanner via [employer] on 8/13/2023 4:56 PM. Workstation performed: [id number]       EKG, Pathology, and Other Studies: I have personally reviewed pertinent reports.      VTE Pharmacologic Prophylaxis: Heparin  VTE Mechanical Prophylaxis: sequential compression device    Historical Information   Past Medical History:   Diagnosis Date   • Rectal hemorrhage     last assessed: May 29, 2015     Past Surgical History:   Procedure Laterality Date   • CORONARY ARTERY BYPASS GRAFT     • ELBOW SURGERY Right     Bursitis - last assessed: Sept 15, 2016     Social History   Social History     Substance and Sexual Activity   Alcohol Use No    Comment: being a social drinker noted in "[employer]scripts"      Social History     Substance and Sexual Activity   Drug Use No     Social History     Tobacco Use   Smoking Status Never   Smokeless Tobacco Never     Family History   Problem Relation Age of Onset   • No Known Problems Mother    • Cancer Father    • Coronary artery disease Father        Meds/Allergies   all current active meds have been reviewed, current meds:   No current facility-administered medications for this encounter. and PTA meds:   Cannot display prior to admission medications because the patient has not been admitted in this contact. Allergies   Allergen Reactions   • Ace Inhibitors Cough       Lab Results: I have personally reviewed pertinent lab results. , CBC:   Lab Results   Component Value Date    WBC 11.68 (H) 08/13/2023    HGB 11.8 (L) 08/13/2023    HCT 35.5 (L) 08/13/2023    MCV 92 08/13/2023     08/13/2023    RBC 3.88 08/13/2023    MCH 30.4 08/13/2023    MCHC 33.2 08/13/2023    RDW 13.2 08/13/2023    MPV 9.3 08/13/2023    NRBC 0 08/13/2023   , CMP:   Lab Results   Component Value Date    SODIUM 132 (L) 08/13/2023    K 3.6 08/13/2023     08/13/2023    CO2 18 (L) 08/13/2023    CO2 19 (L) 08/13/2023    BUN 15 08/13/2023    CREATININE 0.57 (L) 08/13/2023    GLUCOSE 323 (H) 08/13/2023    CALCIUM 8.1 (L) 08/13/2023    AST 8 (L) 08/13/2023    ALT 7 08/13/2023    ALKPHOS 67 08/13/2023    EGFR 106 08/13/2023       Counseling / Coordination of Care  Total floor / unit time spent today 25 minutes.   Greater than 50% of total time was spent with the patient and / or family counseling and / or coordination of care.    Linoluis a Juárez MD  8/13/2023 9:16 PM

## 2023-08-14 NOTE — OP NOTE
OPERATIVE REPORT  PATIENT NAME: Kamilla Brooks    :  1955  MRN: [id number]  Pt Location: [facility] OR ROOM 07    SURGERY DATE: 2023    Surgeon(s) and Role:     Mahi Jolly,  - Primary        Abigail Morales DO - Assisting        Rafal Quintana MD - Assisting    Preop Diagnosis:  Necrotizing soft tissue infection [M79.89]    Post-Op Diagnosis Codes:     * Necrotizing soft tissue infection [M79.89]    Procedure(s):  Right - Washout and debridement of perineal wound    Specimen(s):  * No specimens in log *    Estimated Blood Loss:   Minimal    Drains:  NG/OG/Enteral Tube Nasogastric 18 Fr Left nare (Active)   Placement Reverification Auscultation 23 0000   Site Assessment Clean;Dry; Intact 23 0000   Status Suction-low continuous 23 0000   Drainage Appearance Bile;Brown 23 0000   Output (mL) 100 mL 23 0600   Number of days: 1       Urethral Catheter Latex;Straight-tip 16 Fr. (Active)   Reasons to continue Urinary Catheter  Accurate I&O assessment in critically ill patients (48 hr. 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 Minimal debridement of necrotic fat to the level of the muscle fascia. Wound measured to 24cm x 7.5cm x 8cm down to the bone. Complications:   None    Procedure and Technique:  The patient was seen again in Medical Intensive Care Unit. All the risks, benefits, complications, treatment options, and expected outcomes were discussed with the patient and family at length. All questions were answered to satisfaction. There was concurrence with the proposed plan and informed consent was obtained. The site of surgery was properly noted. The patient was taken to Operating Room, identified, and the procedure verified as washout and debridement of perineal wound. The patient was placed in the lithotomy position on the operating room table. The patient had received preoperative antibiotics and SCDs placed on the bilateral lower extremities. Anesthesia was then induced, the patient arrived to OR already  intubated with landon catheter in place     The perineum, perirectal area, groin was then prepped and draped in the usual sterile fashion using Betadine. A Time-Out was then performed with all involved present confirming the correct patient, procedure, antibiotics, and any additional concerns. 1 Kerlix with Betadine was removed from the wound. The tissue looked healthy and viable. There was minimal debridement of necrotic fat to the level of the muscle fascia and hemostasis was ensured with electrocautery. Wound was assessed for any pockets or tracks. A 0.5 cm incision was extended at the base of the penis and further debrided. The wound was irrigated with 3L of warm saline via Pulsavac. The dimensions of the wound measured to 24cm x 7.5cm x 8cm down to the bone. The wound was then repacked with Betadine soaked Kerlix including the scrotum. The wound was dressed with 4x4 gauze, ABD and mesh underwear. All instruments, sponges, and needle counts were noted to be correct at the conclusion of the case. Dr. Jolly was present for the entire procedure and was present for all critical portions of the procedure. Patient Disposition:  Critical Care Unit and intubated and hemodynamically stable.         SIGNATURE: Barry Davis MD  DATE: August 14, 2023  TIME: 3:45 PM

## 2023-08-14 NOTE — ANESTHESIA PREPROCEDURE EVALUATION
Procedure:  DEBRIDEMENT DECUBITI ([address] OUT) (Right: Buttocks)    Relevant Problems   CARDIO   (+) Benign essential hypertension   (+) Coronary atherosclerosis   (+) Mixed hyperlipidemia   (+) Ventricular tachycardia (HCC)      GI/HEPATIC   (+) Esophageal reflux      NEURO/PSYCH   (+) Generalized anxiety disorder      Poorly controlled, DKA, RTOR for Forniers    Intubated from ICU on dilaudid, norepi, prop. Physical Exam    Airway  Comment: Intubated             Dental       Cardiovascular      Pulmonary      Other Findings        Anesthesia Plan  ASA Score- 4     Anesthesia Type- general with ASA Monitors. Additional Monitors:   Airway Plan: ETT. Plan Factors-Exercise tolerance (METS): >4 METS. Chart reviewed. EKG reviewed. Imaging results reviewed. Existing labs reviewed. Patient summary reviewed. Patient is not a current smoker. Patient did not smoke on day of surgery. Obstructive sleep apnea risk education given perioperatively. Induction- intravenous. Postoperative Plan- Plan for postoperative opioid use. Planned trial extubation    Informed Consent- Plan/risks discussed with: RTOR, Prior consent. I personally reviewed this patient with the CRNA. Discussed and agreed on the Anesthesia Plan with the CRNA. Lamar Tamez

## 2023-08-14 NOTE — ANESTHESIA POSTPROCEDURE EVALUATION
Post-Op Assessment Note    CV Status:  Stable  Pain Score: 0    Multimodal analgesia used between 6 hours prior to anesthesia start to PACU discharge    Mental Status:  Unresponsive   PONV Controlled:  None   Airway Patency:  Patent and adequate  Airway: intubated      Post Op Vitals Reviewed: Yes      Staff: Anesthesiologist, CRNA         No notable events documented.     BP   155/72   Temp  98.2   Pulse  72   Resp   18   SpO2   99%

## 2023-08-14 NOTE — RESPIRATORY THERAPY NOTE
RT Ventilator Management Note  Kirsten Carpenter 79 y.o. male MRN: [id number]  Unit/Bed#: [facility] 12 Encounter: [id number]      Daily Screen         8/14/2023  0978             Patient safety screen outcome[de-identified] Failed (P)     Not Ready for Weaning due to[de-identified] Underline problem not resolved (P)               Physical Exam:   Assessment Type: (P) Assess only  General Appearance: (P) Sedated  Respiratory Pattern: (P) Assisted  Chest Assessment: (P) Chest expansion symmetrical  Bilateral Breath Sounds: (P) Diminished, Coarse  Cough: (P) Productive  Suction: (P) ET Tube  O2 Device: (P) Vent      Resp Comments: (P) Pt remains on documented vent settings. Pt appears to be tolerating settings well, no vent changes made at this time. Will continue to monitor pt per protocol.

## 2023-08-14 NOTE — RESPIRATORY THERAPY NOTE
08/13/23 2054   Respiratory Assessment   General Appearance Sedated   Respiratory Pattern Assisted   Chest Assessment Chest expansion symmetrical   Bilateral Breath Sounds Clear   O2 Device pt remain tolerant and sychronous with ventilator   Vent Information   Vent ID [id number]   Vent type Drager   Drager Vent Mode AC/VC+   $ Pulse Oximetry Spot Check Charge Completed   SpO2 97 %   AC/VC+ Settings   Resp Rate (BPM) 20 BPM  (increased from 16 post ABG results per Mir Hymen)   VT (mL) 420 mL   Insp Time (S) 1 S   FIO2 (%) 60 %   PEEP (cmH2O) 6 cmH2O   Rise Time (%) 20 %   Trigger Sensitivity Flow (LPM) 2 LPM   Humidification   (pass over)   AC/VC+ Actuals   Resp Rate (BPM) 20 BPM   VT (mL) 416 mL   MV (Obs) 7.57   MAP (cmH2O) 9.9 cmH2O   Peak Pressure (cmH2O) 18 cmH2O   I:E Ratio (Obs) 1:2   Static Compliance (mL/cmH20) 37.1 mL/cmH2O   Plateau Pressure (cm H2O) 16.7 cm H2O   Heater Temperature (Obs)   (n/a)   AC/VC+ ALARMS   High Peak Pressure (cmH2O) 40 cmH2O   High Resp Rate (BPM) 35 BPM   High MV (L/min) 14 L/min   Low MV (L/min) 4.5 L/min   High Ge VTE (mL) 900 mL   AC/VC+ Apnea Settings   Apnea Time (s) 20 S   IHI Ventilator Associated Pneumonia Bundle   Head of Bed Elevated HOB 30   ETT  Cuffed;Oral;Straight; Inflated; Hi-Lo 8 mm   Placement Date/Time: 08/13/23 1852   Mask Ventilation: Mask ventilation not attempted (0)  Preoxygenated: Yes  Technique: Rapid sequence;Stylet; Video laryngoscopy  Type: Cuffed;Oral;Straight; Inflated; Hi-Lo  Tube Size: 8 mm  Laryngoscope: Claire Johnston. .. Secured at (cm) 23   Measured from Lips   Secured Location Left   Secured by Commercial tube hernandez   Site Condition Cool;Dry   Cuff Pressure (cm H2O)   (MLT)   HI-LO Suction  Continuous low suction   HI-LO Secretions Small; Thick;Clear   HI-LO Intervention Patent

## 2023-08-14 NOTE — RESPIRATORY THERAPY NOTE
Resp Therapy   08/14/23 1237   Respiratory Assessment   Resp Comments (S)  Pt co2 went from 47 to 31 after last gas, so dropped RR to 18 from 20. No other vent changes made at this time. ETT  Cuffed;Oral;Straight; Inflated; Hi-Lo 8 mm   Placement Date/Time: 08/13/23 1852   Mask Ventilation: Mask ventilation not attempted (0)  Preoxygenated: Yes  Technique: Rapid sequence;Stylet; Video laryngoscopy  Type: Cuffed;Oral;Straight; Inflated; Hi-Lo  Tube Size: 8 mm  Laryngoscope: Claire Johnston. ..    Secured at (cm) 23   Measured from Lips

## 2023-08-14 NOTE — RESPIRATORY THERAPY NOTE
08/13/23 7026   Respiratory Assessment   Resp Comments Patient transitioned to PCV, Vt 7.1cc/kg   O2 Device G5   Vent Information   Vent type Dubon G5   Dubon C3/G5 Vent Mode P-CMV/PCV+   $ Pulse Oximetry Spot Check Charge Completed   P-CMV/PCV+ Settings   Resp Rate (BPM) 20 BPM   Pressure Control/Pinsp (cmH20) 15 cmH20   Insp Time (S) 0.8 sec   FiO2 (%) 40 %   PEEP (cmH2O) 6 cmH2O   I:E Ratio 1:2.7   Insp Resistance 15   P-ramp (ms) 50 (ms)   Humidification Heater   Heater Temp 95 °F (35 °C)   P-CMV/PCV+ Actuals   Resp Rate (BPM) 20 BPM   VT (mL) 497 mL   MV 9.9   MAP (cmH2O) 10 cmH2O   Peak Pressure (cmH2O) 66 cmH2O   I:E Ratio (Obs) 1:2.7   Static Compliance (mL/cmH2O) 52 mL/cmH2O   Heater Temperature (Obs) 95 °F (35 °C)   P-CMV/PCV+ Alarms    High Peak Pressure (cmH2O) 40 cmH2O   Low Pressure (cmH2O) 5 cm H2O   High Resp Rate (BPM) 40 BPM   Low Resp Rate (BPM) 8 BPM   High MV (L/min) 20 L/min   Low MV (L/min) 4 L/min   High Spont VTE (mL) 1000 mL   Low Spont VTE (mL) 250 mL

## 2023-08-14 NOTE — H&P
[address] [place] [address]  H&P: Critical Care  Name: Justice Davey 79 y.o. male I MRN: [id number]  Unit/Bed#: MICU 15 I Date of Admission: 2023   Date of Service: 2023 I Hospital Day: 0      Assessment/Plan   Neuro:   · Diagnosis: Acute pain  · Plan: PRN: dilaudid, fentanyl  · Sedation  · Propofol gtt  · Neuro checks   · CAM-ICU    CV:   · Diagnosis: HTN, HLD, CAD, V-tach with ICD, CHF  · Plan: Home regimen to be reviewed: Carvedilol, Zetia, Sotalol, Aldactone   · ICD   · Temporary hemodynamic instability 2/2 NSTI of perineum   · Currently no need for vasopressors, monitor closely  · Goal MAP >65  · Continue to monitor endpoints     Pulm:   · Diagnosis: intubated   · Plan: AC/VC current settings 20/500/6/40%   · Plan to remain intubated for takeback and washout tomorrow with general surgery   · Follow ABG closely for vent settings; RT following    GI:   · Diagnosis: Acute nausea  · Plan: PRN zofran   · Bowel regimen: Senna; Miralax      :   · Diagnosis: No active issues, landon in place, monitor UOP  · Plan: UOP: 0.5 cc/kg/hr  · UA negative    F/E/N:    · Fluids:   · Electrolytes: Replete electrolytes as needed Mg >2, Phos >3, K >4   · Nutrition: NPO    Heme/Onc:   · Diagnosis: DVT PPX   · Plan: Lovenox     Endo:   · Diagnosis: Hx of IDDM; DKA  · Plan: Insulin gtt  · Latest AB.282, 47.1, 135.6, 21.7, -5  · Beta hydroxybutyrate pending   · Lactic acid 2.5 from 1.8 from 2.3   · q2h accuchecks   · Latest ; goal -180    ID:   · Diagnosis: NSTI of right perineum and inner thigh  · Plan: Antimicrobials indicated - Linezolid; Zosyn   · Trend endpoints   · Wound cx pending  · Blood cx pending  · UA negative   · Monitor WBC  · Current after transfer: 14.21 from 11.68    MSK/Skin:   · Diagnosis: NSTI of right perineum and inner thigh  · CTAP w/ contrast : 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   · S/p OR debridement at [facility] and wound packed w/ betadine soaked gauze; dressed with 4x4 and abd pad. Wound measured 21cm in length. · Antimicrobials  · Linezolid; Zosyn  · OR takeback for washout with general surgery (following) 8/14; case added  · Frequent offloading and rotation to prevent pressure wounds      Disposition: Critical care       History of Present Illness     HPI: Aguilar Lujan is a 79 y.o. male with history of HTN, HLD, DM, CAD, Vtach w/ ICD, CHF, presenting with groin pain. Patient notes that he has an enlarging "cyst" in the area between his scrotum and anus for the past 3 days. Also endorsees n/v for 4 days in duration. Denies fevers/chills, chest pains, SOB. He stated that he has not been able to tolerate his medicines due to illness and has not taken his home insulin regimen. His blood sugar was noted to be 426 on presentation. He stated that he had never been in DKA before. Patient was intubated and sedated; HPI obtained primarily through chart review. History obtained from chart review. Review of Systems   Historical Information   Past Medical History:  No date: Rectal hemorrhage      Comment:  last assessed: May 29, 2015 Past Surgical History:  No date: CORONARY ARTERY BYPASS GRAFT  No date: ELBOW SURGERY; Right      Comment:  Bursitis - last assessed: Sept 15, 2016   Current Outpatient Medications   Medication Instructions   • aspirin 81 MG tablet 1 tablet, Oral, 2 times daily   • atorvastatin (LIPITOR) 40 mg tablet 1 tablet, Oral, Daily   • B-D ULTRAFINE III SHORT PEN 31G X 8 MM MISC No dose, route, or frequency recorded. • BD ULTRA-FINE PEN NEEDLES 29G X 12.7MM MISC No dose, route, or frequency recorded.    • candesartan (ATACAND) 16 mg, Oral, Daily   • carvedilol (COREG) 12.5 mg tablet 1 tablet, Oral, 2 times daily   • Continuous Blood Gluc Sensor (Talkspace DRU SENSOR SYSTEM) MISC 1 each, Does not apply, Every 14 days   • Empagliflozin (JARDIANCE) 25 mg, Oral, Every morning   • escitalopram (LEXAPRO) 20 mg, Oral, Daily   • ezetimibe (ZETIA) 10 mg tablet 1 tablet, Oral, Daily   • fenofibrate (TRICOR) 145 mg, Oral, Daily   • Insulin Pen Needle (PEN NEEDLES 29GX1/2") 29G X 12MM MISC 2 X daily subcutaneously dx: E11.65   • insuln lispro (HumaLOG KwikPen) 200 units/mL CONCENTRATED U-200 injection pen 30 Units Once a day with lunch or supper   • isosorbide mononitrate (IMDUR) 30 mg, Oral, Daily at bedtime   • Lancets MISC test once times daily E11.65   • lansoprazole (PREVACID) 30 mg, Oral, Daily   • LORazepam (ATIVAN) 1 mg tablet TAKE 1 TABLET DAILY AS NEEDED FOR ANXIETY   • metFORMIN (GLUCOPHAGE) 1,000 mg, Oral, 2 times daily   • Misc Natural Products (BLOOD SUGAR 360 PO) ONETOUCH ULTRA TEST STRIPS Test once daily E11.65   • omega-3-acid ethyl esters (LOVAZA) 2 g, Oral, 2 times daily   • sotalol (BETAPACE) 80 mg tablet 1 tablet, Oral, 2 times daily   • spironolactone (ALDACTONE) 25 mg tablet 1 tablet, Oral, Daily   • Toujeo SoloStar 130 Units, Subcutaneous, Daily   • Trulicity 3 mg, Subcutaneous, Every 7 days    Allergies   Allergen Reactions   • Ace Inhibitors Cough      Social History     Tobacco Use   • Smoking status: Never   • Smokeless tobacco: Never   Substance Use Topics   • Alcohol use: No     Comment: being a social drinker noted in "allscripts"    • Drug use: No    Family History   Problem Relation Age of Onset   • No Known Problems Mother    • Cancer Father    • Coronary artery disease Father           Objective                            Vitals I/O      Most Recent Min/Max in 24hrs   Temp   Temp  Min: 97.4 °F (36.3 °C)  Max: 98.2 °F (36.8 °C)   Pulse   Pulse  Min: 82  Max: 102   Resp   Resp  Min: 16  Max: 20   BP   BP  Min: 135/62  Max: 182/88   O2 Sat   SpO2  Min: 91 %  Max: 99 %      Intake/Output Summary (Last 24 hours) at 8/13/2023 7443  Last data filed at 8/13/2023 1419  Gross per 24 hour   Intake --   Output 100 ml   Net -100 ml         Diet NPO     Invasive Monitoring Physical exam   Arterial Line  Mendy /42  Arterial Line BP  Min: 108/55  Max: 157/73    mmHg  Arterial Line MAP (mmHg)  Min: 70 mmHg  Max: 180 mmHg    Physical Exam  Eyes:      Pupils: Pupils are equal, round, and reactive to light. Skin:     Comments: Dressing in place with mesh underwear on perineum, dressing without appreciable strikethrough   HENT:      Head: Normocephalic and atraumatic. Mouth/Throat:      Mouth: Mucous membranes are dry. Neck:     Vascular: Central line present. Cardiovascular:      Rate and Rhythm: Normal rate. Pulses: Normal pulses. Abdominal:      General: There is no distension. Palpations: Abdomen is soft. Constitutional:       Appearance: He is ill-appearing. Pulmonary:      Effort: Pulmonary effort is normal.      Comments: ETT in place  Neurological:      Comments: Sedated          Diagnostic Studies      Imaging:  I have personally reviewed pertinent reports. Medications:  Scheduled PRN   chlorhexidine, 15 mL, Q12H [address]  fentanyl citrate (PF), ,   fentanyl citrate (PF), 50 mcg, Once  heparin (porcine), 5,000 Units, Q8H [address]  [START ON 8/14/2023] linezolid, 600 mg, Q12H  piperacillin-tazobactam, 4.5 g, Q6H      fentanyl citrate (PF), ,   HYDROmorphone, 0.5 mg, Q4H PRN  HYDROmorphone, 1 mg, Q4H PRN  ondansetron, 4 mg, Q6H PRN       Continuous    insulin regular (HumuLIN R,NovoLIN R) 1 Units/mL in sodium chloride 0.9 % 100 mL infusion, 0.3-21 Units/hr, Last Rate: 4 Units/hr (08/13/23 2257)  lactated ringers, 125 mL/hr, Last Rate: 125 mL/hr (08/13/23 2318)  propofol, 5-50 mcg/kg/min         Labs:  CBC    Recent Labs     08/13/23  1519 08/13/23  1915 08/13/23 1958 08/13/23 2035   WBC 14.27*  --   --  11.68*   HGB 14.0   < > 10.9* 11.8*   HCT 42.1   < > 32* 35.5*     --   --  251    < > = values in this interval not displayed.      BMP    Recent Labs     08/13/23  1519 08/13/23 1915 08/13/23 1958 08/13/23 2035   SODIUM 129*  --   -- 132*   K 4.0  --   --  3.6   CL 90*  --   --  101   CO2 19*   < > 19* 18*   AGAP 20  --   --  13   BUN 17  --   --  15   CREATININE 0.81  --   --  0.57*   CALCIUM 9.8  --   --  8.1*    < > = values in this interval not displayed. Coags    Recent Labs     08/13/23  1528   INR 1.29*   PTT 29        Additional Electrolytes  Recent Labs     08/13/23  1519 08/13/23  1915 08/13/23 1958 08/13/23 2035   MG 1.6*  --   --  1.5*   PHOS  --   --   --  3.0   CAIONIZED  --  1.19 1.17  --           Blood Gas    Recent Labs     08/13/23 2310   PHART 7.282*   DSU7YEI 47.1*   PO2ART 135.6*   KKQ8EJU 21.7*   BEART -5.0   SOURCE Line, Arterial     Recent Labs     08/13/23  1554 08/13/23 2038 08/13/23 2310   PHVEN 7.438*  --   --    FIU4UFT 26.7*  --   --    PO2VEN 72.8*  --   --    SOB1OWB 17.6*  --   --    BEVEN -4.9  --   --    SOURCE  --    < > Line, Arterial    < > = values in this interval not displayed. LFTs  Recent Labs     08/13/23 1519 08/13/23 2035   ALT 9 7   AST 10* 8*   ALKPHOS 86 67   ALB 4.0 3.2*   TBILI 0.94 0.64       Infectious  Recent Labs     08/13/23 1519   PROCALCITONI 0.91*     Glucose  Recent Labs     08/13/23 1519 08/13/23 2035   GLUC 422* 291*             Critical Care Time Delivered: Upon my evaluation, this patient had a high probability of imminent or life-threatening deterioration due to NSTI of perineum, which required my direct attention, intervention, and personal management. I have personally provided 50 minutes of critical care time, exclusive of procedures, teaching, family meetings, and any prior time recorded by providers other than myself.      Sheila Watts,

## 2023-08-14 NOTE — RESPIRATORY THERAPY NOTE
08/13/23 2858   Respiratory Assessment   Resp Comments Vt increased from 420ml (5.9cc/kg) to 500ml (7cc/kg).    O2 Device G5   Vent Information   Vent type Dubon G5   Dubon C3/G5 Vent Mode APVcmv   APVcmv Settings   Resp Rate (BPM) 20 BPM   VT (mL) 500 mL   Insp Time (sec) 0.8 sec   FIO2 (%) 40 %   PEEP (cmH2O) 6 cmH2O   I:E Ratio 1:2.7   APVcmv Actuals   Resp Rate (BPM) 20 BPM   VT (mL) 504 mL   MV (Obs) 10   MAP (cmH2O) 9.9 cmH2O   Peak Pressure (cmH2O) 21 cmH2O   APVcmv ALARMS   High Peak Pressure (cmH2O) 40 cmH2O   Low Peak Pressure (cmH2O) 5 cm H2O   High Resp Rate (BPM) 40 BPM   High MV (L/min) 20 L/min   Low MV (L/min) 4 L/min   High VT (mL) 1000 mL   Low VT (mL) 250 mL   Apnea Time (s) 20 S

## 2023-08-14 NOTE — OP NOTE
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 All the risks, benefits, complications, treatment options, and expected outcomes were discussed with the patient and family at length. All questions were answered to satisfaction. There was concurrence with the proposed plan and informed consent was obtained.  The site of surgery was properly noted/marked. The patient was taken to Operating Room, identified, and the procedure verified as excisional debridement of the. The patient was placed in the supine position on the operating room table. The patient had received preoperative antibiotics and SCDs placed on the bilateral lower extremities. Anesthesia was then induced and the patient was intubated. A Booth catheter was placed, the patient was placed in the lithotomy position    The perineum, perirectal area, groin was then prepped and draped in the usual sterile fashion using Betadine. A Time-Out was then performed with all involved present confirming the correct patient, procedure, antibiotics, and any additional concerns. There was significant induration spanning from the right groin, right scrotum, right perineum and perirectal area toward the buttock. An incision was made with electrocautery in the middle of this and dissection was carried down to the subcutaneous tissues. There was grossly necrotic tissue and dirty dishwater type fluid that was noted. This was cultured. The incision was then opened into the groin and into the perirectal area/right buttock along the area of the infection tracked. Necrotic fat and tissue was removed to the level of the muscle fascia. The infection was noted to track into the right portion of the scrotum along the spermatic cord. The scrotum was opened for a small amount along the spermatic cord and there was no gross necrotic tissue noted. After the grossly necrotic tissue was all removed hemostasis was ensured with electrocautery. The length of the incision was measured to be 21 cm. The wound was then irrigated with Betadine mixed with hydrogen peroxide. The wound was then irrigated with saline. Hemostasis was noted. The wound was then packed with Betadine soaked Kerlix including the scrotum. All dissection was done with electrocautery.     The incision was then cleansed and dried and Betadine soaked Kerlix was placed into the incision. 4 x 4, ABD, mesh underwear were used as dressing. All instrument, sponge, and needle counts were noted to be correct at the conclusion of the case. I was present for the entire procedure., A qualified resident physician was not available. and A physician assistant was required during the procedure for retraction, tissue handling, dissection and suturing.     Patient Disposition:  intubated and critically ill        SIGNATURE: January Almeida DO  DATE: August 13, 2023  TIME: 8:30 PM

## 2023-08-14 NOTE — RESPIRATORY THERAPY NOTE
08/13/23 2007   Respiratory Assessment   General Appearance Sedated   Respiratory Pattern Assisted   Chest Assessment Chest expansion symmetrical   Bilateral Breath Sounds Clear   Cough None   Resp Comments received from OR and placed on mechanical ventilation at this time   O2 Device ventilator   Vent Information   Vent ID Reynold Zmaudio   Vent type Drager   Drager Vent Mode AC/VC+   $ Vent Charge-INITIAL Yes   Ventilator Start Yes   Is the patient reintubated? No   $ Pulse Oximetry Spot Check Charge Completed   SpO2 99 %   AC/VC+ Settings   Resp Rate (BPM) 16 BPM   VT (mL) 420 mL   Insp Time (S) 1 S   FIO2 (%) 100 %   PEEP (cmH2O) 6 cmH2O   Rise Time (%) 20 %   Trigger Sensitivity Flow (LPM) 2 LPM   Humidification   (pass over)   AC/VC+ Actuals   Resp Rate (BPM) 16 BPM   VT (mL) 419 mL   MV (Obs) 5.99   MAP (cmH2O) 9.5 cmH2O   Peak Pressure (cmH2O) 19 cmH2O   I:E Ratio (Obs) 1:2.3   Static Compliance (mL/cmH20) 37.7 mL/cmH2O   Plateau Pressure (cm H2O) 16.9 cm H2O   Heater Temperature (Obs)   (n/a)   AC/VC+ ALARMS   High Peak Pressure (cmH2O) 40 cmH2O   High Resp Rate (BPM) 30 BPM   High MV (L/min) 12 L/min   Low MV (L/min) 4 L/min   High Ge VTE (mL) 900 mL   AC/VC+ Apnea Settings   Apnea Time (s) 20 S   Maintenance   Alarm (pink) cable attached No   Resuscitation bag with peep valve at bedside Yes   Water bag changed No   Circuit changed No   IHI Ventilator Associated Pneumonia Bundle   Head of Bed Elevated HOB 30   ETT  Cuffed;Oral;Straight; Inflated; Hi-Lo 8 mm   Placement Date/Time: 08/13/23 1852   Mask Ventilation: Mask ventilation not attempted (0)  Preoxygenated: Yes  Technique: Rapid sequence;Stylet; Video laryngoscopy  Type: Cuffed;Oral;Straight; Inflated; Hi-Lo  Tube Size: 8 mm  Laryngoscope: Claire Johnston. ..    Secured at (cm) 23   Measured from Lips   Secured Location Left   Secured by Commercial tube hernandez  (pink tape replaced with tube hernandez)   Site Condition Cool;Dry   Cuff Pressure (cm H2O)   (MLT)   HI-LO Suction  Continuous low suction   HI-LO Secretions Small; Thick;Clear   HI-LO Intervention Patent     Pt received from OR being ventilated by bagging, size 8.0 ETT secured with pink tape to 23cm @ lips, pt placed on ventilator at this time, pink tape replaced with commercial tube hernandez and ETT remains in place and secure to 23cm @ lips left side, cuff pressure maintained with minimal leak technique, pt's bs are equal, bilateral and clear, orally suctioned for moderate amount thick, clear secretions, hi-low suction set up at this time and is pulling same thick, clear secretions, will continue to monitor and wean/titrate as tolerated/indicated for pending transport

## 2023-08-14 NOTE — PROGRESS NOTES
Patient returns from wound exploration/washout of Jamarcus Gangrene. Patient had debridement of minimal necrotic fat, he was repacked and returned to MICU. He returns on 5 mcg/min of levophed. He did not have his NMB reversed. Objective:  Visit Vitals  /50   Pulse 82   Temp 97.9 °F (36.6 °C)   Resp 17   Ht 5' 9" (1.753 m)   Wt 90.7 kg (200 lb)   SpO2 96%   BMI 29.53 kg/m²   Smoking Status Never   BSA 2.07 m²   Gen: Intubated, sedated, restrained, still under NMB  CV: RRR without M/R/G  Pulm: Mechanical breath sounds.  CTAB/L  GI: Soft, NTND  : packed, hemostatic  MSK: no deformities  Skin: warm, dry, intact    Labs: None    A/P:  Jamarcus's Gangrene POD #1 from initial surgery now POD #0 re-exploration  · SBT once NMB wears off  · If unable to extubate start TF  · Monitor Vac output    Rolando Knowles PA-C

## 2023-08-14 NOTE — PROGRESS NOTES
PT is intubated on propofol at 21.8mL/hr provides 575cal, recommend initiating TF as able via NG tube, [email or web address] advance by 10mL every 6hrs to goal of 50mL/hr, add 2 packs of prosource, water flushes per MD or can consider 200mL every 4hrs would provide with propofol total of 2135cal, 120g pro, 2173mL.

## 2023-08-15 ENCOUNTER — ANESTHESIA EVENT (INPATIENT)
Dept: PERIOP | Facility: HOSPITAL | Age: 68
End: 2023-08-15
Payer: COMMERCIAL

## 2023-08-15 LAB
ALBUMIN SERPL BCP-MCNC: 1.7 G/DL (ref 3.5–5)
ALP SERPL-CCNC: 66 U/L (ref 46–116)
ALT SERPL W P-5'-P-CCNC: 10 U/L (ref 12–78)
ANION GAP SERPL CALCULATED.3IONS-SCNC: 6 MMOL/L
ANISOCYTOSIS BLD QL SMEAR: PRESENT
AST SERPL W P-5'-P-CCNC: 9 U/L (ref 5–45)
BASOPHILS # BLD MANUAL: 0.19 THOUSAND/UL (ref 0–0.1)
BASOPHILS NFR MAR MANUAL: 2 % (ref 0–1)
BILIRUB SERPL-MCNC: 0.29 MG/DL (ref 0.2–1)
BUN SERPL-MCNC: 8 MG/DL (ref 5–25)
CA-I BLD-SCNC: 1.04 MMOL/L (ref 1.12–1.32)
CALCIUM ALBUM COR SERPL-MCNC: 9.8 MG/DL (ref 8.3–10.1)
CALCIUM SERPL-MCNC: 8 MG/DL (ref 8.3–10.1)
CHLORIDE SERPL-SCNC: 109 MMOL/L (ref 96–108)
CO2 SERPL-SCNC: 27 MMOL/L (ref 21–32)
CREAT SERPL-MCNC: 0.55 MG/DL (ref 0.6–1.3)
EOSINOPHIL # BLD MANUAL: 0.1 THOUSAND/UL (ref 0–0.4)
EOSINOPHIL NFR BLD MANUAL: 1 % (ref 0–6)
ERYTHROCYTE [DISTWIDTH] IN BLOOD BY AUTOMATED COUNT: 13.8 % (ref 11.6–15.1)
GFR SERPL CREATININE-BSD FRML MDRD: 107 ML/MIN/1.73SQ M
GLUCOSE SERPL-MCNC: 135 MG/DL (ref 65–140)
GLUCOSE SERPL-MCNC: 137 MG/DL (ref 65–140)
GLUCOSE SERPL-MCNC: 138 MG/DL (ref 65–140)
GLUCOSE SERPL-MCNC: 141 MG/DL (ref 65–140)
GLUCOSE SERPL-MCNC: 142 MG/DL (ref 65–140)
GLUCOSE SERPL-MCNC: 144 MG/DL (ref 65–140)
GLUCOSE SERPL-MCNC: 153 MG/DL (ref 65–140)
GLUCOSE SERPL-MCNC: 161 MG/DL (ref 65–140)
GLUCOSE SERPL-MCNC: 163 MG/DL (ref 65–140)
GLUCOSE SERPL-MCNC: 177 MG/DL (ref 65–140)
GLUCOSE SERPL-MCNC: 182 MG/DL (ref 65–140)
GLUCOSE SERPL-MCNC: 184 MG/DL (ref 65–140)
GLUCOSE SERPL-MCNC: 189 MG/DL (ref 65–140)
GLUCOSE SERPL-MCNC: 224 MG/DL (ref 65–140)
HCT VFR BLD AUTO: 28.4 % (ref 36.5–49.3)
HGB BLD-MCNC: 9.6 G/DL (ref 12–17)
LYMPHOCYTES # BLD AUTO: 1.63 THOUSAND/UL (ref 0.6–4.47)
LYMPHOCYTES # BLD AUTO: 16 % (ref 14–44)
MACROCYTES BLD QL AUTO: PRESENT
MAGNESIUM SERPL-MCNC: 1.9 MG/DL (ref 1.6–2.6)
MCH RBC QN AUTO: 31.2 PG (ref 26.8–34.3)
MCHC RBC AUTO-ENTMCNC: 33.8 G/DL (ref 31.4–37.4)
MCV RBC AUTO: 92 FL (ref 82–98)
MONOCYTES # BLD AUTO: 0.29 THOUSAND/UL (ref 0–1.22)
MONOCYTES NFR BLD: 3 % (ref 4–12)
NEUTROPHILS # BLD MANUAL: 7.39 THOUSAND/UL (ref 1.85–7.62)
NEUTS BAND NFR BLD MANUAL: 3 % (ref 0–8)
NEUTS SEG NFR BLD AUTO: 74 % (ref 43–75)
PHOSPHATE SERPL-MCNC: 1.3 MG/DL (ref 2.3–4.1)
PLATELET # BLD AUTO: 233 THOUSANDS/UL (ref 149–390)
PLATELET BLD QL SMEAR: ADEQUATE
PMV BLD AUTO: 9.3 FL (ref 8.9–12.7)
POLYCHROMASIA BLD QL SMEAR: PRESENT
POTASSIUM SERPL-SCNC: 2.9 MMOL/L (ref 3.5–5.3)
PROT SERPL-MCNC: 5.4 G/DL (ref 6.4–8.4)
RBC # BLD AUTO: 3.08 MILLION/UL (ref 3.88–5.62)
RBC MORPH BLD: PRESENT
SODIUM SERPL-SCNC: 142 MMOL/L (ref 135–147)
TARGETS BLD QL SMEAR: PRESENT
VARIANT LYMPHS # BLD AUTO: 1 %
WBC # BLD AUTO: 9.6 THOUSAND/UL (ref 4.31–10.16)

## 2023-08-15 PROCEDURE — 83735 ASSAY OF MAGNESIUM: CPT

## 2023-08-15 PROCEDURE — 99291 CRITICAL CARE FIRST HOUR: CPT | Performed by: STUDENT IN AN ORGANIZED HEALTH CARE EDUCATION/TRAINING PROGRAM

## 2023-08-15 PROCEDURE — 85007 BL SMEAR W/DIFF WBC COUNT: CPT

## 2023-08-15 PROCEDURE — 94003 VENT MGMT INPAT SUBQ DAY: CPT

## 2023-08-15 PROCEDURE — 85027 COMPLETE CBC AUTOMATED: CPT

## 2023-08-15 PROCEDURE — 84100 ASSAY OF PHOSPHORUS: CPT

## 2023-08-15 PROCEDURE — 82948 REAGENT STRIP/BLOOD GLUCOSE: CPT

## 2023-08-15 PROCEDURE — 94664 DEMO&/EVAL PT USE INHALER: CPT

## 2023-08-15 PROCEDURE — 80053 COMPREHEN METABOLIC PANEL: CPT

## 2023-08-15 PROCEDURE — 99232 SBSQ HOSP IP/OBS MODERATE 35: CPT | Performed by: SURGERY

## 2023-08-15 PROCEDURE — 94760 N-INVAS EAR/PLS OXIMETRY 1: CPT

## 2023-08-15 PROCEDURE — 82330 ASSAY OF CALCIUM: CPT

## 2023-08-15 RX ORDER — MAGNESIUM SULFATE HEPTAHYDRATE 40 MG/ML
2 INJECTION, SOLUTION INTRAVENOUS ONCE
Status: COMPLETED | OUTPATIENT
Start: 2023-08-15 | End: 2023-08-15

## 2023-08-15 RX ORDER — SODIUM CHLORIDE, SODIUM GLUCONATE, SODIUM ACETATE, POTASSIUM CHLORIDE, MAGNESIUM CHLORIDE, SODIUM PHOSPHATE, DIBASIC, AND POTASSIUM PHOSPHATE .53; .5; .37; .037; .03; .012; .00082 G/100ML; G/100ML; G/100ML; G/100ML; G/100ML; G/100ML; G/100ML
50 INJECTION, SOLUTION INTRAVENOUS CONTINUOUS
Status: DISCONTINUED | OUTPATIENT
Start: 2023-08-15 | End: 2023-08-16

## 2023-08-15 RX ORDER — OXYCODONE HYDROCHLORIDE 10 MG/1
10 TABLET ORAL EVERY 4 HOURS PRN
Status: DISCONTINUED | OUTPATIENT
Start: 2023-08-15 | End: 2023-08-31 | Stop reason: HOSPADM

## 2023-08-15 RX ORDER — OXYCODONE HYDROCHLORIDE 5 MG/1
5 TABLET ORAL EVERY 4 HOURS PRN
Status: DISCONTINUED | OUTPATIENT
Start: 2023-08-15 | End: 2023-08-31 | Stop reason: HOSPADM

## 2023-08-15 RX ORDER — POTASSIUM CHLORIDE 20MEQ/15ML
60 LIQUID (ML) ORAL ONCE
Status: COMPLETED | OUTPATIENT
Start: 2023-08-15 | End: 2023-08-15

## 2023-08-15 RX ORDER — HYDROMORPHONE HCL/PF 1 MG/ML
0.5 SYRINGE (ML) INJECTION EVERY 4 HOURS PRN
Status: DISCONTINUED | OUTPATIENT
Start: 2023-08-15 | End: 2023-08-31 | Stop reason: HOSPADM

## 2023-08-15 RX ORDER — EZETIMIBE 10 MG/1
10 TABLET ORAL DAILY
Status: DISCONTINUED | OUTPATIENT
Start: 2023-08-15 | End: 2023-08-31 | Stop reason: HOSPADM

## 2023-08-15 RX ORDER — ACETAMINOPHEN 325 MG/1
975 TABLET ORAL EVERY 8 HOURS SCHEDULED
Status: DISCONTINUED | OUTPATIENT
Start: 2023-08-15 | End: 2023-08-31 | Stop reason: HOSPADM

## 2023-08-15 RX ORDER — LANOLIN ALCOHOL/MO/W.PET/CERES
6 CREAM (GRAM) TOPICAL
Status: DISCONTINUED | OUTPATIENT
Start: 2023-08-15 | End: 2023-08-31 | Stop reason: HOSPADM

## 2023-08-15 RX ORDER — ATORVASTATIN CALCIUM 40 MG/1
40 TABLET, FILM COATED ORAL
Status: DISCONTINUED | OUTPATIENT
Start: 2023-08-15 | End: 2023-08-31 | Stop reason: HOSPADM

## 2023-08-15 RX ORDER — ESCITALOPRAM OXALATE 20 MG/1
20 TABLET ORAL DAILY
Status: DISCONTINUED | OUTPATIENT
Start: 2023-08-15 | End: 2023-08-31 | Stop reason: HOSPADM

## 2023-08-15 RX ORDER — PANTOPRAZOLE SODIUM 20 MG/1
20 TABLET, DELAYED RELEASE ORAL
Status: DISCONTINUED | OUTPATIENT
Start: 2023-08-15 | End: 2023-08-31 | Stop reason: HOSPADM

## 2023-08-15 RX ORDER — FENOFIBRATE 145 MG/1
145 TABLET, COATED ORAL DAILY
Status: DISCONTINUED | OUTPATIENT
Start: 2023-08-15 | End: 2023-08-31 | Stop reason: HOSPADM

## 2023-08-15 RX ORDER — POTASSIUM CHLORIDE 29.8 MG/ML
40 INJECTION INTRAVENOUS ONCE
Status: COMPLETED | OUTPATIENT
Start: 2023-08-15 | End: 2023-08-15

## 2023-08-15 RX ORDER — LORAZEPAM 1 MG/1
1 TABLET ORAL DAILY PRN
Status: DISCONTINUED | OUTPATIENT
Start: 2023-08-15 | End: 2023-08-31 | Stop reason: HOSPADM

## 2023-08-15 RX ADMIN — NOREPINEPHRINE BITARTRATE 4 MCG/MIN: 1 INJECTION, SOLUTION, CONCENTRATE INTRAVENOUS at 07:47

## 2023-08-15 RX ADMIN — PIPERACILLIN SODIUM AND TAZOBACTAM SODIUM 4.5 G: 36; 4.5 INJECTION, POWDER, LYOPHILIZED, FOR SOLUTION INTRAVENOUS at 20:43

## 2023-08-15 RX ADMIN — SODIUM CHLORIDE, SODIUM LACTATE, POTASSIUM CHLORIDE, AND CALCIUM CHLORIDE 125 ML/HR: .6; .31; .03; .02 INJECTION, SOLUTION INTRAVENOUS at 00:00

## 2023-08-15 RX ADMIN — SODIUM PHOSPHATE, MONOBASIC, MONOHYDRATE AND SODIUM PHOSPHATE, DIBASIC, ANHYDROUS 30 MMOL: 142; 276 INJECTION, SOLUTION INTRAVENOUS at 13:19

## 2023-08-15 RX ADMIN — SODIUM CHLORIDE, SODIUM GLUCONATE, SODIUM ACETATE, POTASSIUM CHLORIDE, MAGNESIUM CHLORIDE, SODIUM PHOSPHATE, DIBASIC, AND POTASSIUM PHOSPHATE 50 ML/HR: .53; .5; .37; .037; .03; .012; .00082 INJECTION, SOLUTION INTRAVENOUS at 09:43

## 2023-08-15 RX ADMIN — SODIUM CHLORIDE, SODIUM LACTATE, POTASSIUM CHLORIDE, AND CALCIUM CHLORIDE 125 ML/HR: .6; .31; .03; .02 INJECTION, SOLUTION INTRAVENOUS at 08:38

## 2023-08-15 RX ADMIN — ACETAMINOPHEN 975 MG: 325 TABLET, FILM COATED ORAL at 23:25

## 2023-08-15 RX ADMIN — PIPERACILLIN SODIUM AND TAZOBACTAM SODIUM 4.5 G: 36; 4.5 INJECTION, POWDER, LYOPHILIZED, FOR SOLUTION INTRAVENOUS at 04:03

## 2023-08-15 RX ADMIN — MAGNESIUM SULFATE HEPTAHYDRATE 2 G: 2 INJECTION, SOLUTION INTRAVENOUS at 09:43

## 2023-08-15 RX ADMIN — HYDROMORPHONE HYDROCHLORIDE 0.5 MG: 1 INJECTION, SOLUTION INTRAMUSCULAR; INTRAVENOUS; SUBCUTANEOUS at 20:43

## 2023-08-15 RX ADMIN — POTASSIUM CHLORIDE 60 MEQ: 1.5 SOLUTION ORAL at 08:06

## 2023-08-15 RX ADMIN — PANTOPRAZOLE SODIUM 20 MG: 20 TABLET, DELAYED RELEASE ORAL at 15:42

## 2023-08-15 RX ADMIN — PROPOFOL 35 MCG/KG/MIN: 10 INJECTION, EMULSION INTRAVENOUS at 00:10

## 2023-08-15 RX ADMIN — LINEZOLID 600 MG: 600 INJECTION, SOLUTION INTRAVENOUS at 00:16

## 2023-08-15 RX ADMIN — PIPERACILLIN SODIUM AND TAZOBACTAM SODIUM 4.5 G: 36; 4.5 INJECTION, POWDER, LYOPHILIZED, FOR SOLUTION INTRAVENOUS at 12:41

## 2023-08-15 RX ADMIN — FENOFIBRATE 145 MG: 145 TABLET ORAL at 15:43

## 2023-08-15 RX ADMIN — HYDROMORPHONE HYDROCHLORIDE 0.5 MG: 1 INJECTION, SOLUTION INTRAMUSCULAR; INTRAVENOUS; SUBCUTANEOUS at 09:00

## 2023-08-15 RX ADMIN — ATORVASTATIN CALCIUM 40 MG: 40 TABLET, FILM COATED ORAL at 15:45

## 2023-08-15 RX ADMIN — ESCITALOPRAM OXALATE 20 MG: 20 TABLET, FILM COATED ORAL at 15:43

## 2023-08-15 RX ADMIN — ASPIRIN 81 MG: 81 TABLET, COATED ORAL at 15:42

## 2023-08-15 RX ADMIN — EZETIMIBE 10 MG: 10 TABLET ORAL at 15:43

## 2023-08-15 RX ADMIN — POTASSIUM CHLORIDE 40 MEQ: 29.8 INJECTION, SOLUTION INTRAVENOUS at 08:05

## 2023-08-15 RX ADMIN — ENOXAPARIN SODIUM 40 MG: 40 INJECTION SUBCUTANEOUS at 08:06

## 2023-08-15 RX ADMIN — SODIUM CHLORIDE 1.5 UNITS/HR: 9 INJECTION, SOLUTION INTRAVENOUS at 00:10

## 2023-08-15 RX ADMIN — PROPOFOL 35 MCG/KG/MIN: 10 INJECTION, EMULSION INTRAVENOUS at 05:13

## 2023-08-15 RX ADMIN — MELATONIN 6 MG: at 23:25

## 2023-08-15 NOTE — QUICK NOTE
General Surgery    The patient's surgery and post-operative transfer from [address] [facility] Operating Room to [address] was an emergent situation. The patient was transferred by air immediately after surgery for a higher level of care. The patient had necrotizing fascitis/necrotizing soft tissue infection of the right groin, scrotum, perirectal area, and buttock. Patient with significant cardiac history (EF 25%, multi-vessel disease) and has uncontrolled DM in DKA. An inpatient order was not entered prior to transfer due to the emergent nature of the patient case and condition.

## 2023-08-15 NOTE — PROCEDURES
Arterial Line Insertion    Date/Time: 8/14/2023 10:05 PM    Performed by: Jeff Benedict  Authorized by: BRITTANY Pratt    Patient location:  ICU  Other Assisting Provider: Yes (comment) Thelma Wang MD)    Consent:     Consent obtained:  Verbal    Consent given by:  Spouse    Risks discussed:  Bleeding, ischemia, pain and repeat procedure  Universal protocol:     Procedure explained and questions answered to patient or proxy's satisfaction: yes      Relevant documents present and verified: yes      Test results available and properly labeled: yes      Radiology Images displayed and confirmed. If images not available, report reviewed: yes      Required blood products, implants, devices, and special equipment available: yes      Site/side marked: yes      Immediately prior to procedure a time out was called: yes      Patient identity confirmed:  Hospital-assigned identification number and arm band  Indications:     Indications: hemodynamic monitoring, continuous blood pressure monitoring and frequent labs / infusion    Pre-procedure details:     Skin preparation:  Chlorhexidine    Preparation: Patient was prepped and draped in sterile fashion    Sedation:     Sedation type: continuous propofol infusion while intubated on MV. Anesthesia (see MAR for exact dosages):      Anesthesia method:  Local infiltration    Local anesthetic:  Lidocaine 1% w/o epi  Procedure details:     Location / Tip of Catheter:  Radial    Laterality:  Right    Rakesh's test performed: yes      Rakesh's test abnormal: no      Needle gauge:  20 G    Placement technique:  Ultrasound guided and Seldinger    Sterile ultrasound techniques: Sterile gel and sterile probe covers were used      Number of attempts:  1    Successful placement: yes      Transducer: waveform confirmed    Post-procedure details:     Post-procedure:  Secured with tape, sterile dressing applied, sutured and wrist guard applied    Patient tolerance of procedure: Tolerated well, no immediate complications

## 2023-08-15 NOTE — RESPIRATORY THERAPY NOTE
RT Ventilator Management Note  Maria E Reyes 79 y.o. male MRN: [id number]  Unit/Bed#: [facility] 12 Encounter: [id number]      Daily Screen         8/14/2023  0741             Patient safety screen outcome[de-identified] Failed    Not Ready for Weaning due to[de-identified] Underline problem not resolved              Physical Exam:   Assessment Type: Assess only  General Appearance: Sedated  Respiratory Pattern: Assisted  Chest Assessment: Chest expansion symmetrical  Bilateral Breath Sounds: Coarse  Suction: ET Tube  O2 Device: (P) vent      Resp Comments: (P) Pt remained on listed settings through the night with no changes. Will continue to monitor per protocol. 08/15/23 0407   Respiratory Assessment   Assessment Type Assess only   General Appearance Sedated   Respiratory Pattern Assisted   Chest Assessment Chest expansion symmetrical   Bilateral Breath Sounds Coarse   Suction ET Tube   Resp Comments Pt remained on listed settings through the night with no changes. Will continue to monitor per protocol.    O2 Device vent   Vent Information   Vent ID [id number]   Vent type Dubon G5   Dubon C3/G5 Vent Mode P-CMV/PCV+   $ Vent Daily Charge-Subsequent Yes   $ Pulse Oximetry Spot Check Charge Completed   SpO2 95 %   P-CMV/PCV+ Settings   Resp Rate (BPM) 18 BPM   Pressure Control/Pinsp (cmH20) 15 cmH20   Insp Time (S) 0.8 sec   FiO2 (%) 40 %   PEEP (cmH2O) 6 cmH2O   I:E Ratio 1/3.2   Insp Resistance 15   P-ramp (ms) 50 (ms)   Humidification Heater   Heater Temp 95 °F (35 °C)   P-CMV/PCV+ Actuals   Resp Rate (BPM) 18 BPM   VT (mL) 508 mL   MV 9.1   MAP (cmH2O) 9.6 cmH2O   Peak Pressure (cmH2O) 22 cmH2O   I:E Ratio (Obs) 1/3.2   Heater Temperature (Obs) 95 °F (35 °C)   P-CMV/PCV+ Alarms    High Peak Pressure (cmH2O) 40 cmH2O   Low Pressure (cmH2O) 5 cm H2O   High Resp Rate (BPM) 40 BPM   Low Resp Rate (BPM) 8 BPM   High MV (L/min) 20 L/min   Low MV (L/min) 4 L/min   High Spont VTE (mL) 1000 mL   Low Spont VTE (mL) 250 mL   Maintenance   Alarm (pink) cable attached No   Resuscitation bag with peep valve at bedside Yes   Water bag changed Yes   Circuit changed No   IHI Ventilator Associated Pneumonia Bundle   Head of Bed Elevated HOB 30   ETT  Cuffed;Oral;Straight; Inflated; Hi-Lo 8 mm   Placement Date/Time: 08/13/23 1852   Mask Ventilation: Mask ventilation not attempted (0)  Preoxygenated: Yes  Technique: Rapid sequence;Stylet; Video laryngoscopy  Type: Cuffed;Oral;Straight; Inflated; Hi-Lo  Tube Size: 8 mm  Laryngoscope: Claire Johnston. ..    Secured at (cm) 23   Measured from [address] by Commercial tube hernandez   Site Condition Dry   HI-LO Suction  Intermittent suction   HI-LO Secretions Scant   HI-LO Intervention Patent

## 2023-08-15 NOTE — CASE MANAGEMENT
Case Management Assessment & Discharge Planning Note    Patient name Justice Davey  Location MICU 12/MICU 12 MRN [id number]  : 1955 Date 8/15/2023       Current Admission Date: 2023  Current Admission Diagnosis:Necrotizing soft tissue infection   Patient Active Problem List    Diagnosis Date Noted   • Necrotizing soft tissue infection 2023   • Cholelithiasis 2023   • Chronic systolic CHF (congestive heart failure) ([address]) 2021   • Ventricular tachycardia ([address]) 2021   • ICD (implantable cardioverter-defibrillator) in place 2019   • Uncontrolled type 2 diabetes mellitus with hyperglycemia ([address]) 09/15/2016   • Esophageal reflux 2013   • Ischemic cardiomyopathy 2012   • Generalized anxiety disorder 2012   • Benign essential hypertension 2011   • Coronary atherosclerosis 2010   • Mixed hyperlipidemia 2010      LOS (days): 2  Geometric Mean LOS (GMLOS) (days): 3.40  Days to GMLOS:1.7     OBJECTIVE:    Risk of Unplanned Readmission Score: 20.91         Current admission status: Inpatient       Preferred Pharmacy:   [facility] DR EDITH HAN [address] [place] [address]  Phone: [phone] Fax: [phone] [address] [place] [address]  Phone: [phone] Fax: [phone]    [facility] [address] [place] [address]  [address] [place] [address]  Phone: [phone] Fax: [phone]    [facility] ([address] [facility] [address] [place] [address] [address] [facility] [place] [address]  Phone: [phone] Fax: [phone] [address], [address] [place] [address],[address]  [facility] [address]  Phone: [phone] Fax: [phone]    Primary Care Provider: Rafal Gallo MD    Primary Insurance: [facility] [address] [facility] REP  Secondary Insurance:     ASSESSMENT:  Active Health Care Proxies    There are no active Health Care Proxies on file. Readmission Root Cause  30 Day Readmission: No    Patient Information  Admitted from[de-identified] Facility (Transfer from [facility])  Mental Status: Intubated  During Assessment patient was accompanied by: Not accompanied during assessment  Assessment information provided by[de-identified] Spouse Billey Lombard [phone])  Primary Caregiver: Self  Support Systems: Self, Family members, Spouse/significant other, [facility] of Residence: [facility] do you live in?: [address] [place] [address] entry access options. Select all that apply.: Stairs  Number of steps to enter home.: 2  Do the steps have railings?: Yes  Type of Current Residence: 2 story home  Upon entering residence, is there a bedroom on the main floor (no further steps)?: Yes  Upon entering residence, is there a bathroom on the main floor (no further steps)?: Yes  In the last 12 months, was there a time when you were not able to pay the mortgage or rent on time?: No  In the last 12 months, how many places have you lived?: 1  In the last 12 months, was there a time when you did not have a steady place to sleep or slept in a shelter (including now)?: No  Homeless/housing insecurity resource given?: N/A  Living Arrangements: Lives w/ Spouse/significant other  Is patient a ?: No    Activities of Daily Living Prior to Admission  Functional Status: Independent  Completes ADLs independently?: Yes  Ambulates independently?: Yes  Does patient use assisted devices?: No  Does patient currently own DME?: Yes  What DME does the patient currently own?: [employer]  Does patient have a history of Outpatient Therapy (PT/OT)?: No  Does the patient have a history of Short-Term Rehab?: No  Does patient have a history of HHC?: Yes (25+ years ago.  Spouse does not remember agency name.)  Does patient currently have [address]?: No      Patient Information Continued  Income Source: Pension/halfway  Does patient have prescription coverage?: Yes  Within the past 12 months, you worried that your food would run out before you got the money to buy more.: Never true  Within the past 12 months, the food you bought just didn't last and you didn't have money to get more.: Never true  Food insecurity resource given?: N/A  Does patient receive dialysis treatments?: No  Does patient have a history of substance abuse?: No  Does patient have a history of Mental Health Diagnosis?: No      Means of Transportation  Means of Transport to Appts[de-identified] Drives Self  In the past 12 months, has lack of transportation kept you from medical appointments or from getting medications?: No  In the past 12 months, has lack of transportation kept you from meetings, work, or from getting things needed for daily living?: No  Was application for public transport provided?: N/A        DISCHARGE DETAILS:    Discharge planning discussed with[de-identified] Spouse Shweta Ng  Freedom of Choice: Yes     CM contacted family/caregiver?: Yes    Contacts  Patient Contacts: Shweta Ng  Relationship to Patient[de-identified] Family  Contact Method: Phone  Phone Number: [phone]  Reason/Outcome: Continuity of Care, Emergency Contact, Discharge [facility]         Is the patient interested in [facility] at discharge?: Yes      Would you like to participate in our [facility] "[employer] service program?  : No - Declined    Additional Comments: CM Resident spoke to Pt's spouse Aida Ybarra via phone. According to spouse, Pt had open-heart surgery over 25 years ago, which resulted in visiting nurses, but spouse cannot remember agency name. Spouse informed CM that she is a retired RN and is interested in helping with aftercare in the home. Spouse informed CM Resident that she has worked as a nursing home administrator in the past, and is not interested in Pt going to [employer]. Waiting PT/OT recs at d/c.   CM will continue to follow. CM reviewed d/c planning process including the following: identifying help at home, patient preference for d/c planning needs, Discharge Lounge, [facility] Meds to Bed program, availability of treatment team to discuss questions or concerns patient and/or family may have regarding understanding medications and recognizing signs and symptoms once discharged. CM also encouraged patient to follow up with all recommended appointments after discharge. Patient advised of importance for patient and family to participate in managing patient’s medical well being.

## 2023-08-15 NOTE — RESPIRATORY THERAPY NOTE
Resp Therapy   08/15/23 1156   Respiratory Assessment   Resp Comments Pt extubated per order, pt had minimal cuff leak, pt extubated with PA bedside. Pt extubated to 6L NC. Pt able to speak and no stridor heard. Will continue to monitor pt per resp protocol.    Vent Information   Ventilator End Yes   Daily Screen   Patient safety screen outcome: Passed   Spont breathing trial % for 30 min Yes   Spont breathing trial outcome: Passed   Name of Medical Team Notified: Trauma/Surgery   Preparing to extubate/ Notify Nurse Yes   Extubation order obtained Yes   Consider Cuff Test Yes   Patient extubated Yes

## 2023-08-15 NOTE — PROGRESS NOTES
Pastoral Care Progress Note    8/15/2023  Patient: Madison West : 1955  Admission Date & Time: 20235  MRN: [id number] CSN: [id number]          I was rounding on MICU when I noticed a woman crying outside of room 12. I approached her, introduced myself as , asked if I could be of service to her. She said she was the Pt's sister, and she was overwhelmed by his poor condition/health, afraid he might die. She described her interaction with him in the hospital bed, that he had squeezed her finger and opened his eyes a bit, and that was both reassuring to her as well as concerning that he seems so out of it. I provided comfort, emotional support, silent and calming presence. I prayed for her and her brother when she requested it. I gave her a shoulder hug that she was open to, and let her cry.  will continue to visit/provide support.

## 2023-08-15 NOTE — PLAN OF CARE
Problem: MOBILITY - ADULT  Goal: Maintain or return to baseline ADL function  Description: INTERVENTIONS:  -  Assess patient's ability to carry out ADLs; assess patient's baseline for ADL function and identify physical deficits which impact ability to perform ADLs (bathing, care of mouth/teeth, toileting, grooming, dressing, etc.)  - Assess/evaluate cause of self-care deficits   - Assess range of motion  - Assess patient's mobility; develop plan if impaired  - Assess patient's need for assistive devices and provide as appropriate  - Encourage maximum independence but intervene and supervise when necessary  - Involve family in performance of ADLs  - Assess for home care needs following discharge   - Consider OT consult to assist with ADL evaluation and planning for discharge  - Provide patient education as appropriate  Outcome: Progressing  Goal: Maintains/Returns to pre admission functional level  Description: INTERVENTIONS:  - Perform BMAT or MOVE assessment daily.   - Set and communicate daily mobility goal to care team and patient/family/caregiver.    - Collaborate with rehabilitation services on mobility goals if consulted  - Out of bed for toileting  - Record patient progress and toleration of activity level   Outcome: Progressing     Problem: Prexisting or High Potential for Compromised Skin Integrity  Goal: Skin integrity is maintained or improved  Description: INTERVENTIONS:  - Identify patients at risk for skin breakdown  - Assess and monitor skin integrity  - Assess and monitor nutrition and hydration status  - Monitor labs   - Assess for incontinence   - Turn and reposition patient  - Assist with mobility/ambulation  - Relieve pressure over bony prominences  - Avoid friction and shearing  - Provide appropriate hygiene as needed including keeping skin clean and dry  - Evaluate need for skin moisturizer/barrier cream  - Collaborate with interdisciplinary team   - Patient/family teaching  - Consider wound care consult   Outcome: Progressing     Problem: Nutrition/Hydration-ADULT  Goal: Nutrient/Hydration intake appropriate for improving, restoring or maintaining nutritional needs  Description: Monitor and assess patient's nutrition/hydration status for malnutrition. Collaborate with interdisciplinary team and initiate plan and interventions as ordered. Monitor patient's weight and dietary intake as ordered or per policy. Utilize nutrition screening tool and intervene as necessary. Determine patient's food preferences and provide high-protein, high-caloric foods as appropriate.      INTERVENTIONS:  - Monitor oral intake, urinary output, labs, and treatment plans  - Assess nutrition and hydration status and recommend course of action  - Evaluate amount of meals eaten  - Assist patient with eating if necessary   - Allow adequate time for meals  - Recommend/ encourage appropriate diets, oral nutritional supplements, and vitamin/mineral supplements  - Order, calculate, and assess calorie counts as needed  - Recommend, monitor, and adjust tube feedings and TPN/PPN based on assessed needs  - Assess need for intravenous fluids  - Provide specific nutrition/hydration education as appropriate  - Include patient/family/caregiver in decisions related to nutrition  Outcome: Progressing     Problem: SAFETY,RESTRAINT: NV/NON-SELF DESTRUCTIVE BEHAVIOR  Goal: Remains free of harm/injury (restraint for non violent/non self-detsructive behavior)  Description: INTERVENTIONS:  - Instruct patient/family regarding restraint use   - Assess and monitor physiologic and psychological status   - Provide interventions and comfort measures to meet assessed patient needs   - Identify and implement measures to help patient regain control  - Assess readiness for release of restraint   Outcome: Progressing  Goal: Returns to optimal restraint-free functioning  Description: INTERVENTIONS:  - Assess the patient's behavior and symptoms that indicate continued need for restraint  - Identify and implement measures to help patient regain control  - Assess readiness for release of restraint   Outcome: Progressing

## 2023-08-15 NOTE — PROGRESS NOTES
Progress Note - General Surgery   Kervin Agrawal 79 y.o. male MRN: [id number]  Unit/Bed#: MICU 12 Encounter: [id number]    Assessment:  70yo M with strep bactremia, DKA (improved), and sheyla gangrene now s/p  -8/13 washout/debridement of perineum  -8/14 washout/debridement    Afebrile  Levo-3    WBC 9.6 (12.69)  Hgb 9.6 (11.9)  Cr 0.55    Wound cx: GNR, GPC  Blood cx: Strep      Plan:  -Plan for bedside dressing change today  -Wean to extubate, can have diet from surgical perspective  -Plan for OR tomorrow for possible partial closure and vac placement  -Continue IV abx, f/u cultures, recommend ID consult  -Wean pressors as able. -Remainder of care per [facility]    Subjective/Objective   Subjective:   No acute events overnight. Intubated/sedated    Objective:     Blood pressure 106/55, pulse 72, temperature 98.2 °F (36.8 °C), resp. rate 18, height 5' 9" (1.753 m), weight 92.5 kg (203 lb 14.8 oz), SpO2 96 %. ,Body mass index is 30.11 kg/m². Intake/Output Summary (Last 24 hours) at 8/15/2023 0800  Last data filed at 8/15/2023 0757  Gross per 24 hour   Intake 4674.38 ml   Output 1635 ml   Net 3039.38 ml       Invasive Devices     Central Venous Catheter Line  Duration           [facility] Central Lines 08/13/23 Triple 1 day          Peripheral Intravenous Line  Duration           Peripheral IV Right;Ventral (anterior) Antecubital -- days    Peripheral IV 08/13/23 Left;Proximal;Ventral (anterior) Forearm 1 day          Arterial Line  Duration           Arterial Line 08/14/23 Right Radial <1 day          Drain  Duration           NG/OG/Enteral Tube Nasogastric 18 Fr Left nare 1 day    Urethral Catheter Latex;Straight-tip 16 Fr. 1 day          Airway  Duration           ETT  Cuffed;Oral;Straight; Inflated; Hi-Lo 8 mm 1 day                Physical Exam:  General: Ill-appearing  HENT: NCAT MMM  Neck: supple, no JVD  CV: nl rate  Lungs: mechanical breath sounds  ABD: Soft, nontender, nondistended  : landon in place, dressing in place c/d/i.   Extrem: b/l lower extremity edema  Neuro: Sedated

## 2023-08-15 NOTE — RESPIRATORY THERAPY NOTE
RT Ventilator Management Note  Aguilar Coventrmaricarmen 79 y.o. male MRN: [id number]  Unit/Bed#: [facility] 12 Encounter: [id number]      Daily Screen         8/14/2023  0741 8/15/2023  0800          Patient safety screen outcome[de-identified] Failed Failed      Not Ready for Weaning due to[de-identified] Underline problem not resolved Underline problem not resolved                Physical Exam:   Assessment Type: Assess only  General Appearance: Sedated  Respiratory Pattern: Assisted  Chest Assessment: Chest expansion symmetrical  Bilateral Breath Sounds: Coarse  Suction: ET Tube  O2 Device: vent      Resp Comments: No vent changes. Nurse communicated to hold wean this am due to dressing change. BS decreased.

## 2023-08-15 NOTE — PROGRESS NOTES
[facility]  Progress Note: Critical Care  Name: Erin Vences 79 y.o. male I MRN: [id number]  Unit/Bed#: MICU 15 I Date of Admission: 8/13/2023   Date of Service: 8/15/2023 I Hospital Day: 2    Assessment/Plan     Neuro:   • Diagnosis: Acute pain  ? Plan:  ? Dilaudid drip   ? Dilaudid prn   • Sedation  ? Propofol gtt  • Neuro checks   • CAM-ICU     CV:   • Diagnosis: HTN, HLD, CAD, V-tach with ICD, CHF  ? Plan:   ? Hold home medication until able: Carvedilol, Zetia, Sotalol, Aldactone   ? ICD in place   • Diagnosis: Hypotension 2/2 NSTI of perineum   • [place] in place  • Central line in place   ? Currently on Levo, wean as tolerated   ? Goal MAP >65  ? Continue to monitor endpoints      Pulm:   • Diagnosis: Respiratory Insufficiency   ? Plan:  ? Plan to remain intubated for takeback and washout with Gen Surg  ? SBT once back from OR     GI:   • Diagnosis: Acute nausea  ? Plan:   ? Bowel regimen   ? Zofran prn         :   • Diagnosis: No active issues  ? Plan:   ? Monitor BUN:Crt  ? Continue landon  ? Monitor UOP     F/E/N:    • Fluids:   • Electrolytes: Replete electrolytes as needed Mg >2, Phos >3, K >4   • Nutrition: Jevity 1.2 erma      Heme/Onc:   • Diagnosis: DVT PPX     ? Plan:  ? Lovenox      Endo:   • Diagnosis: Hx of IDDM; DKA  ? Plan:   ? Insulin gtt --> consider transition to SSI  ? goal -180     ID:   • Diagnosis: NSTI of right perineum and inner thigh  • 8/14: OR washout and debridement of perineal wound R  ? Plan:   ? Back to OR 8/16 for washout  ? Gen surgery following   ? Continue abx Linezolid and Zosyn   ? Trend endpoints   ? Wound cx pending  ? Blood cx pending  ? Monitor Leukocytosis  ? Monitor fever curve      MSK/Skin:   • Diagnosis: NSTI of right perineum and inner thigh  ? 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   ? S/p OR debridement at [address] [place] [address] and wound packed w/ betadine soaked gauze; dressed with 4x4 and abd pad. Wound measured 21cm in length. ? Frequent offloading and rotation to prevent pressure wounds  ? See ID section     Disposition: Critical care    ICU Core Measures     Vented Patient  VAP Bundle      A: Assess, Prevent, and Manage Pain · Has pain been assessed? Yes  · Need for changes to pain regimen? No   B: Both Spontaneous Awakening Trials (SATs) and Spontaneous Breathing Trials (SBTs) · Plan to perform spontaneous awakening trial today? Yes   · Plan to perform spontaneous breathing trial today? Yes   · Obvious barriers to extubation? Yes   C: Choice of Sedation · RASS Goal: -3 Moderate Sedation  · Need for changes to sedation or analgesia regimen? No   D: Delirium · CAM-ICU: Negative   E: Early Mobility  · Plan for early mobility? Yes   F: Family Engagement · Plan for family engagement today? Yes       Antibiotic Review: Awaiting culture results. Review of Invasive Devices: Booth Plan: Continue for accurate I/O monitoring for 48 hours  Central access plan: Patient has multiple central venous catheters. Mendy Plan: Keep arterial line for hemodynamic monitoring, frequent ABGs and frequent labs    Prophylaxis:  VTE VTE covered by:  enoxaparin, Subcutaneous, 40 mg at 08/14/23 2730       Stress Ulcer  not ordered          Subjective   HPI/24hr events:     Per H&P "eBnji Cody is a 79 y.o. male with history of HTN, HLD, DM, CAD, Vtach w/ ICD, CHF, presenting with groin pain. Patient notes that he has an enlarging "cyst" in the area between his scrotum and anus for the past 3 days. Also endorsees n/v for 4 days in duration. Denies fevers/chills, chest pains, SOB. He stated that he has not been able to tolerate his medicines due to illness and has not taken his home insulin regimen. His blood sugar was noted to be 426 on presentation. He stated that he had never been in DKA before.  Patient was intubated and sedated; HPI obtained primarily through chart review."     Overnight patients Mendy stopped working, new mendy was placed. No other issues overnight. Review of Systems   Unable to perform ROS: Intubated          Objective                            Vitals I/O      Most Recent Min/Max in 24hrs   Temp 98.6 °F (37 °C) Temp  Min: 97.9 °F (36.6 °C)  Max: 101 °F (38.3 °C)   Pulse 78 Pulse  Min: 74  Max: 96   Resp 17 Resp  Min: 5  Max: 20   /56 BP  Min: 100/54  Max: 132/68   O2 Sat 95 % SpO2  Min: 95 %  Max: 99 %      Intake/Output Summary (Last 24 hours) at 8/15/2023 0518  Last data filed at 8/15/2023 0400  Gross per 24 hour   Intake 3959.01 ml   Output 1685 ml   Net 2274.01 ml         Diet Enteral/Parenteral; Tube Feeding No Oral Diet; Jevity 1.2 Harvinder; Continuous; 10     Invasive Monitoring Physical exam   Arterial Line  [place] /44  Arterial Line BP  Min: 92/62  Max: 148/58   MAP (!) 64 mmHg  Arterial Line MAP (mmHg)  Min: 58 mmHg  Max: 88 mmHg      Physical Exam  Eyes:      Pupils: Pupils are equal, round, and reactive to light. Skin:     General: Skin is warm. Capillary Refill: Capillary refill takes less than 2 seconds. HENT:      Head: Normocephalic and atraumatic. Nose:      Comments: ngt     Mouth/Throat:      Pharynx: No oropharyngeal exudate. Neck:     Vascular: Central line present. Cardiovascular:      Rate and Rhythm: Normal rate and regular rhythm. Abdominal:      Palpations: Abdomen is soft. Tenderness: There is no abdominal tenderness. Constitutional:       Appearance: He is ill-appearing. Interventions: He is sedated and intubated. Pulmonary:      Effort: Pulmonary effort is normal. He is intubated. Comments: Intubated   Genitourinary/Anorectal:     Comments: packed, hemostatic  FoleyVitals and nursing note reviewed. Diagnostic Studies      EKG: sinus tach  Imaging:  I have personally reviewed pertinent reports.    and I have personally reviewed pertinent films in PACS Medications:  Scheduled PRN   enoxaparin, 40 mg, Q24H JODY  linezolid, 600 mg, Q12H  piperacillin-tazobactam, 4.5 g, Q8H  senna, 1 tablet, HS      HYDROmorphone, 0.5 mg, Q4H PRN  HYDROmorphone, 1 mg, Q4H PRN  ondansetron, 4 mg, Q6H PRN       Continuous    HYDROmorphone, 0.5 mg/hr, Last Rate: 0.5 mg/hr (08/14/23 1347)  insulin regular (HumuLIN R,NovoLIN R) 1 Units/mL in sodium chloride 0.9 % 100 mL infusion, 0.3-21 Units/hr, Last Rate: 3 Units/hr (08/15/23 0427)  lactated ringers, 125 mL/hr, Last Rate: 125 mL/hr (08/14/23 1549)  norepinephrine, 1-30 mcg/min, Last Rate: 3 mcg/min (08/15/23 0315)  propofol, 5-50 mcg/kg/min, Last Rate: 35 mcg/kg/min (08/15/23 0513)         Labs:    CBC    Recent Labs     08/14/23  0509 08/15/23  0427   WBC 12.69* 9.60   HGB 11.9* 9.6*   HCT 34.9* 28.4*    233     BMP    Recent Labs     08/13/23  2309 08/14/23  0144   SODIUM 135 135   K 4.0 3.7    104   CO2 22 22   AGAP 8 9   BUN 15 14   CREATININE 0.73 0.61   CALCIUM 8.3 8.2*       Coags    Recent Labs     08/13/23  1528 08/13/23  2312   INR 1.29* 1.27*   PTT 29  --         Additional Electrolytes  Recent Labs     08/13/23  1958 08/13/23  2035 08/14/23  0144 08/15/23  0427   MG  --  1.5* 1.6  --    PHOS  --  3.0  --   --    CAIONIZED 1.17  --   --  1.04*          Blood Gas    Recent Labs     08/14/23  1110   PHART 7.493*   VMQ7CGS 31.7*   PO2ART 108.6   PYO3POC 23.8   BEART 1.1   SOURCE Line, Arterial     Recent Labs     08/13/23 2329 08/14/23  1110   PHVEN 7.312  --    STR6BNV 41.8*  --    PO2VEN 43.3  --    TSF4PHT 20.7*  --    BEVEN -5.3  --    SOURCE  --  Line, Arterial    LFTs  Recent Labs     08/13/23 2035 08/13/23 2309   ALT 7 11*   AST 8* 9   ALKPHOS 67 78   ALB 3.2* 2.3*   TBILI 0.64 0.51       Infectious  Recent Labs     08/13/23  1519   PROCALCITONI 0.91*     Glucose  Recent Labs     08/13/23  1519 08/13/23 2035 08/13/23 2309 08/14/23  0144   GLUC 422* 291* 274* 224*               Monique Artis, DO

## 2023-08-15 NOTE — ANESTHESIA PREPROCEDURE EVALUATION
Procedure:  DEBRIDEMENT WOUND AND DRESSING CHANGE [facility] OUT), possible wound vac (Perineum)  APPLICATION VAC DRESSING (Perineum)    Relevant Problems   ANESTHESIA (within normal limits)      CARDIO   (+) Benign essential hypertension   (+) Coronary atherosclerosis   (+) Mixed hyperlipidemia   (+) Ventricular tachycardia (HCC)      GI/HEPATIC  NPO  BMI 30   (+) Esophageal reflux      NEURO/PSYCH   (+) Generalized anxiety disorder      Poorly controlled, DKA, RTOR for [place]    On Norepi, extubated yesterday    Prev airway  Placement Date: 08/13/23; Placement Time: 1852; Mask Ventilation: Mask ventilation not attempted (0); Technique: Rapid sequence, Stylet, Video laryngoscopy; Type: Cuffed, Oral, Straight, Inflated, Hi-Lo; Tube Size: 8 mm; Laryngoscope: Chong Doe Size: 3; Location: Oral; Grade View: 1; Insertion Attempts: 1;     Allergies   Allergen Reactions   • Ace Inhibitors Cough     Social History     Tobacco Use   • Smoking status: Never   • Smokeless tobacco: Never   Substance Use Topics   • Alcohol use: No     Comment: being a social drinker noted in "allscripts"    • Drug use: No     Current Outpatient Medications   Medication Instructions   • aspirin 81 MG tablet 1 tablet, Oral, 2 times daily   • atorvastatin (LIPITOR) 40 mg tablet 1 tablet, Oral, Daily   • B-D ULTRAFINE III SHORT PEN 31G X 8 MM MISC No dose, route, or frequency recorded. • BD ULTRA-FINE PEN NEEDLES 29G X 12.7MM MISC No dose, route, or frequency recorded.    • candesartan (ATACAND) 16 mg, Oral, Daily   • carvedilol (COREG) 12.5 mg tablet 1 tablet, Oral, 2 times daily   • Continuous Blood Gluc Sensor (FREESTYLE DRU SENSOR SYSTEM) MISC 1 each, Does not apply, Every 14 days   • Empagliflozin (JARDIANCE) 25 mg, Oral, Every morning   • escitalopram (LEXAPRO) 20 mg, Oral, Daily   • ezetimibe (ZETIA) 10 mg tablet 1 tablet, Oral, Daily   • fenofibrate (TRICOR) 145 mg, Oral, Daily   • Insulin Pen Needle (PEN NEEDLES 29GX1/2") 29G X 12MM MISC 2 X daily subcutaneously dx: E11.65   • insuln lispro (HumaLOG KwikPen) 200 units/mL CONCENTRATED U-200 injection pen 30 Units Once a day with lunch or supper   • isosorbide mononitrate (IMDUR) 30 mg, Oral, Daily at bedtime   • Lancets MISC test once times daily E11.65   • lansoprazole (PREVACID) 30 mg, Oral, Daily   • LORazepam (ATIVAN) 1 mg tablet TAKE 1 TABLET DAILY AS NEEDED FOR ANXIETY   • metFORMIN (GLUCOPHAGE) 1,000 mg, Oral, 2 times daily   • Misc Natural Products (BLOOD SUGAR 360 PO) ONETOUCH ULTRA TEST STRIPS Test once daily E11.65   • omega-3-acid ethyl esters (LOVAZA) 2 g, Oral, 2 times daily   • sotalol (BETAPACE) 80 mg tablet 1 tablet, Oral, 2 times daily   • spironolactone (ALDACTONE) 25 mg tablet 1 tablet, Oral, Daily   • Toujeo SoloStar 130 Units, Subcutaneous, Daily   • Trulicity 3 mg, Subcutaneous, Every 7 days     Lab Results   Component Value Date    WBC 9.60 08/15/2023    HGB 9.6 (L) 08/15/2023    HCT 28.4 (L) 08/15/2023     08/15/2023    SODIUM 142 08/15/2023    K 2.9 (L) 08/15/2023     (H) 08/15/2023    CO2 27 08/15/2023    BUN 8 08/15/2023    CREATININE 0.55 (L) 08/15/2023    GLUC 141 (H) 08/15/2023    HGBA1C 12.3 01/27/2023    AST 9 08/15/2023    ALT 10 (L) 08/15/2023    ALKPHOS 66 08/15/2023    TBILI 0.29 08/15/2023    ALB 1.7 (L) 08/15/2023    PROTIME 16.1 (H) 08/13/2023    PTT 29 08/13/2023    INR 1.27 (H) 08/13/2023     Vitals:    08/15/23 1800   BP: 134/67   Pulse: 80   Resp: 20   Temp:    SpO2: 95%     Echo 8/14/23  •  Left Ventricle: Left ventricular cavity size is normal. Wall thickness is mildly increased. There is mild concentric hypertrophy. The left ventricular ejection fraction is 40%. Systolic function is moderately reduced. Diastolic function is mildly abnormal, consistent with grade I (abnormal) relaxation.   •  The following segments are hypokinetic: basal inferoseptal, basal inferior, basal inferolateral, mid inferior and mid inferolateral.  • Right Ventricle: Right ventricular cavity size is at the upper limits of normal. Systolic function is mildly reduced. •  All other segments are normal.  •  IVS: There is abnormal septal motion consistent with post-operative status. Physical Exam    Airway  Comment: Thick neck, large beard  Mallampati score: III  TM Distance: >3 FB  Neck ROM: full     Dental   Comment: Denies loose/chipped teeth, No notable dental hx     Cardiovascular  Rhythm: regular, Rate: normal,     Pulmonary  Breath sounds clear to auscultation, Decreased breath sounds,     Other Findings        Anesthesia Plan  ASA Score- 4     Anesthesia Type- general with ASA Monitors. Additional Monitors:   Airway Plan: LMA. Plan Factors-Exercise tolerance (METS): >4 METS. Chart reviewed. EKG reviewed. Imaging results reviewed. Existing labs reviewed. Patient summary reviewed. Patient is not a current smoker. Patient did not smoke on day of surgery. Obstructive sleep apnea risk education given perioperatively. Induction- intravenous. Postoperative Plan- Plan for postoperative opioid use. Planned trial extubation    Informed Consent- Anesthetic plan and risks discussed with patient (RTOR, Prior consent). I personally reviewed this patient with the CRNA. Discussed and agreed on the Anesthesia Plan with the CRNA. Jaimee Malagon

## 2023-08-16 ENCOUNTER — ANESTHESIA (INPATIENT)
Dept: PERIOP | Facility: HOSPITAL | Age: 68
End: 2023-08-16
Payer: COMMERCIAL

## 2023-08-16 ENCOUNTER — APPOINTMENT (OUTPATIENT)
Dept: RADIOLOGY | Facility: HOSPITAL | Age: 68
DRG: 853 | End: 2023-08-16
Payer: COMMERCIAL

## 2023-08-16 LAB
ANION GAP SERPL CALCULATED.3IONS-SCNC: 2 MMOL/L
ANION GAP SERPL CALCULATED.3IONS-SCNC: 4 MMOL/L
BUN SERPL-MCNC: 8 MG/DL (ref 5–25)
BUN SERPL-MCNC: 8 MG/DL (ref 5–25)
CA-I BLD-SCNC: 1.09 MMOL/L (ref 1.12–1.32)
CALCIUM SERPL-MCNC: 8 MG/DL (ref 8.3–10.1)
CALCIUM SERPL-MCNC: 8.3 MG/DL (ref 8.3–10.1)
CHLORIDE SERPL-SCNC: 109 MMOL/L (ref 96–108)
CHLORIDE SERPL-SCNC: 109 MMOL/L (ref 96–108)
CO2 SERPL-SCNC: 29 MMOL/L (ref 21–32)
CO2 SERPL-SCNC: 29 MMOL/L (ref 21–32)
CREAT SERPL-MCNC: 0.5 MG/DL (ref 0.6–1.3)
CREAT SERPL-MCNC: 0.61 MG/DL (ref 0.6–1.3)
ERYTHROCYTE [DISTWIDTH] IN BLOOD BY AUTOMATED COUNT: 13.9 % (ref 11.6–15.1)
GFR SERPL CREATININE-BSD FRML MDRD: 103 ML/MIN/1.73SQ M
GFR SERPL CREATININE-BSD FRML MDRD: 112 ML/MIN/1.73SQ M
GLUCOSE SERPL-MCNC: 110 MG/DL (ref 65–140)
GLUCOSE SERPL-MCNC: 124 MG/DL (ref 65–140)
GLUCOSE SERPL-MCNC: 124 MG/DL (ref 65–140)
GLUCOSE SERPL-MCNC: 127 MG/DL (ref 65–140)
GLUCOSE SERPL-MCNC: 133 MG/DL (ref 65–140)
GLUCOSE SERPL-MCNC: 142 MG/DL (ref 65–140)
GLUCOSE SERPL-MCNC: 143 MG/DL (ref 65–140)
GLUCOSE SERPL-MCNC: 162 MG/DL (ref 65–140)
GLUCOSE SERPL-MCNC: 192 MG/DL (ref 65–140)
GLUCOSE SERPL-MCNC: 202 MG/DL (ref 65–140)
GLUCOSE SERPL-MCNC: 223 MG/DL (ref 65–140)
GLUCOSE SERPL-MCNC: 232 MG/DL (ref 65–140)
GLUCOSE SERPL-MCNC: 96 MG/DL (ref 65–140)
GLUCOSE SERPL-MCNC: 97 MG/DL (ref 65–140)
HCT VFR BLD AUTO: 27.8 % (ref 36.5–49.3)
HGB BLD-MCNC: 9.1 G/DL (ref 12–17)
MAGNESIUM SERPL-MCNC: 2.1 MG/DL (ref 1.6–2.6)
MCH RBC QN AUTO: 30.5 PG (ref 26.8–34.3)
MCHC RBC AUTO-ENTMCNC: 32.7 G/DL (ref 31.4–37.4)
MCV RBC AUTO: 93 FL (ref 82–98)
PHOSPHATE SERPL-MCNC: 2.2 MG/DL (ref 2.3–4.1)
PLATELET # BLD AUTO: 226 THOUSANDS/UL (ref 149–390)
PMV BLD AUTO: 9.2 FL (ref 8.9–12.7)
POTASSIUM SERPL-SCNC: 3.2 MMOL/L (ref 3.5–5.3)
POTASSIUM SERPL-SCNC: 4 MMOL/L (ref 3.5–5.3)
RBC # BLD AUTO: 2.98 MILLION/UL (ref 3.88–5.62)
SODIUM SERPL-SCNC: 140 MMOL/L (ref 135–147)
SODIUM SERPL-SCNC: 142 MMOL/L (ref 135–147)
WBC # BLD AUTO: 8.44 THOUSAND/UL (ref 4.31–10.16)

## 2023-08-16 PROCEDURE — 80048 BASIC METABOLIC PNL TOTAL CA: CPT | Performed by: PHYSICIAN ASSISTANT

## 2023-08-16 PROCEDURE — 82330 ASSAY OF CALCIUM: CPT

## 2023-08-16 PROCEDURE — 84100 ASSAY OF PHOSPHORUS: CPT

## 2023-08-16 PROCEDURE — 0JDB0ZZ EXTRACTION OF PERINEUM SUBCUTANEOUS TISSUE AND FASCIA, OPEN APPROACH: ICD-10-PCS | Performed by: SURGERY

## 2023-08-16 PROCEDURE — 12006 RPR S/N/A/GEN/TRK20.1-30.0CM: CPT | Performed by: SURGERY

## 2023-08-16 PROCEDURE — 85027 COMPLETE CBC AUTOMATED: CPT

## 2023-08-16 PROCEDURE — 80048 BASIC METABOLIC PNL TOTAL CA: CPT

## 2023-08-16 PROCEDURE — 82948 REAGENT STRIP/BLOOD GLUCOSE: CPT

## 2023-08-16 PROCEDURE — 0JQB0ZZ REPAIR PERINEUM SUBCUTANEOUS TISSUE AND FASCIA, OPEN APPROACH: ICD-10-PCS | Performed by: SURGERY

## 2023-08-16 PROCEDURE — 71045 X-RAY EXAM CHEST 1 VIEW: CPT

## 2023-08-16 PROCEDURE — 83735 ASSAY OF MAGNESIUM: CPT

## 2023-08-16 PROCEDURE — 99291 CRITICAL CARE FIRST HOUR: CPT | Performed by: STUDENT IN AN ORGANIZED HEALTH CARE EDUCATION/TRAINING PROGRAM

## 2023-08-16 PROCEDURE — 99223 1ST HOSP IP/OBS HIGH 75: CPT | Performed by: INTERNAL MEDICINE

## 2023-08-16 PROCEDURE — 99232 SBSQ HOSP IP/OBS MODERATE 35: CPT | Performed by: SURGERY

## 2023-08-16 PROCEDURE — 97605 NEG PRS WND THER DME<=50SQCM: CPT | Performed by: SURGERY

## 2023-08-16 RX ORDER — POTASSIUM CHLORIDE 20MEQ/15ML
40 LIQUID (ML) ORAL ONCE
Status: COMPLETED | OUTPATIENT
Start: 2023-08-16 | End: 2023-08-16

## 2023-08-16 RX ORDER — ONDANSETRON 2 MG/ML
4 INJECTION INTRAMUSCULAR; INTRAVENOUS ONCE AS NEEDED
Status: DISCONTINUED | OUTPATIENT
Start: 2023-08-16 | End: 2023-08-16 | Stop reason: HOSPADM

## 2023-08-16 RX ORDER — CALCIUM GLUCONATE 20 MG/ML
1 INJECTION, SOLUTION INTRAVENOUS ONCE
Status: COMPLETED | OUTPATIENT
Start: 2023-08-16 | End: 2023-08-16

## 2023-08-16 RX ORDER — ONDANSETRON 2 MG/ML
INJECTION INTRAMUSCULAR; INTRAVENOUS AS NEEDED
Status: DISCONTINUED | OUTPATIENT
Start: 2023-08-16 | End: 2023-08-16

## 2023-08-16 RX ORDER — INSULIN LISPRO 100 [IU]/ML
2-12 INJECTION, SOLUTION INTRAVENOUS; SUBCUTANEOUS
Status: DISCONTINUED | OUTPATIENT
Start: 2023-08-17 | End: 2023-08-18

## 2023-08-16 RX ORDER — MAGNESIUM HYDROXIDE 1200 MG/15ML
LIQUID ORAL AS NEEDED
Status: DISCONTINUED | OUTPATIENT
Start: 2023-08-16 | End: 2023-08-16 | Stop reason: HOSPADM

## 2023-08-16 RX ORDER — PROPOFOL 10 MG/ML
INJECTION, EMULSION INTRAVENOUS AS NEEDED
Status: DISCONTINUED | OUTPATIENT
Start: 2023-08-16 | End: 2023-08-16

## 2023-08-16 RX ORDER — FENTANYL CITRATE/PF 50 MCG/ML
25 SYRINGE (ML) INJECTION
Status: DISCONTINUED | OUTPATIENT
Start: 2023-08-16 | End: 2023-08-16 | Stop reason: HOSPADM

## 2023-08-16 RX ORDER — LIDOCAINE HYDROCHLORIDE 10 MG/ML
INJECTION, SOLUTION EPIDURAL; INFILTRATION; INTRACAUDAL; PERINEURAL AS NEEDED
Status: DISCONTINUED | OUTPATIENT
Start: 2023-08-16 | End: 2023-08-16

## 2023-08-16 RX ORDER — SOTALOL HYDROCHLORIDE 80 MG/1
80 TABLET ORAL 2 TIMES DAILY
Status: DISCONTINUED | OUTPATIENT
Start: 2023-08-16 | End: 2023-08-31 | Stop reason: HOSPADM

## 2023-08-16 RX ORDER — CARVEDILOL 12.5 MG/1
12.5 TABLET ORAL 2 TIMES DAILY
Status: DISCONTINUED | OUTPATIENT
Start: 2023-08-16 | End: 2023-08-31 | Stop reason: HOSPADM

## 2023-08-16 RX ORDER — FENTANYL CITRATE 50 UG/ML
INJECTION, SOLUTION INTRAMUSCULAR; INTRAVENOUS AS NEEDED
Status: DISCONTINUED | OUTPATIENT
Start: 2023-08-16 | End: 2023-08-16

## 2023-08-16 RX ORDER — HYDROMORPHONE HCL IN WATER/PF 6 MG/30 ML
0.2 PATIENT CONTROLLED ANALGESIA SYRINGE INTRAVENOUS
Status: DISCONTINUED | OUTPATIENT
Start: 2023-08-16 | End: 2023-08-16 | Stop reason: HOSPADM

## 2023-08-16 RX ORDER — INSULIN GLARGINE 100 [IU]/ML
60 INJECTION, SOLUTION SUBCUTANEOUS EVERY MORNING
Status: DISCONTINUED | OUTPATIENT
Start: 2023-08-17 | End: 2023-08-18

## 2023-08-16 RX ADMIN — ASPIRIN 81 MG: 81 TABLET, COATED ORAL at 12:19

## 2023-08-16 RX ADMIN — POTASSIUM CHLORIDE 40 MEQ: 1.5 SOLUTION ORAL at 05:18

## 2023-08-16 RX ADMIN — FENOFIBRATE 145 MG: 145 TABLET ORAL at 12:22

## 2023-08-16 RX ADMIN — SOTALOL HYDROCHLORIDE 80 MG: 80 TABLET ORAL at 14:11

## 2023-08-16 RX ADMIN — SOTALOL HYDROCHLORIDE 80 MG: 80 TABLET ORAL at 21:10

## 2023-08-16 RX ADMIN — ACETAMINOPHEN 975 MG: 325 TABLET, FILM COATED ORAL at 14:11

## 2023-08-16 RX ADMIN — SODIUM CHLORIDE 3 G: 9 INJECTION, SOLUTION INTRAVENOUS at 18:03

## 2023-08-16 RX ADMIN — ENOXAPARIN SODIUM 40 MG: 40 INJECTION SUBCUTANEOUS at 12:20

## 2023-08-16 RX ADMIN — PROPOFOL 150 MG: 10 INJECTION, EMULSION INTRAVENOUS at 08:43

## 2023-08-16 RX ADMIN — PIPERACILLIN SODIUM AND TAZOBACTAM SODIUM 4.5 G: 36; 4.5 INJECTION, POWDER, LYOPHILIZED, FOR SOLUTION INTRAVENOUS at 04:09

## 2023-08-16 RX ADMIN — SODIUM CHLORIDE 3 UNITS/HR: 9 INJECTION, SOLUTION INTRAVENOUS at 22:01

## 2023-08-16 RX ADMIN — EZETIMIBE 10 MG: 10 TABLET ORAL at 12:21

## 2023-08-16 RX ADMIN — FENTANYL CITRATE 50 MCG: 50 INJECTION INTRAMUSCULAR; INTRAVENOUS at 08:43

## 2023-08-16 RX ADMIN — ONDANSETRON 4 MG: 2 INJECTION INTRAMUSCULAR; INTRAVENOUS at 09:46

## 2023-08-16 RX ADMIN — CARVEDILOL 12.5 MG: 12.5 TABLET, FILM COATED ORAL at 18:03

## 2023-08-16 RX ADMIN — ESCITALOPRAM OXALATE 20 MG: 20 TABLET, FILM COATED ORAL at 12:20

## 2023-08-16 RX ADMIN — PANTOPRAZOLE SODIUM 20 MG: 20 TABLET, DELAYED RELEASE ORAL at 05:10

## 2023-08-16 RX ADMIN — ATORVASTATIN CALCIUM 40 MG: 40 TABLET, FILM COATED ORAL at 16:09

## 2023-08-16 RX ADMIN — CALCIUM GLUCONATE 1 G: 20 INJECTION, SOLUTION INTRAVENOUS at 07:55

## 2023-08-16 RX ADMIN — ACETAMINOPHEN 975 MG: 325 TABLET, FILM COATED ORAL at 05:10

## 2023-08-16 RX ADMIN — ACETAMINOPHEN 975 MG: 325 TABLET, FILM COATED ORAL at 21:10

## 2023-08-16 RX ADMIN — SODIUM CHLORIDE 3 G: 9 INJECTION, SOLUTION INTRAVENOUS at 12:35

## 2023-08-16 RX ADMIN — PHENYLEPHRINE HYDROCHLORIDE 40 MCG/MIN: 10 INJECTION INTRAVENOUS at 08:49

## 2023-08-16 RX ADMIN — LIDOCAINE HYDROCHLORIDE 50 MG: 10 INJECTION, SOLUTION EPIDURAL; INFILTRATION; INTRACAUDAL; PERINEURAL at 08:43

## 2023-08-16 RX ADMIN — SODIUM CHLORIDE 8 UNITS/HR: 9 INJECTION, SOLUTION INTRAVENOUS at 00:36

## 2023-08-16 RX ADMIN — CALCIUM GLUCONATE 1 G: 20 INJECTION, SOLUTION INTRAVENOUS at 05:19

## 2023-08-16 RX ADMIN — SENNOSIDES 8.6 MG: 8.6 TABLET, FILM COATED ORAL at 21:10

## 2023-08-16 RX ADMIN — OXYCODONE HYDROCHLORIDE 5 MG: 5 TABLET ORAL at 19:22

## 2023-08-16 RX ADMIN — FENTANYL CITRATE 50 MCG: 50 INJECTION INTRAMUSCULAR; INTRAVENOUS at 09:08

## 2023-08-16 RX ADMIN — MELATONIN 6 MG: at 21:10

## 2023-08-16 RX ADMIN — POTASSIUM PHOSPHATE, MONOBASIC AND POTASSIUM PHOSPHATE, DIBASIC 30 MMOL: 224; 236 INJECTION, SOLUTION, CONCENTRATE INTRAVENOUS at 05:28

## 2023-08-16 NOTE — ANESTHESIA POSTPROCEDURE EVALUATION
Post-Op Assessment Note    CV Status:  Stable  Pain Score: 0    Pain management: adequate     Mental Status:  Alert and awake   Hydration Status:  Euvolemic and stable   PONV Controlled:  Controlled   Airway Patency:  Patent      Post Op Vitals Reviewed: Yes      Staff: CRNA, Anesthesiologist   Comments: Report given to recovering RN, VSS. Pt states he is comfortable        There were no known notable events for this encounter.     /72 (08/16/23 1030)    Temp 97.8 °F (36.6 °C) (08/16/23 1022)    Pulse 72 (08/16/23 1030)   Resp (!) 24 (08/16/23 1030)    SpO2 95 % (08/16/23 1030)

## 2023-08-16 NOTE — QUICK NOTE
Postop Check    Procedure: DEBRIDEMENT WOUND. [address] OUT. PARTIAL CLOSURE, APPLICATION VAC DRESSING       Subjective:  No acute distress. Resting comfortably in bed. Objective  Vitals:    08/16/23 1130 08/16/23 1144 08/16/23 1200 08/16/23 1214   BP: 118/63 137/66  138/66   Pulse: 70 78 74 74   Resp: 20 22 (!) 23 (!) 27   Temp:       TempSrc:       SpO2: 91% 91% 95% 92%   Weight:       Height:             GENERAL: No acute distress  CV: Regular rate and rhythm  LUNGS: Nonlabored respirations, CTA B/L  ABDOMEN: Abdomen soft, non-tender, nondistended. SKIN: Vac with minimal SA drainage      Assessment and Plan:  Status post DEBRIDEMENT WOUND. [address] OUT.  PARTIAL CLOSURE, APPLICATION VAC DRESSING     Monitor VAC output - management per [facility]   Re-order diet   D/C IVF   D/C A line   Restart home Carvedilol and Sotalol   ID consulted - Continue Unasyn, plan for MARCELLA   Incentive spirometry  Monitor UOP, consider Lasix 20 mg x 1 if needed for low UOP   DVT prophylaxis  Analgesia  6 pm BMP to f/u K   Rest of plan per AM progress note       Nate Law PA-C

## 2023-08-16 NOTE — PLAN OF CARE
Problem: MOBILITY - ADULT  Goal: Maintain or return to baseline ADL function  Description: INTERVENTIONS:  -  Assess patient's ability to carry out ADLs; assess patient's baseline for ADL function and identify physical deficits which impact ability to perform ADLs (bathing, care of mouth/teeth, toileting, grooming, dressing, etc.)  - Assess/evaluate cause of self-care deficits   - Assess range of motion  - Assess patient's mobility; develop plan if impaired  - Assess patient's need for assistive devices and provide as appropriate  - Encourage maximum independence but intervene and supervise when necessary  - Involve family in performance of ADLs  - Assess for home care needs following discharge   - Consider OT consult to assist with ADL evaluation and planning for discharge  - Provide patient education as appropriate  8/16/2023 0012 by Wilda Khan  Outcome: Progressing  8/16/2023 0011 by Wilda Khan  Outcome: Progressing  Goal: Maintains/Returns to pre admission functional level  Description: INTERVENTIONS:  - Perform BMAT or MOVE assessment daily.   - Set and communicate daily mobility goal to care team and patient/family/caregiver. - Collaborate with rehabilitation services on mobility goals if consulted  - Perform Range of Motion 4 times a day. - Reposition patient every 2 hours.   - Dangle patient 0 times a day  - Stand patient 0 times a day  - Ambulate patient 0 times a day  - Out of bed to chair 0 times a day   - Out of bed for meals 0 times a day  - Out of bed for toileting  - Record patient progress and toleration of activity level   8/16/2023 0012 by Wilda Khan  Outcome: Progressing  8/16/2023 0011 by Wilda Khan  Outcome: Progressing     Problem: Prexisting or High Potential for Compromised Skin Integrity  Goal: Skin integrity is maintained or improved  Description: INTERVENTIONS:  - Identify patients at risk for skin breakdown  - Assess and monitor skin integrity  - Assess and monitor nutrition and hydration status  - Monitor labs   - Assess for incontinence   - Turn and reposition patient  - Assist with mobility/ambulation  - Relieve pressure over bony prominences  - Avoid friction and shearing  - Provide appropriate hygiene as needed including keeping skin clean and dry  - Evaluate need for skin moisturizer/barrier cream  - Collaborate with interdisciplinary team   - Patient/family teaching  - Consider wound care consult   8/16/2023 0012 by Ashwin Amor  Outcome: Progressing  8/16/2023 0011 by Ashwin Amor  Outcome: Progressing     Problem: Nutrition/Hydration-ADULT  Goal: Nutrient/Hydration intake appropriate for improving, restoring or maintaining nutritional needs  Description: Monitor and assess patient's nutrition/hydration status for malnutrition. Collaborate with interdisciplinary team and initiate plan and interventions as ordered. Monitor patient's weight and dietary intake as ordered or per policy. Utilize nutrition screening tool and intervene as necessary. Determine patient's food preferences and provide high-protein, high-caloric foods as appropriate.      INTERVENTIONS:  - Monitor oral intake, urinary output, labs, and treatment plans  - Assess nutrition and hydration status and recommend course of action  - Evaluate amount of meals eaten  - Assist patient with eating if necessary   - Allow adequate time for meals  - Recommend/ encourage appropriate diets, oral nutritional supplements, and vitamin/mineral supplements  - Order, calculate, and assess calorie counts as needed  - Recommend, monitor, and adjust tube feedings and TPN/PPN based on assessed needs  - Assess need for intravenous fluids  - Provide specific nutrition/hydration education as appropriate  - Include patient/family/caregiver in decisions related to nutrition  8/16/2023 0012 by Ashwin Amor  Outcome: Progressing  8/16/2023 0011 by Ashwin Amor  Outcome: Progressing     Problem: SAFETY,RESTRAINT: NV/NON-SELF DESTRUCTIVE BEHAVIOR  Goal: Remains free of harm/injury (restraint for non violent/non self-detsructive behavior)  Description: INTERVENTIONS:  - Instruct patient/family regarding restraint use   - Assess and monitor physiologic and psychological status   - Provide interventions and comfort measures to meet assessed patient needs   - Identify and implement measures to help patient regain control  - Assess readiness for release of restraint   8/16/2023 0012 by [employer]  Outcome: Progressing  8/16/2023 0011 by [employer]  Outcome: Progressing  Goal: Returns to optimal restraint-free functioning  Description: INTERVENTIONS:  - Assess the patient's behavior and symptoms that indicate continued need for restraint  - Identify and implement measures to help patient regain control  - Assess readiness for release of restraint   8/16/2023 0012 by [employer]  Outcome: Progressing  8/16/2023 0011 by [employer]  Outcome: Progressing     Problem: PAIN - ADULT  Goal: Verbalizes/displays adequate comfort level or baseline comfort level  Description: Interventions:  - Encourage patient to monitor pain and request assistance  - Assess pain using appropriate pain scale  - Administer analgesics based on type and severity of pain and evaluate response  - Implement non-pharmacological measures as appropriate and evaluate response  - Consider cultural and social influences on pain and pain management  - Notify physician/advanced practitioner if interventions unsuccessful or patient reports new pain  Outcome: Progressing     Problem: INFECTION - ADULT  Goal: Absence or prevention of progression during hospitalization  Description: INTERVENTIONS:  - Assess and monitor for signs and symptoms of infection  - Monitor lab/diagnostic results  - Monitor all insertion sites, i.e. indwelling lines, tubes, and drains  - Monitor endotracheal if appropriate and nasal secretions for changes in amount and color  - [place] appropriate cooling/warming therapies per order  - Administer medications as ordered  - Instruct and encourage patient and family to use good hand hygiene technique  - Identify and instruct in appropriate isolation precautions for identified infection/condition  Outcome: Progressing  Goal: Absence of fever/infection during neutropenic period  Description: INTERVENTIONS:  - Monitor WBC    Outcome: Progressing     Problem: SAFETY ADULT  Goal: Maintain or return to baseline ADL function  Description: INTERVENTIONS:  -  Assess patient's ability to carry out ADLs; assess patient's baseline for ADL function and identify physical deficits which impact ability to perform ADLs (bathing, care of mouth/teeth, toileting, grooming, dressing, etc.)  - Assess/evaluate cause of self-care deficits   - Assess range of motion  - Assess patient's mobility; develop plan if impaired  - Assess patient's need for assistive devices and provide as appropriate  - Encourage maximum independence but intervene and supervise when necessary  - Involve family in performance of ADLs  - Assess for home care needs following discharge   - Consider OT consult to assist with ADL evaluation and planning for discharge  - Provide patient education as appropriate  8/16/2023 0012 by Kimberlyn Gallegos  Outcome: Progressing  8/16/2023 0011 by Kimberlyn Gallegos  Outcome: Progressing  Goal: Maintains/Returns to pre admission functional level  Description: INTERVENTIONS:  - Perform BMAT or MOVE assessment daily.   - Set and communicate daily mobility goal to care team and patient/family/caregiver. - Collaborate with rehabilitation services on mobility goals if consulted  - Perform Range of Motion 4 times a day. - Reposition patient every 2 hours.   - Dangle patient 0 times a day  - Stand patient 0 times a day  - Ambulate patient 0 times a day  - Out of bed to chair 0 times a day   - Out of bed for meals 0 times a day  - Out of bed for toileting  - Record patient progress and toleration of activity level   8/16/2023 0012 by rCow Mina  Outcome: Progressing  8/16/2023 0011 by Crow Mina  Outcome: Progressing  Goal: Patient will remain free of falls  Description: INTERVENTIONS:  - Educate patient/family on patient safety including physical limitations  - Instruct patient to call for assistance with activity   - Consult OT/PT to assist with strengthening/mobility   - Keep Call bell within reach  - Keep bed low and locked with side rails adjusted as appropriate  - Keep care items and personal belongings within reach  - Initiate and maintain comfort rounds  - Make Fall Risk Sign visible to staff  - Offer Toileting every 2 Hours, in advance of need  - Initiate/Maintain bed alarm  - Obtain necessary fall risk management equipment: bed alarm  - Apply yellow socks and bracelet for high fall risk patients  - Consider moving patient to room near nurses station  Outcome: Progressing     Problem: DISCHARGE PLANNING  Goal: Discharge to home or other facility with appropriate resources  Description: INTERVENTIONS:  - Identify barriers to discharge w/patient and caregiver  - Arrange for needed discharge resources and transportation as appropriate  - Identify discharge learning needs (meds, wound care, etc.)  - Arrange for interpretive services to assist at discharge as needed  - Refer to Case Management Department for coordinating discharge planning if the patient needs post-hospital services based on physician/advanced practitioner order or complex needs related to functional status, cognitive ability, or social support system  Outcome: Progressing     Problem: Knowledge Deficit  Goal: Patient/family/caregiver demonstrates understanding of disease process, treatment plan, medications, and discharge instructions  Description: Complete learning assessment and assess knowledge base.   Interventions:  - Provide teaching at level of understanding  - Provide teaching via preferred learning methods  Outcome: Progressing

## 2023-08-16 NOTE — PLAN OF CARE
Problem: MOBILITY - ADULT  Goal: Maintain or return to baseline ADL function  Description: INTERVENTIONS:  -  Assess patient's ability to carry out ADLs; assess patient's baseline for ADL function and identify physical deficits which impact ability to perform ADLs (bathing, care of mouth/teeth, toileting, grooming, dressing, etc.)  - Assess/evaluate cause of self-care deficits   - Assess range of motion  - Assess patient's mobility; develop plan if impaired  - Assess patient's need for assistive devices and provide as appropriate  - Encourage maximum independence but intervene and supervise when necessary  - Involve family in performance of ADLs  - Assess for home care needs following discharge   - Consider OT consult to assist with ADL evaluation and planning for discharge  - Provide patient education as appropriate  Outcome: Progressing  Goal: Maintains/Returns to pre admission functional level  Description: INTERVENTIONS:  - Perform BMAT or MOVE assessment daily.   - Set and communicate daily mobility goal to care team and patient/family/caregiver.    - Collaborate with rehabilitation services on mobility goals if consulted  - Out of bed for toileting  - Record patient progress and toleration of activity level   Outcome: Progressing     Problem: PAIN - ADULT  Goal: Verbalizes/displays adequate comfort level or baseline comfort level  Description: Interventions:  - Encourage patient to monitor pain and request assistance  - Assess pain using appropriate pain scale  - Administer analgesics based on type and severity of pain and evaluate response  - Implement non-pharmacological measures as appropriate and evaluate response  - Consider cultural and social influences on pain and pain management  - Notify physician/advanced practitioner if interventions unsuccessful or patient reports new pain  Outcome: Progressing     Problem: INFECTION - ADULT  Goal: Absence or prevention of progression during hospitalization  Description: INTERVENTIONS:  - Assess and monitor for signs and symptoms of infection  - Monitor lab/diagnostic results  - Monitor all insertion sites, i.e. indwelling lines, tubes, and drains  - Monitor endotracheal if appropriate and nasal secretions for changes in amount and color  - [place] appropriate cooling/warming therapies per order  - Administer medications as ordered  - Instruct and encourage patient and family to use good hand hygiene technique  - Identify and instruct in appropriate isolation precautions for identified infection/condition  Outcome: Progressing  Goal: Absence of fever/infection during neutropenic period  Description: INTERVENTIONS:  - Monitor WBC    Outcome: Progressing     Problem: SAFETY ADULT  Goal: Maintain or return to baseline ADL function  Description: INTERVENTIONS:  -  Assess patient's ability to carry out ADLs; assess patient's baseline for ADL function and identify physical deficits which impact ability to perform ADLs (bathing, care of mouth/teeth, toileting, grooming, dressing, etc.)  - Assess/evaluate cause of self-care deficits   - Assess range of motion  - Assess patient's mobility; develop plan if impaired  - Assess patient's need for assistive devices and provide as appropriate  - Encourage maximum independence but intervene and supervise when necessary  - Involve family in performance of ADLs  - Assess for home care needs following discharge   - Consider OT consult to assist with ADL evaluation and planning for discharge  - Provide patient education as appropriate  Outcome: Progressing  Goal: Maintains/Returns to pre admission functional level  Description: INTERVENTIONS:  - Perform BMAT or MOVE assessment daily.   - Set and communicate daily mobility goal to care team and patient/family/caregiver.    - Collaborate with rehabilitation services on mobility goals if consulted  - Out of bed for toileting  - Record patient progress and toleration of activity level   Outcome: Progressing  Goal: Patient will remain free of falls  Description: INTERVENTIONS:  - Educate patient/family on patient safety including physical limitations  - Instruct patient to call for assistance with activity   - Consult OT/PT to assist with strengthening/mobility   - Keep Call bell within reach  - Keep bed low and locked with side rails adjusted as appropriate  - Keep care items and personal belongings within reach  - Initiate and maintain comfort rounds  - Make Fall Risk Sign visible to staff  - Offer Toileting every 2 Hours, in advance of need  - Initiate/Maintain -alarm  - Obtain necessary fall risk management equipment: -  - Apply yellow socks and bracelet for high fall risk patients  - Consider moving patient to room near nurses station  Outcome: Progressing     Problem: SAFETY,RESTRAINT: NV/NON-SELF DESTRUCTIVE BEHAVIOR  Goal: Remains free of harm/injury (restraint for non violent/non self-detsructive behavior)  Description: INTERVENTIONS:  - Instruct patient/family regarding restraint use   - Assess and monitor physiologic and psychological status   - Provide interventions and comfort measures to meet assessed patient needs   - Identify and implement measures to help patient regain control  - Assess readiness for release of restraint   Outcome: Completed  Goal: Returns to optimal restraint-free functioning  Description: INTERVENTIONS:  - Assess the patient's behavior and symptoms that indicate continued need for restraint  - Identify and implement measures to help patient regain control  - Assess readiness for release of restraint   Outcome: Completed 10/2020

## 2023-08-16 NOTE — PHYSICAL THERAPY NOTE
Physical Therapy Cancellation Note           08/16/23 0829   PT Last Visit   PT Visit Date 08/16/23   Note Type   Note type Evaluation; Cancelled Session   Cancel Reasons Patient to operating room       PT orders received and chart reviewed. Pt is in OR for "DEBRIDEMENT WOUND AND DRESSING CHANGE ([address]), possible wound vac; APPLICATION VAC DRESSING." Will hold initial PT evaluation at this time. Will continue to follow pt on caseload and re attempt PT evaluation as appropriate.      Joana Abrams PT, DPT

## 2023-08-16 NOTE — CONSULTS
Consultation - Infectious Disease   Jaida Palmer 79 y.o. male MRN: [id number]  Unit/Bed#: OR [place] Encounter: [id number]      IMPRESSION & RECOMMENDATIONS:     1. Streptococcus Bacteremia, with ICD present:  -1 of 2 sets from 8/13 are growing Streptococcus. Suspect this will be group C streptococcus as this bacteria also grew from his operative wound cultures, the clear source of bacteremia being his necrotizing perennial infection. Group C streptococcus can cause endovascular infection and we need to consider the possibility of endocarditis and/or ICD infection. 2D echo was noted to be a poor quality study.  -continue IV Unasyn  -follow up official blood culture species identification and sensitivities  -Follow-up repeat blood cultures for clearance  -As we do not know how long patient may have been bacteremic for, and knowing group see strep may cause endocarditis, I would recommend a MARCELLA to better assess for cardiac or valve vegetation and ICD infection; transthoracic echo was a poor quality study and furthermore the sensitivity of transthoracic echo for cardiac device infections is low    2. Jamarcus's Gangrene:  -Status post perineal debridement/washout on 8/13, repeat washout on 8/14 and partial closure with wound VAC application on 5/74. Overall appears there has been good source control of this infection patient is clinically improving as he remains afebrile with resolved leukocytosis and no longer on vasopressors. Likely this was the source of his bacteremia as above.  -Continue Unasyn; this will cover Strep and anaerobes which are likely to have been involved as well  -treatment duration pending evaluation for endocarditis/ICD infection as above  -ongoing surgical management and wound care    3. Acute Hypoxic Respiratory Failure  -Patient had been intubated for the OR. He has now been successfully extubated.   Chest x-rays have shown linear atelectasis at the left lung base but no consolidation to suggest pneumonia. Currently on 6 L nasal cannula. -Monitor O2 needs  -Wean oxygen as able    4. T2DM   -Risk factor for invasive infections including necrotizing soft tissue infection. A1c was 12.3 in January of this year.  -Tight glycemic control     I have discussed the above management plan in detail with the primary service    I have performed an extensive review of the medical records in Epic including review of the notes, radiographs, and laboratory results     HISTORY OF PRESENT ILLNESS:  Reason for Consult: Strep bacteremioa    HPI: Vanessa Franco is a 79y.o. year old male with with coronary artery disease status post CABG, heart failure with reduced ejection fraction, history of ventricular tachycardia status post ICD, type 2 diabetes who first presented to [address] [place] [address] emergency room on 8/13. He had noted an enlarging lump in his perennial region for about 3 days as well as vomiting and chills. He was found to have indurated tenderness in the perennial region with skin erythema. He was also found to be in DKA on labs. Surgery was consulted given concerns for Jamarcus's gangrene/necrotizing soft tissue infection. CT scan was consistent with necrotizing infection with stranding and gas in the right peroneum. He was taken to the OR on 8/13 where there was a necrotizing soft tissue infection confirmed that spanned from the right groin and inguinal region into the right scrotum, right peroneum, right perirectal area and right buttocks. Dissection was carried down to the level of the muscle and muscle fascia. The OR he was transferred by air to [address] [place] [address] for higher level of care. On arrival he was not requiring vasopressors but remained intubated. He was taken back to the OR on 8/14 for second debridement. Was noted that the tissue appeared healthy and viable and there was minimal debridement of necrotic fat needed to the level of the muscle fascia.   The wound measured 24 x 7.5 x 8 cm and was down to bone. Patient had been on linezolid and Zosyn and has since been transitioned to monotherapy with Zosyn. Wound cultures from the OR on 8/13 are growing group C streptococcus. Blood cultures from 8/13 are growing Streptococcus in 1 of 2 sets, not yet formally identified. Today patient's antibiotics were further narrowed to Unasyn. IR plans for partial closure and wound VAC application today then possible return to the OR tomorrow. Today patient denies fever, chills, nausea, emesis. He has some lower abdominal pain but is stable. REVIEW OF SYSTEMS:  A complete review of systems is negative other than that noted in the HPI. PAST MEDICAL HISTORY:  Past Medical History:   Diagnosis Date   • Rectal hemorrhage     last assessed: May 29, 2015     Past Surgical History:   Procedure Laterality Date   • CORONARY ARTERY BYPASS GRAFT     • DECUBITUS ULCER EXCISION Right 8/14/2023    Procedure: Washout and debridement of perineal wound;  Surgeon: Mine Pemberton MD;  [facility];  Service: General   • ELBOW SURGERY Right     Bursitis - last assessed: Sept 15, 2016   • WOUND DEBRIDEMENT N/A 8/13/2023    Procedure: EXCISIONAL DEBRIDEMENT Perineum;  Surgeon: Perfecto Ceja DO;  Location: [facility];  Service: General       FAMILY HISTORY:  Non-contributory    SOCIAL HISTORY:  Social History   Social History     Substance and Sexual Activity   Alcohol Use No    Comment: being a social drinker noted in "allscripts"      Social History     Substance and Sexual Activity   Drug Use No     Social History     Tobacco Use   Smoking Status Never   Smokeless Tobacco Never       ALLERGIES:  Allergies   Allergen Reactions   • Ace Inhibitors Cough       MEDICATIONS:  All current active medications have been reviewed.     PHYSICAL EXAM:  Temp:  [97.8 °F (36.6 °C)-98.8 °F (37.1 °C)] 97.8 °F (36.6 °C)  HR:  [70-84] 72  Resp:  [10-26] 24  BP: (101-137)/(52-72) 137/72  SpO2:  [22 %-97 %] 95 %  Temp (24hrs), Av.1 °F (36.7 °C), Min:97.8 °F (36.6 °C), Max:98.8 °F (37.1 °C)  Current: Temperature: 97.8 °F (36.6 °C)    Intake/Output Summary (Last 24 hours) at 2023 1047  Last data filed at 2023 1019  Gross per 24 hour   Intake 3457.92 ml   Output 1380 ml   Net 2077.92 ml       General Appearance:  Appearing well, nontoxic, and in no distress   Head:  Normocephalic, without obvious abnormality, atraumatic   Eyes:  Conjunctiva pink and sclera anicteric, both eyes   Nose: Nares normal, mucosa normal, no drainage   Throat: Oropharynx moist without lesions   Neck: Supple   Back:   Symmetric   Lungs:   Clear to auscultation bilaterally, respirations unlabored   Chest Wall:  No tenderness or deformity   Heart:  RRR; no murmur   Abdomen:   Soft, non-tender   Extremities: No cyanosis, clubbing   Skin: No rashes or lesions. Lymph nodes: Cervical, supraclavicular nodes normal   Neurologic: Alert and oriented        LABS, IMAGING, & OTHER STUDIES:  Lab Results:  I have personally reviewed pertinent labs.   Results from last 7 days   Lab Units 23  0423 08/15/23  0427 23  0509   WBC Thousand/uL 8.44 9.60 12.69*   HEMOGLOBIN g/dL 9.1* 9.6* 11.9*   PLATELETS Thousands/uL 226 233 285     Results from last 7 days   Lab Units 23  0455 08/15/23  0427 23  0144 23  2309 23  2035 23  1958   SODIUM mmol/L 142 142 135 135 132*  --    POTASSIUM mmol/L 3.2* 2.9* 3.7 4.0 3.6  --    CHLORIDE mmol/L 109* 109* 104 105 101  --    CO2 mmol/L 29 27 22 22 18*  --    CO2, I-STAT mmol/L  --   --   --   --   --  19*   BUN mg/dL 8 8 14 15 15  --    CREATININE mg/dL 0.50* 0.55* 0.61 0.73 0.57*  --    EGFR ml/min/1.73sq m 112 107 103 95 106  --    GLUCOSE, ISTAT mg/dl  --   --   --   --   --  323*   CALCIUM mg/dL 8.3 8.0* 8.2* 8.3 8.1*  --    AST U/L  --  9  --  9 8*  --    ALT U/L  --  10*  --  11* 7  --    ALK PHOS U/L  --  66  --  78 67  --      Results from last 7 days   Lab Units 08/13/23  2308 08/13/23  1930 08/13/23  1528 08/13/23  1519   BLOOD CULTURE  No Growth at 48 hrs. No Growth at 48 hrs.  --   --  No Growth at 48 hrs. GRAM STAIN RESULT   --  No polys seen*  1+ Gram negative rods*  1+ Gram positive cocci in pairs* Gram positive cocci in pairs and chains*  --    WOUND CULTURE   --  3+ Growth of Beta Hemolytic Streptococcus Group C*  --   --      Results from last 7 days   Lab Units 08/13/23  1519   PROCALCITONIN ng/ml 0.91*       Imaging Studies:   I have personally reviewed pertinent imaging study reports and images in PACS. Other Studies:   I have personally reviewed pertinent reports.       Ivon Kan MD  [facility]

## 2023-08-16 NOTE — ANESTHESIA POSTPROCEDURE EVALUATION
Post-Op Assessment Note             Reason for prolonged intubation > 24 hours:  Pt remained intubated due to critical illness and transfer to [facility]      No notable events documented.     BP      Temp      Pulse     Resp      SpO2

## 2023-08-16 NOTE — OCCUPATIONAL THERAPY NOTE
OT CANCEL NOTE    OT orders received. Chart reviewed. Pt is in OR for "DEBRIDEMENT WOUND AND DRESSING CHANGE ([address] [facility]), possible wound vac; APPLICATION VAC DRESSING." Will hold initial OT evaluation. Will continue to follow pt on caseload and see pt when medically stable and as clinically appropriate, post-op. 08/16/23 0816   OT Last Visit   OT Visit Date 08/16/23   Note Type   Note type Evaluation; Cancelled Session   Cancel Reasons Patient to operating room         Nirmala Hernandez MS, OTR/L

## 2023-08-16 NOTE — PROGRESS NOTES
Progress Note - General Surgery   Justice Davey 79 y.o. male MRN: [id number]  Unit/Bed#: OR [facility] Encounter: [id number]    Assessment:  70yo M with strep bactremia, DKA (improved), and sheyla gangrene now s/p  -8/13 washout/debridement of perineum  -8/14 washout/debridement    Afebrile  Levo-3    WBC 9.6 (12.69)  Hgb 9.6 (11.9)  Cr 0.55    Wound cx: GNR, GPC  Blood cx: Strep      Plan:  -Plan for partial closure and wound vac placement today  -can have diet after OR  -Plan for OR tomorrow for possible partial closure and vac placement  -Continue IV abx,recommend ID consult  -clear for tx to med surg    Subjective/Objective   Subjective:   No acute events overnight. Doing well. Tolerated diet. Has no pain    Objective:     Blood pressure 115/52, pulse 70, temperature 98.8 °F (37.1 °C), temperature source Oral, resp. rate 15, height 5' 9" (1.753 m), weight 92.4 kg (203 lb 11.3 oz), SpO2 95 %. ,Body mass index is 30.08 kg/m².       Intake/Output Summary (Last 24 hours) at 8/16/2023 1024  Last data filed at 8/16/2023 0600  Gross per 24 hour   Intake 3007.92 ml   Output 1380 ml   Net 1627.92 ml       Invasive Devices     Central Venous Catheter Line  Duration           CVC Central Lines 08/13/23 Triple 2 days          Peripheral Intravenous Line  Duration           Peripheral IV Right;Ventral (anterior) Antecubital -- days    Peripheral IV 08/13/23 Left;Proximal;Ventral (anterior) Forearm 2 days          Arterial Line  Duration           Arterial Line 08/14/23 Right Radial 1 day          Drain  Duration           Urethral Catheter Latex;Straight-tip 16 Fr. 2 days                Physical Exam:  General: Ill-appearing, NAD  HENT: NCAT MMM  Neck: supple, no JVD  CV: nl rate  Lungs: mechanical breath sounds  ABD: Soft, nontender, nondistended  : landon present, dressing is c/d/i  Extrem: no cce  Neuro: AAOx3

## 2023-08-16 NOTE — NURSING NOTE
Arrived to assess pt for PICC placement. Consent has not been obtained. Pt remains on insulin drip and requiring three sites of access. Will plan to place PICC tomorrow, 8/17. Primary RN, Jonatan hebert. Also communicated with ordering provider, Dr Skyla Perry. Request that VAT being updated via TT when consent has been obtained.

## 2023-08-16 NOTE — OP NOTE
OPERATIVE REPORT  PATIENT NAME: Justus Vora    :  1955  MRN: [id number]  Pt Location: [facility] OR ROOM 03    SURGERY DATE: 2023    Surgeon(s) and Role:     Diego Jolly, DO - Primary     * Santosh Vazquez, DO - Assisting     * Rosario Blue MD    Preop Diagnosis:  Necrotizing soft tissue infection [M79.89]    Post-Op Diagnosis Codes:     * Necrotizing soft tissue infection [M79.89]    Procedure(s):  DEBRIDEMENT WOUND. [address] OUT. PARTIAL CLOSURE  APPLICATION VAC DRESSING    Specimen(s):  * No specimens in log *    Estimated Blood Loss:   Minimal    Drains:  Urethral Catheter Latex;Straight-tip 16 Fr. (Active)   Reasons to continue Urinary Catheter  Accurate I&O assessment in critically ill patients (48 hr. max) 23 0400   Goal for Removal Voiding trial when ambulation improves 23 0400   Site Assessment Clean;Skin intact 23 0400   Booth Care Done 08/15/23 2100   Collection Container Standard drainage bag 23 0400   Securement Method Securing device (Describe) 23 0400   Output (mL) 15 mL 23 0600   Number of days: 3       Anesthesia Type:   General    Operative Indications:  Necrotizing soft tissue infection [M79.89]      Operative Findings:  Original wound measuring 20 x 12 x 6 cm without need for further excisional debridement  Wound edges with fibrinous necrosis  Posterior 17 cm aspect of the wound was closed  4 centimeters medial aspect of the scrotal wound was closed  Final wound dimensions, 5 x 3.5 x 3 cm with 17 cm of tunneling  Wound VAC with 2 black sponges with good seal set to 125 mmHg continuous    Complications:   None    Procedure and Technique:  The patient was identified both verbally and by wrist band in the preoperative holding area. The patient was brought to the operating room and identified verbally and via wristband. The patient was transferred to the operating room table and positioned supine.   General anesthesia with LMA was induced successfully by our anesthesia colleagues. The patient was placed in lithotomy position. 1 Betadine soaked Kerlix was removed from the wound. The patient was prepped with Betadine and draped in the usual sterile fashion. The patient received Zosyn earlier this morning preoperatively. A time out was performed confirming the patient, procedure, and site. All parties were in agreement. All aspects of the wound were inspected. Fibrinous necrosis was observed on the lateral aspects of the wound. This was removed with a Betadine scrub brush. Next, the entire wound was irrigated with 1 L of normal saline from the Pulsavac. Following this, all aspects of the wound were again observed and found to be clean with minimal bleeding. Attention was then turned to the posterior aspect of the wound. The posterior 17 cm of the wound was closed with vertical mattress stitches utilizing 2-0 nylon sutures. Next, the medial 4 cm aspect of the scrotal wound was closed with vertical mattress stitches utilizing 2-0 nylon sutures. The final dimensions of the wound measured 5 x 3.5 x 3 cm with 17 cm of tunneling. A black wound VAC sponge was placed into the wound and an additional black sponge was utilized to bridge to the infraumbilical area Xeroform was placed over the closed incision. Wound VAC tape was then applied over the entire area. A wound VAC label was placed in accordance with guidelines. The wound VAC was set to 125 continuous and held a good seal. The patient tolerated procedure well. All sponge and instrument counts were correct. The patient was then allowed to awaken, LMA removed, and transferred to the PACU having tolerated the procedure well in good condition. All instrument, needles, and sponge counts were correct at the end of the case. Radiofrequency detection was negative.   Dr. Jolly was present for the entire operation    Patient Disposition:  PACU         SIGNATURE: Gabriela Puente MD  DATE: August 16, 2023  TIME: 10:24 AM

## 2023-08-17 LAB
ANION GAP SERPL CALCULATED.3IONS-SCNC: 4 MMOL/L
BACTERIA BLD CULT: ABNORMAL
BACTERIA SPEC ANAEROBE CULT: ABNORMAL
BUN SERPL-MCNC: 7 MG/DL (ref 5–25)
CA-I BLD-SCNC: 1.1 MMOL/L (ref 1.12–1.32)
CALCIUM SERPL-MCNC: 8.2 MG/DL (ref 8.3–10.1)
CHLORIDE SERPL-SCNC: 110 MMOL/L (ref 96–108)
CO2 SERPL-SCNC: 29 MMOL/L (ref 21–32)
CREAT SERPL-MCNC: 0.42 MG/DL (ref 0.6–1.3)
ERYTHROCYTE [DISTWIDTH] IN BLOOD BY AUTOMATED COUNT: 13.8 % (ref 11.6–15.1)
GFR SERPL CREATININE-BSD FRML MDRD: 120 ML/MIN/1.73SQ M
GLUCOSE SERPL-MCNC: 103 MG/DL (ref 65–140)
GLUCOSE SERPL-MCNC: 109 MG/DL (ref 65–140)
GLUCOSE SERPL-MCNC: 115 MG/DL (ref 65–140)
GLUCOSE SERPL-MCNC: 128 MG/DL (ref 65–140)
GLUCOSE SERPL-MCNC: 200 MG/DL (ref 65–140)
GLUCOSE SERPL-MCNC: 232 MG/DL (ref 65–140)
GLUCOSE SERPL-MCNC: 324 MG/DL (ref 65–140)
GLUCOSE SERPL-MCNC: 348 MG/DL (ref 65–140)
GLUCOSE SERPL-MCNC: 86 MG/DL (ref 65–140)
GLUCOSE SERPL-MCNC: 90 MG/DL (ref 65–140)
GRAM STN SPEC: ABNORMAL
HCT VFR BLD AUTO: 28.3 % (ref 36.5–49.3)
HGB BLD-MCNC: 9 G/DL (ref 12–17)
MAGNESIUM SERPL-MCNC: 1.6 MG/DL (ref 1.6–2.6)
MCH RBC QN AUTO: 30.2 PG (ref 26.8–34.3)
MCHC RBC AUTO-ENTMCNC: 31.8 G/DL (ref 31.4–37.4)
MCV RBC AUTO: 95 FL (ref 82–98)
PHOSPHATE SERPL-MCNC: 2.2 MG/DL (ref 2.3–4.1)
PLATELET # BLD AUTO: 227 THOUSANDS/UL (ref 149–390)
PMV BLD AUTO: 9.4 FL (ref 8.9–12.7)
POTASSIUM SERPL-SCNC: 3.8 MMOL/L (ref 3.5–5.3)
RBC # BLD AUTO: 2.98 MILLION/UL (ref 3.88–5.62)
SODIUM SERPL-SCNC: 143 MMOL/L (ref 135–147)
STREPTOCOCCUS DNA BLD POS NAA+NON-PROBE: DETECTED
WBC # BLD AUTO: 9.28 THOUSAND/UL (ref 4.31–10.16)

## 2023-08-17 PROCEDURE — 99232 SBSQ HOSP IP/OBS MODERATE 35: CPT | Performed by: SURGERY

## 2023-08-17 PROCEDURE — 83735 ASSAY OF MAGNESIUM: CPT | Performed by: SURGERY

## 2023-08-17 PROCEDURE — 80048 BASIC METABOLIC PNL TOTAL CA: CPT | Performed by: SURGERY

## 2023-08-17 PROCEDURE — 85027 COMPLETE CBC AUTOMATED: CPT | Performed by: SURGERY

## 2023-08-17 PROCEDURE — 82330 ASSAY OF CALCIUM: CPT | Performed by: SURGERY

## 2023-08-17 PROCEDURE — 02HV33Z INSERTION OF INFUSION DEVICE INTO SUPERIOR VENA CAVA, PERCUTANEOUS APPROACH: ICD-10-PCS | Performed by: SURGERY

## 2023-08-17 PROCEDURE — 82948 REAGENT STRIP/BLOOD GLUCOSE: CPT

## 2023-08-17 PROCEDURE — C1751 CATH, INF, PER/CENT/MIDLINE: HCPCS

## 2023-08-17 PROCEDURE — 99233 SBSQ HOSP IP/OBS HIGH 50: CPT | Performed by: STUDENT IN AN ORGANIZED HEALTH CARE EDUCATION/TRAINING PROGRAM

## 2023-08-17 PROCEDURE — 99232 SBSQ HOSP IP/OBS MODERATE 35: CPT | Performed by: INTERNAL MEDICINE

## 2023-08-17 PROCEDURE — 97163 PT EVAL HIGH COMPLEX 45 MIN: CPT

## 2023-08-17 PROCEDURE — 84100 ASSAY OF PHOSPHORUS: CPT | Performed by: SURGERY

## 2023-08-17 PROCEDURE — 36569 INSJ PICC 5 YR+ W/O IMAGING: CPT

## 2023-08-17 PROCEDURE — 97167 OT EVAL HIGH COMPLEX 60 MIN: CPT

## 2023-08-17 RX ORDER — MAGNESIUM SULFATE HEPTAHYDRATE 40 MG/ML
2 INJECTION, SOLUTION INTRAVENOUS ONCE
Status: COMPLETED | OUTPATIENT
Start: 2023-08-17 | End: 2023-08-17

## 2023-08-17 RX ORDER — CALCIUM GLUCONATE 20 MG/ML
2 INJECTION, SOLUTION INTRAVENOUS ONCE
Status: COMPLETED | OUTPATIENT
Start: 2023-08-17 | End: 2023-08-18

## 2023-08-17 RX ADMIN — ACETAMINOPHEN 975 MG: 325 TABLET, FILM COATED ORAL at 14:37

## 2023-08-17 RX ADMIN — ACETAMINOPHEN 975 MG: 325 TABLET, FILM COATED ORAL at 22:24

## 2023-08-17 RX ADMIN — HYDROMORPHONE HYDROCHLORIDE 0.5 MG: 1 INJECTION, SOLUTION INTRAMUSCULAR; INTRAVENOUS; SUBCUTANEOUS at 19:37

## 2023-08-17 RX ADMIN — INSULIN LISPRO 8 UNITS: 100 INJECTION, SOLUTION INTRAVENOUS; SUBCUTANEOUS at 19:45

## 2023-08-17 RX ADMIN — POTASSIUM & SODIUM PHOSPHATES POWDER PACK 280-160-250 MG 1 PACKET: 280-160-250 PACK at 07:29

## 2023-08-17 RX ADMIN — EZETIMIBE 10 MG: 10 TABLET ORAL at 08:10

## 2023-08-17 RX ADMIN — CARVEDILOL 12.5 MG: 12.5 TABLET, FILM COATED ORAL at 17:34

## 2023-08-17 RX ADMIN — INSULIN LISPRO 4 UNITS: 100 INJECTION, SOLUTION INTRAVENOUS; SUBCUTANEOUS at 11:10

## 2023-08-17 RX ADMIN — SOTALOL HYDROCHLORIDE 80 MG: 80 TABLET ORAL at 22:24

## 2023-08-17 RX ADMIN — OXYCODONE HYDROCHLORIDE 10 MG: 10 TABLET ORAL at 17:34

## 2023-08-17 RX ADMIN — ASPIRIN 81 MG: 81 TABLET, COATED ORAL at 08:10

## 2023-08-17 RX ADMIN — INSULIN GLARGINE 60 UNITS: 100 INJECTION, SOLUTION SUBCUTANEOUS at 08:08

## 2023-08-17 RX ADMIN — ESCITALOPRAM OXALATE 20 MG: 20 TABLET, FILM COATED ORAL at 08:10

## 2023-08-17 RX ADMIN — MAGNESIUM SULFATE HEPTAHYDRATE 2 G: 2 INJECTION, SOLUTION INTRAVENOUS at 07:28

## 2023-08-17 RX ADMIN — FENOFIBRATE 145 MG: 145 TABLET ORAL at 08:08

## 2023-08-17 RX ADMIN — CARVEDILOL 12.5 MG: 12.5 TABLET, FILM COATED ORAL at 08:10

## 2023-08-17 RX ADMIN — SODIUM CHLORIDE 3 G: 9 INJECTION, SOLUTION INTRAVENOUS at 11:39

## 2023-08-17 RX ADMIN — OXYCODONE HYDROCHLORIDE 10 MG: 10 TABLET ORAL at 22:26

## 2023-08-17 RX ADMIN — PANTOPRAZOLE SODIUM 20 MG: 20 TABLET, DELAYED RELEASE ORAL at 05:52

## 2023-08-17 RX ADMIN — MELATONIN 6 MG: at 22:24

## 2023-08-17 RX ADMIN — SODIUM CHLORIDE 3 G: 9 INJECTION, SOLUTION INTRAVENOUS at 05:52

## 2023-08-17 RX ADMIN — INSULIN LISPRO 8 UNITS: 100 INJECTION, SOLUTION INTRAVENOUS; SUBCUTANEOUS at 22:25

## 2023-08-17 RX ADMIN — ATORVASTATIN CALCIUM 40 MG: 40 TABLET, FILM COATED ORAL at 16:40

## 2023-08-17 RX ADMIN — ENOXAPARIN SODIUM 40 MG: 40 INJECTION SUBCUTANEOUS at 08:09

## 2023-08-17 RX ADMIN — CALCIUM GLUCONATE 2 G: 20 INJECTION, SOLUTION INTRAVENOUS at 07:28

## 2023-08-17 RX ADMIN — SOTALOL HYDROCHLORIDE 80 MG: 80 TABLET ORAL at 08:10

## 2023-08-17 RX ADMIN — SODIUM CHLORIDE 3 G: 9 INJECTION, SOLUTION INTRAVENOUS at 00:08

## 2023-08-17 RX ADMIN — ACETAMINOPHEN 975 MG: 325 TABLET, FILM COATED ORAL at 05:52

## 2023-08-17 RX ADMIN — POTASSIUM PHOSPHATE, MONOBASIC AND POTASSIUM PHOSPHATE, DIBASIC 12 MMOL: 224; 236 INJECTION, SOLUTION, CONCENTRATE INTRAVENOUS at 08:10

## 2023-08-17 RX ADMIN — SODIUM CHLORIDE 3 G: 9 INJECTION, SOLUTION INTRAVENOUS at 19:37

## 2023-08-17 NOTE — OCCUPATIONAL THERAPY NOTE
Occupational Therapy Evaluation     Patient Name: Andrei Stinson  SLPUD'I Date: 8/17/2023  Problem List  Principal Problem:    Necrotizing soft tissue infection  Active Problems:    Uncontrolled type 2 diabetes mellitus with hyperglycemia ([address])    Ischemic cardiomyopathy    Cholelithiasis    Past Medical History  Past Medical History:   Diagnosis Date    Rectal hemorrhage     last assessed: May 29, 2015     Past Surgical History  Past Surgical History:   Procedure Laterality Date    CORONARY ARTERY BYPASS GRAFT      DECUBITUS ULCER EXCISION Right 8/14/2023    Procedure: Washout and debridement of perineal wound;  Surgeon: Mark Bernard MD;  [facility];  Service: General    ELBOW SURGERY Right     Bursitis - last assessed: Sept 15, 2016    [facility] DRESSING APPLICATION N/A 5/26/4140    Procedure: APPLICATION VAC DRESSING;  Surgeon: Clair Jolly DO;  [facility];  Service: General    WOUND DEBRIDEMENT N/A 8/13/2023    Procedure: EXCISIONAL DEBRIDEMENT Perineum;  Surgeon: Mallorie Martínez DO;  [facility];  Service: General    WOUND DEBRIDEMENT N/A 8/16/2023    Procedure: DEBRIDEMENT WOUND, [address] [facility] [address] OUT, PARTIAL CLOSURE;  Surgeon: Clair Jolly DO;  [facility];  Service: General           08/17/23 1100   OT Last Visit   OT Visit Date 08/17/23   Note Type   Note type Evaluation   Pain Assessment   Pain Assessment Tool 0-10   Pain Score No Pain   Restrictions/Precautions   Weight Bearing Precautions Per Order No   Other Precautions Multiple lines; Fall Risk   Home Living   Type of [address] [place] [address] One level;Stairs to enter with rails  (2 cassia)   [facility] [place]   (denies)   Home Equipment   (denies)   Prior Function   Level of [place] Independent with ADLs; Independent with functional mobility; Independent with IADLS   Lives With Spouse   Receives Help From Family   IADLs Independent with driving; Independent with meal prep; Independent with medication management   Falls in the last 6 months 0   Vocational Retired   Lifestyle   Autonomy Pt I w/ ADLS, IADLs, and mobility at baseline. +   Reciprocal Relationships Supportive spouse- retired wound care nurse   Service to Others retired   Intrinsic Gratification enjoys fishing and swimming   ADL   Where Assessed Chair   Eating Assistance 7  [address] 5  [address] [facility] 5  Supervision/Setup    [address] 3  Moderate Assistance   [address] [facility] 5  Supervision/Setup   [address] 3  Moderate Assistance   [address]  4  Minimal Assistance   Toileting Deficit Use of bedpan/urinal setup   Functional Assistance 3  Moderate Assistance   Bed Mobility   Supine to Sit 3  Moderate assistance   Additional items Assist x 1   Transfers   Sit to Stand 3  Moderate assistance   Additional items Assist x 1   Stand to Sit 3  Moderate assistance   Additional items Assist x 1   Balance   Static Sitting Fair   Dynamic Sitting Fair -   Static Standing Poor   Activity Tolerance   Activity Tolerance Patient limited by fatigue   Medical Staff Made Aware PT, Ken[place] 2* medical complexity   Nurse Made Aware Cleared with RN   RUE Assessment   RUE Assessment WFL   LUE Assessment   LUE Assessment WFL   Hand Function   Gross Motor Coordination Functional   Fine Motor Coordination Functional   Cognition   Overall Cognitive Status WFL   Arousal/Participation Alert; Responsive; Cooperative   Attention Within functional limits   Orientation Level Oriented X4   Memory Within functional limits   Following Commands Follows all commands and directions without difficulty   Comments Pt pleasant and agreeable to session   Assessment   Limitation Decreased ADL status; Decreased endurance;Decreased self-care trans;Decreased high-level ADLs   Prognosis Good   Assessment Pt is a 79 y.o. male who was admitted to [facility] on 8/13/2023 with Necrotizing soft tissue infection.  Pt is s/p multiple washout/debridement of perineum w/ partial closure and vac placement. Patient  has a past medical history of Rectal hemorrhage. At baseline pt was I w/ ADLs, IADLs, and mobility. No DME use. Pt lives with spouse in [address] [place] [address]. Currently pt requires min-mod A for overall ADLS and mod Ax1 for functional mobility/transfers w/ HHA. Pt currently presents with impairments in the following categories -difficulty performing ADLS and difficulty performing IADLS  activity tolerance, endurance, standing balance/tolerance and sitting balance/tolerance. These impairments, as well as pt's fatigue, pain and risk for falls  limit pt's ability to safely engage in all baseline areas of occupation, including bathing, dressing, toileting, functional mobility/transfers and leisure activities. The patient's raw score on the -PAC Daily Activity Inpatient Short Form is 17. A raw score of less than 19 suggests the patient may benefit from discharge to post-acute rehabilitation services. Please refer to the recommendation of the Occupational Therapist for safe discharge planning. From OT standpoint, anticipate pt will progress to be mic to return home w/ support of his spouse upon D/C. OT will continue to follow to address the below stated goals. Goals   Patient Goals to get back to outdoor activities   LTG Time Frame 10-14   Long Term Goal see below goals   Plan   Treatment Interventions ADL retraining;Functional transfer training; Endurance training;Patient/family training;Equipment evaluation/education; Compensatory technique education; Energy conservation; Activityengagement   Goal Expiration Date 08/31/23   OT Frequency 2-3x/wk   Recommendation   OT Discharge Recommendation Home with home health rehabilitation   Additional Comments  pending progress   AM-PAC Daily Activity Inpatient   Lower Body Dressing 2   Bathing 2   Toileting 3   Upper Body Dressing 3   Grooming 3   Eating 4   Daily Activity Raw Score 17   Daily Activity Standardized Score (Calc for Raw Score >=11) 37.26     LTG (10-14 DAYS)  Pt will improve activity tolerance to G for min 30-45 min txment sessions to increase participation in ADLs    Pt will complete ADLs/self care w/ mod I using adaptive device and DME as needed    Pt will complete toileting w/ mod I w/ G hygiene/thoroughness using DME as needed    Pt will improve functional transfers on/off all surfaces to mod I using DME as needed w/ G balance/safety. Pt will improve functional mobility during ADL/IADL/leisure tasks to mod I using DME as needed w/ G balance/safety. Pt will improve standing balance to G for 8-10 minutes of purposeful activity w/ G endurance.     Pt will demonstrate G carryover of pt/caregiver education and training as appropriate w/ mod I w/o cues w/ good tolerance    Pt will demonstrate 100% carryover of energy conservation techniques w/ mod I t/o functional I/ADL/leisure tasks w/o cues s/p skilled education           Mell Azar, OTR/L

## 2023-08-17 NOTE — PROGRESS NOTES
Vac dressing dislodged when patient attempted to go to the bathroom. Contacted Dr. Clinton Milner to assess. En route to bedside.

## 2023-08-17 NOTE — UTILIZATION REVIEW
Initial Clinical Review    Admission: Date/Time/Statement:   Admission Orders (From admission, onward)     Ordered        08/13/23 2241  Inpatient Admission  Once                      Orders Placed This Encounter   Procedures   • Inpatient Admission     Standing Status:   Standing     Number of Occurrences:   1     Order Specific Question:   Level of Care     Answer:   Critical Care [15]     Order Specific Question:   Estimated length of stay     Answer:   More than 2 Midnights     Order Specific Question:   Certification     Answer:   I certify that inpatient services are medically necessary for this patient for a duration of greater than two midnights. See H&P and MD Progress Notes for additional information about the patient's course of treatment. ED Arrival Information     Patient not seen in ED                       Initial Presentation: 79 y.o. male, Transfer from [address] [place] [address] for groin pain. Pt notes that he has an enlarging "cyst" in the area between his scrotum and anus for the past 3 days. Also endorses n/v for 4 days in duration. Per pt, unable to tolerated his meds due to illness and has not taken his home insulin regimen. Bld sugar 426 on admit. At [facility], CT abd/pelvis consistent with necrotizing soft tissue infection of the perineum. 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 PMH for HTN, HLD, DM, CAD, Vtach w/ ICD, CHF. Admit Inpatient level of care for Necrotizing soft tissue infection of the right perineum. Pt intubated/ sedated for OR tomorrow 8/14.  Wean Propofol if hypotensive. Continue Iv antibiotics. Plan for OR tomorrow 8/14 for 2nd look. Insulin drip. IVFs. Pain/ antiemetics prn.     8/13 At [facility];  OR - S/p EXCISIONAL DEBRIDEMENT Perineum  Operative Findings: There was a necrotizing soft tissue infection that spanned from the right groin/inguinal region into the right scrotum, right perineum, right perirectal area and right buttock  21 cm incision was made to open up this area  Dissection was carried down to the level of the muscle and muscle fascia       Date: 8/14   Day 2:    Progress notes; POD #1. : Necrotizing fasciitis s/p wide debridement, Septic shock secondary to necrotizing fasciitis, Acute hypoxic respiratory failure secondary to sepsis necrotizing fasciitis and Chronic systolic heart failure with Ejection fraction < 20. Pt critically ill. Continue Intubated/ Vent. Continue Iv antibiotics and IVFs. F/u Cbc/BMP. Plan for OR in 12 to 24 hours for further debridement - will take sooner if worsening sepsis. NPO/ NGT - Start tube feeds after 2nd take back. Analgesia and sedation. Plan to remain intubated for takeback and washout tomorrow with general surgery. Per General Surgery; Tmax 100.1. Gtt: levo 5, propofol, insulin. Wean pressors as tolerated. Continue Iv antibiotics. Plan OR for Washout today. 8/14 OR - S/p Right - Washout and debridement of perineal wound   Operative Findings:  Tissue healthy and viable. Minimal debridement of necrotic fat to the level of the muscle fascia. Wound measured to 24cm x 7.5cm x 8cm down to the bone.        Date: 8/15    Day 3: Has surpassed a 2nd midnight with active treatments and services, which include S/p OR x2, post op care, multiple drips/ Intubated/ Vent      Vitals   Temperature Pulse Respirations Blood Pressure SpO2   08/13/23 2300 08/13/23 2300 08/13/23 2300 08/14/23 0000 08/13/23 2300   99.6 °F (37.6 °C) 90 19 94/57 98 %      Temp Source Heart Rate Source Patient Position - Orthostatic VS BP Location FiO2 (%) 08/13/23 2300 08/14/23 2000 08/15/23 0000 08/15/23 0000 08/14/23 0315   Oral Monitor Lying Left arm 40      Pain Score       08/13/23 2325       Med Not Given for Pain - for MAR use only          Wt Readings from Last 1 Encounters:   08/16/23 92.4 kg (203 lb 11.3 oz)     Additional Vital Signs:   Row Name 08/14/23 1617 08/14/23 1600 08/14/23 1519 08/14/23 1500 08/14/23 1300   Vital Signs   Temp 97.9 °F (36.6 °C)  -JS 97.9 °F (36.6 °C)  -JS 97.9 °F (36.6 °C)  -JS -- 99.3 °F (37.4 °C)  -JS   Pulse 80  -JS 84  -JS 82  -JS 74  -JS 96  -JS   Resp 18  -JS 18  -JS 17  -JS 5   Abnormal   -JS 18  -JS   BP -- -- 100/50  -JS -- 114/57  -JS   MAP (mmHg) -- -- 72  -JS -- 79  -JS   Oxygen Therapy   SpO2 97 %  -JS 97 %  -JS 96 %  -JS 99 %  -JS 98 %  -JS   Row Name 08/14/23 1205 08/14/23 1200 08/14/23 1100 08/14/23 1059 08/14/23 1000     SpO2 97 %  -NH 97 %  -NH 98 %  -NH 97 %  -NH 97 %  -NH   Row Name 08/14/23 0000 08/13/23 2353 08/13/23 2325 08/13/23 2300    Vital Signs   Temp 96.1 °F (35.6 °C)   Abnormal   -NH -- -- 99.6 °F (37.6 °C)  -NH    Temp src -- -- -- Oral  -NH    Pulse 90  -NH -- -- 90  -NH    Resp 20  -NH -- -- 19  -NH    BP 94/57  -NH -- -- --    MAP (mmHg) 67  -NH -- -- --          Pertinent Labs/Diagnostic Test Results:      X-ray chest 1 view   Final Result by Ruth Quinteros MD (08/14 0849)      Tubes and lines as described above. Workstation performed: [id number]           8/13  CT abd/pelvis w contrast - 1. 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 No abscess. 2.  Cholelithiasis. PCXR - Tubes and lines as described without pneumothorax or other complication.   No acute cardiopulmonary disease.         Results from last 7 days   Lab Units 08/14/23  0509 08/13/23  2309 08/13/23  1915 08/13/23  1519   WBC Thousand/uL 12.69* 14.21*   < > 14.27*   HEMOGLOBIN g/dL 11.9* 12.2   < > 14.0   I STAT HEMOGLOBIN   --   --    < >  --    HEMATOCRIT % 34.9* 37.2   < > 42.1   HEMATOCRIT, ISTAT   --   --    < >  --    PLATELETS Thousands/uL 285 282   < > 316   NEUTROS ABS Thousands/µL 9.08* 10.19*  --  11.50*   BANDS PCT %  --   --   --   --     < > = values in this interval not displayed. Results from last 7 days   Lab Units 08/14/23 0144 08/13/23 2309 08/13/23 2035 08/13/23 1958 08/13/23  1915   SODIUM mmol/L 135   < > 132*  --   --    POTASSIUM mmol/L 3.7   < > 3.6  --   --    CHLORIDE mmol/L 104   < > 101  --   --    CO2 mmol/L 22   < > 18*  --   --    CO2, I-STAT mmol/L  --   --   --  19* 22   ANION GAP mmol/L 9   < > 13  --   --    BUN mg/dL 14   < > 15  --   --    CREATININE mg/dL 0.61   < > 0.57*  --   --    EGFR ml/min/1.73sq m 103   < > 106  --   --    CALCIUM mg/dL 8.2*   < > 8.1*  --   --    CALCIUM, IONIZED mmol/L  --   --   --   --   --    CALCIUM, IONIZED, ISTAT mmol/L  --   --   --  1.17 1.19   MAGNESIUM mg/dL 1.6  --  1.5*  --   --    PHOSPHORUS mg/dL  --   --  3.0  --   --     < > = values in this interval not displayed.      Results from last 7 days   Lab Units 08/13/23 2309 08/13/23 2035 08/13/23  1519   AST U/L 9 8* 10*   ALT U/L 11* 7 9   ALK PHOS U/L 78 67 86   TOTAL PROTEIN g/dL 6.6 6.3* 8.2   ALBUMIN g/dL 2.3* 3.2* 4.0   TOTAL BILIRUBIN mg/dL 0.51 0.64 0.94       Results from last 7 days   Lab Units 08/14/23 0144 08/13/23 2309 08/13/23 2035 08/13/23  1519   GLUCOSE RANDOM mg/dL 224* 274* 291* 422*             BETA-HYDROXYBUTYRATE   Date Value Ref Range Status   08/13/2023 1.3 (H) <0.6 mmol/L Final   08/13/2023 4.9 (H) <0.6 mmol/L Final      Results from last 7 days   Lab Units 08/14/23  1110 08/13/23  2310 08/13/23 2038   PH ART  7.493* 7.282* 7.293*   PCO2 ART mm Hg 31.7* 47.1* 36.1   PO2 ART mm Hg 108.6 135.6* 142.9*   HCO3 ART mmol/L 23.8 21.7* 17.1*   BASE EXC ART mmol/L 1.1 -5.0 -8.6   O2 CONTENT ART mL/dL 16.8 17.7 17.5   O2 HGB, ARTERIAL % 97.2* 97.3* 98.0*   ABG SOURCE  Line, Arterial Line, Arterial Line, Arterial Results from last 7 days   Lab Units 08/13/23  2329 08/13/23  1554   PH MICKI  7.312 7.438*   PCO2 MICKI mm Hg 41.8* 26.7*   PO2 MICKI mm Hg 43.3 72.8*   HCO3 MICKI mmol/L 20.7* 17.6*   BASE EXC MICKI mmol/L -5.3 -4.9   O2 CONTENT MICKI ml/dL 12.8 18.2   O2 HGB, VENOUS % 72.9 92.4*     Results from last 7 days   Lab Units 08/13/23 1958 08/13/23  1915   I STAT BASE EXC mmol/L -10* -7*   I STAT O2 SAT % 100*  --    ISTAT PH ART  7.201* 7.263*   I STAT ART PCO2 mm HG 45.4* 45.2*   I STAT ART PO2 mm .0* >400.0*   I STAT ART HCO3 mmol/L 17.8* 20.4*         Results from last 7 days   Lab Units 08/13/23  1744 08/13/23  1519   HS TNI 0HR ng/L  --  10   HS TNI 2HR ng/L 12  --    HSTNI D2 ng/L 2  --          Results from last 7 days   Lab Units 08/13/23  2312 08/13/23  1528   PROTIME seconds 16.1* 15.9*   INR  1.27* 1.29*   PTT seconds  --  29         Results from last 7 days   Lab Units 08/13/23  1519   PROCALCITONIN ng/ml 0.91*     Results from last 7 days   Lab Units 08/14/23  0144 08/13/23  2309 08/13/23  1819 08/13/23  1519   LACTIC ACID mmol/L 1.6 2.5* 1.4 2.8*             Results from last 7 days   Lab Units 08/13/23  2035   BNP pg/mL 366*       Results from last 7 days   Lab Units 08/13/23  2312 08/13/23  1711   CLARITY UA  Clear Clear   COLOR UA  Light Yellow Light Yellow   SPEC GRAV UA  1.040* 1.010   PH UA  6.0 5.0   GLUCOSE UA mg/dl >=1000 (1%)* >=1000 (1%)*   KETONES UA mg/dl 20 (1+)* 40 (2+)*   BLOOD UA  Small* Trace-Intact*   PROTEIN UA mg/dl Trace* Trace*   NITRITE UA  Negative Negative   BILIRUBIN UA  Negative Negative   UROBILINOGEN UA E.U./dl  --  0.2   UROBILINOGEN UA (BE) mg/dl <2.0  --    LEUKOCYTES UA  Elevated glucose may cause decreased leukocyte values. See urine microscopic for [facility] result* Elevated glucose may cause decreased leukocyte values.  See urine microscopic for UWBC result*   WBC UA /hpf 2-4* None Seen   RBC UA /hpf 10-20* 0-1   BACTERIA UA /hpf Occasional None Seen   EPITHELIAL CELLS WET PREP /hpf Occasional None Seen   MUCUS THREADS  Occasional*  --          Results from last 7 days   Lab Units 08/13/23  2308 08/13/23  1930 08/13/23  1528 08/13/23  1519   BLOOD CULTURE  No Growth at 72 hrs. No Growth at 72 hrs.  --  Streptococcus anginosus* No Growth at 72 hrs.    GRAM STAIN RESULT   --  No polys seen*  1+ Gram negative rods*  1+ Gram positive cocci in pairs* Gram positive cocci in pairs and chains*  --    WOUND CULTURE   --  3+ Growth of Beta Hemolytic Streptococcus Group C*  Growth in Broth culture only Escherichia coli*  --   --        Past Medical History:   Diagnosis Date   • Rectal hemorrhage     last assessed: May 29, 2015     Present on Admission:  • Uncontrolled type 2 diabetes mellitus with hyperglycemia (HCC)  • Cholelithiasis  • Ischemic cardiomyopathy  • Necrotizing soft tissue infection      Admitting Diagnosis: Jamarcus gangrene  Age/Sex: 79 y.o. male     Admission Orders:  Scheduled Medications:  ampicillin-sulbactam, 3 g, Intravenous, Q6H  carvedilol, 12.5 mg, Oral, BID  enoxaparin, 40 mg, Subcutaneous, Q24H JODY   insulin glargine, 60 Units, Subcutaneous, QAM  insulin lispro, 2-12 Units, Subcutaneous, 4x Daily (AC & HS)      Continuous IV Infusions:  insulin regular (HumuLIN R,NovoLIN R) 1 Units/mL in sodium chloride 0.9 % 100 mL infusion  Rate: 0.3-21 mL/hr Dose: 0.3-21 Units/hr  Freq: Titrated Route: IV  Last Dose: Stopped (08/17/23 0800)  Start: 08/13/23 2245    HYDROmorphone (DILAUDID) 50 mg in sodium chloride 0.9% 50mL drip  Rate: 0.5 mL/hr Dose: 0.5 mg/hr  Freq: Continuous Route: IV  Last Dose: Stopped (08/15/23 0939)  Start: 08/14/23 1115    lactated ringers infusion  Rate: 125 mL/hr Dose: 125 mL/hr  Freq: Continuous Route: IV  Last Dose: Stopped (08/15/23 0939)  Start: 08/13/23 2300     norepinephrine (LEVOPHED) 4 mg (STANDARD CONCENTRATION) IV in sodium chloride 0.9% 250 mL  Rate: 3.8-112.5 mL/hr Dose: 1-30 mcg/min  Freq: Titrated Route: IV  Last Dose: Stopped (08/15/23 0957)  Start: 08/14/23 0000    propofol (DIPRIVAN) 1000 mg in 100 mL infusion (premix)  Rate: 2.7-27.3 mL/hr Dose: 5-50 mcg/kg/min  Weight Dosing Info: 91 kg  Freq: Titrated Route: IV  Last Dose: Stopped (08/15/23 0908)  Start: 08/13/23 2245          PRN Meds:  HYDROmorphone, 0.5 mg, Intravenous, Q4H PRN  LORazepam, 1 mg, Oral, Daily PRN  ondansetron, 4 mg, Intravenous, Q6H PRN  oxyCODONE, 10 mg, Oral, Q4H PRN  oxyCODONE, 5 mg, Oral, Q4H PRN        IP CONSULT TO CASE MANAGEMENT  IP CONSULT TO INFECTIOUS DISEASES  IP CONSULT TO PICC TEAM    Network Utilization Review Department  ATTENTION: Please call with any questions or concerns to [phone] and carefully listen to the prompts so that you are directed to the right person. All voicemails are confidential.  Nyla Luci all requests for admission clinical reviews, approved or denied determinations and any other requests to dedicated fax number below belonging to the campus where the patient is receiving treatment.  List of dedicated fax numbers for the Facilities:  [place] DENIALS (Administrative/Medical Necessity) [phone]   [facility] [phone]   [address] [place] [address] Drive [phone]   [facility] [address] [facility] [phone]   [address] [facility] [id number] [place] [address] [phone]   [address] [facility] [address] [address] [address] [phone]

## 2023-08-17 NOTE — PROGRESS NOTES
[address] [place] [address]  Progress Note: Critical Care  Name: Fannie Ponce 79 y.o. male I MRN: [id number]  Unit/Bed#: MICU 15 I Date of Admission: 2023   Date of Service: 2023 I Hospital Day: 4    Assessment/Plan   Neuro:   • Diagnosis: Acute pain  ? Plan: Tylenol, PRN oxycodone, dilaudid  ? MMEq: 116.5 (165)  • Nausea  ? PRN zofran  • Anxiety  ? PRN ativan  • Depression:  ? Continue home lexapro  • Insomnia:  ?  melatonin QHS  • CAM-ICU, delirium precautions, regulate sleep-wake cycle.        CV:   • Diagnosis: HTN, HLD, CAD s/p CABG, V-tach with ICD, CHF  ? TTE : LVEF 40%. Grade I diastolic. RV systolic mildly reduced. Hypokinetic: basal inferoseptal, basal inferior, basal inferolateral, mid inferior and mid inferolateral  ? ICD in place  ? Continue home aspirin, statin, ezetimibe, fenofibrate  ? Continue carvedolol and sotalol. Continue holding aldactone  ? Goal MAP>65      Pulm:  • Diagnosis: Respiratory insufficiency (improving)  ? Extubated 8/15  ? Wean supplemental oxygen for goal SpO2>92. Encourage IS. ? Currently on 6L NC  ? CXR : Linear atalectasis vs scarring at L Lung base     GI:   • Diagnosis: Bowel regimen:   ? Plan: Continue senna  • Diagnosis: GERD:  ? Protonix        :   No active issues. Trend Cr, monitor urine output. Continue landon  Cr: 0.50 from 0.55  UOP: 1465 (15 cc/hr for past two hours)  Consider lasix 20mg IV     F/E/N:   • Fluids: Plyte @ 50 cc/hr  • Electrolytes: Replace for goal K>4, PO4>3, Mg>2. 3.2, 2.2, 2.1  • Nutrition: NPO for OR, resume diet post-operatively        Heme/Onc:   • Diagnosis: DVT ppx  ? Plan: LVX  • Diagnosis: Acute blood loss anemia  ? Hgb 9.1 from 9.6.  ? Transfuse for Hgb <7 or if symptomatic. ? Daily CBC        Endo:   • Diagnosis: DM2  ? Plan: continue insulin gtt. Lantus 20 units QHS  ? B-162     ID:   • Diagnosis: NSTI of right perineum and inner thigh, sheyla's gangrene  ?  : OR washout and debridement of perineal wound R  ? Plan for OR on 8/16 for washout, possible closure, possible vac per general surgery  ? 8/13 Bcx: NGTD  ? 8/13 would Cx: beta hemolytic strep group C, GNR, GPC  ? Continue zosyn   ? Trend WBC and fever curve  ? WBC: 9.28 from 8.44     MSK/Skin:   • Diagnosis: Right perineum wound, physical deconditioning  ? Plan: PT/OT. Encourage OOB, ambulation. Frequent turning, repositioning.        Disposition: Transfer out of ICU    ICU Core Measures     A: Assess, Prevent, and Manage Pain · Has pain been assessed? Yes  · Need for changes to pain regimen? No   B: Both SAT/SAT  · N/A   C: Choice of Sedation · RASS Goal: 0 Alert and Calm or N/A patient not on sedation  · Need for changes to sedation or analgesia regimen? NA   D: Delirium · CAM-ICU: Negative   E: Early Mobility  · Plan for early mobility? Yes   F: Family Engagement · Plan for family engagement today? No       Antibiotic Review: Patient on appropriate coverage based on culture data. Review of Invasive Devices:      Central access plan: Continue CVC for medications    Prophylaxis:  VTE VTE covered by:  enoxaparin, Subcutaneous, 40 mg at 08/16/23 1220       Stress Ulcer  covered bypantoprazole (PROTONIX) EC tablet 20 mg [[id number]]          Subjective   HPI/24hr events: No acute events overnight. S/p wound debridement yesterday afternoon. Review of Systems   Constitutional: Negative. HENT: Negative. Eyes: Negative. Respiratory: Negative. Cardiovascular: Negative. Gastrointestinal: Negative. Negative for nausea and vomiting. Genitourinary: Negative. Musculoskeletal: Negative. Neurological: Negative. Psychiatric/Behavioral: Negative.               Objective                            Vitals I/O      Most Recent Min/Max in 24hrs   Temp 98.3 °F (36.8 °C) Temp  Min: 97.8 °F (36.6 °C)  Max: 98.3 °F (36.8 °C)   Pulse 78 Pulse  Min: 70  Max: 86   Resp 18 Resp  Min: 9  Max: 30   /52 BP  Min: 94/59  Max: 138/66   O2 Sat 97 % SpO2  Min: 91 %  Max: 97 %      Intake/Output Summary (Last 24 hours) at 8/17/2023 0701  Last data filed at 8/17/2023 0601  Gross per 24 hour   Intake 1604.18 ml   Output 850 ml   Net 754.18 ml         Diet NPO; Sips with meds     Invasive Monitoring Physical exam    Physical Exam  Skin:     General: Skin is warm and dry. HENT:      Head: Normocephalic and atraumatic. Mouth/Throat:      Mouth: Mucous membranes are moist.   Cardiovascular:      Rate and Rhythm: Normal rate and regular rhythm. Pulses: Normal pulses. Musculoskeletal:         General: Normal range of motion. Abdominal:      Palpations: Abdomen is soft. Tenderness: There is no abdominal tenderness. Constitutional:       Appearance: He is well-developed. Pulmonary:      Effort: Pulmonary effort is normal.      Breath sounds: Normal breath sounds. Neurological:      General: No focal deficit present. Mental Status: He is alert and oriented to person, place and time.    Genitourinary/Anorectal:  Booth         Diagnostic Studies      Imaging:       Medications:  Scheduled PRN   acetaminophen, 975 mg, Q8H JODY  ampicillin-sulbactam, 3 g, Q6H  aspirin, 81 mg, Daily  atorvastatin, 40 mg, Daily With Dinner  calcium gluconate, 2 g, Once  carvedilol, 12.5 mg, BID  enoxaparin, 40 mg, Q24H JODY  escitalopram, 20 mg, Daily  ezetimibe, 10 mg, Daily  fenofibrate, 145 mg, Daily  insulin glargine, 60 Units, QAM  insulin lispro, 2-12 Units, 4x Daily (AC & HS)  magnesium sulfate, 2 g, Once  melatonin, 6 mg, HS  pantoprazole, 20 mg, Early Morning  potassium phosphate, 12 mmol, Once  potassium-sodium phosphates, 1 packet, BID With Meals  senna, 1 tablet, HS  sotalol, 80 mg, BID      HYDROmorphone, 0.5 mg, Q4H PRN  LORazepam, 1 mg, Daily PRN  ondansetron, 4 mg, Q6H PRN  oxyCODONE, 10 mg, Q4H PRN  oxyCODONE, 5 mg, Q4H PRN       Continuous          Labs:    CBC    Recent Labs     08/16/23  0423 08/17/23  0442   WBC 8.44 9.28 HGB 9.1* 9.0*   HCT 27.8* 28.3*    227     BMP    Recent Labs     08/16/23  1824 08/17/23  0442   SODIUM 140 143   K 4.0 3.8   * 110*   CO2 29 29   AGAP 2 4   BUN 8 7   CREATININE 0.61 0.42*   CALCIUM 8.0* 8.2*       Coags    No recent results     Additional Electrolytes  Recent Labs     08/16/23  0422 08/16/23  0455 08/17/23  0442   MG  --  2.1 1.6   PHOS  --  2.2* 2.2*   CAIONIZED 1.09*  --  1.10*          Blood Gas    No recent results  No recent results LFTs  No recent results    Infectious  No recent results  Glucose  Recent Labs     08/16/23  0455 08/16/23  1824 08/17/23  0442   GLUC 97 127 921 Deep KNAPP MD

## 2023-08-17 NOTE — PROGRESS NOTES
Progress Note - General Surgery   [place] Pellet 79 y.o. male MRN: [id number]  [facility] [id number]    Assessment:  70yo M with strep bactremia, sheyla gangrene now s/p  -8/13 washout/debridement of perineum  -8/14 washout/debridement  -8/16 washout/debridement, partial closure, vac placement    Afebrile, VSS    WBC 9.28 (8.4)  Hgb 9 (9.1)  Cr 0.42    Wound cx: GNR, GPC  Blood cx: Strep      Plan:  -cont regular diet  -planning for vac change tomorrow, MWF  -Continue IV abx,recommend ID consult  -needs MARCELLA for strep bacteremia  -need PT/OT eval  -dispo planning, appreciate CM input  -downgraded to med surg    Subjective/Objective   Subjective:   No acute events overnight. Tolerating diet. Had a large explosive BM overnight, but wound vac remained intact without any issues. Denies having pain. Objective:     Blood pressure 104/52, pulse 78, temperature 98.3 °F (36.8 °C), resp. rate 18, height 5' 9" (1.753 m), weight 92.4 kg (203 lb 11.3 oz), SpO2 97 %. ,Body mass index is 30.08 kg/m². Intake/Output Summary (Last 24 hours) at 8/17/2023 0804  Last data filed at 8/17/2023 0601  Gross per 24 hour   Intake 1604.18 ml   Output 875 ml   Net 729.18 ml       Invasive Devices     Central Venous Catheter Line  Duration           CVC Central Lines 08/13/23 Triple 3 days          Peripheral Intravenous Line  Duration           Peripheral IV Right;Ventral (anterior) Antecubital -- days                Physical Exam:  General: No acute distress  Neuro: alert and oriented  HEENT: moist mucous membranes  CV: Well perfused, regular rate and rhythm  Lungs: Normal work of breathing, no increased respiratory effort  Abdomen: Soft, non-tender, non-distended.    Extremities: No edema, clubbing or cyanosis  : right groin wound vac in place with good seal, wound with incision intact  Skin: Warm, dry

## 2023-08-17 NOTE — PHYSICAL THERAPY NOTE
Physical Therapy Evaluation     Patient's Name: Whitney Townsend    Admitting Diagnosis  Jamarcus gangrene    Problem List  Patient Active Problem List   Diagnosis    Benign essential hypertension    Uncontrolled type 2 diabetes mellitus with hyperglycemia ([address])    Coronary atherosclerosis    Esophageal reflux    Generalized anxiety disorder    Ischemic cardiomyopathy    Mixed hyperlipidemia    ICD (implantable cardioverter-defibrillator) in place    Chronic systolic CHF (congestive heart failure) ([address])    Ventricular tachycardia ([address])    Necrotizing soft tissue infection    Cholelithiasis       Past Medical History  Past Medical History:   Diagnosis Date    Rectal hemorrhage     last assessed: May 29, 2015       Past Surgical History  Past Surgical History:   Procedure Laterality Date    CORONARY ARTERY BYPASS GRAFT      DECUBITUS ULCER EXCISION Right 8/14/2023    Procedure: Washout and debridement of perineal wound;  Surgeon: Guille Pineda MD;  Location: [facility];  Service: General    ELBOW SURGERY Right     Bursitis - last assessed: Sept 15, 2016    [facility] DRESSING APPLICATION N/A [id number]/[id number]    Procedure: APPLICATION VAC DRESSING;  Surgeon: Uri Jolly DO;  Location: [facility];  Service: General    WOUND DEBRIDEMENT N/A 8/13/2023    Procedure: EXCISIONAL DEBRIDEMENT Perineum;  Surgeon: Joenathan Shone, DO;  Location: [facility];  Service: General    WOUND DEBRIDEMENT N/A 8/16/2023    Procedure: DEBRIDEMENT WOUND, [address] [place], PARTIAL CLOSURE;  Surgeon: Uri Jolly DO;  Location: [facility];  Service: General        08/17/23 1055   PT Last Visit   PT Visit Date 08/17/23   Note Type   Note type Evaluation   Pain Assessment   Pain Assessment Tool 0-10   Pain Score No Pain   Hospital Pain Intervention(s) Repositioned   Home Living   Type of [address] [facility]  One level;Stairs to enter with rails  (2 JOLANTA)   Prior Function   Level of [place] Independent with functional mobility   Lives With Spouse [facility]   Vocational Retired   General   Family/Caregiver Present No   Cognition   Orientation Level Oriented X4   Subjective   Subjective Pt willing and agreeable to PT session   RLE Assessment   RLE Assessment WFL   LLE Assessment   LLE Assessment WFL   Coordination   Movements are Fluid and Coordinated 0   Coordination and Movement Description slow and guarded with pain   Bed Mobility   Supine to Sit 3  Moderate assistance   Additional items Assist x 1   Sit to Supine Unable to assess   Additional Comments Pt left resting in chair, call bell in reach, chair alarm active   Transfers   Sit to Stand 3  Moderate assistance   Additional items Assist x 1; Increased time required   Stand to Sit 3  Moderate assistance   Additional items Assist x 1; Increased time required   Ambulation/Elevation   Gait pattern Short stride; Shuffling;Decreased foot clearance; Excessively slow   Gait Assistance 3  Moderate assist   Additional items Assist x 1   Assistive Device Other (Comment)  (HHA)   Distance 4'   Balance   Static Sitting Fair   Dynamic Sitting Fair -   Static Standing Poor   Ambulatory Poor   Endurance Deficit   Endurance Deficit Yes   Endurance Deficit Description limited by pain and fatigue compared to baseline mobility   Activity Tolerance   Activity Tolerance Patient limited by fatigue;Patient limited by pain   Medical Staff Made Aware OT for D/C planning   Nurse Made Aware yes, nsg gave clearance to work with pt   Assessment   Prognosis Good   Problem List Decreased strength;Decreased range of motion;Decreased endurance; Impaired balance;Decreased mobility; Decreased coordination;Decreased cognition; Impaired judgement;Decreased safety awareness;Pain;Decreased skin integrity   Assessment Pt is 79 y.o. male seen for PT evaluation s/p admit to [facility] on [facility]/13/2023 w/ Necrotizing soft tissue infection. PT consulted to assess pt's functional mobility and d/c needs.  Order placed for PT eval and tx, w/ up w/ A order. Comorbidities affecting pt's physical performance at time of assessment include:  has a past medical history of Rectal hemorrhage. PTA, pt was ambulates unrestricted distances and all terrain, has 2 JOLANTA and retired. Personal factors affecting pt at time of IE include: stairs to enter home, inability to navigate community distances, unable to perform caregiver support/tasks, unable to perform physical activity, inability to perform IADLs and inability to perform ADLs. Please find objective findings from PT assessment regarding body systems outlined above with impairments and limitations including weakness, impaired balance, decreased endurance, gait deviations, pain, decreased activity tolerance, decreased functional mobility tolerance, decreased safety awareness and decreased skin integrity. Pt required increased time and A to complete bed mobility with decreased strength and increased pain with wound. Tolerated sitting with minimal reports of increased pain. Tolerated standing for several minutes to urinate. Decreased B/L foot clearance during gait. Noted wide DARREN during gait with wound VAC placement. The following objective measures performed on IE also reveal limitations: The patient's AM-PAC Basic Mobility Inpatient Short Form Raw Score is 13, Standardized Score is 33.99. A standardized score less than 42.9 suggests the patient may benefit from discharge to post-acute rehabilitation services although anticipate at this time pt will progress. Please also refer to the recommendation of the Physical Therapist for safe discharge planning. Pt's clinical presentation is currently unstable/unpredictable seen in pt's presentation of critical care monitoring, wound VAC. Pt to benefit from continued PT tx to address deficits as defined above and maximize level of functional independent mobility and consistency.  From PT/mobility standpoint, recommendation at time of d/c would be anticipate pt will progress to achieve goals to return home, if unable to progress will require rehab. Goals   Patient Goals To get back to the pool and outdoor activity   STG Expiration Date 08/29/23   Short Term Goal #1 1. Complete bed mobility and transfers I to decrease need for caregiver in home. 2. Ambulate 300' I to complete household and community mobility without A. 3. Improve dynamic balance to good to decrease need for UE support during ambulation. 4. Be educated & demonstate 12 steps to be able to enter home without A. Plan   Treatment/Interventions OT; Spoke to case management;Spoke to nursing;Gait training;Bed mobility; Patient/family training; Endurance training;LE strengthening/ROM; Functional transfer training; Therapeutic exercise   PT Frequency 2-3x/wk   Recommendation   PT Discharge Recommendation   (anticipate pt will progress to achieve goals to return home with increased A)   Equipment Recommended   (TBD)   AM-PAC Basic Mobility Inpatient   Turning in Flat Bed Without Bedrails 3   Lying on Back to Sitting on Edge of Flat Bed Without Bedrails 3   Moving Bed to Chair 2   Standing Up From Chair Using Arms 2   Walk in Room 2   Climb 3-5 Stairs With Railing 1   Basic Mobility Inpatient Raw Score 13   Basic Mobility Standardized Score 33.99   Highest Level Of Mobility   JH-HLM Goal 4: Move to chair/commode   JH-HLM Achieved 4: Move to chair/commode             Kim Baez, PT

## 2023-08-17 NOTE — PROCEDURES
Insert Complex Venous Access Line    Date/Time: 8/17/2023 1:21 PM    Performed by: Bhargav Lowe RN  Authorized by: Steven May DO    Patient location:  Bedside  Other Assisting Provider: Yes (comment) (Arturo Pham RN)    Consent:     Consent obtained:  Written (Provider obtained)    Consent given by:  Patient    Risks discussed:  Arterial puncture, bleeding, infection, incorrect placement, nerve damage and pneumothorax (Provider and [facility] RN discussed)    Alternatives discussed:  Alternative treatment and no treatment (Provider discussed)  Universal protocol:     Procedure explained and questions answered to patient or proxy's satisfaction: yes      Relevant documents present and verified: yes      Test results available and properly labeled: yes      Radiology Images displayed and confirmed. If images not available, report reviewed: yes      Required blood products, implants, devices, and special equipment available: yes      Site/side marked: yes      Immediately prior to procedure, a time out was called: yes      Patient identity confirmed:  Verbally with patient, arm band, provided demographic data and hospital-assigned identification number  Pre-procedure details:     Hand hygiene: Hand hygiene performed prior to insertion      Sterile barrier technique: All elements of maximal sterile technique followed      Skin preparation:  ChloraPrep    Skin preparation agent: Skin preparation agent completely dried prior to procedure    Indications:     PICC line indications: long term antibiotics    Sedation:     Sedation type: None. Anesthesia (see MAR for exact dosages): Anesthesia method:  Local infiltration    Local anesthetic:  Lidocaine 1% w/o epi (2 mL administered)  Procedure details:     Location:  [place]    Vessel type: vein      Laterality:  Right    Site selection rationale:  Largest, most patent vein of RUE. Avoided LUE d/t ICD and CVC in place on Left side.     Approach: percutaneous technique used      Patient position:  Flat    Procedural supplies:  Double lumen    Catheter size:  5 Fr    Landmarks identified: yes      Ultrasound guidance: yes      Ultrasound image availability:  Not saved    Sterile ultrasound techniques: Sterile gel and sterile probe covers were used      Number of attempts:  1    Successful placement: no (Unable to drop catheter down SVC. )      Total catheter length (cm):  42    Catheter out on skin (cm):  Approx 13    Max flow rate:  999 mL/hr    Arm circumference:  33  Post-procedure details:     Post-procedure:  Dressing applied    Assessment:  Blood return through all ports and free fluid flow    Post-procedure complications: none      Patient tolerance of procedure: Tolerated well, no immediate complications  Comments:      Discussed w/ IR roma RN, Vicki Suarez. Pt will need to go to IR for reposition of catheter. Pt and family updated.

## 2023-08-17 NOTE — PLAN OF CARE
Problem: MOBILITY - ADULT  Goal: Maintain or return to baseline ADL function  Description: INTERVENTIONS:  -  Assess patient's ability to carry out ADLs; assess patient's baseline for ADL function and identify physical deficits which impact ability to perform ADLs (bathing, care of mouth/teeth, toileting, grooming, dressing, etc.)  - Assess/evaluate cause of self-care deficits   - Assess range of motion  - Assess patient's mobility; develop plan if impaired  - Assess patient's need for assistive devices and provide as appropriate  - Encourage maximum independence but intervene and supervise when necessary  - Involve family in performance of ADLs  - Assess for home care needs following discharge   - Consider OT consult to assist with ADL evaluation and planning for discharge  - Provide patient education as appropriate  Outcome: Progressing  Goal: Maintains/Returns to pre admission functional level  Description: INTERVENTIONS:  - Perform BMAT or MOVE assessment daily.   - Set and communicate daily mobility goal to care team and patient/family/caregiver.    - Collaborate with rehabilitation services on mobility goals if consulted  - Out of bed for toileting  - Record patient progress and toleration of activity level   Outcome: Progressing     Problem: Prexisting or High Potential for Compromised Skin Integrity  Goal: Skin integrity is maintained or improved  Description: INTERVENTIONS:  - Identify patients at risk for skin breakdown  - Assess and monitor skin integrity  - Assess and monitor nutrition and hydration status  - Monitor labs   - Assess for incontinence   - Turn and reposition patient  - Assist with mobility/ambulation  - Relieve pressure over bony prominences  - Avoid friction and shearing  - Provide appropriate hygiene as needed including keeping skin clean and dry  - Evaluate need for skin moisturizer/barrier cream  - Collaborate with interdisciplinary team   - Patient/family teaching  - Consider wound care consult   Outcome: Progressing     Problem: Nutrition/Hydration-ADULT  Goal: Nutrient/Hydration intake appropriate for improving, restoring or maintaining nutritional needs  Description: Monitor and assess patient's nutrition/hydration status for malnutrition. Collaborate with interdisciplinary team and initiate plan and interventions as ordered. Monitor patient's weight and dietary intake as ordered or per policy. Utilize nutrition screening tool and intervene as necessary. Determine patient's food preferences and provide high-protein, high-caloric foods as appropriate.      INTERVENTIONS:  - Monitor oral intake, urinary output, labs, and treatment plans  - Assess nutrition and hydration status and recommend course of action  - Evaluate amount of meals eaten  - Assist patient with eating if necessary   - Allow adequate time for meals  - Recommend/ encourage appropriate diets, oral nutritional supplements, and vitamin/mineral supplements  - Order, calculate, and assess calorie counts as needed  - Recommend, monitor, and adjust tube feedings and TPN/PPN based on assessed needs  - Assess need for intravenous fluids  - Provide specific nutrition/hydration education as appropriate  - Include patient/family/caregiver in decisions related to nutrition  Outcome: Progressing     Problem: PAIN - ADULT  Goal: Verbalizes/displays adequate comfort level or baseline comfort level  Description: Interventions:  - Encourage patient to monitor pain and request assistance  - Assess pain using appropriate pain scale  - Administer analgesics based on type and severity of pain and evaluate response  - Implement non-pharmacological measures as appropriate and evaluate response  - Consider cultural and social influences on pain and pain management  - Notify physician/advanced practitioner if interventions unsuccessful or patient reports new pain  Outcome: Progressing     Problem: INFECTION - ADULT  Goal: Absence or prevention of progression during hospitalization  Description: INTERVENTIONS:  - Assess and monitor for signs and symptoms of infection  - Monitor lab/diagnostic results  - Monitor all insertion sites, i.e. indwelling lines, tubes, and drains  - Monitor endotracheal if appropriate and nasal secretions for changes in amount and color  - [place] appropriate cooling/warming therapies per order  - Administer medications as ordered  - Instruct and encourage patient and family to use good hand hygiene technique  - Identify and instruct in appropriate isolation precautions for identified infection/condition  Outcome: Progressing  Goal: Absence of fever/infection during neutropenic period  Description: INTERVENTIONS:  - Monitor WBC    Outcome: Progressing     Problem: SAFETY ADULT  Goal: Maintain or return to baseline ADL function  Description: INTERVENTIONS:  -  Assess patient's ability to carry out ADLs; assess patient's baseline for ADL function and identify physical deficits which impact ability to perform ADLs (bathing, care of mouth/teeth, toileting, grooming, dressing, etc.)  - Assess/evaluate cause of self-care deficits   - Assess range of motion  - Assess patient's mobility; develop plan if impaired  - Assess patient's need for assistive devices and provide as appropriate  - Encourage maximum independence but intervene and supervise when necessary  - Involve family in performance of ADLs  - Assess for home care needs following discharge   - Consider OT consult to assist with ADL evaluation and planning for discharge  - Provide patient education as appropriate  Outcome: Progressing  Goal: Maintains/Returns to pre admission functional level  Description: INTERVENTIONS:  - Perform BMAT or MOVE assessment daily.   - Set and communicate daily mobility goal to care team and patient/family/caregiver.    - Collaborate with rehabilitation services on mobility goals if consulted  - Out of bed for toileting  - Record patient progress and toleration of activity level   Outcome: Progressing  Goal: Patient will remain free of falls  Description: INTERVENTIONS:  - Educate patient/family on patient safety including physical limitations  - Instruct patient to call for assistance with activity   - Consult OT/PT to assist with strengthening/mobility   - Keep Call bell within reach  - Keep bed low and locked with side rails adjusted as appropriate  - Keep care items and personal belongings within reach  - Initiate and maintain comfort rounds  - Make Fall Risk Sign visible to staff  - Offer Toileting every 2 Hours, in advance of need  - Apply yellow socks and bracelet for high fall risk patients  - Consider moving patient to room near nurses station  Outcome: Progressing     Problem: DISCHARGE PLANNING  Goal: Discharge to home or other facility with appropriate resources  Description: INTERVENTIONS:  - Identify barriers to discharge w/patient and caregiver  - Arrange for needed discharge resources and transportation as appropriate  - Identify discharge learning needs (meds, wound care, etc.)  - Arrange for interpretive services to assist at discharge as needed  - Refer to Case Management Department for coordinating discharge planning if the patient needs post-hospital services based on physician/advanced practitioner order or complex needs related to functional status, cognitive ability, or social support system  Outcome: Progressing     Problem: Knowledge Deficit  Goal: Patient/family/caregiver demonstrates understanding of disease process, treatment plan, medications, and discharge instructions  Description: Complete learning assessment and assess knowledge base.   Interventions:  - Provide teaching at level of understanding  - Provide teaching via preferred learning methods  Outcome: Progressing

## 2023-08-17 NOTE — PROGRESS NOTES
Progress Note - Infectious Disease   Baljit Garvin 79 y.o. male MRN: [id number]  [id number]      Impression/Plan:    1. Streptococcus Bacteremia, with ICD present:  -1 of 2 sets from 8/13 are growing Streptococcus now identified as Strep anginosus. The likely source of bacteremia being his necrotizing perennial infection. Strep anginosus can cause endovascular infection and we need to consider the possibility of endocarditis and/or ICD infection. 2D echo was noted to be a poor quality study. Repeat blood cultures 8/13 negative.  -continue IV Unasyn  -follow up official Strep anginosus sensitivities  -Follow-up repeat blood cultures for clearance  -As we do not know how long patient may have been bacteremic for, and knowing Streptococcus anginosus may cause endocarditis, I would recommend a MARCELLA to better assess for cardiac or valve vegetation and ICD infection; transthoracic echo was a poor quality study and furthermore the sensitivity of transthoracic echo for cardiac device infections is low     2. Jamarcus's Gangrene:  -Status post perineal debridement/washout on 8/13, repeat washout on 8/14 and partial closure with wound VAC application on 7/66. Overall appears there has been good source control of this infection patient is clinically improving as he remains afebrile with resolved leukocytosis and no longer on vasopressors. Likely this was the source of his bacteremia as above. OR cultures have grown Group C Strep, Prevotella and now E. Coli. -Continue Unasyn; this will cover Group C Strep, Prevotella (anaerobes) and most likely E. Coli  -follow up E. Coli sensitivities  -treatment duration pending evaluation for endocarditis/ICD infection as above  -ongoing surgical management and wound care     3. Acute Hypoxic Respiratory Failure  -Patient had been intubated for the OR. He has now been successfully extubated.   Chest x-rays have shown linear atelectasis at the left lung base but no consolidation to suggest pneumonia. Currently on 6 L nasal cannula. -Monitor O2 needs  -Wean oxygen as able     4. T2DM   -Risk factor for invasive infections including necrotizing soft tissue infection. A1c was 12.3 in January of this year.  -Tight glycemic control      I have discussed the above management plan in detail with the primary service    Antibiotics:  Unasyn: 2  Total abx days: 5    24 Hour Events:  Afebrile. Blood cultures speciated to Streptococcus anginosus. OR cultures growing Group C strep, Prevotella and E. Coli (broth only). Repeat blood cultures  negative to date. Afebrile, WBC normal.    Subjective:  Patient has no fever, chills, sweats. Pain in groin region is stable. No other new complaints. Objective:  Vitals:  Temp:  [97.7 °F (36.5 °C)-98.3 °F (36.8 °C)] 97.7 °F (36.5 °C)  HR:  [70-86] 70  Resp:  [9-30] 25  BP: ()/(52-72) 133/61  SpO2:  [91 %-98 %] 98 %  Temp (24hrs), Av.9 °F (36.6 °C), Min:97.7 °F (36.5 °C), Max:98.3 °F (36.8 °C)  Current: Temperature: 97.7 °F (36.5 °C)    Physical Exam:   General Appearance:  Alert, interactive, tired appearing, no acute distress. Throat: Oropharynx moist without lesions. Lungs:   Clear to auscultation bilaterally; no wheezes, rhonchi or rales; respirations unlabored   Heart:  RRR; no murmur, rub or gallop   Abdomen:   Soft, non-tender. Extremities: No clubbing, cyanosis or edema   Skin: No new rashes or lesions. Labs:    All pertinent labs and imaging studies were personally reviewed  Results from last 7 days   Lab Units 23  0442 23  0423 08/15/23  0427   WBC Thousand/uL 9.28 8.44 9.60   HEMOGLOBIN g/dL 9.0* 9.1* 9.6*   PLATELETS Thousands/uL 227 226 233     Results from last 7 days   Lab Units 23  0442 23  1824 23  0455 08/15/23  0427 23  0144 23  2309 23  2035 23   SODIUM mmol/L 143 140 142 142   < > 135 132*  --    POTASSIUM mmol/L 3.8 4.0 3.2* 2.9* < > 4.0 3.6  --    CHLORIDE mmol/L 110* 109* 109* 109*   < > 105 101  --    CO2 mmol/L 29 29 29 27   < > 22 18*  --    CO2, I-STAT mmol/L  --   --   --   --   --   --   --  19*   BUN mg/dL 7 8 8 8   < > 15 15  --    CREATININE mg/dL 0.42* 0.61 0.50* 0.55*   < > 0.73 0.57*  --    EGFR ml/min/1.73sq m 120 103 112 107   < > 95 106  --    GLUCOSE, ISTAT mg/dl  --   --   --   --   --   --   --  323*   CALCIUM mg/dL 8.2* 8.0* 8.3 8.0*   < > 8.3 8.1*  --    AST U/L  --   --   --  9  --  9 8*  --    ALT U/L  --   --   --  10*  --  11* 7  --    ALK PHOS U/L  --   --   --  66  --  78 67  --     < > = values in this interval not displayed. Results from last 7 days   Lab Units 08/13/23  1519   PROCALCITONIN ng/ml 0.91*                   Micro:  Results from last 7 days   Lab Units 08/13/23  2308 08/13/23  1930 08/13/23  1528 08/13/23  1519   BLOOD CULTURE  No Growth at 72 hrs. No Growth at 72 hrs.  --  Streptococcus anginosus* No Growth at 72 hrs.    GRAM STAIN RESULT   --  No polys seen*  1+ Gram negative rods*  1+ Gram positive cocci in pairs* Gram positive cocci in pairs and chains*  --    WOUND CULTURE   --  3+ Growth of Beta Hemolytic Streptococcus Group C*  Growth in Broth culture only Gram Negative Kale*  --   --        Imaging:          Jason Whitley MD  [facility]

## 2023-08-17 NOTE — PLAN OF CARE
Problem: OCCUPATIONAL THERAPY ADULT  Goal: Performs self-care activities at highest level of function for planned discharge setting. See evaluation for individualized goals. Description: Treatment Interventions: ADL retraining, Functional transfer training, Endurance training, Patient/family training, Equipment evaluation/education, Compensatory technique education, Energy conservation, Activityengagement          See flowsheet documentation for full assessment, interventions and recommendations. Note: Limitation: Decreased ADL status, Decreased endurance, Decreased self-care trans, Decreased high-level ADLs  Prognosis: Good  Assessment: Pt is a 79 y.o. male who was admitted to [address] [place] [address] on 8/13/2023 with Necrotizing soft tissue infection. Pt is s/p multiple washout/debridement of perineum w/ partial closure and vac placement. Patient  has a past medical history of Rectal hemorrhage. At baseline pt was I w/ ADLs, IADLs, and mobility. No DME use. Pt lives with spouse in [address] [place] [address]. Currently pt requires min-mod A for overall ADLS and mod Ax1 for functional mobility/transfers w/ HHA. Pt currently presents with impairments in the following categories -difficulty performing ADLS and difficulty performing IADLS  activity tolerance, endurance, standing balance/tolerance and sitting balance/tolerance. These impairments, as well as pt's fatigue, pain and risk for falls  limit pt's ability to safely engage in all baseline areas of occupation, including bathing, dressing, toileting, functional mobility/transfers and leisure activities. The patient's raw score on the -PAC Daily Activity Inpatient Short Form is 17. A raw score of less than 19 suggests the patient may benefit from discharge to post-acute rehabilitation services. Please refer to the recommendation of the Occupational Therapist for safe discharge planning.  From OT standpoint, anticipate pt will progress to be mic to return home w/ support of his spouse upon D/C. OT will continue to follow to address the below stated goals.      OT Discharge Recommendation: Home with home health rehabilitation

## 2023-08-17 NOTE — PLAN OF CARE
Problem: PHYSICAL THERAPY ADULT  Goal: Performs mobility at highest level of function for planned discharge setting. See evaluation for individualized goals. Description: Treatment/Interventions: OT, Spoke to case management, Spoke to nursing, Gait training, Bed mobility, Patient/family training, Endurance training, LE strengthening/ROM, Functional transfer training, Therapeutic exercise  Equipment Recommended:  (TBD)       See flowsheet documentation for full assessment, interventions and recommendations. Note: Prognosis: Good  Problem List: Decreased strength, Decreased range of motion, Decreased endurance, Impaired balance, Decreased mobility, Decreased coordination, Decreased cognition, Impaired judgement, Decreased safety awareness, Pain, Decreased skin integrity  Assessment: Pt is 79 y.o. male seen for PT evaluation s/p admit to [facility] on 8/13/2023 w/ Necrotizing soft tissue infection. PT consulted to assess pt's functional mobility and d/c needs. Order placed for PT eval and tx, w/ up w/ A order. Comorbidities affecting pt's physical performance at time of assessment include:  has a past medical history of Rectal hemorrhage. PTA, pt was ambulates unrestricted distances and all terrain, has 2 JOLANTA and retired. Personal factors affecting pt at time of IE include: stairs to enter home, inability to navigate community distances, unable to perform caregiver support/tasks, unable to perform physical activity, inability to perform IADLs and inability to perform ADLs. Please find objective findings from PT assessment regarding body systems outlined above with impairments and limitations including weakness, impaired balance, decreased endurance, gait deviations, pain, decreased activity tolerance, decreased functional mobility tolerance, decreased safety awareness and decreased skin integrity.  Pt required increased time and A to complete bed mobility with decreased strength and increased pain with wound. Tolerated sitting with minimal reports of increased pain. Tolerated standing for several minutes to urinate. Decreased B/L foot clearance during gait. Noted wide DARREN during gait with wound VAC placement. The following objective measures performed on IE also reveal limitations: The patient's AM-PAC Basic Mobility Inpatient Short Form Raw Score is 13, Standardized Score is 33.99. A standardized score less than 42.9 suggests the patient may benefit from discharge to post-acute rehabilitation services although anticipate at this time pt will progress. Please also refer to the recommendation of the Physical Therapist for safe discharge planning. Pt's clinical presentation is currently unstable/unpredictable seen in pt's presentation of critical care monitoring, wound VAC. Pt to benefit from continued PT tx to address deficits as defined above and maximize level of functional independent mobility and consistency. From PT/mobility standpoint, recommendation at time of d/c would be anticipate pt will progress to achieve goals to return home, if unable to progress will require rehab. PT Discharge Recommendation:  (anticipate pt will progress to achieve goals to return home with increased A)    See flowsheet documentation for full assessment.

## 2023-08-17 NOTE — UTILIZATION REVIEW
NOTIFICATION OF INPATIENT ADMISSION   AUTHORIZATION REQUEST   SERVICING FACILITY:   [facility]  Address: [address] [place] [address], [address] [place] [address] Place [address]  Tax ID: [id number]  NPI: [id number] ATTENDING PROVIDER:  Attending Name and NPI#: Lisa Hernández Md [[id number]]  Address: [address] [place] [address]  Phone: [phone]   ADMISSION INFORMATION:  Place of Service: Inpatient [address] [facility]  Place of Service Code: 21  Inpatient Admission Date/Time: 8/13/23 10:25 PM  Discharge Date/Time: No discharge date for patient encounter. Admitting Diagnosis Code/Description:  Jamarcus gangrene     UTILIZATION REVIEW CONTACT:  Joselito Ríos Utilization   Network Utilization Review Department  Phone: [phone]  Fax: [phone]  Email: [email or web address]  Contact for approvals/pending authorizations, clinical reviews, and discharge. PHYSICIAN ADVISORY SERVICES:  Medical Necessity Denial & Ktur-kl-Vpzs Review  Phone: [phone]  Fax: [phone]  Email: [email or web address]

## 2023-08-18 ENCOUNTER — ANESTHESIA (INPATIENT)
Dept: NON INVASIVE DIAGNOSTICS | Facility: HOSPITAL | Age: 68
End: 2023-08-18
Payer: COMMERCIAL

## 2023-08-18 ENCOUNTER — APPOINTMENT (INPATIENT)
Dept: RADIOLOGY | Facility: HOSPITAL | Age: 68
DRG: 853 | End: 2023-08-18
Payer: COMMERCIAL

## 2023-08-18 ENCOUNTER — APPOINTMENT (INPATIENT)
Dept: NON INVASIVE DIAGNOSTICS | Facility: HOSPITAL | Age: 68
DRG: 853 | End: 2023-08-18
Payer: COMMERCIAL

## 2023-08-18 ENCOUNTER — ANESTHESIA EVENT (INPATIENT)
Dept: NON INVASIVE DIAGNOSTICS | Facility: HOSPITAL | Age: 68
End: 2023-08-18
Payer: COMMERCIAL

## 2023-08-18 LAB
ANION GAP SERPL CALCULATED.3IONS-SCNC: 4 MMOL/L
BASOPHILS # BLD AUTO: 0.03 THOUSANDS/ÂΜL (ref 0–0.1)
BASOPHILS NFR BLD AUTO: 0 % (ref 0–1)
BUN SERPL-MCNC: 8 MG/DL (ref 5–25)
CA-I BLD-SCNC: 1.11 MMOL/L (ref 1.12–1.32)
CALCIUM SERPL-MCNC: 8.4 MG/DL (ref 8.3–10.1)
CHLORIDE SERPL-SCNC: 109 MMOL/L (ref 96–108)
CO2 SERPL-SCNC: 29 MMOL/L (ref 21–32)
CREAT SERPL-MCNC: 0.49 MG/DL (ref 0.6–1.3)
EOSINOPHIL # BLD AUTO: 0.18 THOUSAND/ÂΜL (ref 0–0.61)
EOSINOPHIL NFR BLD AUTO: 2 % (ref 0–6)
ERYTHROCYTE [DISTWIDTH] IN BLOOD BY AUTOMATED COUNT: 13.6 % (ref 11.6–15.1)
EST. AVERAGE GLUCOSE BLD GHB EST-MCNC: 269 MG/DL
GFR SERPL CREATININE-BSD FRML MDRD: 113 ML/MIN/1.73SQ M
GLUCOSE SERPL-MCNC: 133 MG/DL (ref 65–140)
GLUCOSE SERPL-MCNC: 140 MG/DL (ref 65–140)
GLUCOSE SERPL-MCNC: 153 MG/DL (ref 65–140)
GLUCOSE SERPL-MCNC: 159 MG/DL (ref 65–140)
GLUCOSE SERPL-MCNC: 162 MG/DL (ref 65–140)
GLUCOSE SERPL-MCNC: 164 MG/DL (ref 65–140)
GLUCOSE SERPL-MCNC: 189 MG/DL (ref 65–140)
GLUCOSE SERPL-MCNC: 215 MG/DL (ref 65–140)
GLUCOSE SERPL-MCNC: 252 MG/DL (ref 65–140)
GLUCOSE SERPL-MCNC: 272 MG/DL (ref 65–140)
HBA1C MFR BLD: 11 %
HCT VFR BLD AUTO: 28.6 % (ref 36.5–49.3)
HGB BLD-MCNC: 9.2 G/DL (ref 12–17)
IMM GRANULOCYTES # BLD AUTO: 0.14 THOUSAND/UL (ref 0–0.2)
IMM GRANULOCYTES NFR BLD AUTO: 2 % (ref 0–2)
LYMPHOCYTES # BLD AUTO: 1.76 THOUSANDS/ÂΜL (ref 0.6–4.47)
LYMPHOCYTES NFR BLD AUTO: 22 % (ref 14–44)
MAGNESIUM SERPL-MCNC: 1.8 MG/DL (ref 1.6–2.6)
MCH RBC QN AUTO: 31 PG (ref 26.8–34.3)
MCHC RBC AUTO-ENTMCNC: 32.2 G/DL (ref 31.4–37.4)
MCV RBC AUTO: 96 FL (ref 82–98)
MONOCYTES # BLD AUTO: 0.66 THOUSAND/ÂΜL (ref 0.17–1.22)
MONOCYTES NFR BLD AUTO: 8 % (ref 4–12)
NEUTROPHILS # BLD AUTO: 5.29 THOUSANDS/ÂΜL (ref 1.85–7.62)
NEUTS SEG NFR BLD AUTO: 66 % (ref 43–75)
NRBC BLD AUTO-RTO: 0 /100 WBCS
PHOSPHATE SERPL-MCNC: 2.3 MG/DL (ref 2.3–4.1)
PLATELET # BLD AUTO: 250 THOUSANDS/UL (ref 149–390)
PMV BLD AUTO: 9.7 FL (ref 8.9–12.7)
POTASSIUM SERPL-SCNC: 3.5 MMOL/L (ref 3.5–5.3)
RBC # BLD AUTO: 2.97 MILLION/UL (ref 3.88–5.62)
SL CV LV EF: 40
SODIUM SERPL-SCNC: 142 MMOL/L (ref 135–147)
WBC # BLD AUTO: 8.06 THOUSAND/UL (ref 4.31–10.16)

## 2023-08-18 PROCEDURE — 93325 DOPPLER ECHO COLOR FLOW MAPG: CPT | Performed by: INTERNAL MEDICINE

## 2023-08-18 PROCEDURE — 85025 COMPLETE CBC W/AUTO DIFF WBC: CPT

## 2023-08-18 PROCEDURE — 93312 ECHO TRANSESOPHAGEAL: CPT | Performed by: INTERNAL MEDICINE

## 2023-08-18 PROCEDURE — 82948 REAGENT STRIP/BLOOD GLUCOSE: CPT

## 2023-08-18 PROCEDURE — 99232 SBSQ HOSP IP/OBS MODERATE 35: CPT | Performed by: INTERNAL MEDICINE

## 2023-08-18 PROCEDURE — 80048 BASIC METABOLIC PNL TOTAL CA: CPT

## 2023-08-18 PROCEDURE — 82330 ASSAY OF CALCIUM: CPT

## 2023-08-18 PROCEDURE — 36597 REPOSITION VENOUS CATHETER: CPT

## 2023-08-18 PROCEDURE — NC001 PR NO CHARGE: Performed by: RADIOLOGY

## 2023-08-18 PROCEDURE — 99222 1ST HOSP IP/OBS MODERATE 55: CPT | Performed by: INTERNAL MEDICINE

## 2023-08-18 PROCEDURE — 93312 ECHO TRANSESOPHAGEAL: CPT

## 2023-08-18 PROCEDURE — 99223 1ST HOSP IP/OBS HIGH 75: CPT | Performed by: THORACIC SURGERY (CARDIOTHORACIC VASCULAR SURGERY)

## 2023-08-18 PROCEDURE — 83735 ASSAY OF MAGNESIUM: CPT

## 2023-08-18 PROCEDURE — 83036 HEMOGLOBIN GLYCOSYLATED A1C: CPT | Performed by: STUDENT IN AN ORGANIZED HEALTH CARE EDUCATION/TRAINING PROGRAM

## 2023-08-18 PROCEDURE — 99223 1ST HOSP IP/OBS HIGH 75: CPT | Performed by: STUDENT IN AN ORGANIZED HEALTH CARE EDUCATION/TRAINING PROGRAM

## 2023-08-18 PROCEDURE — 93320 DOPPLER ECHO COMPLETE: CPT | Performed by: INTERNAL MEDICINE

## 2023-08-18 PROCEDURE — 99232 SBSQ HOSP IP/OBS MODERATE 35: CPT | Performed by: STUDENT IN AN ORGANIZED HEALTH CARE EDUCATION/TRAINING PROGRAM

## 2023-08-18 PROCEDURE — 84100 ASSAY OF PHOSPHORUS: CPT

## 2023-08-18 PROCEDURE — 2W06X6Z CHANGE PRESSURE DRESSING ON RIGHT INGUINAL REGION: ICD-10-PCS | Performed by: SURGERY

## 2023-08-18 RX ORDER — SODIUM CHLORIDE 9 MG/ML
INJECTION, SOLUTION INTRAVENOUS CONTINUOUS PRN
Status: DISCONTINUED | OUTPATIENT
Start: 2023-08-18 | End: 2023-08-18

## 2023-08-18 RX ORDER — POTASSIUM CHLORIDE 20 MEQ/1
40 TABLET, EXTENDED RELEASE ORAL ONCE
Status: COMPLETED | OUTPATIENT
Start: 2023-08-18 | End: 2023-08-18

## 2023-08-18 RX ORDER — PROPOFOL 10 MG/ML
INJECTION, EMULSION INTRAVENOUS AS NEEDED
Status: DISCONTINUED | OUTPATIENT
Start: 2023-08-18 | End: 2023-08-18

## 2023-08-18 RX ORDER — METRONIDAZOLE 500 MG/1
500 TABLET ORAL EVERY 12 HOURS SCHEDULED
Status: COMPLETED | OUTPATIENT
Start: 2023-08-18 | End: 2023-08-21

## 2023-08-18 RX ADMIN — PROPOFOL 50 MG: 10 INJECTION, EMULSION INTRAVENOUS at 09:10

## 2023-08-18 RX ADMIN — OXYCODONE HYDROCHLORIDE 10 MG: 10 TABLET ORAL at 14:53

## 2023-08-18 RX ADMIN — POTASSIUM CHLORIDE 40 MEQ: 1500 TABLET, EXTENDED RELEASE ORAL at 10:09

## 2023-08-18 RX ADMIN — METRONIDAZOLE 500 MG: 500 TABLET ORAL at 10:10

## 2023-08-18 RX ADMIN — SOTALOL HYDROCHLORIDE 80 MG: 80 TABLET ORAL at 21:32

## 2023-08-18 RX ADMIN — ACETAMINOPHEN 975 MG: 325 TABLET, FILM COATED ORAL at 14:52

## 2023-08-18 RX ADMIN — SODIUM CHLORIDE 3 G: 9 INJECTION, SOLUTION INTRAVENOUS at 02:32

## 2023-08-18 RX ADMIN — ENOXAPARIN SODIUM 40 MG: 40 INJECTION SUBCUTANEOUS at 10:10

## 2023-08-18 RX ADMIN — PROPOFOL 80 MCG/KG/MIN: 10 INJECTION, EMULSION INTRAVENOUS at 09:11

## 2023-08-18 RX ADMIN — OXYCODONE HYDROCHLORIDE 10 MG: 10 TABLET ORAL at 07:48

## 2023-08-18 RX ADMIN — FENOFIBRATE 145 MG: 145 TABLET ORAL at 10:07

## 2023-08-18 RX ADMIN — ASPIRIN 81 MG: 81 TABLET, COATED ORAL at 10:07

## 2023-08-18 RX ADMIN — HYDROMORPHONE HYDROCHLORIDE 0.5 MG: 1 INJECTION, SOLUTION INTRAMUSCULAR; INTRAVENOUS; SUBCUTANEOUS at 12:00

## 2023-08-18 RX ADMIN — METRONIDAZOLE 500 MG: 500 TABLET ORAL at 21:32

## 2023-08-18 RX ADMIN — ATORVASTATIN CALCIUM 40 MG: 40 TABLET, FILM COATED ORAL at 16:35

## 2023-08-18 RX ADMIN — ACETAMINOPHEN 975 MG: 325 TABLET, FILM COATED ORAL at 21:32

## 2023-08-18 RX ADMIN — CARVEDILOL 12.5 MG: 12.5 TABLET, FILM COATED ORAL at 10:07

## 2023-08-18 RX ADMIN — SODIUM CHLORIDE: 9 INJECTION, SOLUTION INTRAVENOUS at 09:03

## 2023-08-18 RX ADMIN — SENNOSIDES 8.6 MG: 8.6 TABLET, FILM COATED ORAL at 21:32

## 2023-08-18 RX ADMIN — HYDROMORPHONE HYDROCHLORIDE 0.5 MG: 1 INJECTION, SOLUTION INTRAMUSCULAR; INTRAVENOUS; SUBCUTANEOUS at 20:17

## 2023-08-18 RX ADMIN — EPINEPHRINE 2 MCG/MIN: 1 INJECTION, SOLUTION, CONCENTRATE INTRAVENOUS at 09:10

## 2023-08-18 RX ADMIN — CARVEDILOL 12.5 MG: 12.5 TABLET, FILM COATED ORAL at 18:24

## 2023-08-18 RX ADMIN — SODIUM CHLORIDE 1 UNITS/HR: 9 INJECTION, SOLUTION INTRAVENOUS at 08:11

## 2023-08-18 RX ADMIN — MELATONIN 6 MG: at 21:32

## 2023-08-18 RX ADMIN — ESCITALOPRAM OXALATE 20 MG: 20 TABLET, FILM COATED ORAL at 10:07

## 2023-08-18 RX ADMIN — EZETIMIBE 10 MG: 10 TABLET ORAL at 10:07

## 2023-08-18 RX ADMIN — OXYCODONE HYDROCHLORIDE 10 MG: 10 TABLET ORAL at 02:37

## 2023-08-18 RX ADMIN — HYDROMORPHONE HYDROCHLORIDE 0.5 MG: 1 INJECTION, SOLUTION INTRAMUSCULAR; INTRAVENOUS; SUBCUTANEOUS at 16:35

## 2023-08-18 RX ADMIN — SOTALOL HYDROCHLORIDE 80 MG: 80 TABLET ORAL at 11:30

## 2023-08-18 RX ADMIN — CEFTRIAXONE SODIUM 2000 MG: 10 INJECTION, POWDER, FOR SOLUTION INTRAVENOUS at 10:11

## 2023-08-18 NOTE — ANESTHESIA PREPROCEDURE EVALUATION
Procedure:  MARCELLA    Relevant Problems   CARDIO  s/p ICD, PM  Hx VT   (+) Benign essential hypertension   (+) Coronary atherosclerosis   (+) Mixed hyperlipidemia   (+) Ventricular tachycardia (HCC)      GI/HEPATIC   (+) Esophageal reflux      NEURO/PSYCH   (+) Generalized anxiety disorder      PULMONARY (within normal limits)        Physical Exam    Airway    Mallampati score: II  TM Distance: >3 FB  Neck ROM: full     Dental       Cardiovascular  No peripheral edema,     Pulmonary  Breath sounds normal,     Other Findings        Anesthesia Plan  ASA Score- 3     Anesthesia Type- IV sedation with anesthesia with ASA Monitors. Additional Monitors:   Airway Plan:           Plan Factors-    Chart reviewed. EKG reviewed. Existing labs reviewed. Patient summary reviewed. Induction- intravenous. Postoperative Plan-     Informed Consent- Anesthetic plan and risks discussed with patient. I personally reviewed this patient with the CRNA. Discussed and agreed on the Anesthesia Plan with the CRNA. Heaven Mcdaniel

## 2023-08-18 NOTE — RESTORATIVE TECHNICIAN NOTE
Restorative Technician Note      Patient Name: Berta Blakely     Restorative Tech Visit Date: 08/18/23  Note Type: Mobility  Patient Position Upon Consult: Bedside chair  Activity Performed: Ambulated  Assistive Device: Roller walker (2L O2)  Patient Position at End of Consult: Supine;  All needs within reach    Sarah Ojeda Restorative Technician

## 2023-08-18 NOTE — QUICK NOTE
General Surgery  Benji Cody 79 y.o. male MRN: [id number]  [facility] Encounter: [id number]    KRYSTYNA THURMAN Procedure Note    Date: 08/18/23    Time: 1:14 PM     Location of wound: Right groin    Sponges removed: Wet to dry dressing removed    Dimensions of wound: 5 cm x 3 cm x 5 cm    Description of wound:           Sponges placed:  2 Black Sponges and 1 black sponge bridge    1 black sponge inserted vertically into the area of the wound that tracks laterally. A 2nd black sponge was placed over top to cover the superficial area of the wound. The sponges were draped with VAC drape, a black sponge bridge was placed over top.     VAC settings:  125 mmHg  Continuous    Pt tolerated procedure well  VAC sticker placed to wound dressing, per protocol      Demond Chavez MD  8/18/2023

## 2023-08-18 NOTE — PLAN OF CARE
Problem: MOBILITY - ADULT  Goal: Maintain or return to baseline ADL function  Description: INTERVENTIONS:  -  Assess patient's ability to carry out ADLs; assess patient's baseline for ADL function and identify physical deficits which impact ability to perform ADLs (bathing, care of mouth/teeth, toileting, grooming, dressing, etc.)  - Assess/evaluate cause of self-care deficits   - Assess range of motion  - Assess patient's mobility; develop plan if impaired  - Assess patient's need for assistive devices and provide as appropriate  - Encourage maximum independence but intervene and supervise when necessary  - Involve family in performance of ADLs  - Assess for home care needs following discharge   - Consider OT consult to assist with ADL evaluation and planning for discharge  - Provide patient education as appropriate  Outcome: Progressing  Goal: Maintains/Returns to pre admission functional level  Description: INTERVENTIONS:  - Perform BMAT or MOVE assessment daily.   - Set and communicate daily mobility goal to care team and patient/family/caregiver.    - Collaborate with rehabilitation services on mobility goals if consulted  - Out of bed for toileting  - Record patient progress and toleration of activity level   Outcome: Progressing     Problem: Prexisting or High Potential for Compromised Skin Integrity  Goal: Skin integrity is maintained or improved  Description: INTERVENTIONS:  - Identify patients at risk for skin breakdown  - Assess and monitor skin integrity  - Assess and monitor nutrition and hydration status  - Monitor labs   - Assess for incontinence   - Turn and reposition patient  - Assist with mobility/ambulation  - Relieve pressure over bony prominences  - Avoid friction and shearing  - Provide appropriate hygiene as needed including keeping skin clean and dry  - Evaluate need for skin moisturizer/barrier cream  - Collaborate with interdisciplinary team   - Patient/family teaching  - Consider wound care consult   Outcome: Progressing     Problem: Nutrition/Hydration-ADULT  Goal: Nutrient/Hydration intake appropriate for improving, restoring or maintaining nutritional needs  Description: Monitor and assess patient's nutrition/hydration status for malnutrition. Collaborate with interdisciplinary team and initiate plan and interventions as ordered. Monitor patient's weight and dietary intake as ordered or per policy. Utilize nutrition screening tool and intervene as necessary. Determine patient's food preferences and provide high-protein, high-caloric foods as appropriate.      INTERVENTIONS:  - Monitor oral intake, urinary output, labs, and treatment plans  - Assess nutrition and hydration status and recommend course of action  - Evaluate amount of meals eaten  - Assist patient with eating if necessary   - Allow adequate time for meals  - Recommend/ encourage appropriate diets, oral nutritional supplements, and vitamin/mineral supplements  - Order, calculate, and assess calorie counts as needed  - Recommend, monitor, and adjust tube feedings and TPN/PPN based on assessed needs  - Assess need for intravenous fluids  - Provide specific nutrition/hydration education as appropriate  - Include patient/family/caregiver in decisions related to nutrition  Outcome: Progressing     Problem: PAIN - ADULT  Goal: Verbalizes/displays adequate comfort level or baseline comfort level  Description: Interventions:  - Encourage patient to monitor pain and request assistance  - Assess pain using appropriate pain scale  - Administer analgesics based on type and severity of pain and evaluate response  - Implement non-pharmacological measures as appropriate and evaluate response  - Consider cultural and social influences on pain and pain management  - Notify physician/advanced practitioner if interventions unsuccessful or patient reports new pain  Outcome: Progressing     Problem: INFECTION - ADULT  Goal: Absence or prevention of progression during hospitalization  Description: INTERVENTIONS:  - Assess and monitor for signs and symptoms of infection  - Monitor lab/diagnostic results  - Monitor all insertion sites, i.e. indwelling lines, tubes, and drains  - Monitor endotracheal if appropriate and nasal secretions for changes in amount and color  - [place] appropriate cooling/warming therapies per order  - Administer medications as ordered  - Instruct and encourage patient and family to use good hand hygiene technique  - Identify and instruct in appropriate isolation precautions for identified infection/condition  Outcome: Progressing  Goal: Absence of fever/infection during neutropenic period  Description: INTERVENTIONS:  - Monitor WBC    Outcome: Progressing     Problem: SAFETY ADULT  Goal: Maintain or return to baseline ADL function  Description: INTERVENTIONS:  -  Assess patient's ability to carry out ADLs; assess patient's baseline for ADL function and identify physical deficits which impact ability to perform ADLs (bathing, care of mouth/teeth, toileting, grooming, dressing, etc.)  - Assess/evaluate cause of self-care deficits   - Assess range of motion  - Assess patient's mobility; develop plan if impaired  - Assess patient's need for assistive devices and provide as appropriate  - Encourage maximum independence but intervene and supervise when necessary  - Involve family in performance of ADLs  - Assess for home care needs following discharge   - Consider OT consult to assist with ADL evaluation and planning for discharge  - Provide patient education as appropriate  Outcome: Progressing  Goal: Maintains/Returns to pre admission functional level  Description: INTERVENTIONS:  - Perform BMAT or MOVE assessment daily.   - Set and communicate daily mobility goal to care team and patient/family/caregiver.    - Collaborate with rehabilitation services on mobility goals if consulted  - Out of bed for toileting  - Record patient progress and toleration of activity level   Outcome: Progressing  Goal: Patient will remain free of falls  Description: INTERVENTIONS:  - Educate patient/family on patient safety including physical limitations  - Instruct patient to call for assistance with activity   - Consult OT/PT to assist with strengthening/mobility   - Keep Call bell within reach  - Keep bed low and locked with side rails adjusted as appropriate  - Keep care items and personal belongings within reach  - Initiate and maintain comfort rounds  - Make Fall Risk Sign visible to staff  - Apply yellow socks and bracelet for high fall risk patients  - Consider moving patient to room near nurses station  Outcome: Progressing     Problem: DISCHARGE PLANNING  Goal: Discharge to home or other facility with appropriate resources  Description: INTERVENTIONS:  - Identify barriers to discharge w/patient and caregiver  - Arrange for needed discharge resources and transportation as appropriate  - Identify discharge learning needs (meds, wound care, etc.)  - Arrange for interpretive services to assist at discharge as needed  - Refer to Case Management Department for coordinating discharge planning if the patient needs post-hospital services based on physician/advanced practitioner order or complex needs related to functional status, cognitive ability, or social support system  Outcome: Progressing     Problem: Knowledge Deficit  Goal: Patient/family/caregiver demonstrates understanding of disease process, treatment plan, medications, and discharge instructions  Description: Complete learning assessment and assess knowledge base.   Interventions:  - Provide teaching at level of understanding  - Provide teaching via preferred learning methods  Outcome: Progressing

## 2023-08-18 NOTE — ANESTHESIA POSTPROCEDURE EVALUATION
Post-Op Assessment Note    CV Status:  Stable  Pain Score: 0    Pain management: adequate     Mental Status:  Alert and awake   Hydration Status:  Stable   PONV Controlled:  Controlled   Airway Patency:  Patent      Post Op Vitals Reviewed: Yes      Staff: Anesthesiologist, CRNA         No notable events documented.     BP   124/68   Temp      Pulse  68   Resp   18   SpO2   96

## 2023-08-18 NOTE — CONSULTS
Consultation - Cardiothoracic Surgery   Vanessa Franco 79 y.o. male MRN: [id number]  Unit/Bed#: [facility] 934-01 Encounter: [id number]    Physician Requesting Consult: Carmen Kraus MD    Reason for Consult / Principal Problem: Lead Extraction    Inpatient consult to Cardiothoracic Surgery  Consult performed by: Garland Martinez PA-C  Consult ordered by: Katie Rueda PA-C        History of Present Illness: Vanessa Franco is a 79 y.o. male with a PMH of uncontrolled DM2 (a1c 12) , HTN , CAD s/p PCI/stenting 2015, and CABG in 1994, ischemic CMP, chronic systolic CHF, s/p dual-chamber [facility] Scientific ICD placed in 2011, hx of ICD shocks around time of placement. He  presented to [facility] with worsening perineal pain and swelling x 4 days along with nausea, vomiting, maliase . He was admitted 8/13 with necrotizing facsciitis/sheyla’s gangrene with bacteremia/septic shock, DKA, with acute resp failure s/p emergent wide debridement of perineum by general surgery 8/13, transferred to \[facility]\"" for further management, admitted to the ICU. Underwent repeat washout/debridement 8/14, partial closure and wound vac 8/16. IV antibiotics per ID. Blood cultures grew Strep. MARCELLA 8/18 showed EF 40% and 1.7 cm veg on RA wire. EP was consulted for device extraction. Found to not be device dependent. Cardiac surgery is consulted for assistance with device extraction. Wound/infection improving with ongoing wound vac care. Upon examination today, he reports feeling sore but ok. Denies CP, palpitations, LE edema b/l, orthopnea, PND, numbness/tinlging/paresthesias in UE or LE bilaterally, HA, lightheadeness, dizziness, presyncopal symptoms, hx syncopal events, hemoptysis, hematemesis, hematochezia, melena. Patient's wife was with him today. Denies current or prior use of tobacco, ETOH, or drug use.  Allergies include ACE inhibitors       Past Medical History:  Past Medical History:   Diagnosis Date   • Rectal hemorrhage     last assessed: May 29, 2015         Past Surgical History:   Past Surgical History:   Procedure Laterality Date   • CORONARY ARTERY BYPASS GRAFT     • DECUBITUS ULCER EXCISION Right 8/14/2023    Procedure: Washout and debridement of perineal wound;  Surgeon: Nuris Palmer MD;  Location: [facility];  Service: General   • ELBOW SURGERY Right     Bursitis - last assessed: Sept 15, 2016   • VAC DRESSING APPLICATION N/A 9/48/0326    Procedure: APPLICATION VAC DRESSING;  Surgeon: Jose David Jolly, DO;  Location: [facility];  Service: General   • WOUND DEBRIDEMENT N/A 8/13/2023    Procedure: EXCISIONAL DEBRIDEMENT Perineum;  Surgeon: Isha Cruz DO;  Location: [facility];  Service: General   • WOUND DEBRIDEMENT N/A 8/16/2023    Procedure: DEBRIDEMENT WOUND, [address] OUT, PARTIAL CLOSURE;  Surgeon: Jose David Jolly DO;  Location: [facility];  Service: General         Family History:  Family History   Problem Relation Age of Onset   • No Known Problems Mother    • Cancer Father    • Coronary artery disease Father          Social History:  Social History     Substance and Sexual Activity   Alcohol Use No    Comment: being a social drinker noted in "allscripts"      Social History     Substance and Sexual Activity   Drug Use No     Social History     Tobacco Use   Smoking Status Never   Smokeless Tobacco Never     Marital Status: /Civil Union      Home Medications:   Prior to Admission medications    Medication Sig Start Date End Date Taking?  Authorizing Provider   aspirin 81 MG tablet Take 1 tablet by mouth 2 (two) times a day  9/13/12   Historical Provider, MD   atorvastatin (LIPITOR) 40 mg tablet Take 1 tablet by mouth daily 11/3/11   Historical Provider, MD AG ULTRAFINE III SHORT PEN 31G X 8 MM MISC  1/17/18   Historical Provider, MD   BD ULTRA-FINE PEN NEEDLES 29G X 12.7MM MISC  1/17/18   Historical Provider, MD   candesartan (ATACAND) 16 mg tablet Take 1 tablet (16 mg total) by mouth daily 12/4/19   Sonya Elkins MD carvedilol (COREG) 12.5 mg tablet Take 1 tablet by mouth 2 (two) times a day    Historical Provider, MD   Continuous Blood Gluc Sensor ([address] [place] [address]) MISC 1 each by Does not apply route every 14 (fourteen) days 10/1/19   Historical Provider, MD   dulaglutide (Trulicity) 3 IS/4.5VX injection Inject 0.5 mL (3 mg total) under the skin every 7 days 1/27/23   Yocasta Bryan MD   Empagliflozin (JARDIANCE) 25 MG TABS Take 1 tablet (25 mg total) by mouth every morning 6/7/19   Yocasta Bryan MD   escitalopram (LEXAPRO) 20 mg tablet Take 1 tablet (20 mg total) by mouth daily 9/7/22   Yocasta Bryan MD   ezetimibe (ZETIA) 10 mg tablet Take 1 tablet by mouth daily 9/14/11   Historical Provider, MD   fenofibrate (TRICOR) 145 mg tablet Take 145 mg by mouth daily 8/9/22   Historical Provider, MD   insulin glargine (Toujeo SoloStar) 300 units/mL CONCENTRATED U-300 injection pen (1-unit dial) Inject 130 Units under the skin daily 4/12/21   Yocasta Bryan MD   Insulin Pen Needle (PEN NEEDLES 29GX1/2") 29G X 12MM MISC 2 X daily subcutaneously dx: E11.65 1/17/18   Historical Provider, MD   insuln lispro (HumaLOG KwikPen) 200 units/mL CONCENTRATED U-200 injection pen 30 Units Once a day with lunch or supper 9/2/21   Historical Provider, MD   isosorbide mononitrate (IMDUR) 30 mg 24 hr tablet Take 30 mg by mouth daily at bedtime 11/30/19   Historical Provider, MD   Lancets MISC test once times daily E11.65 7/12/17   Historical Provider, MD   lansoprazole (PREVACID) 30 mg capsule Take 1 capsule (30 mg total) by mouth daily 10/17/22   Yocasta Bryan MD   LORazepam (ATIVAN) 1 mg tablet TAKE 1 TABLET DAILY AS NEEDED FOR ANXIETY 3/6/23   Yocasta Bryan MD   metFORMIN (GLUCOPHAGE) 500 mg tablet Take 1,000 mg by mouth 2 (two) times a day 5/6/11   Historical Provider, MD   Misc Natural Products (BLOOD SUGAR 360 PO) ONETOUCH ULTRA TEST STRIPS Test once daily E11.65 7/12/17   Historical Provider, MD   omega-3-acid ethyl esters (LOVAZA) 1 g capsule Take 2 g by mouth 2 (two) times a day  11/3/11   Historical Provider, MD   sotalol (BETAPACE) 80 mg tablet Take 1 tablet by mouth 2 (two) times a day    Historical Provider, MD   spironolactone (ALDACTONE) 25 mg tablet Take 1 tablet by mouth daily    Historical Provider, MD       Inpatient Medications:  Scheduled Meds:   Current Facility-Administered Medications   Medication Dose Route Frequency Provider Last Rate   • acetaminophen  975 mg Oral Q8H [address] Villa DO     • aspirin  81 mg Oral Daily Silvio Villa DO     • atorvastatin  40 mg Oral Daily With [address] [facility] Allen, DO     • carvedilol  12.5 mg Oral BID Liz Wang MD     • cefTRIAXone  2,000 mg Intravenous Q24H Tika Quezada MD 2,000 mg (08/18/23 1011)   • enoxaparin  40 mg Subcutaneous Q24H [address] Chong  Allen DO     • escitalopram  20 mg Oral Daily Silvio Villa DO     • ezetimibe  10 mg Oral Daily Silvio Villa DO     • fenofibrate  145 mg Oral Daily Silvio Villa DO     • HYDROmorphone  0.5 mg Intravenous Q4H PRN Silvio Villa DO     • insulin regular (HumuLIN R,NovoLIN R) 1 Units/mL in sodium chloride 0.9 % 100 mL infusion  0.3-21 Units/hr Intravenous Titrated Ree Mccoy MD 2 Units/hr (08/18/23 1418)   • LORazepam  1 mg Oral Daily PRN Silvio Villa DO     • melatonin  6 mg Oral HS Chong P Allen DO     • metroNIDAZOLE  500 mg Oral Q12H [address] Tika Quezada MD     • ondansetron  4 mg Intravenous Q6H PRN Santosh Vazquez, DO     • oxyCODONE  10 mg Oral Q4H PRN Santosh Del, DO     • oxyCODONE  5 mg Oral Q4H PRN Silvio Villa DO     • pantoprazole  20 mg Oral Early Morning Santosh Del, DO     • senna  1 tablet Oral HS Silvio Villa DO     • sotalol  80 mg Oral BID Liz Wang MD       Continuous Infusions: insulin regular (HumuLIN R,NovoLIN R) 1 Units/mL in sodium chloride 0.9 % 100 mL infusion, 0.3-21 Units/hr, Last Rate: 2 Units/hr (08/18/23 1418)      PRN Meds:  HYDROmorphone, 0.5 mg, Q4H PRN  LORazepam, 1 mg, Daily PRN  ondansetron, 4 mg, Q6H PRN  oxyCODONE, 10 mg, Q4H PRN  oxyCODONE, 5 mg, Q4H PRN        Allergies: Allergies   Allergen Reactions   • Ace Inhibitors Cough       Review of Systems:  Review of Systems   Constitutional: Positive for fatigue. Negative for chills and fever. HENT: Negative for ear pain and sore throat. Eyes: Negative for pain and visual disturbance. Respiratory: Negative for cough and shortness of breath. Cardiovascular: Negative for chest pain, palpitations and leg swelling. Gastrointestinal: Negative for abdominal pain and vomiting. Genitourinary: Negative for dysuria and hematuria. Musculoskeletal: Negative for arthralgias and back pain. Skin: Negative for color change and rash. Neurological: Negative for seizures and syncope. All other systems reviewed and are negative. Vital Signs:     Vitals:    08/17/23 1613 08/17/23 2127 08/18/23 0816 08/18/23 0943   BP: 146/75 134/59 156/78 121/75   BP Location:   Right arm    Pulse: 70 70 70 80   Resp: 14 16 20    Temp: 98.7 °F (37.1 °C) 98.7 °F (37.1 °C) 98.6 °F (37 °C)    TempSrc:   Oral    SpO2: 97% 96% 91%    Weight:    92.1 kg (203 lb)   Height:    5' 9" (1.753 m)     Invasive Devices     Peripherally Inserted Central Catheter Line  Duration           PICC Line 80/07/03 Right Basilic 1 day          Central Venous Catheter Line  Duration           CVC Central Lines 08/13/23 Triple 4 days                Physical Exam:  Physical Exam  Vitals reviewed. Constitutional:       General: He is not in acute distress. Appearance: Normal appearance. He is obese. HENT:      Head: Normocephalic and atraumatic. Mouth/Throat:      Mouth: Mucous membranes are moist.      Pharynx: Oropharynx is clear. Eyes:      Extraocular Movements: Extraocular movements intact.       Conjunctiva/sclera: Conjunctivae normal.      Pupils: Pupils are equal, round, and reactive to light. Cardiovascular:      Rate and Rhythm: Normal rate and regular rhythm. Heart sounds: Normal heart sounds. No murmur heard. Pulmonary:      Effort: Pulmonary effort is normal.      Breath sounds: Normal breath sounds. Chest:       Abdominal:      General: Abdomen is flat. Bowel sounds are normal.      Palpations: Abdomen is soft. Musculoskeletal:         General: No swelling. Normal range of motion. Cervical back: Normal range of motion and neck supple. Skin:     General: Skin is warm and dry. Neurological:      General: No focal deficit present. Mental Status: He is alert and oriented to person, place, and time. Psychiatric:         Mood and Affect: Mood normal.         Behavior: Behavior normal.         Lab Results:     Results from last 7 days   Lab Units 08/18/23  0446 08/17/23 0442 08/16/23  0423   WBC Thousand/uL 8.06 9.28 8.44   HEMOGLOBIN g/dL 9.2* 9.0* 9.1*   HEMATOCRIT % 28.6* 28.3* 27.8*   PLATELETS Thousands/uL 250 227 226     Results from last 7 days   Lab Units 08/18/23  0446 08/17/23  0442 08/16/23  1824 08/13/23  2035 08/13/23  1958   POTASSIUM mmol/L 3.5 3.8 4.0   < >  --    CHLORIDE mmol/L 109* 110* 109*   < >  --    CO2 mmol/L 29 29 29   < >  --    CO2, I-STAT mmol/L  --   --   --   --  19*   BUN mg/dL 8 7 8   < >  --    CREATININE mg/dL 0.49* 0.42* 0.61   < >  --    GLUCOSE, ISTAT mg/dl  --   --   --   --  323*   CALCIUM mg/dL 8.4 8.2* 8.0*   < >  --     < > = values in this interval not displayed. Results from last 7 days   Lab Units 08/13/23  2312 08/13/23  1528   INR  1.27* 1.29*   PTT seconds  --  29     Lab Results   Component Value Date    HGBA1C 11.0 (H) 08/18/2023     No results found for: "CKTOTAL", "CKMB", "CKMBINDEX", "TROPONINI"    Imaging Studies:     Echocardiogram:   •  Left Ventricle: Left ventricular cavity size is normal. Wall thickness is normal. The left ventricular ejection fraction is 40%. Systolic function is mildly reduced. There is mild global hypokinesis. •  Right Ventricle: Right ventricular cavity size is normal. Systolic function is normal.  •  Right Atrium: A pacer wire is present with a mobile 1.7 cm strandlike echodensity on the atrial aspect of the right ventricular lead, which is highly suggestive of a vegetation. •  Mitral Valve: There is mild regurgitation. •  Tricuspid Valve: There is mild regurgitation.     Likely vegetation on RV device lead.       Findings    Left Ventricle Left ventricular cavity size is normal. Wall thickness is normal. The left ventricular ejection fraction is 40%. Systolic function is mildly reduced. There is mild global hypokinesis. Right Ventricle Right ventricular cavity size is normal. Systolic function is normal. Wall thickness is normal.      Left Atrium The atrium is normal in size. Right Atrium The atrium is normal in size. A pacer wire is present with a mobile 1.7 cm strandlike echodensity on the atrial aspect of the right ventricular lead, which is highly suggestive of a vegetation. Atrial Septum No patent foramen ovale detected using color flow Doppler at rest.      Left Atrial Appendage Left atrial appendage is normal in size. There is normal function. There is no thrombus. Aortic Valve The aortic valve is trileaflet. The leaflets are not thickened. The leaflets are not calcified. The leaflets exhibit normal mobility. There is no evidence of regurgitation. The aortic valve has no significant stenosis. Mitral Valve Mitral valve structure is normal. There is mild regurgitation. There is no evidence of stenosis. Tricuspid Valve Tricuspid valve structure is normal. There is mild regurgitation. There is no evidence of stenosis. Pulmonic Valve Pulmonic valve structure is normal. There is trace regurgitation. There is no evidence of stenosis. Ascending Aorta The aortic root is normal in size. There is no evidence of aortic dissection.  There is no aneurysm in the aorta. I have personally reviewed pertinent reports. 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 They understand and wish to proceed with further workup and ultimately surgical intervention. We have ordered routine preoperative laboratory and vascular studies. Pending the results of these tests, they will be scheduled for surgery  with Dr. Samreen Dietz, Natanael Arroyo was comfortable with our recommendations, and their questions were answered to their satisfaction. We will continue to evaluate the patient daily with further recommendations as work up is completed. Thank you for allowing us to participate in the care of this patient. SIGNATURE: Karin Antoine  DATE: August 18, 2023  TIME: 2:25 PM    * This note was completed in part utilizing AGLOGIC direct voice recognition software. Grammatical errors, random word insertion, spelling mistakes, and incomplete sentences may be an occasional consequence of the system secondary to software limitations, ambient noise and hardware issues. At the time of dictation, efforts were made to edit, clarify and /or correct errors. Please read the chart carefully and recognize, using context, where substitutions have occurred. If you have any questions or concerns about the context, text or information contained within the body of this dictation, please contact myself, the provider, for further clarification.

## 2023-08-18 NOTE — QUICK NOTE
Patient's vac dressing dislodged during attempt to use restroom. I applied a wet to dry dressing, 1/2 a Kerlix in saline irrigation with dry gauze and ABD. Apply vac tomorrow.     Lizbeth Alcantara MD  8/17/2023 9:29 PM

## 2023-08-18 NOTE — PLAN OF CARE
Problem: MOBILITY - ADULT  Goal: Maintain or return to baseline ADL function  Description: INTERVENTIONS:  -  Assess patient's ability to carry out ADLs; assess patient's baseline for ADL function and identify physical deficits which impact ability to perform ADLs (bathing, care of mouth/teeth, toileting, grooming, dressing, etc.)  - Assess/evaluate cause of self-care deficits   - Assess range of motion  - Assess patient's mobility; develop plan if impaired  - Assess patient's need for assistive devices and provide as appropriate  - Encourage maximum independence but intervene and supervise when necessary  - Involve family in performance of ADLs  - Assess for home care needs following discharge   - Consider OT consult to assist with ADL evaluation and planning for discharge  - Provide patient education as appropriate  Outcome: Progressing  Goal: Maintains/Returns to pre admission functional level  Description: INTERVENTIONS:  - Perform BMAT or MOVE assessment daily.   - Set and communicate daily mobility goal to care team and patient/family/caregiver. - Collaborate with rehabilitation services on mobility goals if consulted  - Perform Range of Motion 2 times a day. - Reposition patient every 2 hours.   - Dangle patient 2 times a day  - Stand patient 2 times a day  - Ambulate patient 2 times a day  - Out of bed to chair 2 times a day   - Out of bed for meals 2 times a day  - Out of bed for toileting  - Record patient progress and toleration of activity level   Outcome: Progressing     Problem: Prexisting or High Potential for Compromised Skin Integrity  Goal: Skin integrity is maintained or improved  Description: INTERVENTIONS:  - Identify patients at risk for skin breakdown  - Assess and monitor skin integrity  - Assess and monitor nutrition and hydration status  - Monitor labs   - Assess for incontinence   - Turn and reposition patient  - Assist with mobility/ambulation  - Relieve pressure over bony prominences  - Avoid friction and shearing  - Provide appropriate hygiene as needed including keeping skin clean and dry  - Evaluate need for skin moisturizer/barrier cream  - Collaborate with interdisciplinary team   - Patient/family teaching  - Consider wound care consult   Outcome: Progressing     Problem: Nutrition/Hydration-ADULT  Goal: Nutrient/Hydration intake appropriate for improving, restoring or maintaining nutritional needs  Description: Monitor and assess patient's nutrition/hydration status for malnutrition. Collaborate with interdisciplinary team and initiate plan and interventions as ordered. Monitor patient's weight and dietary intake as ordered or per policy. Utilize nutrition screening tool and intervene as necessary. Determine patient's food preferences and provide high-protein, high-caloric foods as appropriate.      INTERVENTIONS:  - Monitor oral intake, urinary output, labs, and treatment plans  - Assess nutrition and hydration status and recommend course of action  - Evaluate amount of meals eaten  - Assist patient with eating if necessary   - Allow adequate time for meals  - Recommend/ encourage appropriate diets, oral nutritional supplements, and vitamin/mineral supplements  - Order, calculate, and assess calorie counts as needed  - Recommend, monitor, and adjust tube feedings and TPN/PPN based on assessed needs  - Assess need for intravenous fluids  - Provide specific nutrition/hydration education as appropriate  - Include patient/family/caregiver in decisions related to nutrition  Outcome: Progressing     Problem: PAIN - ADULT  Goal: Verbalizes/displays adequate comfort level or baseline comfort level  Description: Interventions:  - Encourage patient to monitor pain and request assistance  - Assess pain using appropriate pain scale  - Administer analgesics based on type and severity of pain and evaluate response  - Implement non-pharmacological measures as appropriate and evaluate response  - Consider cultural and social influences on pain and pain management  - Notify physician/advanced practitioner if interventions unsuccessful or patient reports new pain  Outcome: Progressing     Problem: INFECTION - ADULT  Goal: Absence or prevention of progression during hospitalization  Description: INTERVENTIONS:  - Assess and monitor for signs and symptoms of infection  - Monitor lab/diagnostic results  - Monitor all insertion sites, i.e. indwelling lines, tubes, and drains  - Monitor endotracheal if appropriate and nasal secretions for changes in amount and color  - [place] appropriate cooling/warming therapies per order  - Administer medications as ordered  - Instruct and encourage patient and family to use good hand hygiene technique  - Identify and instruct in appropriate isolation precautions for identified infection/condition  Outcome: Progressing  Goal: Absence of fever/infection during neutropenic period  Description: INTERVENTIONS:  - Monitor WBC    Outcome: Progressing     Problem: SAFETY ADULT  Goal: Maintain or return to baseline ADL function  Description: INTERVENTIONS:  -  Assess patient's ability to carry out ADLs; assess patient's baseline for ADL function and identify physical deficits which impact ability to perform ADLs (bathing, care of mouth/teeth, toileting, grooming, dressing, etc.)  - Assess/evaluate cause of self-care deficits   - Assess range of motion  - Assess patient's mobility; develop plan if impaired  - Assess patient's need for assistive devices and provide as appropriate  - Encourage maximum independence but intervene and supervise when necessary  - Involve family in performance of ADLs  - Assess for home care needs following discharge   - Consider OT consult to assist with ADL evaluation and planning for discharge  - Provide patient education as appropriate  Outcome: Progressing  Goal: Maintains/Returns to pre admission functional level  Description: INTERVENTIONS:  - Perform BMAT or MOVE assessment daily.   - Set and communicate daily mobility goal to care team and patient/family/caregiver. - Collaborate with rehabilitation services on mobility goals if consulted  - Perform Range of Motion 2 times a day. - Reposition patient every 2 hours.   - Dangle patient 2 times a day  - Stand patient 2 times a day  - Ambulate patient 2 times a day  - Out of bed to chair 2 times a day   - Out of bed for meals 2 times a day  - Out of bed for toileting  - Record patient progress and toleration of activity level   Outcome: Progressing  Goal: Patient will remain free of falls  Description: INTERVENTIONS:  - Educate patient/family on patient safety including physical limitations  - Instruct patient to call for assistance with activity   - Consult OT/PT to assist with strengthening/mobility   - Keep Call bell within reach  - Keep bed low and locked with side rails adjusted as appropriate  - Keep care items and personal belongings within reach  - Initiate and maintain comfort rounds  - Make Fall Risk Sign visible to staff  - Offer Toileting every 2 Hours, in advance of need  - Initiate/Maintain alarm  - Obtain necessary fall risk management equipment  - Apply yellow socks and bracelet for high fall risk patients  - Consider moving patient to room near nurses station  Outcome: Progressing     Problem: DISCHARGE PLANNING  Goal: Discharge to home or other facility with appropriate resources  Description: INTERVENTIONS:  - Identify barriers to discharge w/patient and caregiver  - Arrange for needed discharge resources and transportation as appropriate  - Identify discharge learning needs (meds, wound care, etc.)  - Arrange for interpretive services to assist at discharge as needed  - Refer to Case Management Department for coordinating discharge planning if the patient needs post-hospital services based on physician/advanced practitioner order or complex needs related to functional status, cognitive ability, or social support system  Outcome: Progressing     Problem: Knowledge Deficit  Goal: Patient/family/caregiver demonstrates understanding of disease process, treatment plan, medications, and discharge instructions  Description: Complete learning assessment and assess knowledge base.   Interventions:  - Provide teaching at level of understanding  - Provide teaching via preferred learning methods  Outcome: Progressing

## 2023-08-18 NOTE — PROGRESS NOTES
General Surgery  Progress Note   Rufina Carter 79 y.o. male MRN: [id number]  Unit/Bed#: [facility] 934-01 Encounter: [id number]    Assessment:  68yo M with strep bactremia, sheyla gangrene now s/p  -8/13 washout/debridement of perineum  -8/14 washout/debridement  -8/16 washout/debridement, partial closure, vac placement    Patient is doing well. Last night during ambulating to the restroom patient dislodged wound VAC, I placed a wet-to-dry dressing with half Curlex soaked in saline irrigation covered with gauze and ABD. Resume VAC change today. Patient is afebrile, hemodynamically stable, perineal wound is healing well. Concern for uncontrolled hyperglycemia and effect on wound healing. Continuing to monitor for signs of infection or for further need for debridement. AVSS on 5L NC  UOP: 1325 cc  BM x1  WBC pending from 9.28  Hemoglobin pending from 9.0  Platelets pending from 227  Glucose pending from 324    Wound cx: GNR, GPC  Blood cx: Strep    Plan:  -npo, IV fluids  -continue Diabetic diet level 2 after MARCELLA  -f/u Endocrinology consult for uncontrolled glucose  -f/u IR PICC repositioning  -vac change today, MWF  -continue IV Unasyn per ID  -f/u blood cx  -follow up Strep anginosus sensitivities   -follow up E. Coli sensitivities  -pt/ot  -dispo planning  -pain/ nausea control PRN  -oob/ ambulation  -incentive Spirometry      Subjective/Objective     Subjective:   Patient seen and examined at bedside, in no acute distress. Patient reports dislodging wound VAC when ambulating to restroom. Patient reports that he has mild pain however is controlled well with medication. Patient denies nausea vomiting fevers chills shortness of breath or chest pain. Patient is ambulating, tolerating diet, passing flatus and bowel movements. Objective:   Vitals:Blood pressure 134/59, pulse 70, temperature 98.7 °F (37.1 °C), resp. rate 16, height 5' 9" (1.753 m), weight 92.4 kg (203 lb 11.3 oz), SpO2 96 %.   Temp (24hrs), Av.4 °F (36.9 °C), Min:97.7 °F (36.5 °C), Max:98.7 °F (37.1 °C)      I/O        0701   0700  0701   0700    P. O. 360     I.V. (mL/kg) 566.2 (6.1) 2 (0)    NG/GT      IV Piggyback 678 450    Feedings      Total Intake(mL/kg) 1604.2 (17.4) 452 (4.9)    Urine (mL/kg/hr) 750 (0.3) 725 (0.5)    Emesis/NG output      Drains 125     Stool 0     Total Output 875 725    Net +729.2 -273          Unmeasured Stool Occurrence 1 x           Invasive Devices     Peripherally Inserted Central Catheter Line  Duration           PICC Line  Right Basilic <1 day          Central Venous Catheter Line  Duration           CVC Central Lines 23 Triple 4 days          Peripheral Intravenous Line  Duration           Peripheral IV Right;Ventral (anterior) Antecubital -- days                Physical Exam:  General: No acute distress  Neuro: Awake, Alert and Oriented x 3  HEENT:  Normocephalic, atraumatic, moist mucous membranes  CV: Regular rate and rhythm  Lungs: Normal work of breathing, no increased respiratory effort  Abdomen: Soft, non-tender, non-distended.   Extremities: No edema, clubbing or cyanosis  : Wet-to-dry dressing with gauze and ABD, wound with incision intact  Skin: Warm, dry    Lab Results:   BMP/CMP:   No results found for: "SODIUM", "K", "CL", "CO2", "ANIONGAP", "BUN", "CREATININE", "GLUCOSE", "CALCIUM", "AST", "ALT", "ALKPHOS", "PROT", "BILITOT", "EGFR" and CBC:   No results found for: "WBC", "HGB", "HCT", "MCV", "PLT", "ADJUSTEDWBC", "RBC", "MCH", "MCHC", "RDW", "MPV", "NRBC"  VTE Prophylaxis: Sequential compression device [facility])       Beverly Larsen MD  2023  5:11 AM

## 2023-08-18 NOTE — H&P (VIEW-ONLY)
Consultation - Cardiothoracic Surgery   Kay Kevin 79 y.o. male MRN: [id number]  Unit/Bed#: [facility] 934-01 Encounter: [id number]    Physician Requesting Consult: Delores Espinal MD    Reason for Consult / Principal Problem: Lead Extraction    Inpatient consult to Cardiothoracic Surgery  Consult performed by: Heydi Shabazz PA-C  Consult ordered by: Harry Loja PA-C        History of Present Illness: Kay Kevin is a 79 y.o. male with a PMH of uncontrolled DM2 (a1c 12) , HTN , CAD s/p PCI/stenting 2015, and CABG in 1994, ischemic CMP, chronic systolic CHF, s/p dual-chamber [employer] ICD placed in 2011, hx of ICD shocks around time of placement. He  presented to [facility] with worsening perineal pain and swelling x 4 days along with nausea, vomiting, maliase . He was admitted 8/13 with necrotizing facsciitis/sheyla’s gangrene with bacteremia/septic shock, DKA, with acute resp failure s/p emergent wide debridement of perineum by general surgery 8/13, transferred to [facility] for further management, admitted to the ICU. Underwent repeat washout/debridement 8/14, partial closure and wound vac 8/16. IV antibiotics per ID. Blood cultures grew Strep. MARCELLA 8/18 showed EF 40% and 1.7 cm veg on RA wire. EP was consulted for device extraction. Found to not be device dependent. Cardiac surgery is consulted for assistance with device extraction. Wound/infection improving with ongoing wound vac care. Upon examination today, he reports feeling sore but ok. Denies CP, palpitations, LE edema b/l, orthopnea, PND, numbness/tinlging/paresthesias in UE or LE bilaterally, HA, lightheadeness, dizziness, presyncopal symptoms, hx syncopal events, hemoptysis, hematemesis, hematochezia, melena. Patient's wife was with him today. Denies current or prior use of tobacco, ETOH, or drug use.  Allergies include ACE inhibitors       Past Medical History:  Past Medical History:   Diagnosis Date   • Rectal hemorrhage     last assessed: May 29, 2015         Past Surgical History:   Past Surgical History:   Procedure Laterality Date   • CORONARY ARTERY BYPASS GRAFT     • DECUBITUS ULCER EXCISION Right 8/14/2023    Procedure: Washout and debridement of perineal wound;  Surgeon: Sue Hernandez MD;  Location: [facility];  Service: General   • ELBOW SURGERY Right     Bursitis - last assessed: Sept 15, 2016   • VAC DRESSING APPLICATION N/A 4/95/2847    Procedure: APPLICATION VAC DRESSING;  Surgeon: Brad Jolly DO;  Location: [facility];  Service: General   • WOUND DEBRIDEMENT N/A 8/13/2023    Procedure: EXCISIONAL DEBRIDEMENT Perineum;  Surgeon: Micheal Mckeon DO;  Location: [facility];  Service: General   • WOUND DEBRIDEMENT N/A 8/16/2023    Procedure: DEBRIDEMENT WOUND, [address] OUT, PARTIAL CLOSURE;  Surgeon: Brad Jolly DO;  Location: [facility];  Service: General         Family History:  Family History   Problem Relation Age of Onset   • No Known Problems Mother    • Cancer Father    • Coronary artery disease Father          Social History:  Social History     Substance and Sexual Activity   Alcohol Use No    Comment: being a social drinker noted in "allscripts"      Social History     Substance and Sexual Activity   Drug Use No     Social History     Tobacco Use   Smoking Status Never   Smokeless Tobacco Never     Marital Status: /Civil Union      Home Medications:   Prior to Admission medications    Medication Sig Start Date End Date Taking?  Authorizing Provider   aspirin 81 MG tablet Take 1 tablet by mouth 2 (two) times a day  9/13/12   Historical Provider, MD   atorvastatin (LIPITOR) 40 mg tablet Take 1 tablet by mouth daily 11/3/11   Historical Provider, MD PARIKH-MAINE ULTRAFINE III SHORT PEN 31G X 8 MM MISC  1/17/18   Historical Provider, MD   BD ULTRA-FINE PEN NEEDLES 29G X 12.7MM MISC  1/17/18   Historical Provider, MD   candesartan (ATACAND) 16 mg tablet Take 1 tablet (16 mg total) by mouth daily 12/4/19   Quinten Mccarthy MD carvedilol (COREG) 12.5 mg tablet Take 1 tablet by mouth 2 (two) times a day    Historical Provider, MD   Continuous Blood Gluc Sensor ([address] [place] [address]) MISC 1 each by Does not apply route every 14 (fourteen) days 10/1/19   Historical Provider, MD   dulaglutide (Trulicity) 3 AG/2.6AW injection Inject 0.5 mL (3 mg total) under the skin every 7 days 1/27/23   Paramjit Art MD   Empagliflozin (JARDIANCE) 25 MG TABS Take 1 tablet (25 mg total) by mouth every morning 6/7/19   Paramjit Art MD   escitalopram (LEXAPRO) 20 mg tablet Take 1 tablet (20 mg total) by mouth daily 9/7/22   Paramjit Art MD   ezetimibe (ZETIA) 10 mg tablet Take 1 tablet by mouth daily 9/14/11   Historical Provider, MD   fenofibrate (TRICOR) 145 mg tablet Take 145 mg by mouth daily 8/9/22   Historical Provider, MD   insulin glargine (Toujeo SoloStar) 300 units/mL CONCENTRATED U-300 injection pen (1-unit dial) Inject 130 Units under the skin daily 4/12/21   Paramjit Art MD   Insulin Pen Needle (PEN NEEDLES 29GX1/2") 29G X 12MM MISC 2 X daily subcutaneously dx: E11.65 1/17/18   Historical Provider, MD   insuln lispro (HumaLOG KwikPen) 200 units/mL CONCENTRATED U-200 injection pen 30 Units Once a day with lunch or supper 9/2/21   Historical Provider, MD   isosorbide mononitrate (IMDUR) 30 mg 24 hr tablet Take 30 mg by mouth daily at bedtime 11/30/19   Historical Provider, MD   Lancets MISC test once times daily E11.65 7/12/17   Historical Provider, MD   lansoprazole (PREVACID) 30 mg capsule Take 1 capsule (30 mg total) by mouth daily 10/17/22   Paramjit Art MD   LORazepam (ATIVAN) 1 mg tablet TAKE 1 TABLET DAILY AS NEEDED FOR ANXIETY 3/6/23   Paramjit Art MD   metFORMIN (GLUCOPHAGE) 500 mg tablet Take 1,000 mg by mouth 2 (two) times a day 5/6/11   Historical Provider, MD   Misc Natural Products (BLOOD SUGAR 360 PO) ONETOUCH ULTRA TEST STRIPS Test once daily E11.65 7/12/17   Historical Provider, MD   omega-3-acid ethyl esters (LOVAZA) 1 g capsule Take 2 g by mouth 2 (two) times a day  11/3/11   Historical Provider, MD   sotalol (BETAPACE) 80 mg tablet Take 1 tablet by mouth 2 (two) times a day    Historical Provider, MD   spironolactone (ALDACTONE) 25 mg tablet Take 1 tablet by mouth daily    Historical Provider, MD       Inpatient Medications:  Scheduled Meds:   Current Facility-Administered Medications   Medication Dose Route Frequency Provider Last Rate   • acetaminophen  975 mg Oral Q8H [address] Villa, DO     • aspirin  81 mg Oral Daily Cami Villa DO     • atorvastatin  40 mg Oral Daily With [address] [facility] Allen, DO     • carvedilol  12.5 mg Oral BID Cintia Loya MD     • cefTRIAXone  2,000 mg Intravenous Q24H Audelia Morejon MD 2,000 mg (08/18/23 1011)   • enoxaparin  40 mg Subcutaneous Q24H [address] Chong Villa DO     • escitalopram  20 mg Oral Daily Cami Villa DO     • ezetimibe  10 mg Oral Daily Cami Villa DO     • fenofibrate  145 mg Oral Daily Cami Villa DO     • HYDROmorphone  0.5 mg Intravenous Q4H PRN Cami Villa DO     • insulin regular (HumuLIN R,NovoLIN R) 1 Units/mL in sodium chloride 0.9 % 100 mL infusion  0.3-21 Units/hr Intravenous Titrated Brady Rosado MD 2 Units/hr (08/18/23 1418)   • LORazepam  1 mg Oral Daily PRN Cami Villa DO     • melatonin  6 mg Oral HS Chong Villa DO     • metroNIDAZOLE  500 mg Oral Q12H [address] Audelia Morejon MD     • ondansetron  4 mg Intravenous Q6H PRN Michelle Leah, DO     • oxyCODONE  10 mg Oral Q4H PRN Michelle Yae, DO     • oxyCODONE  5 mg Oral Q4H PRN Cami Villa DO     • pantoprazole  20 mg Oral Early Morning Michelle Leah, DO     • senna  1 tablet Oral HS Cami Villa DO     • sotalol  80 mg Oral BID Cintia Loya MD       Continuous Infusions: insulin regular (HumuLIN R,NovoLIN R) 1 Units/mL in sodium chloride 0.9 % 100 mL infusion, 0.3-21 Units/hr, Last Rate: 2 Units/hr (08/18/23 1418)      PRN Meds:  HYDROmorphone, 0.5 mg, Q4H PRN  LORazepam, 1 mg, Daily PRN  ondansetron, 4 mg, Q6H PRN  oxyCODONE, 10 mg, Q4H PRN  oxyCODONE, 5 mg, Q4H PRN        Allergies: Allergies   Allergen Reactions   • Ace Inhibitors Cough       Review of Systems:  Review of Systems   Constitutional: Positive for fatigue. Negative for chills and fever. HENT: Negative for ear pain and sore throat. Eyes: Negative for pain and visual disturbance. Respiratory: Negative for cough and shortness of breath. Cardiovascular: Negative for chest pain, palpitations and leg swelling. Gastrointestinal: Negative for abdominal pain and vomiting. Genitourinary: Negative for dysuria and hematuria. Musculoskeletal: Negative for arthralgias and back pain. Skin: Negative for color change and rash. Neurological: Negative for seizures and syncope. All other systems reviewed and are negative. Vital Signs:     Vitals:    08/17/23 1613 08/17/23 2127 08/18/23 0816 08/18/23 0943   BP: 146/75 134/59 156/78 121/75   BP Location:   Right arm    Pulse: 70 70 70 80   Resp: 14 16 20    Temp: 98.7 °F (37.1 °C) 98.7 °F (37.1 °C) 98.6 °F (37 °C)    TempSrc:   Oral    SpO2: 97% 96% 91%    Weight:    92.1 kg (203 lb)   Height:    5' 9" (1.753 m)     Invasive Devices     Peripherally Inserted Central Catheter Line  Duration           PICC Line 31/22/30 Right Basilic 1 day          Central Venous Catheter Line  Duration           CVC Central Lines 08/13/23 Triple 4 days                Physical Exam:  Physical Exam  Vitals reviewed. Constitutional:       General: He is not in acute distress. Appearance: Normal appearance. He is obese. HENT:      Head: Normocephalic and atraumatic. Mouth/Throat:      Mouth: Mucous membranes are moist.      Pharynx: Oropharynx is clear. Eyes:      Extraocular Movements: Extraocular movements intact.       Conjunctiva/sclera: Conjunctivae normal.      Pupils: Pupils are equal, round, and reactive to light. Cardiovascular:      Rate and Rhythm: Normal rate and regular rhythm. Heart sounds: Normal heart sounds. No murmur heard. Pulmonary:      Effort: Pulmonary effort is normal.      Breath sounds: Normal breath sounds. Chest:       Abdominal:      General: Abdomen is flat. Bowel sounds are normal.      Palpations: Abdomen is soft. Musculoskeletal:         General: No swelling. Normal range of motion. Cervical back: Normal range of motion and neck supple. Skin:     General: Skin is warm and dry. Neurological:      General: No focal deficit present. Mental Status: He is alert and oriented to person, place, and time. Psychiatric:         Mood and Affect: Mood normal.         Behavior: Behavior normal.         Lab Results:     Results from last 7 days   Lab Units 08/18/23  0446 08/17/23 0442 08/16/23  0423   WBC Thousand/uL 8.06 9.28 8.44   HEMOGLOBIN g/dL 9.2* 9.0* 9.1*   HEMATOCRIT % 28.6* 28.3* 27.8*   PLATELETS Thousands/uL 250 227 226     Results from last 7 days   Lab Units 08/18/23  0446 08/17/23  0442 08/16/23  1824 08/13/23  2035 08/13/23  1958   POTASSIUM mmol/L 3.5 3.8 4.0   < >  --    CHLORIDE mmol/L 109* 110* 109*   < >  --    CO2 mmol/L 29 29 29   < >  --    CO2, I-STAT mmol/L  --   --   --   --  19*   BUN mg/dL 8 7 8   < >  --    CREATININE mg/dL 0.49* 0.42* 0.61   < >  --    GLUCOSE, ISTAT mg/dl  --   --   --   --  323*   CALCIUM mg/dL 8.4 8.2* 8.0*   < >  --     < > = values in this interval not displayed. Results from last 7 days   Lab Units 08/13/23  2312 08/13/23  1528   INR  1.27* 1.29*   PTT seconds  --  29     Lab Results   Component Value Date    HGBA1C 11.0 (H) 08/18/2023     No results found for: "CKTOTAL", "CKMB", "CKMBINDEX", "TROPONINI"    Imaging Studies:     Echocardiogram:   •  Left Ventricle: Left ventricular cavity size is normal. Wall thickness is normal. The left ventricular ejection fraction is 40%. Systolic function is mildly reduced. There is mild global hypokinesis. •  Right Ventricle: Right ventricular cavity size is normal. Systolic function is normal.  •  Right Atrium: A pacer wire is present with a mobile 1.7 cm strandlike echodensity on the atrial aspect of the right ventricular lead, which is highly suggestive of a vegetation. •  Mitral Valve: There is mild regurgitation. •  Tricuspid Valve: There is mild regurgitation.     Likely vegetation on RV device lead.       Findings    Left Ventricle Left ventricular cavity size is normal. Wall thickness is normal. The left ventricular ejection fraction is 40%. Systolic function is mildly reduced. There is mild global hypokinesis. Right Ventricle Right ventricular cavity size is normal. Systolic function is normal. Wall thickness is normal.      Left Atrium The atrium is normal in size. Right Atrium The atrium is normal in size. A pacer wire is present with a mobile 1.7 cm strandlike echodensity on the atrial aspect of the right ventricular lead, which is highly suggestive of a vegetation. Atrial Septum No patent foramen ovale detected using color flow Doppler at rest.      Left Atrial Appendage Left atrial appendage is normal in size. There is normal function. There is no thrombus. Aortic Valve The aortic valve is trileaflet. The leaflets are not thickened. The leaflets are not calcified. The leaflets exhibit normal mobility. There is no evidence of regurgitation. The aortic valve has no significant stenosis. Mitral Valve Mitral valve structure is normal. There is mild regurgitation. There is no evidence of stenosis. Tricuspid Valve Tricuspid valve structure is normal. There is mild regurgitation. There is no evidence of stenosis. Pulmonic Valve Pulmonic valve structure is normal. There is trace regurgitation. There is no evidence of stenosis. Ascending Aorta The aortic root is normal in size. There is no evidence of aortic dissection.  There is no aneurysm in the aorta. I have personally reviewed pertinent reports. 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 They understand and wish to proceed with further workup and ultimately surgical intervention. We have ordered routine preoperative laboratory and vascular studies. Pending the results of these tests, they will be scheduled for surgery  with Dr. Obdulio Armendariz, Mere Patel was comfortable with our recommendations, and their questions were answered to their satisfaction. We will continue to evaluate the patient daily with further recommendations as work up is completed. Thank you for allowing us to participate in the care of this patient. SIGNATURE: Karin Ramírez  DATE: August 18, 2023  TIME: 2:25 PM    * This note was completed in part utilizing [employer] direct voice recognition software. Grammatical errors, random word insertion, spelling mistakes, and incomplete sentences may be an occasional consequence of the system secondary to software limitations, ambient noise and hardware issues. At the time of dictation, efforts were made to edit, clarify and /or correct errors. Please read the chart carefully and recognize, using context, where substitutions have occurred. If you have any questions or concerns about the context, text or information contained within the body of this dictation, please contact myself, the provider, for further clarification.

## 2023-08-18 NOTE — CONSULTS
Consultation - Fannie Ponce 79 y.o. male MRN: [id number]    Unit/Bed#: [facility] 934-01 Encounter: [id number]      Assessment/Plan     Assessment:  Fannie Ponce is a 79y.o. year old male with past medical history of type 2 diabetes mellitus, CAD status post CABG, who presented with right groin pain into [address] [place] [address], transferred to [address] [place] [address] for debridement of necrotizing fasciitis. He is status post debridement on 8/13, 8/14, 8/16. .  Endocrinology consulted for management of type 2 diabetes mellitus. Plan:  Type 2 diabetes mellitus with hyperglycemia  HbA1c 11 August 2023  Home regimen: Toujeo U300 130 units at bedtime, Humalog U200 24 units with meals, Jardiance 25 mg daily, Trulicity 4.5 mg weekly, metformin 1000 mg twice daily, uses juany at home, follows up with [facility] endocrinology  Inpatient regimen: Insulin drip    Recommendations: We recommend continuing with insulin drip for now until plans for CT surgery is finalized  We recommend discontinuing Jardiance as outpatient given necrotizing soft tissue infection this admission  - We will continue to monitor blood glucose and make adjustments as needed. Monitor for hypoglycemia and treat according to the protocol    CC: Diabetes Consult    History of Present Illness     HPI: Fannie Ponce is a 79y.o. year old male with past medical history of type 2 diabetes mellitus, CAD status post CABG, who presented with right groin pain into [address] [place] [address], transferred to [address] [place] [address] for debridement of necrotizing fasciitis. He is status post debridement on 8/13, 8/14, 8/16. .  Endocrinology consulted for management of type 2 diabetes mellitus. He denies to have polyuria, polydipsia, nocturia and blurry vision. Denies of hypoglycemic episodes at home. He is on oral agents as well as insulin. He has never been on U-500 insulin as outpatient. Postoperatively, he has had uncontrolled blood glucose and hence was started on insulin drip.   He was n.p.o. today for MARCELLA procedure and was found to have right lead vegetation and needs ICD explantation for which CT surgery is going to be consulted. He reports to have good appetite    Inpatient consult to Endocrinology  Consult performed by: Fernando Andres MD  Consult ordered by: Bebe Bowden MD          Review of Systems   Constitutional: Negative for activity change, appetite change and fatigue. HENT: Negative for congestion and sore throat. Eyes: Negative for redness and visual disturbance. Respiratory: Negative for cough and shortness of breath. Cardiovascular: Negative for palpitations and leg swelling. Gastrointestinal: Negative for abdominal pain, constipation, diarrhea, nausea and vomiting. Endocrine: Negative for polydipsia, polyphagia and polyuria. Genitourinary: Negative for difficulty urinating and dysuria. Musculoskeletal: Negative for gait problem and neck pain. Skin: Negative for color change. Neurological: Negative for dizziness and syncope. Psychiatric/Behavioral: Negative for agitation and behavioral problems. All other systems reviewed and are negative.       Historical Information   Past Medical History:   Diagnosis Date   • Benign essential hypertension 06/02/2011    Last Assessment & Plan:  BP stable on current meds   • Chronic systolic CHF (congestive heart failure) ([address]) 04/12/2021   • Coronary atherosclerosis 04/08/2010    Overview:  Managed by cardiology   • Generalized anxiety disorder 05/22/2012   • ICD (implantable cardioverter-defibrillator) in place 12/04/2019   • Ischemic cardiomyopathy 09/13/2012   • Mixed hyperlipidemia 04/08/2010    Managed by cardiology   Last Assessment & Plan:  on lipitor 40 mg, zetia, lovaza ,LDL 83. continue   • Necrotizing soft tissue infection 08/13/2023   • Rectal hemorrhage     last assessed: May 29, 2015   • S/P CABG (coronary artery bypass graft) 1994   • Uncontrolled type 2 diabetes mellitus with hyperglycemia ([address]) 09/15/2016    Transitioned From: Diabetes Mellitus   • Ventricular tachycardia ([address]) 04/12/2021     Past Surgical History:   Procedure Laterality Date   • CORONARY ARTERY BYPASS GRAFT     • DECUBITUS ULCER EXCISION Right 8/14/2023    Procedure: Washout and debridement of perineal wound;  Surgeon: Nova Leonard MD;  [facility];  Service: General   • ELBOW SURGERY Right     Bursitis - last assessed: Sept 15, 2016   • IR PICC REPOSITION  8/18/2023   • VAC DRESSING APPLICATION N/A 0/20/0732    Procedure: APPLICATION VAC DRESSING;  Surgeon: Shakira Jolly DO;  Location[facility];  Service: General   • WOUND DEBRIDEMENT N/A 8/13/2023    Procedure: EXCISIONAL DEBRIDEMENT Perineum;  Surgeon: Malik Soto DO;  Location[facility];  Service: General   • WOUND DEBRIDEMENT N/A 8/16/2023    Procedure: DEBRIDEMENT WOUND, [facility] OUT, PARTIAL CLOSURE;  Surgeon: Shakira Jolly DO;  Location[facility];  Service: General     Social History   Social History     Substance and Sexual Activity   Alcohol Use No    Comment: being a social drinker noted in "allscripts"      Social History     Substance and Sexual Activity   Drug Use No     Social History     Tobacco Use   Smoking Status Never   Smokeless Tobacco Never     Family History:   Family History   Problem Relation Age of Onset   • No Known Problems Mother    • Cancer Father    • Coronary artery disease Father        Meds/Allergies   Current Facility-Administered Medications   Medication Dose Route Frequency Provider Last Rate Last Admin   • acetaminophen (TYLENOL) tablet 975 mg  975 mg Oral Q8H [address] Villa DO   975 mg at 08/18/23 1452   • aspirin (ECOTRIN LOW STRENGTH) EC tablet 81 mg  81 mg Oral Daily Chong Villa DO   81 mg at 08/18/23 1007   • atorvastatin (LIPITOR) tablet 40 mg  40 mg Oral Daily With Dyllan Villa DO   40 mg at 08/17/23 1640   • carvedilol (COREG) tablet 12.5 mg  12.5 mg Oral BID Juany Ruth MD 12.5 mg at 08/18/23 1007   • cefTRIAXone (ROCEPHIN) 2,000 mg in dextrose 5 % 50 mL IVPB  2,000 mg Intravenous Q24H Yasmani Ward  mL/hr at 08/18/23 1011 2,000 mg at 08/18/23 1011   • enoxaparin (LOVENOX) subcutaneous injection 40 mg  40 mg Subcutaneous Q24H [facility], DO   40 mg at 08/18/23 1010   • escitalopram (LEXAPRO) tablet 20 mg  20 mg Oral Daily [facility], DO   20 mg at 08/18/23 1007   • ezetimibe (ZETIA) tablet 10 mg  10 mg Oral Daily [facility], DO   10 mg at 08/18/23 1007   • fenofibrate (TRICOR) tablet 145 mg  145 mg Oral Daily [facility], DO   145 mg at 08/18/23 1007   • HYDROmorphone (DILAUDID) injection 0.5 mg  0.5 mg Intravenous Q4H PRN [facility], DO   0.5 mg at 08/18/23 1200   • insulin regular (HumuLIN R,NovoLIN R) 1 Units/mL in sodium chloride 0.9 % 100 mL infusion  0.3-21 Units/hr Intravenous Titrated Hortensia Saunders MD 2 mL/hr at 08/18/23 1418 2 Units/hr at 08/18/23 1418   • LORazepam (ATIVAN) tablet 1 mg  1 mg Oral Daily PRN [facility], DO       • melatonin tablet 6 mg  6 mg Oral HS [facility], DO   6 mg at 08/17/23 2224   • metroNIDAZOLE (FLAGYL) tablet 500 mg  500 mg Oral Q12H 100 Lamar Sepulveda MD   500 mg at 08/18/23 1010   • ondansetron (ZOFRAN) injection 4 mg  4 mg Intravenous Q6H PRN [facility], DO       • oxyCODONE (ROXICODONE) immediate release tablet 10 mg  10 mg Oral Q4H PRN [facility], DO   10 mg at 08/18/23 1453   • oxyCODONE (ROXICODONE) IR tablet 5 mg  5 mg Oral Q4H PRN [facility], DO   5 mg at 08/16/23 1922   • pantoprazole (PROTONIX) EC tablet 20 mg  20 mg Oral Early Morning [facility], DO   20 mg at 08/17/23 6213   • senna (SENOKOT) tablet 8.6 mg  1 tablet Oral HS [facility], DO   8.6 mg at 08/16/23 2110   • sotalol (BETAPACE) tablet 80 mg  80 mg Oral BID Anitha Sparks MD   80 mg at 08/18/23 1130     Allergies   Allergen Reactions   • Ace Inhibitors Cough Objective   Vitals: Blood pressure 143/64, pulse 73, temperature 98.8 °F (37.1 °C), resp. rate 18, height 5' 9" (1.753 m), weight 92.1 kg (203 lb), SpO2 90 %. Intake/Output Summary (Last 24 hours) at 8/18/2023 1538  Last data filed at 8/18/2023 0916  Gross per 24 hour   Intake 150 ml   Output 600 ml   Net -450 ml     Invasive Devices     Peripherally Inserted Central Catheter Line  Duration           PICC Line 74/61/23 Right Basilic 1 day          Central Venous Catheter Line  Duration           CVC Central Lines 08/13/23 Triple 4 days                Physical Exam  Constitutional:       Appearance: Normal appearance. HENT:      Head: Normocephalic and atraumatic. Cardiovascular:      Rate and Rhythm: Normal rate and regular rhythm. Pulses: Normal pulses. Heart sounds: Normal heart sounds. No murmur heard. No gallop. Pulmonary:      Effort: Pulmonary effort is normal.      Breath sounds: Normal breath sounds. No wheezing or rales. Abdominal:      Palpations: Abdomen is soft. Tenderness: There is no abdominal tenderness. Musculoskeletal:         General: No swelling. Cervical back: Normal range of motion and neck supple. Right lower leg: No edema. Left lower leg: No edema. Skin:     General: Skin is warm. Neurological:      Mental Status: He is alert and oriented to person, place, and time. Mental status is at baseline. Psychiatric:         Mood and Affect: Mood normal.         Behavior: Behavior normal.         The history was obtained from the review of the chart, patient.     Lab Results:   Results from last 7 days   Lab Units 08/18/23  0446   HEMOGLOBIN A1C % 11.0*     Lab Results   Component Value Date    WBC 8.06 08/18/2023    HGB 9.2 (L) 08/18/2023    HCT 28.6 (L) 08/18/2023    MCV 96 08/18/2023     08/18/2023     Lab Results   Component Value Date/Time    BUN 8 08/18/2023 04:46 AM    BUN 20 10/14/2020 08:18 AM     07/18/2015 05:40 AM    K 3.5 08/18/2023 04:46 AM    K 4.6 10/14/2020 08:18 AM     (H) 08/18/2023 04:46 AM     10/14/2020 08:18 AM    CO2 29 08/18/2023 04:46 AM    CO2 19 (L) 08/13/2023 07:58 PM    CO2 26 10/14/2020 08:18 AM    CREATININE 0.49 (L) 08/18/2023 04:46 AM    CREATININE 0.65 07/18/2015 05:40 AM    AST 9 08/15/2023 04:27 AM    AST 12 10/14/2020 08:18 AM    ALT 10 (L) 08/15/2023 04:27 AM    ALT 14 10/14/2020 08:18 AM    TP 5.4 (L) 08/15/2023 04:27 AM    TP 7.0 10/14/2020 08:18 AM    ALB 1.7 (L) 08/15/2023 04:27 AM    GLOB 3.0 10/14/2020 08:18 AM     No results for input(s): "CHOL", "HDL", "LDL", "TRIG", "VLDL" in the last 72 hours. No results found for: "[place]", "[id number]"  POC Glucose (mg/dl)   Date Value   08/18/2023 189 (H)   08/18/2023 159 (H)   08/18/2023 162 (H)   08/18/2023 164 (H)   08/18/2023 153 (H)   08/17/2023 324 (H)   08/17/2023 348 (H)   08/17/2023 200 (H)   08/17/2023 232 (H)   08/17/2023 103       Imaging Studies: I have personally reviewed pertinent reports. Portions of the record may have been created with voice recognition software. Please Tigertext questions to the clinician covering the "[id number]" Role. Thank you.

## 2023-08-18 NOTE — CONSULTS
Consultation - Electrophysiology - Cardiology  Paul Lemon 79 y.o. male MRN: [id number]  Unit/Bed#: [facility] 934-01 Encounter: [id number]      Inpatient consult to Electrophysiology  Consult performed by: Cassie Mccartney PA-C  Consult ordered by: BRITTANY Kendrick          History of Present Illness   Physician Requesting Consult: Maxime Mo MD  Reason for Consult / Principal Problem: ICD lead infection with need for extraction     Assessment/Plan   Assessment:  1. [employer] dual chamber ICD placed in 2011 with RA lead infection   a. Dual chamber [employer] ICD placed in 2011 2/2 primary prevention for NICM   b. Bacteremia with strep Aninosus likely 2/2 #2   c. MARCELLA with 1.7 cm strand-like echodensity on RA highly suggestive of vegetation   d. Device check AP 80% set to DDDR 70 with intrinsic A rate 65 so patient not dependent   2. Jamarcus's Gangrene   a. S/p perineal debridement and washout 8/13  b. Repeat washout and debridement 8/14  c. Repeat washout and debridement, partial closure, and wound VAC placement 8/16  d. On coreg, asa, statin  3. Step Bacteremia w/ Step Anginosus   a. 1/2 Bcx positive   b. Infectious disease is following   c. Currently on rocephin and flagyl   d. Treatment duration is undetermined    4. HFrEF 2/2 to ischemia   a. Prior echo with EF 25% in the setting of ischemia   b. Repeat echo here with EF 40%   5. CAD s/p stenting in 2015 and CABG in 1994   a. On coreg, asa, statin  6. DM II   a. a1c 11.0   b. Endocrine following   c. Currently on heparin drip   7. Acute respiratory failure   a. Required intubation in the OR   b. Now on NC oxygen 2L   8. HTN     Plan:  - unfortunately given the patients vegetation on RA lead it would appear that device extraction would be recommended. We did have the [facility] scientific reps check his device and found him to not be dependent. He atrially paces 80% but his intrinsic A rate (65) is lower then his device rate which is set to DDDR 70.  He did receive two prior ICD shocks according to the patient and wife who is a nurse back when he first got the device implanted in 2011 and has not received tachy therapy since then. We will need to consult CTS given this device has been in place since 2011 and will likely require their assistance with the extraction. Depending on timing for reimplant, he may require a life-vest until infection clears and he is cleared by ID for reimplantation. Regarding reimplantation, this will likely have to be a right sided reimplantation. HPI: Kay Kevin is a 79y.o. year old male with history of type 2 diabetes, hypertension, coronary artery disease status post stenting in 2015, and CABG in 1994, primary prevention dual-chamber [employer] ICD placed in 2011 who presents with 40 years gangrene and subsequent RA lead vegetation. In terms of patient's electrophysiology history, he has prior history of ischemic cardiomyopathy with reduced EF prompting primary prevention DC ICD placement by [employer] in 2011. He did have 2 device shocks at around the time when device was placed according to patient, however we have no records available at this. Briefly, patient initially presented to [address] [place] [address] with right perineal pain and swelling for 4 days. He initially was triggering severe sepsis with lactic acid of 2.8, white blood cell count of 14 and had significantly elevated A1c at 12. 3. He was emergently taken to the operating room for washout and debridement was transferred to [address] [place] [address]. Blood cultures 1 of 2 were positive for strep Anginosus prompting patient to undergo transesophageal echocardiogram showing 1.7 cm right atrial lead vegetation. EP was consulted for need for device extraction.       Historical Information   Past Medical History:   Diagnosis Date   • Rectal hemorrhage     last assessed: May 29, 2015     Past Surgical History:   Procedure Laterality Date   • CORONARY ARTERY BYPASS GRAFT     • DECUBITUS ULCER EXCISION Right 8/14/2023    Procedure: Washout and debridement of perineal wound;  Surgeon: Lisa Hernández MD;  [facility];  Service: General   • ELBOW SURGERY Right     Bursitis - last assessed: Sept 15, 2016   • VAC DRESSING APPLICATION N/A 2/25/3041    Procedure: APPLICATION VAC DRESSING;  Surgeon: Brandi Jolly DO;  [facility];  Service: General   • WOUND DEBRIDEMENT N/A 8/13/2023    Procedure: EXCISIONAL DEBRIDEMENT Perineum;  Surgeon: Waldo Sahu DO;  [facility];  Service: General   • WOUND DEBRIDEMENT N/A 8/16/2023    Procedure: DEBRIDEMENT WOUND, [address] OUT, PARTIAL CLOSURE;  Surgeon: Brandi Jolly DO;  [facility];  Service: General     Social History     Substance and Sexual Activity   Alcohol Use No    Comment: being a social drinker noted in "allscripts"      Social History     Substance and Sexual Activity   Drug Use No     Social History     Tobacco Use   Smoking Status Never   Smokeless Tobacco Never     Family History:   Family History   Problem Relation Age of Onset   • No Known Problems Mother    • Cancer Father    • Coronary artery disease Father        Meds/Allergies   Hospital Medications:   Current Facility-Administered Medications   Medication Dose Route Frequency   • acetaminophen (TYLENOL) tablet 975 mg  975 mg Oral Q8H [address]   • aspirin (ECOTRIN LOW STRENGTH) EC tablet 81 mg  81 mg Oral Daily   • atorvastatin (LIPITOR) tablet 40 mg  40 mg Oral Daily With Dinner   • carvedilol (COREG) tablet 12.5 mg  12.5 mg Oral BID   • cefTRIAXone (ROCEPHIN) 2,000 mg in dextrose 5 % 50 mL IVPB  2,000 mg Intravenous Q24H   • enoxaparin (LOVENOX) subcutaneous injection 40 mg  40 mg Subcutaneous Q24H [address]   • escitalopram (LEXAPRO) tablet 20 mg  20 mg Oral Daily   • ezetimibe (ZETIA) tablet 10 mg  10 mg Oral Daily   • fenofibrate (TRICOR) tablet 145 mg  145 mg Oral Daily   • HYDROmorphone (DILAUDID) injection 0.5 mg  0.5 mg Intravenous Q4H PRN   • insulin regular (HumuLIN R,NovoLIN R) 1 Units/mL in sodium chloride 0.9 % 100 mL infusion  0.3-21 Units/hr Intravenous Titrated   • LORazepam (ATIVAN) tablet 1 mg  1 mg Oral Daily PRN   • melatonin tablet 6 mg  6 mg Oral HS   • metroNIDAZOLE (FLAGYL) tablet 500 mg  500 mg Oral Q12H [facility]   • ondansetron (ZOFRAN) injection 4 mg  4 mg Intravenous Q6H PRN   • oxyCODONE (ROXICODONE) immediate release tablet 10 mg  10 mg Oral Q4H PRN   • oxyCODONE (ROXICODONE) IR tablet 5 mg  5 mg Oral Q4H PRN   • pantoprazole (PROTONIX) EC tablet 20 mg  20 mg Oral Early Morning   • senna (SENOKOT) tablet 8.6 mg  1 tablet Oral HS   • sotalol (BETAPACE) tablet 80 mg  80 mg Oral BID     Home Medications:   Medications Prior to Admission   Medication   • aspirin 81 MG tablet   • atorvastatin (LIPITOR) 40 mg tablet   • B-D ULTRAFINE III SHORT PEN 31G X 8 MM MISC   • BD ULTRA-FINE PEN NEEDLES 29G X 12.7MM MISC   • candesartan (ATACAND) 16 mg tablet   • carvedilol (COREG) 12.5 mg tablet   • Continuous Blood Gluc Sensor ([employer] DRU SENSOR SYSTEM) MISC   • dulaglutide (Trulicity) 3 NT/9.0BO injection   • Empagliflozin (JARDIANCE) 25 MG TABS   • escitalopram (LEXAPRO) 20 mg tablet   • ezetimibe (ZETIA) 10 mg tablet   • fenofibrate (TRICOR) 145 mg tablet   • insulin glargine (Toujeo SoloStar) 300 units/mL CONCENTRATED U-300 injection pen (1-unit dial)   • Insulin Pen Needle (PEN NEEDLES 29GX1/2") 29G X 12MM MISC   • insuln lispro (HumaLOG KwikPen) 200 units/mL CONCENTRATED U-200 injection pen   • isosorbide mononitrate (IMDUR) 30 mg 24 hr tablet   • Lancets MISC   • lansoprazole (PREVACID) 30 mg capsule   • LORazepam (ATIVAN) 1 mg tablet   • metFORMIN (GLUCOPHAGE) 500 mg tablet   • Misc Natural Products (BLOOD SUGAR 360 PO)   • omega-3-acid ethyl esters (LOVAZA) 1 g capsule   • sotalol (BETAPACE) 80 mg tablet   • spironolactone (ALDACTONE) 25 mg tablet       Allergies   Allergen Reactions   • Ace Inhibitors Cough       Objective   Vitals: Blood pressure 121/75, pulse 80, temperature 98.6 °F (37 °C), temperature source Oral, resp. rate 20, height 5' 9" (1.753 m), weight 92.1 kg (203 lb), SpO2 91 %. Orthostatic Blood Pressures    Flowsheet Row Most Recent Value   Blood Pressure 121/75 filed at 08/18/2023 7959   Patient Position - Orthostatic VS Lying filed at 08/17/2023 0827            Intake/Output Summary (Last 24 hours) at 8/18/2023 1221  Last data filed at 8/18/2023 0916  Gross per 24 hour   Intake 150 ml   Output 600 ml   Net -450 ml       Invasive Devices     Peripherally Inserted Central Catheter Line  Duration           PICC Line 79/79/58 Right Basilic <1 day          Central Venous Catheter Line  Duration           CVC Central Lines 08/13/23 Triple 4 days                Review of Systems:  Review of Systems   Constitutional: Negative for fever and malaise/fatigue. Cardiovascular: Negative for chest pain, dyspnea on exertion, irregular heartbeat and palpitations. Respiratory: Negative for shortness of breath. ROS as noted above, otherwise 12 point review of systems was performed and is negative. Physical Exam  Vitals and nursing note reviewed. Constitutional:       General: He is not in acute distress. Appearance: He is obese. Cardiovascular:      Rate and Rhythm: Normal rate and regular rhythm. Pulmonary:      Effort: Pulmonary effort is normal.      Breath sounds: Normal breath sounds. Comments: On 2 L nc   Genitourinary:     Comments: Wound vac in place. Musculoskeletal:         General: Swelling (bilateral upper extremities ) present. Skin:     General: Skin is warm and dry. Neurological:      General: No focal deficit present. Mental Status: He is alert and oriented to person, place, and time. Lab Results: I have personally reviewed pertinent lab results.     Results from last 7 days   Lab Units 08/18/23  0446 08/17/23  0442 08/16/23  0423   WBC Thousand/uL 8. 06 9.28 8.44   HEMOGLOBIN g/dL 9.2* 9.0* 9.1*   HEMATOCRIT % 28.6* 28.3* 27.8*   PLATELETS Thousands/uL 250 227 226     Results from last 7 days   Lab Units 08/18/23  0446 08/17/23  0442 08/16/23  1824 08/13/23  2035 08/13/23  1958   POTASSIUM mmol/L 3.5 3.8 4.0   < >  --    CHLORIDE mmol/L 109* 110* 109*   < >  --    CO2 mmol/L 29 29 29   < >  --    CO2, I-STAT mmol/L  --   --   --   --  19*   BUN mg/dL 8 7 8   < >  --    CREATININE mg/dL 0.49* 0.42* 0.61   < >  --    GLUCOSE, ISTAT mg/dl  --   --   --   --  323*   CALCIUM mg/dL 8.4 8.2* 8.0*   < >  --     < > = values in this interval not displayed. Results from last 7 days   Lab Units 08/13/23  2312 08/13/23  1528   INR  1.27* 1.29*   PTT seconds  --  29     Results from last 7 days   Lab Units 08/18/23  0446 08/17/23  0442 08/16/23  0455   MAGNESIUM mg/dL 1.8 1.6 2.1       Imaging: I have personally reviewed pertinent reports. ECHO: No results found for this or any previous visit. Cardiac testing:   ECHO:   No results found for this or any previous visit. No results found for this or any previous visit. CATH:  No results found for this or any previous visit. STRESS TEST:  No results found for this or any previous visit.       VTE Prophylaxis: Enoxaparin (Lovenox)

## 2023-08-18 NOTE — PROGRESS NOTES
Progress Note - Infectious Disease   Isabel Arndt 79 y.o. male MRN: [id number]  Unit/Bed#: [facility] 934-01 Encounter: [id number]      Impression/Plan:    1. Streptococcus Bacteremia and likely ICD infection:  -1 of 2 sets from 8/13 are growing Streptococcus now identified as Strep anginosus. The likely source of bacteremia being his necrotizing perennial infection. Strep anginosus can cause endovascular infection and we need to consider the possibility of endocarditis and/or ICD infection. 2D echo was noted to be a poor quality study. MARCELLA 8/18 found vegetation on pacer wire of RV lead. Repeat blood cultures 8/13 negative.  -will adjust antibiotics to IV Ceftriaxone 2g daily for Streptococcal cardiac device infection, add PO Metronidazole as below for anaerobic coverage of soft tissue infection  -Please consult EP to discuss consideration for cardiac device removal     2. Jamarcus's Gangrene:  -Status post perineal debridement/washout on 8/13, repeat washout on 8/14 and partial closure with wound VAC application on 2/92. Overall appears there has been good source control of this infection patient is clinically improving as he remains afebrile with resolved leukocytosis and no longer on vasopressors. Likely this was the source of his bacteremia as above. OR cultures have grown Group C Strep, Prevotella and now E. Coli. -As above switch to Ceftriaxone which will cover Strep and E. coli  -Add PO Metronidazole 500 mg every 12 hours for Prevotella  -Will likely need prolonged course of Ceftriaxone for ICD infection as above  -can stop Metronidazole on 8/22, will complete 5 day course from last wound debridement  -ongoing surgical management and wound care     3. Acute Hypoxic Respiratory Failure  -Patient had been intubated for the OR. He has now been successfully extubated. Chest x-rays have shown linear atelectasis at the left lung base but no consolidation to suggest pneumonia.   Currently on 6 L nasal cannula. -Monitor O2 needs  -Wean oxygen as able     4. T2DM   -Risk factor for invasive infections including necrotizing soft tissue infection. A1c was 12.3 in January of this year.  -Tight glycemic control      I have discussed the above management plan in detail with the primary service  ID will reassess on , call over weekend with questions    Antibiotics:  Unasyn: 3  Total abx days: 6    24 Hour Events:  Afebrile. E. Coli sensitivities still not back. MARCELLA done showing vegetation on RV lead. Subjective:  Patient has no fever, chills, sweats. Pain in groin region is stable. No other new complaints. Objective:  Vitals:  Temp:  [98.6 °F (37 °C)-98.7 °F (37.1 °C)] 98.6 °F (37 °C)  HR:  [70-74] 70  Resp:  [14-29] 20  BP: (125-156)/(52-78) 156/78  SpO2:  [91 %-97 %] 91 %  Temp (24hrs), Av.7 °F (37.1 °C), Min:98.6 °F (37 °C), Max:98.7 °F (37.1 °C)  Current: Temperature: 98.6 °F (37 °C)    Physical Exam:   General Appearance:  Alert, interactive, tired appearing, no acute distress. Throat: Oropharynx moist without lesions. Lungs:   Clear to auscultation bilaterally; no wheezes, rhonchi or rales; respirations unlabored   Heart:  RRR; no murmur, rub or gallop   Abdomen:   Soft, non-tender. Extremities: No clubbing, cyanosis or edema   Skin: No new rashes or lesions. Labs:    All pertinent labs and imaging studies were personally reviewed  Results from last 7 days   Lab Units 23  0446 23  0442 23  0423   WBC Thousand/uL 8.06 9.28 8.44   HEMOGLOBIN g/dL 9.2* 9.0* 9.1*   PLATELETS Thousands/uL 250 227 226     Results from last 7 days   Lab Units 23  0446 23  0442 23  1824 23  0455 08/15/23  0427 23  0144 23  2309 23   SODIUM mmol/L 142 143 140   < > 142   < > 135 132*  --    POTASSIUM mmol/L 3.5 3.8 4.0   < > 2.9*   < > 4.0 3.6  --    CHLORIDE mmol/L 109* 110* 109*   < > 109*   < > 105 101  --    CO2 mmol/L 29 29 29 < > 27   < > 22 18*  --    CO2, I-STAT mmol/L  --   --   --   --   --   --   --   --  19*   BUN mg/dL 8 7 8   < > 8   < > 15 15  --    CREATININE mg/dL 0.49* 0.42* 0.61   < > 0.55*   < > 0.73 0.57*  --    EGFR ml/min/1.73sq m 113 120 103   < > 107   < > 95 106  --    GLUCOSE, ISTAT mg/dl  --   --   --   --   --   --   --   --  323*   CALCIUM mg/dL 8.4 8.2* 8.0*   < > 8.0*   < > 8.3 8.1*  --    AST U/L  --   --   --   --  9  --  9 8*  --    ALT U/L  --   --   --   --  10*  --  11* 7  --    ALK PHOS U/L  --   --   --   --  66  --  78 67  --     < > = values in this interval not displayed. Results from last 7 days   Lab Units 08/13/23  1519   PROCALCITONIN ng/ml 0.91*                   Micro:  Results from last 7 days   Lab Units 08/13/23  2308 08/13/23  1930 08/13/23  1528 08/13/23  1519   BLOOD CULTURE  No Growth After 4 Days. No Growth After 4 Days. --  Streptococcus anginosus* No Growth After 4 Days.    GRAM STAIN RESULT   --  No polys seen*  1+ Gram negative rods*  1+ Gram positive cocci in pairs* Gram positive cocci in pairs and chains*  --    WOUND CULTURE   --  3+ Growth of Beta Hemolytic Streptococcus Group C*  Growth in Broth culture only Escherichia coli*  --   --        Imaging:          Tucker Bryson MD  [facility]

## 2023-08-18 NOTE — CASE MANAGEMENT
Case Management Discharge Planning Note    Patient name Justice Davey  Location [facility] 934/[facility] 286-73 MRN [id number]  : 1955 Date 2023       Current Admission Date: 2023  Current Admission Diagnosis:Necrotizing soft tissue infection   Patient Active Problem List    Diagnosis Date Noted   • Necrotizing soft tissue infection 2023   • Cholelithiasis 2023   • Chronic systolic CHF (congestive heart failure) ([address]) 2021   • Ventricular tachycardia ([address]) 2021   • ICD (implantable cardioverter-defibrillator) in place 2019   • Uncontrolled type 2 diabetes mellitus with hyperglycemia ([address]) 09/15/2016   • Esophageal reflux 2013   • Ischemic cardiomyopathy 2012   • Generalized anxiety disorder 2012   • Benign essential hypertension 2011   • Coronary atherosclerosis 2010   • Mixed hyperlipidemia 2010      LOS (days): 5  Geometric Mean LOS (GMLOS) (days): 9.60  Days to GMLOS:5.1     OBJECTIVE:  Risk of Unplanned Readmission Score: 18.85         Current admission status: Inpatient   Preferred Pharmacy:   [address] [place] [address] [address] [address]  Phone: [phone] Fax: [phone] [address] [place] [address] [address] [address]  Phone: [phone] Fax: [phone]    [facility] [address] [place] [address] Road [address]  Phone: [phone] Fax: [phone]    [facility] ([address] [place] [address]  Phone: [phone] Fax: [phone] [address], 210 [facility] [address]  [facility] [address]  Phone: [phone] Fax: [phone]    Primary Care Provider: Rafal Gallo MD    Primary Insurance: [address] [facility]  Secondary Insurance:     DISCHARGE DETAILS:    Per surgery - EP and endo being consulted. Patient will need a wound vac. Paperwork provided to provider. Plan of care is TBD at this time. CM to continue to support.

## 2023-08-19 LAB
ABO GROUP BLD: NORMAL
ANION GAP SERPL CALCULATED.3IONS-SCNC: 3 MMOL/L
BACTERIA BLD CULT: NORMAL
BACTERIA WND AEROBE CULT: ABNORMAL
BACTERIA WND AEROBE CULT: ABNORMAL
BASOPHILS # BLD AUTO: 0.03 THOUSANDS/ÂΜL (ref 0–0.1)
BASOPHILS NFR BLD AUTO: 0 % (ref 0–1)
BLD GP AB SCN SERPL QL: NEGATIVE
BUN SERPL-MCNC: 7 MG/DL (ref 5–25)
CALCIUM SERPL-MCNC: 8.6 MG/DL (ref 8.3–10.1)
CHLORIDE SERPL-SCNC: 109 MMOL/L (ref 96–108)
CO2 SERPL-SCNC: 29 MMOL/L (ref 21–32)
CREAT SERPL-MCNC: 0.54 MG/DL (ref 0.6–1.3)
EOSINOPHIL # BLD AUTO: 0.15 THOUSAND/ÂΜL (ref 0–0.61)
EOSINOPHIL NFR BLD AUTO: 2 % (ref 0–6)
ERYTHROCYTE [DISTWIDTH] IN BLOOD BY AUTOMATED COUNT: 13.6 % (ref 11.6–15.1)
GFR SERPL CREATININE-BSD FRML MDRD: 108 ML/MIN/1.73SQ M
GLUCOSE SERPL-MCNC: 104 MG/DL (ref 65–140)
GLUCOSE SERPL-MCNC: 106 MG/DL (ref 65–140)
GLUCOSE SERPL-MCNC: 128 MG/DL (ref 65–140)
GLUCOSE SERPL-MCNC: 136 MG/DL (ref 65–140)
GLUCOSE SERPL-MCNC: 137 MG/DL (ref 65–140)
GLUCOSE SERPL-MCNC: 142 MG/DL (ref 65–140)
GLUCOSE SERPL-MCNC: 173 MG/DL (ref 65–140)
GLUCOSE SERPL-MCNC: 183 MG/DL (ref 65–140)
GLUCOSE SERPL-MCNC: 205 MG/DL (ref 65–140)
GLUCOSE SERPL-MCNC: 224 MG/DL (ref 65–140)
GLUCOSE SERPL-MCNC: 230 MG/DL (ref 65–140)
GLUCOSE SERPL-MCNC: 249 MG/DL (ref 65–140)
GLUCOSE SERPL-MCNC: 269 MG/DL (ref 65–140)
GLUCOSE SERPL-MCNC: 95 MG/DL (ref 65–140)
GRAM STN SPEC: ABNORMAL
HCT VFR BLD AUTO: 28.6 % (ref 36.5–49.3)
HGB BLD-MCNC: 9.1 G/DL (ref 12–17)
IMM GRANULOCYTES # BLD AUTO: 0.14 THOUSAND/UL (ref 0–0.2)
IMM GRANULOCYTES NFR BLD AUTO: 2 % (ref 0–2)
LYMPHOCYTES # BLD AUTO: 1.39 THOUSANDS/ÂΜL (ref 0.6–4.47)
LYMPHOCYTES NFR BLD AUTO: 21 % (ref 14–44)
MCH RBC QN AUTO: 30.3 PG (ref 26.8–34.3)
MCHC RBC AUTO-ENTMCNC: 31.8 G/DL (ref 31.4–37.4)
MCV RBC AUTO: 95 FL (ref 82–98)
MONOCYTES # BLD AUTO: 0.57 THOUSAND/ÂΜL (ref 0.17–1.22)
MONOCYTES NFR BLD AUTO: 9 % (ref 4–12)
NEUTROPHILS # BLD AUTO: 4.4 THOUSANDS/ÂΜL (ref 1.85–7.62)
NEUTS SEG NFR BLD AUTO: 66 % (ref 43–75)
NRBC BLD AUTO-RTO: 0 /100 WBCS
PLATELET # BLD AUTO: 240 THOUSANDS/UL (ref 149–390)
PMV BLD AUTO: 9.4 FL (ref 8.9–12.7)
POTASSIUM SERPL-SCNC: 3.9 MMOL/L (ref 3.5–5.3)
RBC # BLD AUTO: 3 MILLION/UL (ref 3.88–5.62)
RH BLD: POSITIVE
SODIUM SERPL-SCNC: 141 MMOL/L (ref 135–147)
SPECIMEN EXPIRATION DATE: NORMAL
WBC # BLD AUTO: 6.68 THOUSAND/UL (ref 4.31–10.16)

## 2023-08-19 PROCEDURE — 86901 BLOOD TYPING SEROLOGIC RH(D): CPT | Performed by: PHYSICIAN ASSISTANT

## 2023-08-19 PROCEDURE — 85025 COMPLETE CBC W/AUTO DIFF WBC: CPT | Performed by: NURSE PRACTITIONER

## 2023-08-19 PROCEDURE — 86900 BLOOD TYPING SEROLOGIC ABO: CPT | Performed by: PHYSICIAN ASSISTANT

## 2023-08-19 PROCEDURE — 86850 RBC ANTIBODY SCREEN: CPT | Performed by: PHYSICIAN ASSISTANT

## 2023-08-19 PROCEDURE — 99233 SBSQ HOSP IP/OBS HIGH 50: CPT | Performed by: SURGERY

## 2023-08-19 PROCEDURE — 80048 BASIC METABOLIC PNL TOTAL CA: CPT | Performed by: NURSE PRACTITIONER

## 2023-08-19 PROCEDURE — 82948 REAGENT STRIP/BLOOD GLUCOSE: CPT

## 2023-08-19 RX ORDER — POTASSIUM CHLORIDE 20 MEQ/1
20 TABLET, EXTENDED RELEASE ORAL ONCE
Status: COMPLETED | OUTPATIENT
Start: 2023-08-19 | End: 2023-08-19

## 2023-08-19 RX ADMIN — SODIUM CHLORIDE 4 UNITS/HR: 9 INJECTION, SOLUTION INTRAVENOUS at 16:40

## 2023-08-19 RX ADMIN — SENNOSIDES 8.6 MG: 8.6 TABLET, FILM COATED ORAL at 21:59

## 2023-08-19 RX ADMIN — METRONIDAZOLE 500 MG: 500 TABLET ORAL at 08:17

## 2023-08-19 RX ADMIN — POTASSIUM CHLORIDE 20 MEQ: 1500 TABLET, EXTENDED RELEASE ORAL at 14:22

## 2023-08-19 RX ADMIN — CEFTRIAXONE SODIUM 2000 MG: 10 INJECTION, POWDER, FOR SOLUTION INTRAVENOUS at 08:16

## 2023-08-19 RX ADMIN — OXYCODONE HYDROCHLORIDE 10 MG: 10 TABLET ORAL at 22:03

## 2023-08-19 RX ADMIN — ASPIRIN 81 MG: 81 TABLET, COATED ORAL at 08:17

## 2023-08-19 RX ADMIN — MELATONIN 6 MG: at 21:57

## 2023-08-19 RX ADMIN — CARVEDILOL 12.5 MG: 12.5 TABLET, FILM COATED ORAL at 18:11

## 2023-08-19 RX ADMIN — ACETAMINOPHEN 975 MG: 325 TABLET, FILM COATED ORAL at 14:19

## 2023-08-19 RX ADMIN — OXYCODONE HYDROCHLORIDE 10 MG: 10 TABLET ORAL at 11:56

## 2023-08-19 RX ADMIN — ACETAMINOPHEN 975 MG: 325 TABLET, FILM COATED ORAL at 06:17

## 2023-08-19 RX ADMIN — SOTALOL HYDROCHLORIDE 80 MG: 80 TABLET ORAL at 08:17

## 2023-08-19 RX ADMIN — ENOXAPARIN SODIUM 40 MG: 40 INJECTION SUBCUTANEOUS at 08:17

## 2023-08-19 RX ADMIN — OXYCODONE HYDROCHLORIDE 10 MG: 10 TABLET ORAL at 04:29

## 2023-08-19 RX ADMIN — SOTALOL HYDROCHLORIDE 80 MG: 80 TABLET ORAL at 21:59

## 2023-08-19 RX ADMIN — PANTOPRAZOLE SODIUM 20 MG: 20 TABLET, DELAYED RELEASE ORAL at 06:17

## 2023-08-19 RX ADMIN — ACETAMINOPHEN 975 MG: 325 TABLET, FILM COATED ORAL at 21:57

## 2023-08-19 RX ADMIN — HYDROMORPHONE HYDROCHLORIDE 0.5 MG: 1 INJECTION, SOLUTION INTRAMUSCULAR; INTRAVENOUS; SUBCUTANEOUS at 18:14

## 2023-08-19 RX ADMIN — ATORVASTATIN CALCIUM 40 MG: 40 TABLET, FILM COATED ORAL at 18:11

## 2023-08-19 RX ADMIN — EZETIMIBE 10 MG: 10 TABLET ORAL at 08:17

## 2023-08-19 RX ADMIN — CARVEDILOL 12.5 MG: 12.5 TABLET, FILM COATED ORAL at 08:16

## 2023-08-19 RX ADMIN — METRONIDAZOLE 500 MG: 500 TABLET ORAL at 21:59

## 2023-08-19 RX ADMIN — ESCITALOPRAM OXALATE 20 MG: 20 TABLET, FILM COATED ORAL at 08:17

## 2023-08-19 RX ADMIN — HYDROMORPHONE HYDROCHLORIDE 0.5 MG: 1 INJECTION, SOLUTION INTRAMUSCULAR; INTRAVENOUS; SUBCUTANEOUS at 06:20

## 2023-08-19 RX ADMIN — FENOFIBRATE 145 MG: 145 TABLET ORAL at 08:16

## 2023-08-19 NOTE — PROGRESS NOTES
Progress Note - Red Surgery  : Red Surgery Resident on Vini Gonzalez 79 y.o. male MRN: [id number]  Unit/Bed#: [facility] 934-01 Encounter: [id number]    Assessment:  79 y.o. 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 Wound VAC changed yesterday. Hemodynamically stable, perennial wound is healing well. Echocardiogram from yesterday showed vegetations on pacemaker lead. EP found patient to be not dependent on pacemaker, and CT surgery was consulted. They are recommending removal of pacemaker leads Monday. O2 sat 89% overnight on 2 L of nasal cannula. Vital signs otherwise stable. WBC pending from 8.06    Hgb pending from 9.2  Creatinine pending from 0.49  Blood glucose pending from 133.    8/13 Blood Cx: Strep susceptible to Rocephin, Penicillin, and Vanc        Plan:  -continue Diabetic diet level 2  -f/u Endocrinology consult for glucose control  -vac change MWF  -continue Rocephin and Flagyl per ID  -follow up Strep anginosus sensitivities   -follow up E. Coli sensitivities  -pt/ot  -dispo planning  -pain/ nausea control PRN  -oob/ ambulation  -incentive Spirometry      Subjective/Objective     Subjective: No acute events overnight, patient seen and examined at bedside. Patient pain is well controlled overall. No fevers, chills, nausea, vomiting. No problems with urination or bowel movements. Objective:     Physical Exam:  • General: No acute distress  • Head: Normocephalic, atraumatic  • Eyes: Conjunctivae unremarkable  • Nose: External appearance unremarkable  • Ears: External ears unremarkable  • Lung: Normal effort no signs of respiratory distress  • Ab: Soft, not distended, not TTP  • Extrem: No cyanosis, clubbing, or edema  • Neuro: A+Ox3   • Skin: Wound vac in place over right groin.  Surgical incisions clean, dry, and intact without significant erythema or induration. Vitals: Temp:  [98.6 °F (37 °C)-98.8 °F (37.1 °C)] 98.7 °F (37.1 °C)  HR:  [70-80] 70  Resp:  [18-20] 19  BP: (121-156)/(53-78) 144/69  FiO2 (%):  [6] 6  Body mass index is 29.98 kg/m². I/O       08/17 0701  08/18 0700 08/18 0701  08/19 0700    P. O. 0     I.V. (mL/kg) 2 (0) 150 (1.6)    IV Piggyback 450     Total Intake(mL/kg) 452 (4.9) 150 (1.6)    Urine (mL/kg/hr) 1325 (0.6)     Total Output 1325     Net -873 +150                  Lab, Imaging and other studies: I have personally reviewed pertinent reports.   , CBC with diff: No results found for: "WBC", "HGB", "HCT", "MCV", "PLT", "ADJUSTEDWBC", "RBC", "MCH", "MCHC", "RDW", "MPV", "NRBC", BMP/CMP: No results found for: "SODIUM", "K", "CL", "CO2", "ANIONGAP", "BUN", "CREATININE", "GLUCOSE", "CALCIUM", "AST", "ALT", "ALKPHOS", "PROT", "BILITOT", "EGFR"  VTE Prophylaxis: sequential compression device      Edwin Beatty MD  8/19/2023 6:55 AM

## 2023-08-19 NOTE — PLAN OF CARE
Problem: MOBILITY - ADULT  Goal: Maintain or return to baseline ADL function  Description: INTERVENTIONS:  -  Assess patient's ability to carry out ADLs; assess patient's baseline for ADL function and identify physical deficits which impact ability to perform ADLs (bathing, care of mouth/teeth, toileting, grooming, dressing, etc.)  - Assess/evaluate cause of self-care deficits   - Assess range of motion  - Assess patient's mobility; develop plan if impaired  - Assess patient's need for assistive devices and provide as appropriate  - Encourage maximum independence but intervene and supervise when necessary  - Involve family in performance of ADLs  - Assess for home care needs following discharge   - Consider OT consult to assist with ADL evaluation and planning for discharge  - Provide patient education as appropriate  8/18/2023 2032 by Pau Fernandez RN  Outcome: Progressing  Goal: Maintains/Returns to pre admission functional level  Description: INTERVENTIONS:  - Perform BMAT or MOVE assessment daily.   - Set and communicate daily mobility goal to care team and patient/family/caregiver.    - Collaborate with rehabilitation services on mobility goals if consulted  - Out of bed for toileting  - Record patient progress and toleration of activity level   8/18/2023 2032 by Pau Fernandez RN  Outcome: Progressing    Problem: Prexisting or High Potential for Compromised Skin Integrity  Goal: Skin integrity is maintained or improved  Description: INTERVENTIONS:  - Identify patients at risk for skin breakdown  - Assess and monitor skin integrity  - Assess and monitor nutrition and hydration status  - Monitor labs   - Assess for incontinence   - Turn and reposition patient  - Assist with mobility/ambulation  - Relieve pressure over bony prominences  - Avoid friction and shearing  - Provide appropriate hygiene as needed including keeping skin clean and dry  - Evaluate need for skin moisturizer/barrier cream  - Collaborate with interdisciplinary team   - Patient/family teaching  - Consider wound care consult   8/18/2023 2032 by Javier Cain RN  Outcome: Progressing     Problem: Nutrition/Hydration-ADULT  Goal: Nutrient/Hydration intake appropriate for improving, restoring or maintaining nutritional needs  Description: Monitor and assess patient's nutrition/hydration status for malnutrition. Collaborate with interdisciplinary team and initiate plan and interventions as ordered. Monitor patient's weight and dietary intake as ordered or per policy. Utilize nutrition screening tool and intervene as necessary. Determine patient's food preferences and provide high-protein, high-caloric foods as appropriate.      INTERVENTIONS:  - Monitor oral intake, urinary output, labs, and treatment plans  - Assess nutrition and hydration status and recommend course of action  - Evaluate amount of meals eaten  - Assist patient with eating if necessary   - Allow adequate time for meals  - Recommend/ encourage appropriate diets, oral nutritional supplements, and vitamin/mineral supplements  - Order, calculate, and assess calorie counts as needed  - Recommend, monitor, and adjust tube feedings and TPN/PPN based on assessed needs  - Assess need for intravenous fluids  - Provide specific nutrition/hydration education as appropriate  - Include patient/family/caregiver in decisions related to nutrition  8/18/2023 2032 by Javier Cain RN  Outcome: Progressing     Problem: PAIN - ADULT  Goal: Verbalizes/displays adequate comfort level or baseline comfort level  Description: Interventions:  - Encourage patient to monitor pain and request assistance  - Assess pain using appropriate pain scale  - Administer analgesics based on type and severity of pain and evaluate response  - Implement non-pharmacological measures as appropriate and evaluate response  - Consider cultural and social influences on pain and pain management  - Notify physician/advanced practitioner if interventions unsuccessful or patient reports new pain  8/18/2023 2032 by Rashaad Arriaza RN  Outcome: Progressing     Problem: INFECTION - ADULT  Goal: Absence or prevention of progression during hospitalization  Description: INTERVENTIONS:  - Assess and monitor for signs and symptoms of infection  - Monitor lab/diagnostic results  - Monitor all insertion sites, i.e. indwelling lines, tubes, and drains  - Monitor endotracheal if appropriate and nasal secretions for changes in amount and color  - [place] appropriate cooling/warming therapies per order  - Administer medications as ordered  - Instruct and encourage patient and family to use good hand hygiene technique  - Identify and instruct in appropriate isolation precautions for identified infection/condition  8/18/2023 2032 by Rashaad Arriaza RN  Outcome: Progressing  Goal: Absence of fever/infection during neutropenic period  Description: INTERVENTIONS:  - Monitor WBC    8/18/2023 2032 by Rashaad Arriaza RN  Outcome: Progressing     Problem: SAFETY ADULT  Goal: Maintain or return to baseline ADL function  Description: INTERVENTIONS:  -  Assess patient's ability to carry out ADLs; assess patient's baseline for ADL function and identify physical deficits which impact ability to perform ADLs (bathing, care of mouth/teeth, toileting, grooming, dressing, etc.)  - Assess/evaluate cause of self-care deficits   - Assess range of motion  - Assess patient's mobility; develop plan if impaired  - Assess patient's need for assistive devices and provide as appropriate  - Encourage maximum independence but intervene and supervise when necessary  - Involve family in performance of ADLs  - Assess for home care needs following discharge   - Consider OT consult to assist with ADL evaluation and planning for discharge  - Provide patient education as appropriate  8/18/2023 2032 by Rashaad Arriaza RN  Outcome: Progressing  Goal: Maintains/Returns to pre admission functional level  Description: INTERVENTIONS:  - Perform BMAT or MOVE assessment daily.   - Set and communicate daily mobility goal to care team and patient/family/caregiver.    - Collaborate with rehabilitation services on mobility goals if consulted  - Out of bed for toileting  - Record patient progress and toleration of activity level   8/18/2023 2032 by Panfilo Vaughn RN  Outcome: Progressing  Goal: Patient will remain free of falls  Description: INTERVENTIONS:  - Educate patient/family on patient safety including physical limitations  - Instruct patient to call for assistance with activity   - Consult OT/PT to assist with strengthening/mobility   - Keep Call bell within reach  - Keep bed low and locked with side rails adjusted as appropriate  - Keep care items and personal belongings within reach  - Initiate and maintain comfort rounds  - Make Fall Risk Sign visible to staff  - Apply yellow socks and bracelet for high fall risk patients  - Consider moving patient to room near nurses station  8/18/2023 2032 by Panfilo Vaughn RN  Outcome: Progressing     Problem: DISCHARGE PLANNING  Goal: Discharge to home or other facility with appropriate resources  Description: INTERVENTIONS:  - Identify barriers to discharge w/patient and caregiver  - Arrange for needed discharge resources and transportation as appropriate  - Identify discharge learning needs (meds, wound care, etc.)  - Arrange for interpretive services to assist at discharge as needed  - Refer to Case Management Department for coordinating discharge planning if the patient needs post-hospital services based on physician/advanced practitioner order or complex needs related to functional status, cognitive ability, or social support system  8/18/2023 2032 by Panfilo Vaughn RN  Outcome: Progressing    Problem: Knowledge Deficit  Goal: Patient/family/caregiver demonstrates understanding of disease process, treatment plan, medications, and discharge instructions  Description: Complete learning assessment and assess knowledge base.   Interventions:  - Provide teaching at level of understanding  - Provide teaching via preferred learning methods  8/18/2023 2032 by Coliln Muhammad RN  Outcome: Progressing

## 2023-08-19 NOTE — PLAN OF CARE
Problem: MOBILITY - ADULT  Goal: Maintain or return to baseline ADL function  Description: INTERVENTIONS:  -  Assess patient's ability to carry out ADLs; assess patient's baseline for ADL function and identify physical deficits which impact ability to perform ADLs (bathing, care of mouth/teeth, toileting, grooming, dressing, etc.)  - Assess/evaluate cause of self-care deficits   - Assess range of motion  - Assess patient's mobility; develop plan if impaired  - Assess patient's need for assistive devices and provide as appropriate  - Encourage maximum independence but intervene and supervise when necessary  - Involve family in performance of ADLs  - Assess for home care needs following discharge   - Consider OT consult to assist with ADL evaluation and planning for discharge  - Provide patient education as appropriate  Outcome: Progressing  Goal: Maintains/Returns to pre admission functional level  Description: INTERVENTIONS:  - Perform BMAT or MOVE assessment daily.   - Set and communicate daily mobility goal to care team and patient/family/caregiver.    - Collaborate with rehabilitation services on mobility goals if consulted  - Out of bed for toileting  - Record patient progress and toleration of activity level   Outcome: Progressing     Problem: Prexisting or High Potential for Compromised Skin Integrity  Goal: Skin integrity is maintained or improved  Description: INTERVENTIONS:  - Identify patients at risk for skin breakdown  - Assess and monitor skin integrity  - Assess and monitor nutrition and hydration status  - Monitor labs   - Assess for incontinence   - Turn and reposition patient  - Assist with mobility/ambulation  - Relieve pressure over bony prominences  - Avoid friction and shearing  - Provide appropriate hygiene as needed including keeping skin clean and dry  - Evaluate need for skin moisturizer/barrier cream  - Collaborate with interdisciplinary team   - Patient/family teaching  - Consider wound care consult   Outcome: Progressing     Problem: Nutrition/Hydration-ADULT  Goal: Nutrient/Hydration intake appropriate for improving, restoring or maintaining nutritional needs  Description: Monitor and assess patient's nutrition/hydration status for malnutrition. Collaborate with interdisciplinary team and initiate plan and interventions as ordered. Monitor patient's weight and dietary intake as ordered or per policy. Utilize nutrition screening tool and intervene as necessary. Determine patient's food preferences and provide high-protein, high-caloric foods as appropriate.      INTERVENTIONS:  - Monitor oral intake, urinary output, labs, and treatment plans  - Assess nutrition and hydration status and recommend course of action  - Evaluate amount of meals eaten  - Assist patient with eating if necessary   - Allow adequate time for meals  - Recommend/ encourage appropriate diets, oral nutritional supplements, and vitamin/mineral supplements  - Order, calculate, and assess calorie counts as needed  - Recommend, monitor, and adjust tube feedings and TPN/PPN based on assessed needs  - Assess need for intravenous fluids  - Provide specific nutrition/hydration education as appropriate  - Include patient/family/caregiver in decisions related to nutrition  Outcome: Progressing     Problem: PAIN - ADULT  Goal: Verbalizes/displays adequate comfort level or baseline comfort level  Description: Interventions:  - Encourage patient to monitor pain and request assistance  - Assess pain using appropriate pain scale  - Administer analgesics based on type and severity of pain and evaluate response  - Implement non-pharmacological measures as appropriate and evaluate response  - Consider cultural and social influences on pain and pain management  - Notify physician/advanced practitioner if interventions unsuccessful or patient reports new pain  Outcome: Progressing     Problem: INFECTION - ADULT  Goal: Absence or prevention of progression during hospitalization  Description: INTERVENTIONS:  - Assess and monitor for signs and symptoms of infection  - Monitor lab/diagnostic results  - Monitor all insertion sites, i.e. indwelling lines, tubes, and drains  - Monitor endotracheal if appropriate and nasal secretions for changes in amount and color  - [place] appropriate cooling/warming therapies per order  - Administer medications as ordered  - Instruct and encourage patient and family to use good hand hygiene technique  - Identify and instruct in appropriate isolation precautions for identified infection/condition  Outcome: Progressing  Goal: Absence of fever/infection during neutropenic period  Description: INTERVENTIONS:  - Monitor WBC    Outcome: Progressing     Problem: SAFETY ADULT  Goal: Maintain or return to baseline ADL function  Description: INTERVENTIONS:  -  Assess patient's ability to carry out ADLs; assess patient's baseline for ADL function and identify physical deficits which impact ability to perform ADLs (bathing, care of mouth/teeth, toileting, grooming, dressing, etc.)  - Assess/evaluate cause of self-care deficits   - Assess range of motion  - Assess patient's mobility; develop plan if impaired  - Assess patient's need for assistive devices and provide as appropriate  - Encourage maximum independence but intervene and supervise when necessary  - Involve family in performance of ADLs  - Assess for home care needs following discharge   - Consider OT consult to assist with ADL evaluation and planning for discharge  - Provide patient education as appropriate  Outcome: Progressing  Goal: Maintains/Returns to pre admission functional level  Description: INTERVENTIONS:  - Perform BMAT or MOVE assessment daily.   - Set and communicate daily mobility goal to care team and patient/family/caregiver.    - Collaborate with rehabilitation services on mobility goals if consulted  - Out of bed for toileting  - Record patient progress and toleration of activity level   Outcome: Progressing  Goal: Patient will remain free of falls  Description: INTERVENTIONS:  - Educate patient/family on patient safety including physical limitations  - Instruct patient to call for assistance with activity   - Consult OT/PT to assist with strengthening/mobility   - Keep Call bell within reach  - Keep bed low and locked with side rails adjusted as appropriate  - Keep care items and personal belongings within reach  - Initiate and maintain comfort rounds  - Make Fall Risk Sign visible to staff  - Apply yellow socks and bracelet for high fall risk patients  - Consider moving patient to room near nurses station  Outcome: Progressing     Problem: DISCHARGE PLANNING  Goal: Discharge to home or other facility with appropriate resources  Description: INTERVENTIONS:  - Identify barriers to discharge w/patient and caregiver  - Arrange for needed discharge resources and transportation as appropriate  - Identify discharge learning needs (meds, wound care, etc.)  - Arrange for interpretive services to assist at discharge as needed  - Refer to Case Management Department for coordinating discharge planning if the patient needs post-hospital services based on physician/advanced practitioner order or complex needs related to functional status, cognitive ability, or social support system  Outcome: Progressing     Problem: Knowledge Deficit  Goal: Patient/family/caregiver demonstrates understanding of disease process, treatment plan, medications, and discharge instructions  Description: Complete learning assessment and assess knowledge base.   Interventions:  - Provide teaching at level of understanding  - Provide teaching via preferred learning methods  Outcome: Progressing

## 2023-08-20 LAB
ANION GAP SERPL CALCULATED.3IONS-SCNC: 2 MMOL/L
BASOPHILS # BLD AUTO: 0.06 THOUSANDS/ÂΜL (ref 0–0.1)
BASOPHILS NFR BLD AUTO: 1 % (ref 0–1)
BUN SERPL-MCNC: 5 MG/DL (ref 5–25)
CALCIUM SERPL-MCNC: 8.5 MG/DL (ref 8.3–10.1)
CHLORIDE SERPL-SCNC: 108 MMOL/L (ref 96–108)
CO2 SERPL-SCNC: 30 MMOL/L (ref 21–32)
CREAT SERPL-MCNC: 0.57 MG/DL (ref 0.6–1.3)
EOSINOPHIL # BLD AUTO: 0.16 THOUSAND/ÂΜL (ref 0–0.61)
EOSINOPHIL NFR BLD AUTO: 2 % (ref 0–6)
ERYTHROCYTE [DISTWIDTH] IN BLOOD BY AUTOMATED COUNT: 13.5 % (ref 11.6–15.1)
GFR SERPL CREATININE-BSD FRML MDRD: 106 ML/MIN/1.73SQ M
GLUCOSE SERPL-MCNC: 138 MG/DL (ref 65–140)
GLUCOSE SERPL-MCNC: 150 MG/DL (ref 65–140)
GLUCOSE SERPL-MCNC: 152 MG/DL (ref 65–140)
GLUCOSE SERPL-MCNC: 152 MG/DL (ref 65–140)
GLUCOSE SERPL-MCNC: 156 MG/DL (ref 65–140)
GLUCOSE SERPL-MCNC: 171 MG/DL (ref 65–140)
GLUCOSE SERPL-MCNC: 191 MG/DL (ref 65–140)
GLUCOSE SERPL-MCNC: 246 MG/DL (ref 65–140)
GLUCOSE SERPL-MCNC: 268 MG/DL (ref 65–140)
GLUCOSE SERPL-MCNC: 274 MG/DL (ref 65–140)
GLUCOSE SERPL-MCNC: 296 MG/DL (ref 65–140)
HCT VFR BLD AUTO: 30.1 % (ref 36.5–49.3)
HGB BLD-MCNC: 9.6 G/DL (ref 12–17)
IMM GRANULOCYTES # BLD AUTO: 0.14 THOUSAND/UL (ref 0–0.2)
IMM GRANULOCYTES NFR BLD AUTO: 2 % (ref 0–2)
LYMPHOCYTES # BLD AUTO: 1.83 THOUSANDS/ÂΜL (ref 0.6–4.47)
LYMPHOCYTES NFR BLD AUTO: 25 % (ref 14–44)
MCH RBC QN AUTO: 30.1 PG (ref 26.8–34.3)
MCHC RBC AUTO-ENTMCNC: 31.9 G/DL (ref 31.4–37.4)
MCV RBC AUTO: 94 FL (ref 82–98)
MONOCYTES # BLD AUTO: 0.75 THOUSAND/ÂΜL (ref 0.17–1.22)
MONOCYTES NFR BLD AUTO: 10 % (ref 4–12)
NEUTROPHILS # BLD AUTO: 4.3 THOUSANDS/ÂΜL (ref 1.85–7.62)
NEUTS SEG NFR BLD AUTO: 60 % (ref 43–75)
NRBC BLD AUTO-RTO: 0 /100 WBCS
PLATELET # BLD AUTO: 274 THOUSANDS/UL (ref 149–390)
PMV BLD AUTO: 9.6 FL (ref 8.9–12.7)
POTASSIUM SERPL-SCNC: 4 MMOL/L (ref 3.5–5.3)
RBC # BLD AUTO: 3.19 MILLION/UL (ref 3.88–5.62)
SODIUM SERPL-SCNC: 140 MMOL/L (ref 135–147)
WBC # BLD AUTO: 7.24 THOUSAND/UL (ref 4.31–10.16)

## 2023-08-20 PROCEDURE — 80048 BASIC METABOLIC PNL TOTAL CA: CPT

## 2023-08-20 PROCEDURE — 99232 SBSQ HOSP IP/OBS MODERATE 35: CPT | Performed by: INTERNAL MEDICINE

## 2023-08-20 PROCEDURE — 86900 BLOOD TYPING SEROLOGIC ABO: CPT | Performed by: SURGERY

## 2023-08-20 PROCEDURE — 86850 RBC ANTIBODY SCREEN: CPT | Performed by: SURGERY

## 2023-08-20 PROCEDURE — 85025 COMPLETE CBC W/AUTO DIFF WBC: CPT

## 2023-08-20 PROCEDURE — 82948 REAGENT STRIP/BLOOD GLUCOSE: CPT

## 2023-08-20 PROCEDURE — 99232 SBSQ HOSP IP/OBS MODERATE 35: CPT | Performed by: SURGERY

## 2023-08-20 PROCEDURE — 86901 BLOOD TYPING SEROLOGIC RH(D): CPT | Performed by: SURGERY

## 2023-08-20 RX ORDER — INSULIN LISPRO 100 [IU]/ML
13 INJECTION, SOLUTION INTRAVENOUS; SUBCUTANEOUS
Status: DISCONTINUED | OUTPATIENT
Start: 2023-08-21 | End: 2023-08-25

## 2023-08-20 RX ORDER — INSULIN LISPRO 100 [IU]/ML
7 INJECTION, SOLUTION INTRAVENOUS; SUBCUTANEOUS
Status: COMPLETED | OUTPATIENT
Start: 2023-08-20 | End: 2023-08-20

## 2023-08-20 RX ORDER — INSULIN LISPRO 100 [IU]/ML
7 INJECTION, SOLUTION INTRAVENOUS; SUBCUTANEOUS
Status: DISCONTINUED | OUTPATIENT
Start: 2023-08-20 | End: 2023-08-20

## 2023-08-20 RX ORDER — INSULIN GLARGINE 100 [IU]/ML
20 INJECTION, SOLUTION SUBCUTANEOUS EVERY MORNING
Status: DISCONTINUED | OUTPATIENT
Start: 2023-08-20 | End: 2023-08-21

## 2023-08-20 RX ORDER — INSULIN LISPRO 100 [IU]/ML
1-5 INJECTION, SOLUTION INTRAVENOUS; SUBCUTANEOUS
Status: DISCONTINUED | OUTPATIENT
Start: 2023-08-20 | End: 2023-08-21

## 2023-08-20 RX ORDER — INSULIN LISPRO 100 [IU]/ML
1-6 INJECTION, SOLUTION INTRAVENOUS; SUBCUTANEOUS
Status: DISCONTINUED | OUTPATIENT
Start: 2023-08-20 | End: 2023-08-21

## 2023-08-20 RX ADMIN — METRONIDAZOLE 500 MG: 500 TABLET ORAL at 08:28

## 2023-08-20 RX ADMIN — ENOXAPARIN SODIUM 40 MG: 40 INJECTION SUBCUTANEOUS at 08:28

## 2023-08-20 RX ADMIN — CARVEDILOL 12.5 MG: 12.5 TABLET, FILM COATED ORAL at 17:00

## 2023-08-20 RX ADMIN — MELATONIN 6 MG: at 22:48

## 2023-08-20 RX ADMIN — SOTALOL HYDROCHLORIDE 80 MG: 80 TABLET ORAL at 22:48

## 2023-08-20 RX ADMIN — INSULIN GLARGINE 20 UNITS: 100 INJECTION, SOLUTION SUBCUTANEOUS at 11:46

## 2023-08-20 RX ADMIN — ACETAMINOPHEN 975 MG: 325 TABLET, FILM COATED ORAL at 06:11

## 2023-08-20 RX ADMIN — PANTOPRAZOLE SODIUM 20 MG: 20 TABLET, DELAYED RELEASE ORAL at 06:11

## 2023-08-20 RX ADMIN — INSULIN LISPRO 4 UNITS: 100 INJECTION, SOLUTION INTRAVENOUS; SUBCUTANEOUS at 16:59

## 2023-08-20 RX ADMIN — ASPIRIN 81 MG: 81 TABLET, COATED ORAL at 08:28

## 2023-08-20 RX ADMIN — EZETIMIBE 10 MG: 10 TABLET ORAL at 08:28

## 2023-08-20 RX ADMIN — ESCITALOPRAM OXALATE 20 MG: 20 TABLET, FILM COATED ORAL at 08:28

## 2023-08-20 RX ADMIN — ACETAMINOPHEN 975 MG: 325 TABLET, FILM COATED ORAL at 22:49

## 2023-08-20 RX ADMIN — CEFTRIAXONE SODIUM 2000 MG: 10 INJECTION, POWDER, FOR SOLUTION INTRAVENOUS at 08:30

## 2023-08-20 RX ADMIN — OXYCODONE HYDROCHLORIDE 5 MG: 5 TABLET ORAL at 13:24

## 2023-08-20 RX ADMIN — HYDROMORPHONE HYDROCHLORIDE 0.5 MG: 1 INJECTION, SOLUTION INTRAMUSCULAR; INTRAVENOUS; SUBCUTANEOUS at 06:29

## 2023-08-20 RX ADMIN — ATORVASTATIN CALCIUM 40 MG: 40 TABLET, FILM COATED ORAL at 16:57

## 2023-08-20 RX ADMIN — CARVEDILOL 12.5 MG: 12.5 TABLET, FILM COATED ORAL at 08:28

## 2023-08-20 RX ADMIN — INSULIN LISPRO 7 UNITS: 100 INJECTION, SOLUTION INTRAVENOUS; SUBCUTANEOUS at 11:46

## 2023-08-20 RX ADMIN — FENOFIBRATE 145 MG: 145 TABLET ORAL at 08:28

## 2023-08-20 RX ADMIN — SOTALOL HYDROCHLORIDE 80 MG: 80 TABLET ORAL at 08:32

## 2023-08-20 RX ADMIN — INSULIN LISPRO 1 UNITS: 100 INJECTION, SOLUTION INTRAVENOUS; SUBCUTANEOUS at 22:55

## 2023-08-20 RX ADMIN — OXYCODONE HYDROCHLORIDE 10 MG: 10 TABLET ORAL at 03:41

## 2023-08-20 RX ADMIN — ACETAMINOPHEN 975 MG: 325 TABLET, FILM COATED ORAL at 13:23

## 2023-08-20 RX ADMIN — INSULIN LISPRO 7 UNITS: 100 INJECTION, SOLUTION INTRAVENOUS; SUBCUTANEOUS at 19:30

## 2023-08-20 RX ADMIN — METRONIDAZOLE 500 MG: 500 TABLET ORAL at 22:55

## 2023-08-20 RX ADMIN — INSULIN LISPRO 4 UNITS: 100 INJECTION, SOLUTION INTRAVENOUS; SUBCUTANEOUS at 11:46

## 2023-08-20 NOTE — PROGRESS NOTES
Progress Note - Red Surgery  : Red Surgery Resident on Gloria Gonzalez 79 y.o. male MRN: [id number]  Unit/Bed#: [facility] 934-01 Encounter: [id number]    Assessment:  79 y.o. 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 Wound VAC changed yesterday. Hemodynamically stable, perennial wound is healing well. O2 sat 89% over night on 2L NC. Vitals otherwise stable. Plan:  -lead removal with CT surgery this week. -continue Diabetic diet level 2  -f/u Endocrinology consult for glucose control  -vac change MWF  -continue Rocephin and Flagyl per ID  -follow up Strep anginosus sensitivities   -follow up E. Coli sensitivities  -pt/ot  -dispo planning  -pain/ nausea control PRN  -oob/ ambulation  -incentive Spirometry    Subjective/Objective     Subjective: Doing well pain is well controlled. No fevers, nausea, vomiting, difficulty with urination or bowel movements. Objective:     Physical Exam:  • General: No acute distress  • Head: Normocephalic, atraumatic  • Eyes: Conjunctivae unremarkable  • Nose: External appearance unremarkable  • Ears: External ears unremarkable  • Lung: Normal effort no signs of respiratory distress  • Ab: Soft, not distended, not TTP  • Extrem: No cyanosis, clubbing, or edema  • Neuro: A+Ox3   • Skin: Wound vac in place over the right groin surgical site and functioning. Mild TTP over the area. No erythema, induration, or other signs of infection. Vitals: Temp:  [97.9 °F (36.6 °C)-98.8 °F (37.1 °C)] 98 °F (36.7 °C)  HR:  [70-75] 75  Resp:  [19] 19  BP: (143-164)/(60-99) 160/81  Body mass index is 31.58 kg/m². I/O       08/18 0701 08/19 0700 08/19 0701 08/20 0700 08/20 0701 08/21 0700    P. O.       I.V. (mL/kg) 150 (1.5) 27.5 (0.3)     IV Piggyback       Total Intake(mL/kg) 150 (1.5) 27.5 (0.3) Urine (mL/kg/hr)  600 (0.3)     Stool  300     Total Output  900     Net +150 -872.5            Unmeasured Urine Occurrence 1 x 1 x     Unmeasured Stool Occurrence 1 x 1 x             Lab, Imaging and other studies: I have personally reviewed pertinent reports.   , CBC with diff:   Lab Results   Component Value Date    WBC 7.24 08/20/2023    HGB 9.6 (L) 08/20/2023    HCT 30.1 (L) 08/20/2023    MCV 94 08/20/2023     08/20/2023    RBC 3.19 (L) 08/20/2023    MCH 30.1 08/20/2023    MCHC 31.9 08/20/2023    RDW 13.5 08/20/2023    MPV 9.6 08/20/2023    NRBC 0 08/20/2023   , BMP/CMP:   Lab Results   Component Value Date    SODIUM 140 08/20/2023    K 4.0 08/20/2023     08/20/2023    CO2 30 08/20/2023    BUN 5 08/20/2023    CREATININE 0.57 (L) 08/20/2023    CALCIUM 8.5 08/20/2023    EGFR 106 08/20/2023     VTE Pharmacologic Prophylaxis: Enoxaparin (Lovenox)  VTE Mechanical Prophylaxis: sequential compression device      Evelio Casas MD  8/20/2023 7:24 AM

## 2023-08-20 NOTE — PROGRESS NOTES
Progress Note - Ketan Nguyễn 79 y.o. male MRN: [id number]    Unit/Bed#: [facility] 934-01 Encounter: [id number]      CC: diabetes f/u    Subjective:   Ketan Nguyễn is a 79y.o. 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 Endocrinology consulted for management of type 2 diabetes mellitus. Feels well. No complaints. No hypoglycemia. He is going to be planned for pacemaker extraction this week per gen-surg note    Objective:     Vitals: Blood pressure 147/66, pulse 69, temperature 97.8 °F (36.6 °C), resp. rate 19, height 5' 9" (1.753 m), weight 97 kg (213 lb 13.5 oz), SpO2 97 %. ,Body mass index is 31.58 kg/m². Intake/Output Summary (Last 24 hours) at 8/20/2023 7943  Last data filed at 8/20/2023 0701  Gross per 24 hour   Intake 27.53 ml   Output 1000 ml   Net -972.47 ml       Physical Exam:  General Appearance: awake, appears stated age and cooperative  Head: Normocephalic, without obvious abnormality, atraumatic  Extremities: moves all extremities  Skin: Skin color and temperature normal.   Pulm: no labored breathing    Lab, Imaging and other studies: I have personally reviewed pertinent reports. POC Glucose (mg/dl)   Date Value   08/20/2023 138   08/20/2023 156 (H)   08/20/2023 152 (H)   08/20/2023 152 (H)   08/20/2023 171 (H)   08/19/2023 173 (H)   08/19/2023 205 (H)   08/19/2023 224 (H)   08/19/2023 95   08/19/2023 183 (H)       Assessment and plan:  Type 2 diabetes mellitus with hyperglycemia  HbA1c 11 August 2023  Home regimen:  Toujeo U300 130 units at bedtime, Humalog U200 24 units with meals, Jardiance 25 mg daily, Trulicity 4.5 mg weekly, metformin 1000 mg twice daily, uses juany at home, follows [facility] with [facility] endocrinology  Inpatient regimen: Insulin drip     Recommendations:  -Based on 24-hour requirements, we recommend transitioning to Lantus 20 units now with insulin drip overlap and start Humalog 7 units with meals as well as correctional scale algorithm 3 with meals and 2 at bedtime. His insulin requirements for the last 24 hours are significantly less than his home regimen  - We will continue to monitor blood glucose and make adjustments as needed. Monitor for hypoglycemia and treat according to the protocol      Portions of the record may have been created with voice recognition software. Please Tigertext questions to the clinician covering the "[id number]" Role. Thank you.

## 2023-08-21 ENCOUNTER — PREP FOR PROCEDURE (OUTPATIENT)
Dept: CARDIAC SURGERY | Facility: CLINIC | Age: 68
End: 2023-08-21

## 2023-08-21 DIAGNOSIS — I47.20 VENTRICULAR TACHYCARDIA (HCC): Primary | ICD-10-CM

## 2023-08-21 LAB
ABO GROUP BLD: NORMAL
ANION GAP SERPL CALCULATED.3IONS-SCNC: 4 MMOL/L
BASOPHILS # BLD AUTO: 0.06 THOUSANDS/ÂΜL (ref 0–0.1)
BASOPHILS NFR BLD AUTO: 1 % (ref 0–1)
BLD GP AB SCN SERPL QL: NEGATIVE
BUN SERPL-MCNC: 7 MG/DL (ref 5–25)
CALCIUM SERPL-MCNC: 8.5 MG/DL (ref 8.3–10.1)
CHLORIDE SERPL-SCNC: 110 MMOL/L (ref 96–108)
CO2 SERPL-SCNC: 28 MMOL/L (ref 21–32)
CREAT SERPL-MCNC: 0.57 MG/DL (ref 0.6–1.3)
EOSINOPHIL # BLD AUTO: 0.13 THOUSAND/ÂΜL (ref 0–0.61)
EOSINOPHIL NFR BLD AUTO: 2 % (ref 0–6)
ERYTHROCYTE [DISTWIDTH] IN BLOOD BY AUTOMATED COUNT: 13.7 % (ref 11.6–15.1)
GFR SERPL CREATININE-BSD FRML MDRD: 106 ML/MIN/1.73SQ M
GLUCOSE SERPL-MCNC: 154 MG/DL (ref 65–140)
GLUCOSE SERPL-MCNC: 164 MG/DL (ref 65–140)
GLUCOSE SERPL-MCNC: 167 MG/DL (ref 65–140)
GLUCOSE SERPL-MCNC: 176 MG/DL (ref 65–140)
GLUCOSE SERPL-MCNC: 201 MG/DL (ref 65–140)
GLUCOSE SERPL-MCNC: 240 MG/DL (ref 65–140)
GLUCOSE SERPL-MCNC: 73 MG/DL (ref 65–140)
HCT VFR BLD AUTO: 29.3 % (ref 36.5–49.3)
HGB BLD-MCNC: 9.1 G/DL (ref 12–17)
IMM GRANULOCYTES # BLD AUTO: 0.11 THOUSAND/UL (ref 0–0.2)
IMM GRANULOCYTES NFR BLD AUTO: 1 % (ref 0–2)
LYMPHOCYTES # BLD AUTO: 2.02 THOUSANDS/ÂΜL (ref 0.6–4.47)
LYMPHOCYTES NFR BLD AUTO: 26 % (ref 14–44)
MAGNESIUM SERPL-MCNC: 1.8 MG/DL (ref 1.6–2.6)
MCH RBC QN AUTO: 29.8 PG (ref 26.8–34.3)
MCHC RBC AUTO-ENTMCNC: 31.1 G/DL (ref 31.4–37.4)
MCV RBC AUTO: 96 FL (ref 82–98)
MONOCYTES # BLD AUTO: 0.75 THOUSAND/ÂΜL (ref 0.17–1.22)
MONOCYTES NFR BLD AUTO: 10 % (ref 4–12)
NEUTROPHILS # BLD AUTO: 4.57 THOUSANDS/ÂΜL (ref 1.85–7.62)
NEUTS SEG NFR BLD AUTO: 60 % (ref 43–75)
NRBC BLD AUTO-RTO: 0 /100 WBCS
PHOSPHATE SERPL-MCNC: 2.1 MG/DL (ref 2.3–4.1)
PLATELET # BLD AUTO: 286 THOUSANDS/UL (ref 149–390)
PMV BLD AUTO: 9.9 FL (ref 8.9–12.7)
POTASSIUM SERPL-SCNC: 3.7 MMOL/L (ref 3.5–5.3)
RBC # BLD AUTO: 3.05 MILLION/UL (ref 3.88–5.62)
RH BLD: POSITIVE
SODIUM SERPL-SCNC: 142 MMOL/L (ref 135–147)
SPECIMEN EXPIRATION DATE: NORMAL
WBC # BLD AUTO: 7.64 THOUSAND/UL (ref 4.31–10.16)

## 2023-08-21 PROCEDURE — 97530 THERAPEUTIC ACTIVITIES: CPT

## 2023-08-21 PROCEDURE — 99233 SBSQ HOSP IP/OBS HIGH 50: CPT | Performed by: INTERNAL MEDICINE

## 2023-08-21 PROCEDURE — 99233 SBSQ HOSP IP/OBS HIGH 50: CPT | Performed by: PHYSICIAN ASSISTANT

## 2023-08-21 PROCEDURE — 97116 GAIT TRAINING THERAPY: CPT

## 2023-08-21 PROCEDURE — 80048 BASIC METABOLIC PNL TOTAL CA: CPT

## 2023-08-21 PROCEDURE — 82948 REAGENT STRIP/BLOOD GLUCOSE: CPT

## 2023-08-21 PROCEDURE — 83735 ASSAY OF MAGNESIUM: CPT

## 2023-08-21 PROCEDURE — 99233 SBSQ HOSP IP/OBS HIGH 50: CPT | Performed by: SURGERY

## 2023-08-21 PROCEDURE — 84100 ASSAY OF PHOSPHORUS: CPT

## 2023-08-21 PROCEDURE — 85025 COMPLETE CBC W/AUTO DIFF WBC: CPT

## 2023-08-21 RX ORDER — INSULIN GLARGINE 100 [IU]/ML
30 INJECTION, SOLUTION SUBCUTANEOUS EVERY MORNING
Status: DISCONTINUED | OUTPATIENT
Start: 2023-08-22 | End: 2023-08-26

## 2023-08-21 RX ORDER — INSULIN LISPRO 100 [IU]/ML
2-12 INJECTION, SOLUTION INTRAVENOUS; SUBCUTANEOUS
Status: DISCONTINUED | OUTPATIENT
Start: 2023-08-21 | End: 2023-08-21

## 2023-08-21 RX ORDER — INSULIN LISPRO 100 [IU]/ML
1-6 INJECTION, SOLUTION INTRAVENOUS; SUBCUTANEOUS
Status: DISCONTINUED | OUTPATIENT
Start: 2023-08-21 | End: 2023-08-31 | Stop reason: HOSPADM

## 2023-08-21 RX ORDER — INSULIN LISPRO 100 [IU]/ML
2-12 INJECTION, SOLUTION INTRAVENOUS; SUBCUTANEOUS
Status: DISCONTINUED | OUTPATIENT
Start: 2023-08-21 | End: 2023-08-26

## 2023-08-21 RX ADMIN — SOTALOL HYDROCHLORIDE 80 MG: 80 TABLET ORAL at 09:10

## 2023-08-21 RX ADMIN — INSULIN LISPRO 13 UNITS: 100 INJECTION, SOLUTION INTRAVENOUS; SUBCUTANEOUS at 12:20

## 2023-08-21 RX ADMIN — ACETAMINOPHEN 975 MG: 325 TABLET, FILM COATED ORAL at 21:25

## 2023-08-21 RX ADMIN — PANTOPRAZOLE SODIUM 20 MG: 20 TABLET, DELAYED RELEASE ORAL at 05:10

## 2023-08-21 RX ADMIN — FENOFIBRATE 145 MG: 145 TABLET ORAL at 09:09

## 2023-08-21 RX ADMIN — INSULIN LISPRO 13 UNITS: 100 INJECTION, SOLUTION INTRAVENOUS; SUBCUTANEOUS at 09:17

## 2023-08-21 RX ADMIN — POTASSIUM PHOSPHATE, MONOBASIC AND POTASSIUM PHOSPHATE, DIBASIC 21 MMOL: 224; 236 INJECTION, SOLUTION, CONCENTRATE INTRAVENOUS at 14:17

## 2023-08-21 RX ADMIN — INSULIN GLARGINE 20 UNITS: 100 INJECTION, SOLUTION SUBCUTANEOUS at 09:20

## 2023-08-21 RX ADMIN — CARVEDILOL 12.5 MG: 12.5 TABLET, FILM COATED ORAL at 17:53

## 2023-08-21 RX ADMIN — INSULIN LISPRO 2 UNITS: 100 INJECTION, SOLUTION INTRAVENOUS; SUBCUTANEOUS at 17:54

## 2023-08-21 RX ADMIN — ACETAMINOPHEN 975 MG: 325 TABLET, FILM COATED ORAL at 13:45

## 2023-08-21 RX ADMIN — EZETIMIBE 10 MG: 10 TABLET ORAL at 09:09

## 2023-08-21 RX ADMIN — INSULIN LISPRO 13 UNITS: 100 INJECTION, SOLUTION INTRAVENOUS; SUBCUTANEOUS at 17:53

## 2023-08-21 RX ADMIN — ATORVASTATIN CALCIUM 40 MG: 40 TABLET, FILM COATED ORAL at 17:52

## 2023-08-21 RX ADMIN — CEFTRIAXONE SODIUM 2000 MG: 10 INJECTION, POWDER, FOR SOLUTION INTRAVENOUS at 09:22

## 2023-08-21 RX ADMIN — INSULIN HUMAN 10 UNITS: 100 INJECTION, SUSPENSION SUBCUTANEOUS at 18:04

## 2023-08-21 RX ADMIN — SOTALOL HYDROCHLORIDE 80 MG: 80 TABLET ORAL at 21:27

## 2023-08-21 RX ADMIN — CARVEDILOL 12.5 MG: 12.5 TABLET, FILM COATED ORAL at 09:09

## 2023-08-21 RX ADMIN — INSULIN LISPRO 2 UNITS: 100 INJECTION, SOLUTION INTRAVENOUS; SUBCUTANEOUS at 09:17

## 2023-08-21 RX ADMIN — MELATONIN 6 MG: at 21:25

## 2023-08-21 RX ADMIN — METRONIDAZOLE 500 MG: 500 TABLET ORAL at 09:09

## 2023-08-21 RX ADMIN — OXYCODONE HYDROCHLORIDE 10 MG: 10 TABLET ORAL at 13:45

## 2023-08-21 RX ADMIN — OXYCODONE HYDROCHLORIDE 10 MG: 10 TABLET ORAL at 18:04

## 2023-08-21 RX ADMIN — INSULIN LISPRO 3 UNITS: 100 INJECTION, SOLUTION INTRAVENOUS; SUBCUTANEOUS at 12:11

## 2023-08-21 RX ADMIN — ENOXAPARIN SODIUM 40 MG: 40 INJECTION SUBCUTANEOUS at 09:21

## 2023-08-21 RX ADMIN — METRONIDAZOLE 500 MG: 500 TABLET ORAL at 21:25

## 2023-08-21 RX ADMIN — ESCITALOPRAM OXALATE 20 MG: 20 TABLET, FILM COATED ORAL at 09:10

## 2023-08-21 RX ADMIN — ACETAMINOPHEN 975 MG: 325 TABLET, FILM COATED ORAL at 05:11

## 2023-08-21 RX ADMIN — ASPIRIN 81 MG: 81 TABLET, COATED ORAL at 09:09

## 2023-08-21 NOTE — PLAN OF CARE
Problem: MOBILITY - ADULT  Goal: Maintain or return to baseline ADL function  Description: INTERVENTIONS:  -  Assess patient's ability to carry out ADLs; assess patient's baseline for ADL function and identify physical deficits which impact ability to perform ADLs (bathing, care of mouth/teeth, toileting, grooming, dressing, etc.)  - Assess/evaluate cause of self-care deficits   - Assess range of motion  - Assess patient's mobility; develop plan if impaired  - Assess patient's need for assistive devices and provide as appropriate  - Encourage maximum independence but intervene and supervise when necessary  - Involve family in performance of ADLs  - Assess for home care needs following discharge   - Consider OT consult to assist with ADL evaluation and planning for discharge  - Provide patient education as appropriate  Outcome: Progressing  Goal: Maintains/Returns to pre admission functional level  Description: INTERVENTIONS:  - Perform BMAT or MOVE assessment daily.   - Set and communicate daily mobility goal to care team and patient/family/caregiver.    - Collaborate with rehabilitation services on mobility goals if consulted  - Out of bed for toileting  - Record patient progress and toleration of activity level   Outcome: Progressing     Problem: Prexisting or High Potential for Compromised Skin Integrity  Goal: Skin integrity is maintained or improved  Description: INTERVENTIONS:  - Identify patients at risk for skin breakdown  - Assess and monitor skin integrity  - Assess and monitor nutrition and hydration status  - Monitor labs   - Assess for incontinence   - Turn and reposition patient  - Assist with mobility/ambulation  - Relieve pressure over bony prominences  - Avoid friction and shearing  - Provide appropriate hygiene as needed including keeping skin clean and dry  - Evaluate need for skin moisturizer/barrier cream  - Collaborate with interdisciplinary team   - Patient/family teaching  - Consider wound care consult   Outcome: Progressing     Problem: Nutrition/Hydration-ADULT  Goal: Nutrient/Hydration intake appropriate for improving, restoring or maintaining nutritional needs  Description: Monitor and assess patient's nutrition/hydration status for malnutrition. Collaborate with interdisciplinary team and initiate plan and interventions as ordered. Monitor patient's weight and dietary intake as ordered or per policy. Utilize nutrition screening tool and intervene as necessary. Determine patient's food preferences and provide high-protein, high-caloric foods as appropriate.      INTERVENTIONS:  - Monitor oral intake, urinary output, labs, and treatment plans  - Assess nutrition and hydration status and recommend course of action  - Evaluate amount of meals eaten  - Assist patient with eating if necessary   - Allow adequate time for meals  - Recommend/ encourage appropriate diets, oral nutritional supplements, and vitamin/mineral supplements  - Order, calculate, and assess calorie counts as needed  - Recommend, monitor, and adjust tube feedings and TPN/PPN based on assessed needs  - Assess need for intravenous fluids  - Provide specific nutrition/hydration education as appropriate  - Include patient/family/caregiver in decisions related to nutrition  Outcome: Progressing     Problem: PAIN - ADULT  Goal: Verbalizes/displays adequate comfort level or baseline comfort level  Description: Interventions:  - Encourage patient to monitor pain and request assistance  - Assess pain using appropriate pain scale  - Administer analgesics based on type and severity of pain and evaluate response  - Implement non-pharmacological measures as appropriate and evaluate response  - Consider cultural and social influences on pain and pain management  - Notify physician/advanced practitioner if interventions unsuccessful or patient reports new pain  Outcome: Progressing     Problem: INFECTION - ADULT  Goal: Absence or prevention of progression during hospitalization  Description: INTERVENTIONS:  - Assess and monitor for signs and symptoms of infection  - Monitor lab/diagnostic results  - Monitor all insertion sites, i.e. indwelling lines, tubes, and drains  - Monitor endotracheal if appropriate and nasal secretions for changes in amount and color  - [place] appropriate cooling/warming therapies per order  - Administer medications as ordered  - Instruct and encourage patient and family to use good hand hygiene technique  - Identify and instruct in appropriate isolation precautions for identified infection/condition  Outcome: Progressing  Goal: Absence of fever/infection during neutropenic period  Description: INTERVENTIONS:  - Monitor WBC    Outcome: Progressing     Problem: SAFETY ADULT  Goal: Maintain or return to baseline ADL function  Description: INTERVENTIONS:  -  Assess patient's ability to carry out ADLs; assess patient's baseline for ADL function and identify physical deficits which impact ability to perform ADLs (bathing, care of mouth/teeth, toileting, grooming, dressing, etc.)  - Assess/evaluate cause of self-care deficits   - Assess range of motion  - Assess patient's mobility; develop plan if impaired  - Assess patient's need for assistive devices and provide as appropriate  - Encourage maximum independence but intervene and supervise when necessary  - Involve family in performance of ADLs  - Assess for home care needs following discharge   - Consider OT consult to assist with ADL evaluation and planning for discharge  - Provide patient education as appropriate  Outcome: Progressing  Goal: Maintains/Returns to pre admission functional level  Description: INTERVENTIONS:  - Perform BMAT or MOVE assessment daily.   - Set and communicate daily mobility goal to care team and patient/family/caregiver.    - Collaborate with rehabilitation services on mobility goals if consulted  - Out of bed for toileting  - Record patient progress and toleration of activity level   Outcome: Progressing  Goal: Patient will remain free of falls  Description: INTERVENTIONS:  - Educate patient/family on patient safety including physical limitations  - Instruct patient to call for assistance with activity   - Consult OT/PT to assist with strengthening/mobility   - Keep Call bell within reach  - Keep bed low and locked with side rails adjusted as appropriate  - Keep care items and personal belongings within reach  - Initiate and maintain comfort rounds  - Make Fall Risk Sign visible to staff  - Offer Toileting every 2 Hours, in advance of need  - Initiate/Maintain bed alarm  - Obtain necessary fall risk management equipment:   - Apply yellow socks and bracelet for high fall risk patients  - Consider moving patient to room near nurses station  Outcome: Progressing     Problem: DISCHARGE PLANNING  Goal: Discharge to home or other facility with appropriate resources  Description: INTERVENTIONS:  - Identify barriers to discharge w/patient and caregiver  - Arrange for needed discharge resources and transportation as appropriate  - Identify discharge learning needs (meds, wound care, etc.)  - Arrange for interpretive services to assist at discharge as needed  - Refer to Case Management Department for coordinating discharge planning if the patient needs post-hospital services based on physician/advanced practitioner order or complex needs related to functional status, cognitive ability, or social support system  Outcome: Progressing     Problem: Knowledge Deficit  Goal: Patient/family/caregiver demonstrates understanding of disease process, treatment plan, medications, and discharge instructions  Description: Complete learning assessment and assess knowledge base.   Interventions:  - Provide teaching at level of understanding  - Provide teaching via preferred learning methods  Outcome: Progressing

## 2023-08-21 NOTE — PHYSICAL THERAPY NOTE
PHYSICAL THERAPY NOTE          Patient Name: Madison West  WDDZH'Y Date: 8/21/2023 08/21/23 1135   PT Last Visit   PT Visit Date 08/21/23   Note Type   Note Type Treatment   Pain Assessment   Pain Assessment Tool 0-10   Pain Score 5   Pain Location/Orientation Location: Groin   Hospital Pain Intervention(s) Repositioned; Ambulation/increased activity; Emotional support   Restrictions/Precautions   Weight Bearing Precautions Per Order No   Other Precautions Multiple lines; Fall Risk;Pain  (wound vac)   General   Chart Reviewed Yes   Response to Previous Treatment Patient with no complaints from previous session. Family/Caregiver Present Yes   Cognition   Overall Cognitive Status WFL   Arousal/Participation Alert; Responsive; Cooperative   Attention Within functional limits   Orientation Level Oriented X4   Memory Within functional limits   Following Commands Follows all commands and directions without difficulty   Comments pt pleasant and cooperative   Bed Mobility   Supine to Sit Unable to assess   Sit to Supine Unable to assess   Additional Comments pt sitting OOB in recliner with all needs wihtin reach   Transfers   Sit to Stand 6  Modified independent   Additional items Armrests; Increased time required   Stand to Sit 6  Modified independent   Additional items Armrests; Increased time required   Additional Comments transfers with RW, cues for hand placement and sequencing   Ambulation/Elevation   Gait pattern Excessively slow; Short stride; Wide DARREN   Gait Assistance 6  Modified independent  (with RW; S level without AD)   Additional items Verbal cues   Assistive Device Rolling walker;None   Distance 180ft with RW; 40ft without AD   Stair Management Assistance 5  Supervision   Stair Management Technique One rail L;Step to pattern; Foreward   Number of Stairs 3  (limited by lines)   Balance   Static Sitting Fair +   Dynamic Sitting Fair +   Static Standing Fair   Dynamic Standing Fair   Ambulatory Fair   Activity Tolerance   Activity Tolerance Patient tolerated treatment well   Nurse Made Aware RN cleared pt to participate in therapy session   Assessment   Prognosis Good   Problem List Pain   Assessment Pt seen for PT treatment session this date. Therapy session focused on functional transfers, gait training, stair training in order to improve overall mobility and independence. Pt performing functional transfers at mod I level, and functional mobility at mod I level with RW and S level without AD- would recommend RW use for longer distances for pain control. Pt was left sitting OOB in recliner at the end of PT session with all needs in reach. Pt with no questions or concerns regarding d/c home; appears to be functioning at/ near baseline mobility levels. Pt with no further acute inpatient PT needs at this time- please re-consult if needed. PT to d/c pt and recommends home with increased social support as needed. Encourage pt to ambulate at least 3-4x/day with restorative/ nursing staff while remaining in hospital. The patient's [facility] Mobility Inpatient Short Form Raw Score is 21. A Raw score of greater than 16 suggests the patient may benefit from discharge to home. Please also refer to the recommendation of the Physical Therapist for safe discharge planning. Goals   Patient Goals to walk   STG Expiration Date 08/29/23   PT Treatment Day 1   Plan   Treatment/Interventions Functional transfer training;LE strengthening/ROM; Elevations; Endurance training;Patient/family training;Equipment eval/education; Bed mobility;Gait training;Spoke to nursing;Spoke to case management   Progress Discontinue PT   Recommendation   PT Discharge Recommendation No rehabilitation needs   Equipment Recommended [facility] Recommended Wheeled walker   AM-PAC Basic Mobility Inpatient   Turning in Flat Bed Without Bedrails 3   Lying on Back to Sitting on Edge of Flat Bed Without Bedrails 3   Moving Bed to Chair 4   Standing Up From Chair Using Arms 4   Walk in Room 4   Climb 3-5 Stairs With Railing 3   Basic Mobility Inpatient Raw Score 21   Basic Mobility Standardized Score 45.55   Highest Level Of Mobility   JH-HLM Goal 6: Walk 10 steps or more   JH-HLM Achieved 7: Walk 25 feet or more   Education   Education Provided Mobility training;Assistive device   Patient Demonstrates acceptance/verbal understanding   End of Consult   Patient Position at End of Consult Bedside chair; All needs within reach     Keyona Cohen, PT, DPT

## 2023-08-21 NOTE — PROGRESS NOTES
Progress Note - [facility] Infectious Disease   Ruben Host 79 y.o. male MRN: [id number]  Unit/Bed#: [facility] 934-01 Encounter: [id number]      IMPRESSION & RECOMMENDATIONS:   1. 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 1 of 2 sets from 8/13 are growing Streptococcus anginosus. The likely source of bacteremia is his necrotizing perenial infection. Strep anginosus can cause endovascular infection and we need to consider the possibility of endocarditis and/or ICD infection.  2D echo was noted to be a poor quality study. MARCELLA 8/18 found vegetation on pacer wire of RV lead though valves spared. Repeat blood cultures 8/13 negative.  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  XRNRTSI appears there has been good source control of this infection and patient is clinically improved. Likely this was the source of his bacteremia as above. OR cultures have grown Group C Strep, Prevotella and E. Coli. -Continue IV ceftriaxone 2g q24h  -Continue PO Metronidazole 500 mg every 12 hours for Prevotella through tomorrow to complete 5 day course from last wound debridement   -Will likely need prolonged course of Ceftriaxone for ICD infection as above  -ongoing surgical management and wound care     3. Acute Hypoxic Respiratory Failure. Patient had been intubated for the Janet Section has now been successfully extubated.  Chest x-rays have shown linear atelectasis at the left lung base but no consolidation to suggest pneumonia. Currently on 2L NC.  -Monitor O2 needs  -Wean oxygen as able     4. T2DM.  Risk factor for invasive infections including necrotizing soft tissue infection.  A1c was 12.3 in January of this year. Endocrinology following.   -Tight glycemic control     Antibiotics:  IV ceftriaxone D4   PO flagyl D4  Total abx D9     I have discussed the above management plan in detail with patient. I have discussed the above management plan in detail with patient's RN, and the primary service, surgery     24 Hour events:  Yesterday and overnight notes reviewed. Tolerated change of IV abx to ceftriaxone. On PO flagyl through tomorrow. S/p VAC change today. Planning for pacer wire removal later this week with EP/CT surg. Subjective:  Patient has no fever, chills, sweats; no nausea, vomiting, diarrhea; no cough, shortness of breath; no pain. No new symptoms. Tolerating the IV ceftriaxone. Objective:  Vitals:  Temp:  [97.9 °F (36.6 °C)-99.2 °F (37.3 °C)] 97.9 °F (36.6 °C)  HR:  [51-71] 69  BP: (149-161)/(69-90) 161/88  SpO2:  [92 %-96 %] 95 %  Temp (24hrs), Av.5 °F (36.9 °C), Min:97.9 °F (36.6 °C), Max:99.2 °F (37.3 °C)  Current: Temperature: 97.9 °F (36.6 °C)    PHYSICAL EXAM:  General Appearance:  Appearing well, nontoxic, and in no distress, sitting in bedside recliner    HEENT: Normocephalic, without obvious abnormality, atraumatic. Conjunctiva pink and sclera anicteric. Oropharynx moist without lesions. Lungs:   Clear to auscultation bilaterally, respirations unlabored   Heart:  RRR; no murmur, rub or gallop   Abdomen:   Soft, non-tender, obese, non-distended, positive bowel sounds    Extremities: No cyanosis, clubbing or edema   Musculoskeletal: Back symmetric without curvature, ROM normal.    : No CVA or suprapubic tenderness. Wound vac apparatus in place. Skin: No rashes or lesions. Perineal groin wound noted with wound vac. Peripheral IV intact without evidence of erythema, warmth, or exudate.         LABS, IMAGING, & OTHER STUDIES:  Extensive review of the medical records in epic including review of the notes, radiographs, and laboratory results were reviewed personally as below. Lab Results:  I have personally reviewed pertinent labs. Results from last 7 days   Lab Units 08/21/23  0516 08/20/23  0615 08/19/23  0630   WBC Thousand/uL 7.64 7.24 6.68   HEMOGLOBIN g/dL 9.1* 9.6* 9.1*   PLATELETS Thousands/uL 286 274 240     Results from last 7 days   Lab Units 08/21/23  0516 08/20/23  0615 08/19/23  0630 08/16/23  0455 08/15/23  0427   SODIUM mmol/L 142 140 141   < > 142   POTASSIUM mmol/L 3.7 4.0 3.9   < > 2.9*   CHLORIDE mmol/L 110* 108 109*   < > 109*   CO2 mmol/L 28 30 29   < > 27   BUN mg/dL 7 5 7   < > 8   CREATININE mg/dL 0.57* 0.57* 0.54*   < > 0.55*   EGFR ml/min/1.73sq m 106 106 108   < > 107   CALCIUM mg/dL 8.5 8.5 8.6   < > 8.0*   AST U/L  --   --   --   --  9   ALT U/L  --   --   --   --  10*   ALK PHOS U/L  --   --   --   --  66    < > = values in this interval not displayed. Imaging Studies:   I have personally reviewed pertinent imaging study reports and images in PACS.     Other Studies:   I have personally reviewed pertinent report including blood cultures, wound cultures, TTE, MARCELLA.

## 2023-08-21 NOTE — RESTORATIVE TECHNICIAN NOTE
Restorative Technician Note      Patient Name: Berta Blakely     Restorative Tech Visit Date: 08/21/23  Note Type: Mobility  Patient Position Upon Consult: Bedside chair  Activity Performed: Ambulated  Assistive Device: Roller walker  Patient Position at End of Consult: Bedside chair;  All needs within reach    Sarah Ojeda Restorative Technician

## 2023-08-21 NOTE — PROGRESS NOTES
Progress Note - Isabel Arndt 79 y.o. male MRN: [id number]    Unit/Bed#: [facility] 934-01 Encounter: [id number]      CC: diabetes f/u    Subjective:   Elaina feliciano 79y.o. year old male with past medical history of type 2 diabetes mellitus, CAD status post CABG, who presented with right groin pain into [address] [place] [address], found to have sheyla's gangrene transferred to [address] [place] [address] for further care, is status post debridement on 8/13, 8/14, 8/16. Hospital course complicated with bacteremia, R ventricular lead vegetation requiring extraction of pacemaker, to be done by CT surgery. Endocrinology consulted for management of type 2 diabetes mellitus. Feels well. No complaints. No hypoglycemia. Objective:     Vitals: Blood pressure 161/88, pulse 69, temperature 97.9 °F (36.6 °C), resp. rate 19, height 5' 9" (1.753 m), weight 97 kg (213 lb 13.5 oz), SpO2 95 %. ,Body mass index is 31.58 kg/m². Intake/Output Summary (Last 24 hours) at 8/21/2023 1253  Last data filed at 8/21/2023 0915  Gross per 24 hour   Intake --   Output 2450 ml   Net -2450 ml       Physical Exam:  General Appearance: awake, appears stated age and cooperative  Head: Normocephalic, without obvious abnormality, atraumatic  Extremities: moves all extremities  Skin: Skin color and temperature normal.   Pulm: no labored breathing    Lab, Imaging and other studies: I have personally reviewed pertinent reports. POC Glucose (mg/dl)   Date Value   08/21/2023 240 (H)   08/21/2023 201 (H)   08/21/2023 154 (H)   08/21/2023 167 (H)   08/20/2023 191 (H)   08/20/2023 268 (H)   08/20/2023 296 (H)   08/20/2023 274 (H)   08/20/2023 246 (H)   08/20/2023 138       Assessment and plan:  Type 2 diabetes mellitus with hyperglycemia  HbA1c 11 August 2023  Home regimen:  Toujeo U300 130 units at bedtime, Humalog U200 24 units with meals, Jardiance 25 mg daily, Trulicity 4.5 mg weekly, metformin 1000 mg twice daily, uses juany at home, follows up with [facility] endocrinology  Inpatient regimen: Lantus 20 Units, humalog 13 Units with meals, correctional scale algo with meals & at bedtime     Recommendations:  -Overall blood glucose above target range for inpatient   -We recommend increasing Lantus to 30 units every morning and give one-time dose of NPH 10 units today  - We recommend continuing with Humalog 13 units with meals, continue with correctional scale algorithm with meals and  at bedtime  - We will continue to monitor blood glucose and make adjustments as needed. Monitor for hypoglycemia and treat according to the protocol        Portions of the record may have been created with voice recognition software. Please Tigertext questions to the clinician covering the "[id number]" Role. Thank you.

## 2023-08-21 NOTE — PROGRESS NOTES
Progress Note -general surgery  : Red surgery Resident on [address] 79 y.o. male MRN: [id number]  Unit/Bed#: [facility] 934-01 Encounter: [id number]    Assessment:  79 y.o. male presenting with an NSTI for the right groin status post washout and debridement 8/13, 8/14 with partial closure and wound VAC placement on 8/16 recovering well, found to have strep bacteremia with vegetations on pacemaker leads. Bradycardia to 51 at 2254, other vital signs within normal limits and stable    Urine output 1850  Wound  cc serosanguineous  Stool x1 unmeasured    WBC pending from 7.24  Hemoglobin pending from 9.6  Creatinine pending from 1.57    Plan:  Plan for wound VAC change today  Cardiac surgery on board, planning for pacemaker removal this week  Continue diabetic diet as tolerated  Pain control as needed  Antiemetic as needed  Encourage ambulation/out of bed  Infectious disease on board, appreciate recommendations  Feeding Plan -CCD 2 diet  Anticoagulation Plan-Lovenox  Infection Plan -IV Rocephin, 4 weeks for strep bacteremia  Disposition Plan -pending, patient will need extended course of IV antibiotics    Subjective/Objective     Subjective: No acute events overnight. Patient reports he is feeling well and tolerating his diet without nausea and vomiting. Patient is having bowel function and is ambulating. Patient reports pain is well controlled at this time. Patient denies fevers chills and shortness of breath. Objective:     Physical Exam:  GEN: No acute distress  HEENT: MMM  CV: Well-perfused regular rate  Lung: Normal effort  Ab: Soft nontender nondistended  Extrem: No lower extremity edema bilaterally  Perineum: Wound VAC in place with good seal  Neuro: Alert and oriented x3    Vitals: Temp:  [97.8 °F (36.6 °C)-99.2 °F (37.3 °C)] 99.2 °F (37.3 °C)  HR:  [51-71] 70  BP: (147-155)/(66-90) 155/90  Body mass index is 31.58 kg/m².     I/O       08/19 0701 08/20 0700 08/20 0701  08/21 0700    P. O.  236    I.V. (mL/kg) 27.5 (0.3)     Total Intake(mL/kg) 27.5 (0.3) 236 (2.4)    Urine (mL/kg/hr) 600 (0.3) 1850 (0.8)    Emesis/NG output  0    Drains  100    Stool 300 0    Total Output 900 1950    Net -872.5 -1714          Unmeasured Urine Occurrence 1 x 0 x    Unmeasured Stool Occurrence 1 x 1 x    Unmeasured Emesis Occurrence  0 x            Lab, Imaging and other studies: I have personally reviewed pertinent reports.   , CBC with diff:   Lab Results   Component Value Date    WBC 7.64 08/21/2023    HGB 9.1 (L) 08/21/2023    HCT 29.3 (L) 08/21/2023    MCV 96 08/21/2023     08/21/2023    RBC 3.05 (L) 08/21/2023    MCH 29.8 08/21/2023    MCHC 31.1 (L) 08/21/2023    RDW 13.7 08/21/2023    MPV 9.9 08/21/2023    NRBC 0 08/21/2023   , BMP/CMP:   Lab Results   Component Value Date    SODIUM 140 08/20/2023    K 4.0 08/20/2023     08/20/2023    CO2 30 08/20/2023    BUN 5 08/20/2023    CREATININE 0.57 (L) 08/20/2023    CALCIUM 8.5 08/20/2023    EGFR 106 08/20/2023     VTE Pharmacologic Prophylaxis: Enoxaparin (Lovenox)  VTE Mechanical Prophylaxis: sequential compression device    UpdateTime 0613    Cynthia Shepherd MD  8/21/2023 6:13 AM

## 2023-08-21 NOTE — RESTORATIVE TECHNICIAN NOTE
Restorative Technician Note      Patient Name: Kervin Agrawal     Restorative Tech Visit Date: 08/21/23  Note Type: Mobility  Patient Position Upon Consult: Seated edge of bed  Activity Performed: Ambulated  Assistive Device: Roller walker  Patient Position at End of Consult: Bedside chair;  All needs within reach    Amita Mc Restorative Technician

## 2023-08-21 NOTE — QUICK NOTE
I went into the patient's room to change the wound VAC today, I was informed by the patient's wife that yesterday she had to reinforce the wound VAC, and "she could not get a hold of anyone" the patient's wife removed the wound VAC and changed the black sponges. Upon inspection the wound VAC did have a good seal and was holding suction. Upon taking down the wound VAC, there was a black sponge bridge placed directly on skin without a layer of wound VAC tape. The area underlying the skin was mildly macerated, superior and medial of the wound, this can be seen in the image below. I informed the nursing staff that the patient's wife changed his wound VAC yesterday, they were unaware the wife change the wound VAC. I informed the general surgery team that the wife changed his wound VAC yesterday, they were never informed of any problems with the wound VAC function yesterday. I spoke with the patient's wife and informed her that she cannot change or manipulate the wound VAC going forward. If she notices the wound VAC is leaking or does not have a good seal, she should inform the nursing staff who can contact the general surgery team.       The outline of where the black sponge was placed over the skin can be seen in the right upper quadrant of this picture. The index finger in the image below marks the right edge of the macerated area of skin.

## 2023-08-21 NOTE — PLAN OF CARE
Problem: PHYSICAL THERAPY ADULT  Goal: Performs mobility at highest level of function for planned discharge setting. See evaluation for individualized goals. Description: Treatment/Interventions: OT, Spoke to case management, Spoke to nursing, Gait training, Bed mobility, Patient/family training, Endurance training, LE strengthening/ROM, Functional transfer training, Therapeutic exercise  Equipment Recommended:  (TBD)       See flowsheet documentation for full assessment, interventions and recommendations. Outcome: Adequate for Discharge  Note: Prognosis: Good  Problem List: Pain  Assessment: Pt seen for PT treatment session this date. Therapy session focused on functional transfers, gait training, stair training in order to improve overall mobility and independence. Pt performing functional transfers at mod I level, and functional mobility at mod I level with RW and S level without AD- would recommend RW use for longer distances for pain control. Pt was left sitting OOB in recliner at the end of PT session with all needs in reach. Pt with no questions or concerns regarding d/c home; appears to be functioning at/ near baseline mobility levels. Pt with no further acute inpatient PT needs at this time- please re-consult if needed. PT to d/c pt and recommends home with increased social support as needed. Encourage pt to ambulate at least 3-4x/day with restorative/ nursing staff while remaining in hospital. The patient's [facility] Mobility Inpatient Short Form Raw Score is 21. A Raw score of greater than 16 suggests the patient may benefit from discharge to home. Please also refer to the recommendation of the Physical Therapist for safe discharge planning. PT Discharge Recommendation: No rehabilitation needs    See flowsheet documentation for full assessment.

## 2023-08-21 NOTE — QUICK NOTE
Acute Care Surgery  Bedside V.A.C. Procedure Note        Location of wound: Right groin    Dressings and Foam removed:  3 Black Foam  0 White Foam    Dimensions of wound: 4 cm x 3 cm x 4 cm    Description of wound: On inspection of the wound today, there was red granulation tissue at the base of the wound, and superior medial skin edges macerated, see picture below. No necrotic or nonviable tissue requiring debridement. The periwound skin remains clean and intact and unremarkable, outside of the superior medial aspect of the wound. VAC dressing application:  The periwound skin was cleaned and dried. 1 black foam, 0 white foam were cut to size of the wound and placed into the wound. The dressings were then covered with VAC drape. Additional VAC drape and black foam was used to create a bridge to the patient's infraumbilical region and a base for the track pad. The track pad was then placed over the base of black foam. The VAC was then set to 125 mmHg low Continuous suction. The patient tolerated the procedure well and there were no complications. The patient did not require any excisional debridement during today's dressing change. VAC settings:  125 mmHg  Continuous    Wound Images: Additional Notes: The [facility] sticker placed over the dressing per protocol. The next [facility] dressing change will be planned for Wednesday, 8/21/23. The [facility] paperwork was completed and give to the case management staff to arrange home VAC therapy. This dressing change took greater than 15 minutes to complete.     Tanisha Cardoza MD  8/21/2023 5:11 PM

## 2023-08-21 NOTE — PLAN OF CARE
Problem: MOBILITY - ADULT  Goal: Maintain or return to baseline ADL function  Description: INTERVENTIONS:  -  Assess patient's ability to carry out ADLs; assess patient's baseline for ADL function and identify physical deficits which impact ability to perform ADLs (bathing, care of mouth/teeth, toileting, grooming, dressing, etc.)  - Assess/evaluate cause of self-care deficits   - Assess range of motion  - Assess patient's mobility; develop plan if impaired  - Assess patient's need for assistive devices and provide as appropriate  - Encourage maximum independence but intervene and supervise when necessary  - Involve family in performance of ADLs  - Assess for home care needs following discharge   - Consider OT consult to assist with ADL evaluation and planning for discharge  - Provide patient education as appropriate  Outcome: Progressing  Goal: Maintains/Returns to pre admission functional level  Description: INTERVENTIONS:  - Perform BMAT or MOVE assessment daily.   - Set and communicate daily mobility goal to care team and patient/family/caregiver.    - Collaborate with rehabilitation services on mobility goals if consulted  - Out of bed for toileting  - Record patient progress and toleration of activity level   Outcome: Progressing     Problem: Prexisting or High Potential for Compromised Skin Integrity  Goal: Skin integrity is maintained or improved  Description: INTERVENTIONS:  - Identify patients at risk for skin breakdown  - Assess and monitor skin integrity  - Assess and monitor nutrition and hydration status  - Monitor labs   - Assess for incontinence   - Turn and reposition patient  - Assist with mobility/ambulation  - Relieve pressure over bony prominences  - Avoid friction and shearing  - Provide appropriate hygiene as needed including keeping skin clean and dry  - Evaluate need for skin moisturizer/barrier cream  - Collaborate with interdisciplinary team   - Patient/family teaching  - Consider wound care consult   Outcome: Progressing     Problem: Nutrition/Hydration-ADULT  Goal: Nutrient/Hydration intake appropriate for improving, restoring or maintaining nutritional needs  Description: Monitor and assess patient's nutrition/hydration status for malnutrition. Collaborate with interdisciplinary team and initiate plan and interventions as ordered. Monitor patient's weight and dietary intake as ordered or per policy. Utilize nutrition screening tool and intervene as necessary. Determine patient's food preferences and provide high-protein, high-caloric foods as appropriate.      INTERVENTIONS:  - Monitor oral intake, urinary output, labs, and treatment plans  - Assess nutrition and hydration status and recommend course of action  - Evaluate amount of meals eaten  - Assist patient with eating if necessary   - Allow adequate time for meals  - Recommend/ encourage appropriate diets, oral nutritional supplements, and vitamin/mineral supplements  - Order, calculate, and assess calorie counts as needed  - Recommend, monitor, and adjust tube feedings and TPN/PPN based on assessed needs  - Assess need for intravenous fluids  - Provide specific nutrition/hydration education as appropriate  - Include patient/family/caregiver in decisions related to nutrition  Outcome: Progressing     Problem: PAIN - ADULT  Goal: Verbalizes/displays adequate comfort level or baseline comfort level  Description: Interventions:  - Encourage patient to monitor pain and request assistance  - Assess pain using appropriate pain scale  - Administer analgesics based on type and severity of pain and evaluate response  - Implement non-pharmacological measures as appropriate and evaluate response  - Consider cultural and social influences on pain and pain management  - Notify physician/advanced practitioner if interventions unsuccessful or patient reports new pain  Outcome: Progressing     Problem: INFECTION - ADULT  Goal: Absence or prevention of progression during hospitalization  Description: INTERVENTIONS:  - Assess and monitor for signs and symptoms of infection  - Monitor lab/diagnostic results  - Monitor all insertion sites, i.e. indwelling lines, tubes, and drains  - Monitor endotracheal if appropriate and nasal secretions for changes in amount and color  - [place] appropriate cooling/warming therapies per order  - Administer medications as ordered  - Instruct and encourage patient and family to use good hand hygiene technique  - Identify and instruct in appropriate isolation precautions for identified infection/condition  Outcome: Progressing  Goal: Absence of fever/infection during neutropenic period  Description: INTERVENTIONS:  - Monitor WBC    Outcome: Progressing     Problem: SAFETY ADULT  Goal: Maintain or return to baseline ADL function  Description: INTERVENTIONS:  -  Assess patient's ability to carry out ADLs; assess patient's baseline for ADL function and identify physical deficits which impact ability to perform ADLs (bathing, care of mouth/teeth, toileting, grooming, dressing, etc.)  - Assess/evaluate cause of self-care deficits   - Assess range of motion  - Assess patient's mobility; develop plan if impaired  - Assess patient's need for assistive devices and provide as appropriate  - Encourage maximum independence but intervene and supervise when necessary  - Involve family in performance of ADLs  - Assess for home care needs following discharge   - Consider OT consult to assist with ADL evaluation and planning for discharge  - Provide patient education as appropriate  Outcome: Progressing  Goal: Maintains/Returns to pre admission functional level  Description: INTERVENTIONS:  - Perform BMAT or MOVE assessment daily.   - Set and communicate daily mobility goal to care team and patient/family/caregiver.    - Collaborate with rehabilitation services on mobility goals if consulted  - Out of bed for toileting  - Record patient progress and toleration of activity level   Outcome: Progressing  Goal: Patient will remain free of falls  Description: INTERVENTIONS:  - Educate patient/family on patient safety including physical limitations  - Instruct patient to call for assistance with activity   - Consult OT/PT to assist with strengthening/mobility   - Keep Call bell within reach  - Keep bed low and locked with side rails adjusted as appropriate  - Keep care items and personal belongings within reach  - Initiate and maintain comfort rounds  - Make Fall Risk Sign visible to staff  - Apply yellow socks and bracelet for high fall risk patients  - Consider moving patient to room near nurses station  Outcome: Progressing     Problem: DISCHARGE PLANNING  Goal: Discharge to home or other facility with appropriate resources  Description: INTERVENTIONS:  - Identify barriers to discharge w/patient and caregiver  - Arrange for needed discharge resources and transportation as appropriate  - Identify discharge learning needs (meds, wound care, etc.)  - Arrange for interpretive services to assist at discharge as needed  - Refer to Case Management Department for coordinating discharge planning if the patient needs post-hospital services based on physician/advanced practitioner order or complex needs related to functional status, cognitive ability, or social support system  Outcome: Progressing     Problem: Knowledge Deficit  Goal: Patient/family/caregiver demonstrates understanding of disease process, treatment plan, medications, and discharge instructions  Description: Complete learning assessment and assess knowledge base.   Interventions:  - Provide teaching at level of understanding  - Provide teaching via preferred learning methods  Outcome: Progressing

## 2023-08-22 LAB
ANION GAP SERPL CALCULATED.3IONS-SCNC: 4 MMOL/L
BASOPHILS # BLD AUTO: 0.05 THOUSANDS/ÂΜL (ref 0–0.1)
BASOPHILS NFR BLD AUTO: 1 % (ref 0–1)
BUN SERPL-MCNC: 6 MG/DL (ref 5–25)
CALCIUM SERPL-MCNC: 8.1 MG/DL (ref 8.3–10.1)
CHLORIDE SERPL-SCNC: 110 MMOL/L (ref 96–108)
CO2 SERPL-SCNC: 28 MMOL/L (ref 21–32)
CREAT SERPL-MCNC: 0.6 MG/DL (ref 0.6–1.3)
EOSINOPHIL # BLD AUTO: 0.18 THOUSAND/ÂΜL (ref 0–0.61)
EOSINOPHIL NFR BLD AUTO: 2 % (ref 0–6)
ERYTHROCYTE [DISTWIDTH] IN BLOOD BY AUTOMATED COUNT: 14 % (ref 11.6–15.1)
GFR SERPL CREATININE-BSD FRML MDRD: 104 ML/MIN/1.73SQ M
GLUCOSE SERPL-MCNC: 110 MG/DL (ref 65–140)
GLUCOSE SERPL-MCNC: 116 MG/DL (ref 65–140)
GLUCOSE SERPL-MCNC: 117 MG/DL (ref 65–140)
GLUCOSE SERPL-MCNC: 166 MG/DL (ref 65–140)
GLUCOSE SERPL-MCNC: 235 MG/DL (ref 65–140)
HCT VFR BLD AUTO: 29.4 % (ref 36.5–49.3)
HGB BLD-MCNC: 9.3 G/DL (ref 12–17)
IMM GRANULOCYTES # BLD AUTO: 0.13 THOUSAND/UL (ref 0–0.2)
IMM GRANULOCYTES NFR BLD AUTO: 2 % (ref 0–2)
LYMPHOCYTES # BLD AUTO: 2.53 THOUSANDS/ÂΜL (ref 0.6–4.47)
LYMPHOCYTES NFR BLD AUTO: 34 % (ref 14–44)
MCH RBC QN AUTO: 29.8 PG (ref 26.8–34.3)
MCHC RBC AUTO-ENTMCNC: 31.6 G/DL (ref 31.4–37.4)
MCV RBC AUTO: 94 FL (ref 82–98)
MONOCYTES # BLD AUTO: 0.7 THOUSAND/ÂΜL (ref 0.17–1.22)
MONOCYTES NFR BLD AUTO: 9 % (ref 4–12)
NEUTROPHILS # BLD AUTO: 3.87 THOUSANDS/ÂΜL (ref 1.85–7.62)
NEUTS SEG NFR BLD AUTO: 52 % (ref 43–75)
NRBC BLD AUTO-RTO: 0 /100 WBCS
PLATELET # BLD AUTO: 299 THOUSANDS/UL (ref 149–390)
PMV BLD AUTO: 9.9 FL (ref 8.9–12.7)
POTASSIUM SERPL-SCNC: 3.5 MMOL/L (ref 3.5–5.3)
RBC # BLD AUTO: 3.12 MILLION/UL (ref 3.88–5.62)
SODIUM SERPL-SCNC: 142 MMOL/L (ref 135–147)
WBC # BLD AUTO: 7.46 THOUSAND/UL (ref 4.31–10.16)

## 2023-08-22 PROCEDURE — 82948 REAGENT STRIP/BLOOD GLUCOSE: CPT

## 2023-08-22 PROCEDURE — 97530 THERAPEUTIC ACTIVITIES: CPT

## 2023-08-22 PROCEDURE — 97535 SELF CARE MNGMENT TRAINING: CPT

## 2023-08-22 PROCEDURE — 80048 BASIC METABOLIC PNL TOTAL CA: CPT | Performed by: NURSE PRACTITIONER

## 2023-08-22 PROCEDURE — 85025 COMPLETE CBC W/AUTO DIFF WBC: CPT | Performed by: NURSE PRACTITIONER

## 2023-08-22 PROCEDURE — 99232 SBSQ HOSP IP/OBS MODERATE 35: CPT | Performed by: THORACIC SURGERY (CARDIOTHORACIC VASCULAR SURGERY)

## 2023-08-22 PROCEDURE — 99232 SBSQ HOSP IP/OBS MODERATE 35: CPT | Performed by: SURGERY

## 2023-08-22 RX ADMIN — EZETIMIBE 10 MG: 10 TABLET ORAL at 08:50

## 2023-08-22 RX ADMIN — SOTALOL HYDROCHLORIDE 80 MG: 80 TABLET ORAL at 21:27

## 2023-08-22 RX ADMIN — INSULIN GLARGINE 30 UNITS: 100 INJECTION, SOLUTION SUBCUTANEOUS at 08:50

## 2023-08-22 RX ADMIN — ATORVASTATIN CALCIUM 40 MG: 40 TABLET, FILM COATED ORAL at 17:36

## 2023-08-22 RX ADMIN — INSULIN LISPRO 13 UNITS: 100 INJECTION, SOLUTION INTRAVENOUS; SUBCUTANEOUS at 12:55

## 2023-08-22 RX ADMIN — INSULIN LISPRO 13 UNITS: 100 INJECTION, SOLUTION INTRAVENOUS; SUBCUTANEOUS at 17:36

## 2023-08-22 RX ADMIN — PANTOPRAZOLE SODIUM 20 MG: 20 TABLET, DELAYED RELEASE ORAL at 05:07

## 2023-08-22 RX ADMIN — ACETAMINOPHEN 975 MG: 325 TABLET, FILM COATED ORAL at 05:07

## 2023-08-22 RX ADMIN — CARVEDILOL 12.5 MG: 12.5 TABLET, FILM COATED ORAL at 17:36

## 2023-08-22 RX ADMIN — ACETAMINOPHEN 975 MG: 325 TABLET, FILM COATED ORAL at 21:27

## 2023-08-22 RX ADMIN — FENOFIBRATE 145 MG: 145 TABLET ORAL at 08:50

## 2023-08-22 RX ADMIN — INSULIN LISPRO 1 UNITS: 100 INJECTION, SOLUTION INTRAVENOUS; SUBCUTANEOUS at 21:27

## 2023-08-22 RX ADMIN — INSULIN LISPRO 13 UNITS: 100 INJECTION, SOLUTION INTRAVENOUS; SUBCUTANEOUS at 08:52

## 2023-08-22 RX ADMIN — ENOXAPARIN SODIUM 40 MG: 40 INJECTION SUBCUTANEOUS at 08:50

## 2023-08-22 RX ADMIN — ESCITALOPRAM OXALATE 20 MG: 20 TABLET, FILM COATED ORAL at 08:50

## 2023-08-22 RX ADMIN — MELATONIN 6 MG: at 21:27

## 2023-08-22 RX ADMIN — SOTALOL HYDROCHLORIDE 80 MG: 80 TABLET ORAL at 08:50

## 2023-08-22 RX ADMIN — CARVEDILOL 12.5 MG: 12.5 TABLET, FILM COATED ORAL at 08:50

## 2023-08-22 RX ADMIN — CEFTRIAXONE SODIUM 2000 MG: 10 INJECTION, POWDER, FOR SOLUTION INTRAVENOUS at 08:55

## 2023-08-22 RX ADMIN — INSULIN LISPRO 4 UNITS: 100 INJECTION, SOLUTION INTRAVENOUS; SUBCUTANEOUS at 12:56

## 2023-08-22 RX ADMIN — ASPIRIN 81 MG: 81 TABLET, COATED ORAL at 08:50

## 2023-08-22 NOTE — PROGRESS NOTES
Progress Note -general surgery  : Red surgery Resident on [address] Alex[address] Gonzalez 79 y.o. male MRN: [id number]  Unit/Bed#: [facility] 934-01 Encounter: [id number]    Assessment:  79 y.o. male presenting with an NSTI of the right groin status post washout and debridement 8/13, 8/14 with partial closure and wound VAC placement on 8/16. Patient recovering well, found to have strep bacteremia with vegetations on pacemaker leads    Vitals signs within normal limits and stable    Urine output 600 documented +1 unmeasured  Stool output 1 unmeasured  Wound VAC output undocumented    WBC 7.46 from 7.64  Hemoglobin 9.3 from 9.1  Creatinine pending from 0.57    Plan:  Continue diabetic diet as tolerated  Maintain wound VAC to 125 suction  Cardiac surgery on board, planning for pacemaker removal on Thursday  Pain control as needed  Antiemetic as needed  Encourage ambulation/out of bed  Infectious disease on board, appreciate recommendations  Feeding Plan -continue CCD 2 diet for now  Anticoagulation Plan-Lovenox  Infection Plan -IV Rocephin, 4-week for strep bacteremia  Disposition Plan -pending, patient will need extended course of IV antibiotics    Subjective/Objective     Subjective: No acute events overnight. Upon examination this morning, wound VAC was turned off, per nursing it was functioning well less than 2 hours earlier. Wound VAC was turned on and held a good seal, passing a seal check. Patient reports he is feeling well, tolerating diet without nausea and vomiting. Patient is having bowel movements and urinating. Patient is ambulating around the unit. Objective:     Physical Exam:  GEN: No acute distress  HEENT: MMM  CV: Perfused regular rate  Lung: Normal effort  Ab:  Soft nontender nondistended  Groin: Right groin wound VAC with good seal, functioning well serosanguineous output  Extrem: Lower extremity edema bilaterally  Neuro: Alert and oriented x3    Vitals: Temp:  [97.9 °F (36.6 °C)-98.5 °F (36.9 °C)] 98.5 °F (36.9 °C)  HR:  [68-71] 71  Resp:  [15-16] 15  BP: (131-161)/(68-88) 131/68  Body mass index is 31.58 kg/m². I/O       08/20 0701  08/21 0700 08/21 0701  08/22 0700    P. O. 236     Total Intake(mL/kg) 236 (2.4)     Urine (mL/kg/hr) 1850 (0.8) 600 (0.3)    Emesis/NG output 0     Drains 100     Stool 0 0    Total Output 1950 600    Net -1714 -600          Unmeasured Urine Occurrence 0 x 1 x    Unmeasured Stool Occurrence 1 x 1 x    Unmeasured Emesis Occurrence 0 x             Lab, Imaging and other studies: I have personally reviewed pertinent reports.   , CBC with diff:   Lab Results   Component Value Date    WBC 7.46 08/22/2023    HGB 9.3 (L) 08/22/2023    HCT 29.4 (L) 08/22/2023    MCV 94 08/22/2023     08/22/2023    RBC 3.12 (L) 08/22/2023    MCH 29.8 08/22/2023    MCHC 31.6 08/22/2023    RDW 14.0 08/22/2023    MPV 9.9 08/22/2023    NRBC 0 08/22/2023   , BMP/CMP: No results found for: "SODIUM", "K", "CL", "CO2", "ANIONGAP", "BUN", "CREATININE", "GLUCOSE", "CALCIUM", "AST", "ALT", "ALKPHOS", "PROT", "BILITOT", "EGFR"  VTE Pharmacologic Prophylaxis: Enoxaparin (Lovenox)  VTE Mechanical Prophylaxis: sequential compression device    UpdateTime 0600    Doyle Mcdaniel MD  8/22/2023 5:51 AM

## 2023-08-22 NOTE — RESTORATIVE TECHNICIAN NOTE
Restorative Technician Note      Patient Name: Rufina Carter     Restorative Tech Visit Date: 08/22/23  Note Type: Mobility  Patient Position Upon Consult: Bedside chair  Activity Performed: Ambulated  Assistive Device: Roller walker  Patient Position at End of Consult: Bedside chair;  All needs within reach    Merry Reynoso Restorative Technician

## 2023-08-22 NOTE — CASE MANAGEMENT
Case Management Discharge Planning Note    Patient name Rufina Carter  Location [facility] 934/[facility] 312-44 MRN [id number]  : 1955 Date 2023       Current Admission Date: 2023  Current Admission Diagnosis:Necrotizing soft tissue infection   Patient Active Problem List    Diagnosis Date Noted   • Necrotizing soft tissue infection 2023   • Cholelithiasis 2023   • Chronic systolic CHF (congestive heart failure) ([address]) 2021   • Ventricular tachycardia ([address]) 2021   • ICD (implantable cardioverter-defibrillator) in place 2019   • Uncontrolled type 2 diabetes mellitus with hyperglycemia ([address]) 09/15/2016   • Esophageal reflux 2013   • Ischemic cardiomyopathy 2012   • Generalized anxiety disorder 2012   • Benign essential hypertension 2011   • Coronary atherosclerosis 2010   • Mixed hyperlipidemia 2010      LOS (days): 9  Geometric Mean LOS (GMLOS) (days): 9.60  Days to GMLOS:0.9     OBJECTIVE:  Risk of Unplanned Readmission Score: 21.17         Current admission status: Inpatient   Preferred Pharmacy:   [facility] [address] EDITH HAN [address] [place] [address]  Phone: [phone] Fax: [phone] [address] [place] [address]  Phone: [phone] Fax: [phone]    [facility] [address] [place] [address]  [address] [place] [id number]  Phone: [phone] Fax: [phone]    [facility] ([facility] [address] [place] [address] [address] [place] [address]  Phone: [phone] Fax: [phone] [address], [address]  [facility] [address]  Phone: [phone] Fax: [phone]    Primary Care Provider: Jan Steele MD    Primary Insurance: [address] [facility] RE[facility]  Secondary Insurance:     DISCHARGE DETAILS:  TT to surgery for update on dc plan. CM to continue to support. Update: Plan to be TBD, having surgery later this week. Vac paperwork provided to provider and plan to discuss again tomorrow. Unsure if home IV abx will be needed. CM to continue to support.

## 2023-08-22 NOTE — PLAN OF CARE
Problem: MOBILITY - ADULT  Goal: Maintain or return to baseline ADL function  Description: INTERVENTIONS:  -  Assess patient's ability to carry out ADLs; assess patient's baseline for ADL function and identify physical deficits which impact ability to perform ADLs (bathing, care of mouth/teeth, toileting, grooming, dressing, etc.)  - Assess/evaluate cause of self-care deficits   - Assess range of motion  - Assess patient's mobility; develop plan if impaired  - Assess patient's need for assistive devices and provide as appropriate  - Encourage maximum independence but intervene and supervise when necessary  - Involve family in performance of ADLs  - Assess for home care needs following discharge   - Consider OT consult to assist with ADL evaluation and planning for discharge  - Provide patient education as appropriate  Outcome: Progressing  Goal: Maintains/Returns to pre admission functional level  Description: INTERVENTIONS:  - Perform BMAT or MOVE assessment daily.   - Set and communicate daily mobility goal to care team and patient/family/caregiver.    - Out of bed for toileting  - Record patient progress and toleration of activity level   Outcome: Progressing     Problem: Prexisting or High Potential for Compromised Skin Integrity  Goal: Skin integrity is maintained or improved  Description: INTERVENTIONS:  - Identify patients at risk for skin breakdown  - Assess and monitor skin integrity  - Assess and monitor nutrition and hydration status  - Monitor labs   - Assess for incontinence   - Turn and reposition patient  - Assist with mobility/ambulation  - Relieve pressure over bony prominences  - Avoid friction and shearing  - Provide appropriate hygiene as needed including keeping skin clean and dry  - Evaluate need for skin moisturizer/barrier cream  - Collaborate with interdisciplinary team   - Patient/family teaching  - Consider wound care consult   Outcome: Progressing     Problem: Nutrition/Hydration-ADULT  Goal: Nutrient/Hydration intake appropriate for improving, restoring or maintaining nutritional needs  Description: Monitor and assess patient's nutrition/hydration status for malnutrition. Collaborate with interdisciplinary team and initiate plan and interventions as ordered. Monitor patient's weight and dietary intake as ordered or per policy. Utilize nutrition screening tool and intervene as necessary. Determine patient's food preferences and provide high-protein, high-caloric foods as appropriate.      INTERVENTIONS:  - Monitor oral intake, urinary output, labs, and treatment plans  - Assess nutrition and hydration status and recommend course of action  - Evaluate amount of meals eaten  - Assist patient with eating if necessary   - Allow adequate time for meals  - Recommend/ encourage appropriate diets, oral nutritional supplements, and vitamin/mineral supplements  - Order, calculate, and assess calorie counts as needed  - Recommend, monitor, and adjust tube feedings and TPN/PPN based on assessed needs  - Assess need for intravenous fluids  - Provide specific nutrition/hydration education as appropriate  - Include patient/family/caregiver in decisions related to nutrition  Outcome: Progressing     Problem: PAIN - ADULT  Goal: Verbalizes/displays adequate comfort level or baseline comfort level  Description: Interventions:  - Encourage patient to monitor pain and request assistance  - Assess pain using appropriate pain scale  - Administer analgesics based on type and severity of pain and evaluate response  - Implement non-pharmacological measures as appropriate and evaluate response  - Consider cultural and social influences on pain and pain management  - Notify physician/advanced practitioner if interventions unsuccessful or patient reports new pain  Outcome: Progressing     Problem: INFECTION - ADULT  Goal: Absence or prevention of progression during hospitalization  Description: INTERVENTIONS:  - Assess and monitor for signs and symptoms of infection  - Monitor lab/diagnostic results  - Monitor all insertion sites, i.e. indwelling lines, tubes, and drains  - Monitor endotracheal if appropriate and nasal secretions for changes in amount and color  - [place] appropriate cooling/warming therapies per order  - Administer medications as ordered  - Instruct and encourage patient and family to use good hand hygiene technique  - Identify and instruct in appropriate isolation precautions for identified infection/condition  Outcome: Progressing  Goal: Absence of fever/infection during neutropenic period  Description: INTERVENTIONS:  - Monitor WBC    Outcome: Progressing     Problem: SAFETY ADULT  Goal: Maintain or return to baseline ADL function  Description: INTERVENTIONS:  -  Assess patient's ability to carry out ADLs; assess patient's baseline for ADL function and identify physical deficits which impact ability to perform ADLs (bathing, care of mouth/teeth, toileting, grooming, dressing, etc.)  - Assess/evaluate cause of self-care deficits   - Assess range of motion  - Assess patient's mobility; develop plan if impaired  - Assess patient's need for assistive devices and provide as appropriate  - Encourage maximum independence but intervene and supervise when necessary  - Involve family in performance of ADLs  - Assess for home care needs following discharge   - Consider OT consult to assist with ADL evaluation and planning for discharge  - Provide patient education as appropriate  Outcome: Progressing  Goal: Maintains/Returns to pre admission functional level  Description: INTERVENTIONS:  - Perform BMAT or MOVE assessment daily.   - Set and communicate daily mobility goal to care team and patient/family/caregiver.    - Collaborate with rehabilitation services on mobility goals if consulted  - Out of bed for toileting  - Record patient progress and toleration of activity level   Outcome: Progressing  Goal: Patient will remain free of falls  Description: INTERVENTIONS:  - Educate patient/family on patient safety including physical limitations  - Instruct patient to call for assistance with activity   - Consult OT/PT to assist with strengthening/mobility   - Keep Call bell within reach  - Keep bed low and locked with side rails adjusted as appropriate  - Keep care items and personal belongings within reach  - Initiate and maintain comfort rounds  - Make Fall Risk Sign visible to staff  - Apply yellow socks and bracelet for high fall risk patients  - Consider moving patient to room near nurses station  Outcome: Progressing     Problem: DISCHARGE PLANNING  Goal: Discharge to home or other facility with appropriate resources  Description: INTERVENTIONS:  - Identify barriers to discharge w/patient and caregiver  - Arrange for needed discharge resources and transportation as appropriate  - Identify discharge learning needs (meds, wound care, etc.)  - Arrange for interpretive services to assist at discharge as needed  - Refer to Case Management Department for coordinating discharge planning if the patient needs post-hospital services based on physician/advanced practitioner order or complex needs related to functional status, cognitive ability, or social support system  Outcome: Progressing     Problem: Knowledge Deficit  Goal: Patient/family/caregiver demonstrates understanding of disease process, treatment plan, medications, and discharge instructions  Description: Complete learning assessment and assess knowledge base.   Interventions:  - Provide teaching at level of understanding  - Provide teaching via preferred learning methods  Outcome: Progressing

## 2023-08-22 NOTE — QUICK NOTE
Marissa Gonzalez is a 79y.o. 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  Hospital course complicated with bacteremia, R ventricular lead vegetation requiring extraction of pacemaker, to be done by CT surgery. Endocrinology consulted for management of type 2 diabetes mellitus.    -Chart and BG trends reviewed  -We recommend modifying diet to no juice as he has started to drink more cranberry juice With breakfast   - We will continue with Lantus 30 Units every morning and humalog 13 Units with meals, correctional scales with meals and at bedtime  - He is going to be scheduled for extraction of AICD on 8/24/2023 at 1 PM

## 2023-08-22 NOTE — PROGRESS NOTES
Progress Note - Cardiothoracic Surgery   Amna Farfan 79 y.o. male MRN: [id number]  [facility] En[facility]: [id number]      24 Hour Events: S/p VAC change w/ general sx. OR planned for Thursday for extraction. Insulin adjusted per endo. No other events.     Medications:   Scheduled Meds:  Current Facility-Administered Medications   Medication Dose Route Frequency Provider Last Rate   • acetaminophen  975 mg Oral Q8H [address] [facility], DO     • aspirin  81 mg Oral Daily [facility]     • atorvastatin  40 mg Oral Daily With [facility] Allen, DO     • carvedilol  12.5 mg Oral BID Leopold Rink, MD     • cefTRIAXone  2,000 mg Intravenous Q24H Puja Monson MD 2,000 mg (08/21/23 8431)   • enoxaparin  40 mg Subcutaneous Q24H [address] [facility] Allen, DO     • escitalopram  20 mg Oral Daily Taylor Raulito Villa, DO     • ezetimibe  10 mg Oral Daily Taylor Raulito Villa, DO     • fenofibrate  145 mg Oral Daily Taylor Raulito Villa, DO     • HYDROmorphone  0.5 mg Intravenous Q4H PRN Taylor Raulito Villa DO     • insulin glargine  30 Units Subcutaneous QAM Sarah Bennett MD     • insulin lispro  1-6 Units Subcutaneous HS Janet Wilkinson MD     • insulin lispro  13 Units Subcutaneous TID With Meals Sarah Bennett MD     • insulin lispro  2-12 Units Subcutaneous TID AC Sarah Bennett MD     • LORazepam  1 mg Oral Daily PRN Daryel Long, DO     • melatonin  6 mg Oral HS [facility], [facility]     • ondansetron  4 mg Intravenous Q6H PRN Daryel Long, DO     • oxyCODONE  10 mg Oral Q4H PRN Daryel Long, DO     • oxyCODONE  5 mg Oral Q4H PRN Taylor Raulito Villa, DO     • pantoprazole  20 mg Oral Early Morning Daryel Long, DO     • senna  1 tablet Oral HS Taylor Raulito Villa DO     • sotalol  80 mg Oral BID Leopold Rink, MD       Continuous Infusions:     Results:   Results from last 7 days   Lab Units 08/22/23  0512 08/21/23  0516 08/20/23  4351 WBC Thousand/uL 7.46 7.64 7.24   HEMOGLOBIN g/dL 9.3* 9.1* 9.6*   HEMATOCRIT % 29.4* 29.3* 30.1*   PLATELETS Thousands/uL 299 286 274     Results from last 7 days   Lab Units 08/22/23  0512 08/21/23  0516 08/20/23  0615   POTASSIUM mmol/L 3.5 3.7 4.0   CHLORIDE mmol/L 110* 110* 108   CO2 mmol/L 28 28 30   BUN mg/dL 6 7 5   CREATININE mg/dL 0.60 0.57* 0.57*   CALCIUM mg/dL 8.1* 8.5 8.5           Studies:     TTE 8/14: LVEF 40%, RV nml, RA with pacer wire present with 1.7 cm vegetation, no AS/AI, mild MR/TR     CXR 8/16: NAD     EKG 8/14: sinus tach with PVCs     Vitals:   Vitals:    08/21/23 1601 08/21/23 2127 08/21/23 2153 08/22/23 0759   BP: 135/68 139/69 131/68 167/77   Pulse: 68 70 71 56   Resp: 16  15 18   Temp:   98.5 °F (36.9 °C) 97.8 °F (36.6 °C)   TempSrc:       SpO2: 97% 96% 94% 93%   Weight:       Height:           Physical Exam:    HEENT/NECK:  Normocephalic. Atraumatic. Cardiac: Regular rate and rhythm  Pulmonary:  on RA, no respiratory distress  Abdomen:  Non-distended  Extremities: Trace edema B/L  Neuro: No focal deficits  Skin: Warm/Dry, without rashes or lesions. Assessment:    Infected RA lead; Ongoing lead extraction work up    Plan:  Patient agreeable to proceed with surgery;    Pre-op work-up complete    Infected RA lead w/ necrotizing fascitis of groin   Afebrile   No leukocytosis   VAC changes per general sx, next planned for Thursday   Ceftriaxone/Flagyl   [facility] w/ no growth   ID following    No Mupirocin/Chlorhexidine w/ lead extractions necessary    lead extraction scheduled for 8/24 with Dr. Monique Abel D.O. at [address] [place] [address]  DATE: August 22, 2023  TIME: 8:41 AM    * This note was completed in part utilizing m-modal fluency direct voice recognition software.    Grammatical errors, random word insertion, spelling mistakes, and incomplete sentences may be an occasional consequence of the system secondary to software limitations, ambient noise and hardware issues. At the time of dictation, efforts were made to edit, clarify and /or correct errors. Please read the chart carefully and recognize, using context, where substitutions have occurred. If you have any questions or concerns about the context, text or information contained within the body of this dictation, please contact myself, the provider, for further clarification.

## 2023-08-22 NOTE — RESTORATIVE TECHNICIAN NOTE
Restorative Technician Note      Patient Name: Berta Blakely     Restorative Tech Visit Date: 08/22/23  Note Type: Mobility  Patient Position Upon Consult: Supine  Activity Performed: Ambulated  Assistive Device: Roller walker  Patient Position at End of Consult: Bedside chair;  All needs within reach    Sarah Ojeda Restorative Technician

## 2023-08-22 NOTE — OCCUPATIONAL THERAPY NOTE
Occupational Therapy Progress Note     Patient Name: Vanessa Franco  NFAGL'X Date: 8/22/2023  Problem List  Principal Problem:    Necrotizing soft tissue infection  Active Problems:    Benign essential hypertension    Uncontrolled type 2 diabetes mellitus with hyperglycemia ([address])    Coronary atherosclerosis    Ischemic cardiomyopathy    Mixed hyperlipidemia    ICD (implantable cardioverter-defibrillator) in place    Chronic systolic CHF (congestive heart failure) ([address])    Cholelithiasis            08/22/23 0951   OT Last Visit   OT Visit Date 08/22/23   Note Type   Note Type Treatment   Pain Assessment   Pain Assessment Tool 0-10   Pain Score 7   Pain Location/Orientation Location: Groin   Hospital Pain Intervention(s) Repositioned; Ambulation/increased activity   Restrictions/Precautions   Other Precautions Multiple lines; Fall Risk;Pain  (wound vac)   Lifestyle   Autonomy Pt I w/ ADLS, IADLs, and mobility at baseline. +   Reciprocal Relationships Supportive spouse- retired wound care nurse   Service to Others retired   Intrinsic Gratification enjoys fishing and swimming   ADL   Grooming Assistance 7  Independent   Grooming Deficit Teeth care   [address] [facility] 6  Modified independent   [address]; Thread RUE   LB Dressing Assistance 5  Supervision/Setup   LB Dressing Deficit Don/doff R sock; Don/doff L sock; Use of adaptive equipment   LB Dressing Comments utilized sock aide   Toileting Assistance  5  Supervision/Setup   Toileting Deficit Perineal hygiene   Bed Mobility   Supine to Sit Unable to assess   Sit to Supine Unable to assess   Additional Comments Pt seated OOB in chair upon arrival   Transfers   Sit to Stand 6  Modified independent   Additional items Increased time required;Armrests   Stand to Sit 6  Modified independent   Additional items Increased time required;Armrests   Toilet transfer 5  Supervision   Additional items Standard toilet   Additional Comments transfers with RW   Functional Mobility   Functional Mobility 6  Modified independent   Additional items Rolling walker   Cognition   Overall Cognitive Status WFL   Arousal/Participation Alert; Responsive; Cooperative   Attention Within functional limits   Orientation Level Oriented X4   Memory Within functional limits   Following Commands Follows all commands and directions without difficulty   Comments Pt pleasant and cooperative t/o session   Activity Tolerance   Activity Tolerance Patient tolerated treatment well   Medical Staff Made Aware RN clearance for session   Assessment   Assessment Patient participated in Skilled OT session this date with interventions consisting of ADL re training with the use of correct body mechnaics, safety awareness and fall prevention techniques,  long handle equipment,  therapeutic activities to: increase activity tolerance, increase dynamic sit/ stand balance during functional activity  and increase OOB/ sitting tolerance . Upon arrival patient was found seated OOB to Chair. Pt demonstrated the following tasks: MI STS and fnxl mobility with RW, oral care, and UBD. Pt performs toileting with S. Pt educated on [address] - utilized sock aide to don socks with S, verbalized understanding of use of reacher for underwear/pants, as well as understand of VAC management for LBD tasks s/p skilled education. Pt reports wife and dtr are both RNs and can assist as needed, no additional questions or concerns at this time. From OT standpoint, recommendation would be home with social support. No further acute OT needs. D/C OT. Please re-consult if needed. Thank you. Pt was left after session with all current needs met. The patient's raw score on the AM-PAC Daily Activity Inpatient Short Form is 21. A raw score of greater than or equal to 19 suggests the patient may benefit from discharge to home. Please refer to the recommendation of the Occupational Therapist for safe discharge planning.    Plan   Treatment Interventions ADL retraining;Functional transfer training;UE strengthening/ROM; Endurance training;Patient/family training;Equipment evaluation/education; Compensatory technique education;Continued evaluation; Energy conservation; Activityengagement   Goal Expiration Date 08/31/23   OT Treatment Day 1   OT Frequency 2-3x/wk   Recommendation   OT Discharge Recommendation No rehabilitation needs   AM-PAC Daily Activity Inpatient   Lower Body Dressing 3   Bathing 3   Toileting 3   Upper Body Dressing 4   Grooming 4   Eating 4   Daily Activity Raw Score 21   Daily Activity Standardized Score (Calc for Raw Score >=11) 44.27   AM-PAC Applied Cognition Inpatient   Following a Speech/Presentation 4   Understanding Ordinary Conversation 4   Taking Medications 4   Remembering Where Things Are Placed or Put Away 4   Remembering List of 4-5 Errands 4   Taking Care of Complicated Tasks 4   Applied Cognition Raw Score 24   Applied Cognition Standardized Score 62.21     Barry Akers MS, OTR/L

## 2023-08-22 NOTE — PLAN OF CARE
Problem: OCCUPATIONAL THERAPY ADULT  Goal: Performs self-care activities at highest level of function for planned discharge setting. See evaluation for individualized goals. Description: Treatment Interventions: ADL retraining, Functional transfer training, Endurance training, Patient/family training, Equipment evaluation/education, Compensatory technique education, Energy conservation, Activityengagement          See flowsheet documentation for full assessment, interventions and recommendations. Outcome: Completed  Note: Limitation: Decreased ADL status, Decreased endurance, Decreased self-care trans, Decreased high-level ADLs  Prognosis: Good  Assessment: Patient participated in Skilled OT session this date with interventions consisting of ADL re training with the use of correct body mechnaics, safety awareness and fall prevention techniques,  long handle equipment,  therapeutic activities to: increase activity tolerance, increase dynamic sit/ stand balance during functional activity  and increase OOB/ sitting tolerance . Upon arrival patient was found seated OOB to Chair. Pt demonstrated the following tasks: MI STS and fnxl mobility with RW, oral care, and UBD. Pt performs toileting with S. Pt educated on [address] - utilized sock aide to don socks with S, verbalized understanding of use of reacher for underwear/pants, as well as understand of VAC management for LBD tasks s/p skilled education. Pt reports wife and dtr are both RNs and can assist as needed, no additional questions or concerns at this time. From OT standpoint, recommendation would be home with social support. No further acute OT needs. D/C OT. Please re-consult if needed. Thank you. Pt was left after session with all current needs met. The patient's raw score on the AM-PAC Daily Activity Inpatient Short Form is 21. A raw score of greater than or equal to 19 suggests the patient may benefit from discharge to home.  Please refer to the recommendation of the Occupational Therapist for safe discharge planning.      OT Discharge Recommendation: No rehabilitation needs

## 2023-08-22 NOTE — PLAN OF CARE
Problem: MOBILITY - ADULT  Goal: Maintain or return to baseline ADL function  Description: INTERVENTIONS:  -  Assess patient's ability to carry out ADLs; assess patient's baseline for ADL function and identify physical deficits which impact ability to perform ADLs (bathing, care of mouth/teeth, toileting, grooming, dressing, etc.)  - Assess/evaluate cause of self-care deficits   - Assess range of motion  - Assess patient's mobility; develop plan if impaired  - Assess patient's need for assistive devices and provide as appropriate  - Encourage maximum independence but intervene and supervise when necessary  - Involve family in performance of ADLs  - Assess for home care needs following discharge   - Consider OT consult to assist with ADL evaluation and planning for discharge  - Provide patient education as appropriate  Outcome: Progressing  Goal: Maintains/Returns to pre admission functional level  Description: INTERVENTIONS:  - Perform BMAT or MOVE assessment daily.   - Set and communicate daily mobility goal to care team and patient/family/caregiver.    - Collaborate with rehabilitation services on mobility goals if consulted  - Out of bed for toileting  - Record patient progress and toleration of activity level   Outcome: Progressing     Problem: Prexisting or High Potential for Compromised Skin Integrity  Goal: Skin integrity is maintained or improved  Description: INTERVENTIONS:  - Identify patients at risk for skin breakdown  - Assess and monitor skin integrity  - Assess and monitor nutrition and hydration status  - Monitor labs   - Assess for incontinence   - Turn and reposition patient  - Assist with mobility/ambulation  - Relieve pressure over bony prominences  - Avoid friction and shearing  - Provide appropriate hygiene as needed including keeping skin clean and dry  - Evaluate need for skin moisturizer/barrier cream  - Collaborate with interdisciplinary team   - Patient/family teaching  - Consider wound care consult   Outcome: Progressing     Problem: Nutrition/Hydration-ADULT  Goal: Nutrient/Hydration intake appropriate for improving, restoring or maintaining nutritional needs  Description: Monitor and assess patient's nutrition/hydration status for malnutrition. Collaborate with interdisciplinary team and initiate plan and interventions as ordered. Monitor patient's weight and dietary intake as ordered or per policy. Utilize nutrition screening tool and intervene as necessary. Determine patient's food preferences and provide high-protein, high-caloric foods as appropriate.      INTERVENTIONS:  - Monitor oral intake, urinary output, labs, and treatment plans  - Assess nutrition and hydration status and recommend course of action  - Evaluate amount of meals eaten  - Assist patient with eating if necessary   - Allow adequate time for meals  - Recommend/ encourage appropriate diets, oral nutritional supplements, and vitamin/mineral supplements  - Order, calculate, and assess calorie counts as needed  - Recommend, monitor, and adjust tube feedings and TPN/PPN based on assessed needs  - Assess need for intravenous fluids  - Provide specific nutrition/hydration education as appropriate  - Include patient/family/caregiver in decisions related to nutrition  Outcome: Progressing     Problem: PAIN - ADULT  Goal: Verbalizes/displays adequate comfort level or baseline comfort level  Description: Interventions:  - Encourage patient to monitor pain and request assistance  - Assess pain using appropriate pain scale  - Administer analgesics based on type and severity of pain and evaluate response  - Implement non-pharmacological measures as appropriate and evaluate response  - Consider cultural and social influences on pain and pain management  - Notify physician/advanced practitioner if interventions unsuccessful or patient reports new pain  Outcome: Progressing     Problem: INFECTION - ADULT  Goal: Absence or prevention of progression during hospitalization  Description: INTERVENTIONS:  - Assess and monitor for signs and symptoms of infection  - Monitor lab/diagnostic results  - Monitor all insertion sites, i.e. indwelling lines, tubes, and drains  - Monitor endotracheal if appropriate and nasal secretions for changes in amount and color  - [place] appropriate cooling/warming therapies per order  - Administer medications as ordered  - Instruct and encourage patient and family to use good hand hygiene technique  - Identify and instruct in appropriate isolation precautions for identified infection/condition  Outcome: Progressing  Goal: Absence of fever/infection during neutropenic period  Description: INTERVENTIONS:  - Monitor WBC    Outcome: Progressing     Problem: SAFETY ADULT  Goal: Maintain or return to baseline ADL function  Description: INTERVENTIONS:  -  Assess patient's ability to carry out ADLs; assess patient's baseline for ADL function and identify physical deficits which impact ability to perform ADLs (bathing, care of mouth/teeth, toileting, grooming, dressing, etc.)  - Assess/evaluate cause of self-care deficits   - Assess range of motion  - Assess patient's mobility; develop plan if impaired  - Assess patient's need for assistive devices and provide as appropriate  - Encourage maximum independence but intervene and supervise when necessary  - Involve family in performance of ADLs  - Assess for home care needs following discharge   - Consider OT consult to assist with ADL evaluation and planning for discharge  - Provide patient education as appropriate  Outcome: Progressing  Goal: Maintains/Returns to pre admission functional level  Description: INTERVENTIONS:  - Perform BMAT or MOVE assessment daily.   - Set and communicate daily mobility goal to care team and patient/family/caregiver.    - Collaborate with rehabilitation services on mobility goals if consulted  - Out of bed for toileting  - Record patient progress and toleration of activity level   Outcome: Progressing  Goal: Patient will remain free of falls  Description: INTERVENTIONS:  - Educate patient/family on patient safety including physical limitations  - Instruct patient to call for assistance with activity   - Consult OT/PT to assist with strengthening/mobility   - Keep Call bell within reach  - Keep bed low and locked with side rails adjusted as appropriate  - Keep care items and personal belongings within reach  - Initiate and maintain comfort rounds  - Make Fall Risk Sign visible to staff  - Offer Toileting every 2 Hours, in advance of need  - Initiate/Maintain alarm  - Obtain necessary fall risk management equipment:   - Apply yellow socks and bracelet for high fall risk patients  - Consider moving patient to room near nurses station  Outcome: Progressing     Problem: DISCHARGE PLANNING  Goal: Discharge to home or other facility with appropriate resources  Description: INTERVENTIONS:  - Identify barriers to discharge w/patient and caregiver  - Arrange for needed discharge resources and transportation as appropriate  - Identify discharge learning needs (meds, wound care, etc.)  - Arrange for interpretive services to assist at discharge as needed  - Refer to Case Management Department for coordinating discharge planning if the patient needs post-hospital services based on physician/advanced practitioner order or complex needs related to functional status, cognitive ability, or social support system  Outcome: Progressing     Problem: Knowledge Deficit  Goal: Patient/family/caregiver demonstrates understanding of disease process, treatment plan, medications, and discharge instructions  Description: Complete learning assessment and assess knowledge base.   Interventions:  - Provide teaching at level of understanding  - Provide teaching via preferred learning methods  Outcome: Progressing

## 2023-08-23 ENCOUNTER — HOME HEALTH ADMISSION (OUTPATIENT)
Dept: HOME HEALTH SERVICES | Facility: HOME HEALTHCARE | Age: 68
End: 2023-08-23
Payer: COMMERCIAL

## 2023-08-23 LAB
ABO GROUP BLD: NORMAL
ANION GAP SERPL CALCULATED.3IONS-SCNC: 6 MMOL/L
BASOPHILS # BLD AUTO: 0.05 THOUSANDS/ÂΜL (ref 0–0.1)
BASOPHILS NFR BLD AUTO: 1 % (ref 0–1)
BLD GP AB SCN SERPL QL: NEGATIVE
BUN SERPL-MCNC: 7 MG/DL (ref 5–25)
CALCIUM SERPL-MCNC: 8.1 MG/DL (ref 8.4–10.2)
CHLORIDE SERPL-SCNC: 106 MMOL/L (ref 96–108)
CO2 SERPL-SCNC: 28 MMOL/L (ref 21–32)
CREAT SERPL-MCNC: 0.58 MG/DL (ref 0.6–1.3)
EOSINOPHIL # BLD AUTO: 0.18 THOUSAND/ÂΜL (ref 0–0.61)
EOSINOPHIL NFR BLD AUTO: 2 % (ref 0–6)
ERYTHROCYTE [DISTWIDTH] IN BLOOD BY AUTOMATED COUNT: 14 % (ref 11.6–15.1)
GFR SERPL CREATININE-BSD FRML MDRD: 105 ML/MIN/1.73SQ M
GLUCOSE SERPL-MCNC: 126 MG/DL (ref 65–140)
GLUCOSE SERPL-MCNC: 127 MG/DL (ref 65–140)
GLUCOSE SERPL-MCNC: 178 MG/DL (ref 65–140)
GLUCOSE SERPL-MCNC: 219 MG/DL (ref 65–140)
GLUCOSE SERPL-MCNC: 238 MG/DL (ref 65–140)
HCT VFR BLD AUTO: 29.6 % (ref 36.5–49.3)
HGB BLD-MCNC: 9.1 G/DL (ref 12–17)
IMM GRANULOCYTES # BLD AUTO: 0.12 THOUSAND/UL (ref 0–0.2)
IMM GRANULOCYTES NFR BLD AUTO: 1 % (ref 0–2)
LYMPHOCYTES # BLD AUTO: 2.65 THOUSANDS/ÂΜL (ref 0.6–4.47)
LYMPHOCYTES NFR BLD AUTO: 32 % (ref 14–44)
MCH RBC QN AUTO: 29.6 PG (ref 26.8–34.3)
MCHC RBC AUTO-ENTMCNC: 30.7 G/DL (ref 31.4–37.4)
MCV RBC AUTO: 96 FL (ref 82–98)
MONOCYTES # BLD AUTO: 0.79 THOUSAND/ÂΜL (ref 0.17–1.22)
MONOCYTES NFR BLD AUTO: 10 % (ref 4–12)
NEUTROPHILS # BLD AUTO: 4.55 THOUSANDS/ÂΜL (ref 1.85–7.62)
NEUTS SEG NFR BLD AUTO: 54 % (ref 43–75)
NRBC BLD AUTO-RTO: 0 /100 WBCS
PLATELET # BLD AUTO: 310 THOUSANDS/UL (ref 149–390)
PMV BLD AUTO: 9.8 FL (ref 8.9–12.7)
POTASSIUM SERPL-SCNC: 3.8 MMOL/L (ref 3.5–5.3)
RBC # BLD AUTO: 3.07 MILLION/UL (ref 3.88–5.62)
RH BLD: POSITIVE
SODIUM SERPL-SCNC: 140 MMOL/L (ref 135–147)
SPECIMEN EXPIRATION DATE: NORMAL
WBC # BLD AUTO: 8.34 THOUSAND/UL (ref 4.31–10.16)

## 2023-08-23 PROCEDURE — NC001 PR NO CHARGE: Performed by: THORACIC SURGERY (CARDIOTHORACIC VASCULAR SURGERY)

## 2023-08-23 PROCEDURE — 85025 COMPLETE CBC W/AUTO DIFF WBC: CPT | Performed by: NURSE PRACTITIONER

## 2023-08-23 PROCEDURE — 86850 RBC ANTIBODY SCREEN: CPT | Performed by: PHYSICIAN ASSISTANT

## 2023-08-23 PROCEDURE — 99232 SBSQ HOSP IP/OBS MODERATE 35: CPT | Performed by: SURGERY

## 2023-08-23 PROCEDURE — 86901 BLOOD TYPING SEROLOGIC RH(D): CPT | Performed by: PHYSICIAN ASSISTANT

## 2023-08-23 PROCEDURE — 82948 REAGENT STRIP/BLOOD GLUCOSE: CPT

## 2023-08-23 PROCEDURE — 86923 COMPATIBILITY TEST ELECTRIC: CPT

## 2023-08-23 PROCEDURE — 99232 SBSQ HOSP IP/OBS MODERATE 35: CPT | Performed by: INTERNAL MEDICINE

## 2023-08-23 PROCEDURE — 80048 BASIC METABOLIC PNL TOTAL CA: CPT | Performed by: NURSE PRACTITIONER

## 2023-08-23 PROCEDURE — 86900 BLOOD TYPING SEROLOGIC ABO: CPT | Performed by: PHYSICIAN ASSISTANT

## 2023-08-23 RX ORDER — POTASSIUM CHLORIDE 20 MEQ/1
20 TABLET, EXTENDED RELEASE ORAL ONCE
Status: COMPLETED | OUTPATIENT
Start: 2023-08-23 | End: 2023-08-23

## 2023-08-23 RX ORDER — HEPARIN SODIUM 5000 [USP'U]/ML
5000 INJECTION, SOLUTION INTRAVENOUS; SUBCUTANEOUS EVERY 8 HOURS SCHEDULED
Status: DISCONTINUED | OUTPATIENT
Start: 2023-08-23 | End: 2023-08-29

## 2023-08-23 RX ADMIN — LORAZEPAM 1 MG: 1 TABLET ORAL at 22:22

## 2023-08-23 RX ADMIN — ESCITALOPRAM OXALATE 20 MG: 20 TABLET, FILM COATED ORAL at 08:42

## 2023-08-23 RX ADMIN — INSULIN LISPRO 1 UNITS: 100 INJECTION, SOLUTION INTRAVENOUS; SUBCUTANEOUS at 22:23

## 2023-08-23 RX ADMIN — INSULIN LISPRO 4 UNITS: 100 INJECTION, SOLUTION INTRAVENOUS; SUBCUTANEOUS at 12:43

## 2023-08-23 RX ADMIN — CEFTRIAXONE SODIUM 2000 MG: 10 INJECTION, POWDER, FOR SOLUTION INTRAVENOUS at 09:25

## 2023-08-23 RX ADMIN — SOTALOL HYDROCHLORIDE 80 MG: 80 TABLET ORAL at 22:24

## 2023-08-23 RX ADMIN — ACETAMINOPHEN 975 MG: 325 TABLET, FILM COATED ORAL at 22:22

## 2023-08-23 RX ADMIN — CARVEDILOL 12.5 MG: 12.5 TABLET, FILM COATED ORAL at 17:19

## 2023-08-23 RX ADMIN — ATORVASTATIN CALCIUM 40 MG: 40 TABLET, FILM COATED ORAL at 17:19

## 2023-08-23 RX ADMIN — HEPARIN SODIUM 5000 UNITS: 5000 INJECTION INTRAVENOUS; SUBCUTANEOUS at 14:44

## 2023-08-23 RX ADMIN — ASPIRIN 81 MG: 81 TABLET, COATED ORAL at 08:41

## 2023-08-23 RX ADMIN — HEPARIN SODIUM 5000 UNITS: 5000 INJECTION INTRAVENOUS; SUBCUTANEOUS at 22:23

## 2023-08-23 RX ADMIN — OXYCODONE HYDROCHLORIDE 10 MG: 10 TABLET ORAL at 22:27

## 2023-08-23 RX ADMIN — INSULIN LISPRO 13 UNITS: 100 INJECTION, SOLUTION INTRAVENOUS; SUBCUTANEOUS at 09:21

## 2023-08-23 RX ADMIN — INSULIN LISPRO 4 UNITS: 100 INJECTION, SOLUTION INTRAVENOUS; SUBCUTANEOUS at 09:21

## 2023-08-23 RX ADMIN — INSULIN LISPRO 13 UNITS: 100 INJECTION, SOLUTION INTRAVENOUS; SUBCUTANEOUS at 12:42

## 2023-08-23 RX ADMIN — OXYCODONE HYDROCHLORIDE 10 MG: 10 TABLET ORAL at 08:41

## 2023-08-23 RX ADMIN — INSULIN GLARGINE 30 UNITS: 100 INJECTION, SOLUTION SUBCUTANEOUS at 09:21

## 2023-08-23 RX ADMIN — FENOFIBRATE 145 MG: 145 TABLET ORAL at 08:41

## 2023-08-23 RX ADMIN — HYDROMORPHONE HYDROCHLORIDE 0.5 MG: 1 INJECTION, SOLUTION INTRAMUSCULAR; INTRAVENOUS; SUBCUTANEOUS at 09:25

## 2023-08-23 RX ADMIN — ACETAMINOPHEN 975 MG: 325 TABLET, FILM COATED ORAL at 05:14

## 2023-08-23 RX ADMIN — HEPARIN SODIUM 5000 UNITS: 5000 INJECTION INTRAVENOUS; SUBCUTANEOUS at 09:21

## 2023-08-23 RX ADMIN — SOTALOL HYDROCHLORIDE 80 MG: 80 TABLET ORAL at 08:42

## 2023-08-23 RX ADMIN — MELATONIN 6 MG: at 22:22

## 2023-08-23 RX ADMIN — POTASSIUM CHLORIDE 20 MEQ: 1500 TABLET, EXTENDED RELEASE ORAL at 09:21

## 2023-08-23 RX ADMIN — ACETAMINOPHEN 975 MG: 325 TABLET, FILM COATED ORAL at 14:44

## 2023-08-23 RX ADMIN — SENNOSIDES 8.6 MG: 8.6 TABLET, FILM COATED ORAL at 22:22

## 2023-08-23 RX ADMIN — INSULIN LISPRO 13 UNITS: 100 INJECTION, SOLUTION INTRAVENOUS; SUBCUTANEOUS at 18:01

## 2023-08-23 RX ADMIN — EZETIMIBE 10 MG: 10 TABLET ORAL at 08:41

## 2023-08-23 RX ADMIN — PANTOPRAZOLE SODIUM 20 MG: 20 TABLET, DELAYED RELEASE ORAL at 05:14

## 2023-08-23 RX ADMIN — CARVEDILOL 12.5 MG: 12.5 TABLET, FILM COATED ORAL at 08:41

## 2023-08-23 NOTE — QUICK NOTE
Patient is stable and improving with Monday Wednesday Friday wound VAC changes. Patient patient stable for discharge from a surgery perspective and case management is setting up a home wound VAC, paperwork has been submitted. Patient requires continued admission for pacemaker removal, repeat blood cultures and follow-up with possible ICD placement. Patient transferred to [facility] discussed with Dr. Jagdish Machuca. Patient was accepted as transfer to [facility] for tomorrow, 8/24/23. Surgery will continue to follow the patient peripherally and manage Monday Wednesday Friday wound VAC changes.

## 2023-08-23 NOTE — PROGRESS NOTES
Progress Note  Marcelo Forrester 79 y.o. male MRN: [id number]  Unit/Bed#: [facility] 934-01 Encounter: [id number]    Assessment:  79 y.o. M presenting with NSTI of R groin s/p 8/13 and 8/14 washout/debridement of perineum, and 8/16 washout/debridement, partial closure, wound VAC placement. Found to have strep bacteremia with vegetations on pacemaker leads. Subjective:  No acute overnight events. Pain controlled. Tolerating diet well. No n/v. Having bowel function, voiding, walking well. Objective:  VSS on RA. /55   Pulse 71   Temp 98.5 °F (36.9 °C)   Resp 20   Ht 5' 9" (1.753 m)   Wt 97 kg (213 lb 13.5 oz)   SpO2 96%   BMI 31.58 kg/m²      Physical Exam:  General: NAD  HENT: NCAT  CV: nl rate  Lungs: nl wob. No resp distress. ABD: Soft, nontender, nondistended  Extrem: No CCE  Neuro: alert & oriented  Groin: R groin wound VAC had lost seal when examining this morning. Placed occlusive dressing.  Scheduled for wound VAC change this AM.    VAC: 75cc ss  UOP: unmeasured  Stool: x1    08/23/23 BMP pending  Recent Labs     08/21/23  0516 08/22/23  0512 08/23/23  0528   WBC 7.64 7.46 8.34   HGB 9.1* 9.3* 9.1*    299 310   SODIUM 142 142  --    K 3.7 3.5  --    * 110*  --    CO2 28 28  --    BUN 7 6  --    CREATININE 0.57* 0.60  --    GLUC 176* 116  --    CALCIUM 8.5 8.1*  --    MG 1.8  --   --        Plan:  - bedside VAC change today  - pre-op for OR 8/24 for lead extraction by cardiac surgery  - carb-controlled diet  - DVT ppx

## 2023-08-23 NOTE — PROGRESS NOTES
-- Patient:  -- MRN: [id number]  -- Aidin Request ID: [id number]  -- Level of care reserved: Queta Almonte  -- [facility] [place],  [address] [phone]  -- Clinical needs requested:  -- Geography searched: [phone]  -- Start of Service:  -- Request sent: 7:53am EDT on 8/23/2023 by Medhat Dietz  -- Partner reserved: 1:14pm EDT on 8/23/2023 by Medhat Dietz  -- Choice list shared: 1:14pm EDT on 8/23/2023 by Medhat Dietz

## 2023-08-23 NOTE — PLAN OF CARE
Problem: MOBILITY - ADULT  Goal: Maintain or return to baseline ADL function  Description: INTERVENTIONS:  -  Assess patient's ability to carry out ADLs; assess patient's baseline for ADL function and identify physical deficits which impact ability to perform ADLs (bathing, care of mouth/teeth, toileting, grooming, dressing, etc.)  - Assess/evaluate cause of self-care deficits   - Assess range of motion  - Assess patient's mobility; develop plan if impaired  - Assess patient's need for assistive devices and provide as appropriate  - Encourage maximum independence but intervene and supervise when necessary  - Involve family in performance of ADLs  - Assess for home care needs following discharge   - Consider OT consult to assist with ADL evaluation and planning for discharge  - Provide patient education as appropriate  Outcome: Progressing  Goal: Maintains/Returns to pre admission functional level  Description: INTERVENTIONS:  - Perform BMAT or MOVE assessment daily.   - Set and communicate daily mobility goal to care team and patient/family/caregiver.    - Collaborate with rehabilitation services on mobility goals if consulted  - Out of bed for toileting  - Record patient progress and toleration of activity level   Outcome: Progressing     Problem: Prexisting or High Potential for Compromised Skin Integrity  Goal: Skin integrity is maintained or improved  Description: INTERVENTIONS:  - Identify patients at risk for skin breakdown  - Assess and monitor skin integrity  - Assess and monitor nutrition and hydration status  - Monitor labs   - Assess for incontinence   - Turn and reposition patient  - Assist with mobility/ambulation  - Relieve pressure over bony prominences  - Avoid friction and shearing  - Provide appropriate hygiene as needed including keeping skin clean and dry  - Evaluate need for skin moisturizer/barrier cream  - Collaborate with interdisciplinary team   - Patient/family teaching  - Consider wound care consult   Outcome: Progressing     Problem: Nutrition/Hydration-ADULT  Goal: Nutrient/Hydration intake appropriate for improving, restoring or maintaining nutritional needs  Description: Monitor and assess patient's nutrition/hydration status for malnutrition. Collaborate with interdisciplinary team and initiate plan and interventions as ordered. Monitor patient's weight and dietary intake as ordered or per policy. Utilize nutrition screening tool and intervene as necessary. Determine patient's food preferences and provide high-protein, high-caloric foods as appropriate.      INTERVENTIONS:  - Monitor oral intake, urinary output, labs, and treatment plans  - Assess nutrition and hydration status and recommend course of action  - Evaluate amount of meals eaten  - Assist patient with eating if necessary   - Allow adequate time for meals  - Recommend/ encourage appropriate diets, oral nutritional supplements, and vitamin/mineral supplements  - Order, calculate, and assess calorie counts as needed  - Recommend, monitor, and adjust tube feedings and TPN/PPN based on assessed needs  - Assess need for intravenous fluids  - Provide specific nutrition/hydration education as appropriate  - Include patient/family/caregiver in decisions related to nutrition  Outcome: Progressing     Problem: PAIN - ADULT  Goal: Verbalizes/displays adequate comfort level or baseline comfort level  Description: Interventions:  - Encourage patient to monitor pain and request assistance  - Assess pain using appropriate pain scale  - Administer analgesics based on type and severity of pain and evaluate response  - Implement non-pharmacological measures as appropriate and evaluate response  - Consider cultural and social influences on pain and pain management  - Notify physician/advanced practitioner if interventions unsuccessful or patient reports new pain  Outcome: Progressing     Problem: INFECTION - ADULT  Goal: Absence or prevention of progression during hospitalization  Description: INTERVENTIONS:  - Assess and monitor for signs and symptoms of infection  - Monitor lab/diagnostic results  - Monitor all insertion sites, i.e. indwelling lines, tubes, and drains  - Monitor endotracheal if appropriate and nasal secretions for changes in amount and color  - [place] appropriate cooling/warming therapies per order  - Administer medications as ordered  - Instruct and encourage patient and family to use good hand hygiene technique  - Identify and instruct in appropriate isolation precautions for identified infection/condition  Outcome: Progressing  Goal: Absence of fever/infection during neutropenic period  Description: INTERVENTIONS:  - Monitor WBC    Outcome: Progressing     Problem: SAFETY ADULT  Goal: Maintain or return to baseline ADL function  Description: INTERVENTIONS:  -  Assess patient's ability to carry out ADLs; assess patient's baseline for ADL function and identify physical deficits which impact ability to perform ADLs (bathing, care of mouth/teeth, toileting, grooming, dressing, etc.)  - Assess/evaluate cause of self-care deficits   - Assess range of motion  - Assess patient's mobility; develop plan if impaired  - Assess patient's need for assistive devices and provide as appropriate  - Encourage maximum independence but intervene and supervise when necessary  - Involve family in performance of ADLs  - Assess for home care needs following discharge   - Consider OT consult to assist with ADL evaluation and planning for discharge  - Provide patient education as appropriate  Outcome: Progressing  Goal: Maintains/Returns to pre admission functional level  Description: INTERVENTIONS:  - Perform BMAT or MOVE assessment daily.   - Set and communicate daily mobility goal to care team and patient/family/caregiver.    - Collaborate with rehabilitation services on mobility goals if consulted  - Out of bed for toileting  - Record patient progress and toleration of activity level   Outcome: Progressing  Goal: Patient will remain free of falls  Description: INTERVENTIONS:  - Educate patient/family on patient safety including physical limitations  - Instruct patient to call for assistance with activity   - Consult OT/PT to assist with strengthening/mobility   - Keep Call bell within reach  - Keep bed low and locked with side rails adjusted as appropriate  - Keep care items and personal belongings within reach  - Initiate and maintain comfort rounds  - Make Fall Risk Sign visible to staff  - Apply yellow socks and bracelet for high fall risk patients  - Consider moving patient to room near nurses station  Outcome: Progressing     Problem: DISCHARGE PLANNING  Goal: Discharge to home or other facility with appropriate resources  Description: INTERVENTIONS:  - Identify barriers to discharge w/patient and caregiver  - Arrange for needed discharge resources and transportation as appropriate  - Identify discharge learning needs (meds, wound care, etc.)  - Arrange for interpretive services to assist at discharge as needed  - Refer to Case Management Department for coordinating discharge planning if the patient needs post-hospital services based on physician/advanced practitioner order or complex needs related to functional status, cognitive ability, or social support system  Outcome: Progressing     Problem: Knowledge Deficit  Goal: Patient/family/caregiver demonstrates understanding of disease process, treatment plan, medications, and discharge instructions  Description: Complete learning assessment and assess knowledge base.   Interventions:  - Provide teaching at level of understanding  - Provide teaching via preferred learning methods  Outcome: Progressing

## 2023-08-23 NOTE — PLAN OF CARE
Problem: MOBILITY - ADULT  Goal: Maintain or return to baseline ADL function  Description: INTERVENTIONS:  -  Assess patient's ability to carry out ADLs; assess patient's baseline for ADL function and identify physical deficits which impact ability to perform ADLs (bathing, care of mouth/teeth, toileting, grooming, dressing, etc.)  - Assess/evaluate cause of self-care deficits   - Assess range of motion  - Assess patient's mobility; develop plan if impaired  - Assess patient's need for assistive devices and provide as appropriate  - Encourage maximum independence but intervene and supervise when necessary  - Involve family in performance of ADLs  - Assess for home care needs following discharge   - Consider OT consult to assist with ADL evaluation and planning for discharge  - Provide patient education as appropriate  Outcome: Progressing  Goal: Maintains/Returns to pre admission functional level  Description: INTERVENTIONS:  - Perform BMAT or MOVE assessment daily.   - Set and communicate daily mobility goal to care team and patient/family/caregiver.    - Collaborate with rehabilitation services on mobility goals if consulted  - Out of bed for toileting  - Record patient progress and toleration of activity level   Outcome: Progressing     Problem: Prexisting or High Potential for Compromised Skin Integrity  Goal: Skin integrity is maintained or improved  Description: INTERVENTIONS:  - Identify patients at risk for skin breakdown  - Assess and monitor skin integrity  - Assess and monitor nutrition and hydration status  - Monitor labs   - Assess for incontinence   - Turn and reposition patient  - Assist with mobility/ambulation  - Relieve pressure over bony prominences  - Avoid friction and shearing  - Provide appropriate hygiene as needed including keeping skin clean and dry  - Evaluate need for skin moisturizer/barrier cream  - Collaborate with interdisciplinary team   - Patient/family teaching  - Consider wound care consult   Outcome: Progressing     Problem: Nutrition/Hydration-ADULT  Goal: Nutrient/Hydration intake appropriate for improving, restoring or maintaining nutritional needs  Description: Monitor and assess patient's nutrition/hydration status for malnutrition. Collaborate with interdisciplinary team and initiate plan and interventions as ordered. Monitor patient's weight and dietary intake as ordered or per policy. Utilize nutrition screening tool and intervene as necessary. Determine patient's food preferences and provide high-protein, high-caloric foods as appropriate.      INTERVENTIONS:  - Monitor oral intake, urinary output, labs, and treatment plans  - Assess nutrition and hydration status and recommend course of action  - Evaluate amount of meals eaten  - Assist patient with eating if necessary   - Allow adequate time for meals  - Recommend/ encourage appropriate diets, oral nutritional supplements, and vitamin/mineral supplements  - Order, calculate, and assess calorie counts as needed  - Recommend, monitor, and adjust tube feedings and TPN/PPN based on assessed needs  - Assess need for intravenous fluids  - Provide specific nutrition/hydration education as appropriate  - Include patient/family/caregiver in decisions related to nutrition  Outcome: Progressing     Problem: PAIN - ADULT  Goal: Verbalizes/displays adequate comfort level or baseline comfort level  Description: Interventions:  - Encourage patient to monitor pain and request assistance  - Assess pain using appropriate pain scale  - Administer analgesics based on type and severity of pain and evaluate response  - Implement non-pharmacological measures as appropriate and evaluate response  - Consider cultural and social influences on pain and pain management  - Notify physician/advanced practitioner if interventions unsuccessful or patient reports new pain  Outcome: Progressing     Problem: INFECTION - ADULT  Goal: Absence or prevention of progression during hospitalization  Description: INTERVENTIONS:  - Assess and monitor for signs and symptoms of infection  - Monitor lab/diagnostic results  - Monitor all insertion sites, i.e. indwelling lines, tubes, and drains  - Monitor endotracheal if appropriate and nasal secretions for changes in amount and color  - [place] appropriate cooling/warming therapies per order  - Administer medications as ordered  - Instruct and encourage patient and family to use good hand hygiene technique  - Identify and instruct in appropriate isolation precautions for identified infection/condition  Outcome: Progressing  Goal: Absence of fever/infection during neutropenic period  Description: INTERVENTIONS:  - Monitor WBC    Outcome: Progressing     Problem: SAFETY ADULT  Goal: Maintain or return to baseline ADL function  Description: INTERVENTIONS:  -  Assess patient's ability to carry out ADLs; assess patient's baseline for ADL function and identify physical deficits which impact ability to perform ADLs (bathing, care of mouth/teeth, toileting, grooming, dressing, etc.)  - Assess/evaluate cause of self-care deficits   - Assess range of motion  - Assess patient's mobility; develop plan if impaired  - Assess patient's need for assistive devices and provide as appropriate  - Encourage maximum independence but intervene and supervise when necessary  - Involve family in performance of ADLs  - Assess for home care needs following discharge   - Consider OT consult to assist with ADL evaluation and planning for discharge  - Provide patient education as appropriate  Outcome: Progressing  Goal: Maintains/Returns to pre admission functional level  Description: INTERVENTIONS:  - Perform BMAT or MOVE assessment daily.   - Set and communicate daily mobility goal to care team and patient/family/caregiver.    - Collaborate with rehabilitation services on mobility goals if consulted  - Out of bed for toileting  - Record patient progress and toleration of activity level   Outcome: Progressing  Goal: Patient will remain free of falls  Description: INTERVENTIONS:  - Educate patient/family on patient safety including physical limitations  - Instruct patient to call for assistance with activity   - Consult OT/PT to assist with strengthening/mobility   - Keep Call bell within reach  - Keep bed low and locked with side rails adjusted as appropriate  - Keep care items and personal belongings within reach  - Initiate and maintain comfort rounds  - Make Fall Risk Sign visible to staff  - Offer Toileting every 2 Hours, in advance of need  - Initiate/Maintain bed alarm  - Obtain necessary fall risk management equipment:   - Apply yellow socks and bracelet for high fall risk patients  - Consider moving patient to room near nurses station  Outcome: Progressing     Problem: DISCHARGE PLANNING  Goal: Discharge to home or other facility with appropriate resources  Description: INTERVENTIONS:  - Identify barriers to discharge w/patient and caregiver  - Arrange for needed discharge resources and transportation as appropriate  - Identify discharge learning needs (meds, wound care, etc.)  - Arrange for interpretive services to assist at discharge as needed  - Refer to Case Management Department for coordinating discharge planning if the patient needs post-hospital services based on physician/advanced practitioner order or complex needs related to functional status, cognitive ability, or social support system  Outcome: Progressing     Problem: Knowledge Deficit  Goal: Patient/family/caregiver demonstrates understanding of disease process, treatment plan, medications, and discharge instructions  Description: Complete learning assessment and assess knowledge base.   Interventions:  - Provide teaching at level of understanding  - Provide teaching via preferred learning methods  Outcome: Progressing

## 2023-08-23 NOTE — RESTORATIVE TECHNICIAN NOTE
Restorative Technician Note      Patient Name: Kirsten [facility]     Restorative Tech Visit Date: 08/23/23  Note Type: Mobility  Patient Position Upon Consult: Supine  Activity Performed: Ambulated  Assistive Device: Roller walker  Patient Position at End of Consult: Supine;  All needs within reach    Steve Render, Restorative Technician \

## 2023-08-23 NOTE — PROGRESS NOTES
Progress Note - [facility]   Charleen Garcia 79 y.o. male MRN: [id number]  Unit/Bed#: [facility] 934-01 Encounter: [id number]      IMPRESSION & RECOMMENDATIONS:   1. 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 1 of 2 sets from 8/13 are growing Streptococcus anginosus. The likely source of bacteremia is his necrotizing perenial infection. Strep anginosus can cause endovascular infection and we need to consider the possibility of endocarditis and/or ICD infection.  2D echo was noted to be a poor quality study. MARCELLA 8/18 found vegetation on pacer wire of RV lead though valves spared. Repeat blood cultures 8/13 negative.  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  [id number] appears there has been good source control of this infection and patient is clinically improved. Likely this was the source of his bacteremia as above. OR cultures have grown Group C Strep, Prevotella and E. Coli. He completed 5 day post op course of flagyl.   -Continue IV ceftriaxone 2g q24h  -Will need prolonged course of Ceftriaxone for ICD infection as above  -ongoing surgical management and wound care     3. Acute Hypoxic Respiratory Failure. Patient had been intubated for the [place] has now been successfully extubated.  Chest x-rays have shown linear atelectasis at the left lung base but no consolidation to suggest pneumonia. Currently on 2L NC.  -Monitor O2 needs  -Wean oxygen as able     4. T2DM. Risk factor for invasive infections including necrotizing soft tissue infection.  A1c was 12.3 in January of this year. Endocrinology following. -Tight glycemic control     Antibiotics:  IV ceftriaxone D6  Total abx D11     I have discussed the above management plan in detail with patient. I have discussed the above management plan in detail with patient's RN, and the primary service, surgery. 24 Hour events:  Yesterday and overnight notes reviewed. Planning for device extraction tomorrow. S/p vac change today. Subjective:  Patient has no fever, chills, sweats; no nausea, vomiting, diarrhea; no cough, shortness of breath; minimal pain though states this is worse during dressing changes. Tolerating the abx. No new symptoms. Objective:  Vitals:  Temp:  [97.6 °F (36.4 °C)-98.5 °F (36.9 °C)] 98.2 °F (36.8 °C)  HR:  [65-71] 65  Resp:  [16-20] 16  BP: (123-158)/(55-77) 138/63  SpO2:  [95 %-96 %] 95 %  Temp (24hrs), Av.1 °F (36.7 °C), Min:97.6 °F (36.4 °C), Max:98.5 °F (36.9 °C)  Current: Temperature: 98.2 °F (36.8 °C)    PHYSICAL EXAM:  General Appearance:  Appearing well, nontoxic, and in no distress   HEENT: Normocephalic, without obvious abnormality, atraumatic. Conjunctiva pink and sclera anicteric. Oropharynx moist without lesions. Lungs:   Clear to auscultation bilaterally, respirations unlabored   Heart:  RRR; no murmur, rub or gallop   Abdomen:   Soft, non-tender, non-distended, positive bowel sounds    Extremities: No cyanosis, clubbing or edema   Musculoskeletal: Back symmetric without curvature, ROM normal.    Skin: No rashes or lesions. Wound vac apparatus in place. Peripheral IV intact without evidence of erythema, warmth, or exudate. LABS, IMAGING, & OTHER STUDIES:  Extensive review of the medical records in epic including review of the notes, radiographs, and laboratory results were reviewed personally as below. Lab Results:  I have personally reviewed pertinent labs.   Results from last 7 days   Lab Units 23  0528 23  0512 23  0516   WBC Thousand/uL 8.34 7.46 7.64   HEMOGLOBIN g/dL 9.1* 9.3* 9.1* PLATELETS Thousands/uL 310 299 286     Results from last 7 days   Lab Units 08/23/23  0528 08/22/23  0512 08/21/23  0516   SODIUM mmol/L 140 142 142   POTASSIUM mmol/L 3.8 3.5 3.7   CHLORIDE mmol/L 106 110* 110*   CO2 mmol/L 28 28 28   BUN mg/dL 7 6 7   CREATININE mg/dL 0.58* 0.60 0.57*   EGFR ml/min/1.73sq m 105 104 106   CALCIUM mg/dL 8.1* 8.1* 8.5                           Imaging Studies:   I have personally reviewed pertinent imaging study reports and images in PACS. Other Studies:   I have personally reviewed pertinent reports.

## 2023-08-23 NOTE — QUICK NOTE
Nurse-Patient-Provider rounds were completed with the patient's nurse today, Timothy Chacko. We discussed the plan is to change wound VAC today. We will discuss with the EP and cardiac surgery planning of lead extraction on 8/24/2023. Will need to be n.p.o. at midnight. Otherwise continue current diet, PICC line, will need prolonged IV antibiotic course. We reviewed all of the invasive devices/lines/telemetry orders. -PICC line.  -Wound VAC    DVT Prophylaxis:  SQH    Pain Assessment / Plan:  - Continue current analgesic regimen. Mobility Assessment / Plan:  - Activity as tolerated. Goals / Barriers for discharge:  Barriers to dispo include further cardiac surgery intervention warranted with postoperative monitoring. Await wound VAC insurance authorization. Await VNA set up and IV antibiotic set up for home. .  Case management following; case and discharge needs discussed. All questions and concerns were addressed. I spent greater than 20 minutes reviewing the plan with the patient and the nurse, and coordinating care for the day.     Sotero Stoll PA-C  8/23/2023

## 2023-08-23 NOTE — PROGRESS NOTES
Progress Note - Cardiothoracic Surgery   Benita Van 79 y.o. male MRN: [id number]  Unit/Bed#: [facility] 934-01 Encounter: [id number]      24 Hour Events: Diet/insulin adjusted per endo. Surgeon called wife to discuss questions regarding procedure yesterday. No other events. Still agreeable to sx, all questions addressed during encounter.     Medications:   Scheduled Meds:  Current Facility-Administered Medications   Medication Dose Route Frequency Provider Last Rate   • acetaminophen  975 mg Oral Q8H [facility]     • aspirin  81 mg Oral Daily [facility]     • atorvastatin  40 mg Oral Daily With [facility], DO     • carvedilol  12.5 mg Oral BID José Miguel Arellano MD     • cefTRIAXone  2,000 mg Intravenous Q24H Aranza Pascual MD 2,000 mg (08/22/23 0855)   • enoxaparin  40 mg Subcutaneous Q24H [facility] [place], DO     • escitalopram  20 mg Oral Daily [facility] Juliosklarissa, DO     • ezetimibe  10 mg Oral Daily [facility] Allen, DO     • fenofibrate  145 mg Oral Daily [facility] Allen DO     • HYDROmorphone  0.5 mg Intravenous Q4H PRN [facility] Allen DO     • insulin glargine  30 Units Subcutaneous [facility] James Segura MD     • insulin lispro  1-6 Units Subcutaneous  James Segura MD     • insulin lispro  13 Units Subcutaneous TID With Meals Sarah Strauss MD     • insulin lispro  2-12 Units Subcutaneous TID AC Sarah Strauss MD     • LORazepam  1 mg Oral Daily PRN Honey Roof, DO     • melatonin  6 mg Oral HS [facility] Allen DO     • ondansetron  4 mg Intravenous Q6H PRN Honey Roof, DO     • oxyCODONE  10 mg Oral Q4H PRN Honey Roof, DO     • oxyCODONE  5 mg Oral Q4H PRN [facility] [place], DO     • pantoprazole  20 mg Oral Early Morning Honey Roof, DO     • senna  1 tablet Oral HS Elldiane Villa DO     • sotalol  80 mg Oral BID José Miguel Arellano MD       Continuous Infusions:     Results:   Results from last 7 days   Lab Units 08/23/23  0528 08/22/23  0512 08/21/23  0516   WBC Thousand/uL 8.34 7.46 7.64   HEMOGLOBIN g/dL 9.1* 9.3* 9.1*   HEMATOCRIT % 29.6* 29.4* 29.3*   PLATELETS Thousands/uL 310 299 286     Results from last 7 days   Lab Units 08/23/23  0528 08/22/23  0512 08/21/23  0516   POTASSIUM mmol/L 3.8 3.5 3.7   CHLORIDE mmol/L 106 110* 110*   CO2 mmol/L 28 28 28   BUN mg/dL 7 6 7   CREATININE mg/dL 0.58* 0.60 0.57*   CALCIUM mg/dL 8.1* 8.1* 8.5           Studies:     TTE 8/14: LVEF 40%, RV nml, RA with pacer wire present with 1.7 cm vegetation, no AS/AI, mild MR/TR      CXR 8/16: NAD      EKG 8/14: sinus tach with PVCs     Vitals:   Vitals:    08/22/23 0759 08/22/23 1540 08/22/23 2103 08/23/23 0724   BP: 167/77 158/77 123/55 138/63   Pulse: 56 70 71 65   Resp: 18 16 20 16   Temp: 97.8 °F (36.6 °C) 97.6 °F (36.4 °C) 98.5 °F (36.9 °C) 98.2 °F (36.8 °C)   TempSrc:       SpO2: 93% 96% 96% 95%   Weight:       Height:           Physical Exam:    HEENT/NECK:  Normocephalic. Atraumatic. Cardiac: Regular rate and rhythm  Pulmonary:  on RA, no respiratory distress  Abdomen:  Non-distended  Extremities: Trace edema B/L  Neuro: No focal deficits  Skin: Warm/Dry, without rashes or lesions.     Assessment:    Infected RA lead; Ongoing lead extraction work up    Plan:  Patient agreeable to proceed with surgery;     Pre-op work-up complete    Informed consent signed & on chart     Infected RA lead w/ necrotizing fascitis of groin              Afebrile              No leukocytosis              VAC changes per general sx, next planned for Thursday              Ceftriaxone              Last [facility] w/ no growth              ID following    Blood type and cross match ordered for tomorrow    No Mupirocin/Chlorhexidine w/ lead extractions necessary     Lead extraction scheduled for 8/24 with Dr. Tashi Cullen D.O. at [address] [place] [address]  DATE: August 23, 2023  TIME: 7:28 AM    * This note was completed in part utilizing m-modal fluency direct voice recognition software. Grammatical errors, random word insertion, spelling mistakes, and incomplete sentences may be an occasional consequence of the system secondary to software limitations, ambient noise and hardware issues. At the time of dictation, efforts were made to edit, clarify and /or correct errors. Please read the chart carefully and recognize, using context, where substitutions have occurred. If you have any questions or concerns about the context, text or information contained within the body of this dictation, please contact myself, the provider, for further clarification.

## 2023-08-23 NOTE — CASE MANAGEMENT
[address] [place] [address] has received approval to release wound vac to patient. Assigned vac #: [id number]  Proof of delivery PPW given to Care Manager to deliver to patient.

## 2023-08-23 NOTE — CASE MANAGEMENT
[facility] has received request for [employer] wound vac from Care Manager. Request submitted via [employer] website.    Pending order #: [id number]

## 2023-08-23 NOTE — CASE MANAGEMENT
Case Management Discharge Planning Note    Patient name Isabel Arndt  Location [facility] 934/[facility] 938-47 MRN [id number]  : 1955 Date 2023       Current Admission Date: 2023  Current Admission Diagnosis:Necrotizing soft tissue infection   Patient Active Problem List    Diagnosis Date Noted   • Necrotizing soft tissue infection 2023   • Cholelithiasis 2023   • Chronic systolic CHF (congestive heart failure) ([address]) 2021   • Ventricular tachycardia ([address]) 2021   • ICD (implantable cardioverter-defibrillator) in place 2019   • Uncontrolled type 2 diabetes mellitus with hyperglycemia ([address]) 09/15/2016   • Esophageal reflux 2013   • Ischemic cardiomyopathy 2012   • Generalized anxiety disorder 2012   • Benign essential hypertension 2011   • Coronary atherosclerosis 2010   • Mixed hyperlipidemia 2010      LOS (days): 10  Geometric Mean LOS (GMLOS) (days): 9.60  Days to GMLOS:0     OBJECTIVE:  Risk of Unplanned Readmission Score: 20.47         Current admission status: Inpatient   Preferred Pharmacy:   [facility] [address] EDITH HAN [address] [place] [address]  Phone: [phone] Fax: [phone] [address] [place] [address]  Phone: [phone] Fax: [phone]    [facility] [address] [place] [address]  Phone: [phone] Fax: [phone]    [facility] ([address] [place] [address]  Phone: [phone] Fax: [phone] [address] [place] [address], [address] [facility] [address]  [place] [address]  Phone: [phone] Fax: [phone]    Primary Care Provider: Yocasta Bryan MD    Primary Insurance: [address] Insurance:     DISCHARGE DETAILS:  Fide Ortez ELLY accepts, reserved. Educated wife and patient. Wound vac #[id number] delivered to patient room, paperwork reviewed, signature received. Wife and daughter present. Signed documentation faxed back to [facility] at [phone] and to [address] [place] [address] at [phone]. Awaiting to hear final determination if needs IV abx. CM to continue to support.

## 2023-08-24 ENCOUNTER — ANESTHESIA EVENT (INPATIENT)
Dept: PERIOP | Facility: HOSPITAL | Age: 68
DRG: 853 | End: 2023-08-24
Payer: COMMERCIAL

## 2023-08-24 ENCOUNTER — APPOINTMENT (INPATIENT)
Dept: NON INVASIVE DIAGNOSTICS | Facility: HOSPITAL | Age: 68
DRG: 853 | End: 2023-08-24
Attending: THORACIC SURGERY (CARDIOTHORACIC VASCULAR SURGERY)
Payer: COMMERCIAL

## 2023-08-24 ENCOUNTER — ANESTHESIA (INPATIENT)
Dept: PERIOP | Facility: HOSPITAL | Age: 68
DRG: 853 | End: 2023-08-24
Payer: COMMERCIAL

## 2023-08-24 ENCOUNTER — APPOINTMENT (OUTPATIENT)
Dept: RADIOLOGY | Facility: HOSPITAL | Age: 68
DRG: 853 | End: 2023-08-24
Payer: COMMERCIAL

## 2023-08-24 PROBLEM — B95.5 STREPTOCOCCAL BACTEREMIA: Status: ACTIVE | Noted: 2023-08-24

## 2023-08-24 PROBLEM — R78.81 STREPTOCOCCAL BACTEREMIA: Status: ACTIVE | Noted: 2023-08-24

## 2023-08-24 PROBLEM — N49.3 FOURNIER'S GANGRENE: Status: ACTIVE | Noted: 2023-08-24

## 2023-08-24 PROBLEM — T82.7XXA ICD (IMPLANTABLE CARDIOVERTER-DEFIBRILLATOR) INFECTION (HCC): Status: ACTIVE | Noted: 2023-08-24

## 2023-08-24 LAB
ABO GROUP BLD BPU: NORMAL
ANION GAP SERPL CALCULATED.3IONS-SCNC: 6 MMOL/L
BASOPHILS # BLD MANUAL: 0.19 THOUSAND/UL (ref 0–0.1)
BASOPHILS NFR MAR MANUAL: 2 % (ref 0–1)
BPU ID: NORMAL
BUN SERPL-MCNC: 8 MG/DL (ref 5–25)
BURR CELLS BLD QL SMEAR: PRESENT
CALCIUM SERPL-MCNC: 8.2 MG/DL (ref 8.4–10.2)
CHLORIDE SERPL-SCNC: 104 MMOL/L (ref 96–108)
CO2 SERPL-SCNC: 30 MMOL/L (ref 21–32)
CREAT SERPL-MCNC: 0.55 MG/DL (ref 0.6–1.3)
CROSSMATCH: NORMAL
EOSINOPHIL # BLD MANUAL: 0.28 THOUSAND/UL (ref 0–0.4)
EOSINOPHIL NFR BLD MANUAL: 3 % (ref 0–6)
ERYTHROCYTE [DISTWIDTH] IN BLOOD BY AUTOMATED COUNT: 14.2 % (ref 11.6–15.1)
GFR SERPL CREATININE-BSD FRML MDRD: 107 ML/MIN/1.73SQ M
GIANT PLATELETS BLD QL SMEAR: PRESENT
GLUCOSE SERPL-MCNC: 128 MG/DL (ref 65–140)
GLUCOSE SERPL-MCNC: 153 MG/DL (ref 65–140)
GLUCOSE SERPL-MCNC: 167 MG/DL (ref 65–140)
GLUCOSE SERPL-MCNC: 171 MG/DL (ref 65–140)
GLUCOSE SERPL-MCNC: 199 MG/DL (ref 65–140)
HCT VFR BLD AUTO: 29.8 % (ref 36.5–49.3)
HCT VFR BLD AUTO: 29.9 % (ref 36.5–49.3)
HGB BLD-MCNC: 9.3 G/DL (ref 12–17)
HGB BLD-MCNC: 9.5 G/DL (ref 12–17)
LYMPHOCYTES # BLD AUTO: 3.36 THOUSAND/UL (ref 0.6–4.47)
LYMPHOCYTES # BLD AUTO: 35 % (ref 14–44)
MCH RBC QN AUTO: 30.5 PG (ref 26.8–34.3)
MCHC RBC AUTO-ENTMCNC: 31.9 G/DL (ref 31.4–37.4)
MCV RBC AUTO: 96 FL (ref 82–98)
MONOCYTES # BLD AUTO: 0.28 THOUSAND/UL (ref 0–1.22)
MONOCYTES NFR BLD: 3 % (ref 4–12)
NEUTROPHILS # BLD MANUAL: 5.23 THOUSAND/UL (ref 1.85–7.62)
NEUTS BAND NFR BLD MANUAL: 3 % (ref 0–8)
NEUTS SEG NFR BLD AUTO: 53 % (ref 43–75)
OVALOCYTES BLD QL SMEAR: PRESENT
PHOSPHATE SERPL-MCNC: 3.5 MG/DL (ref 2.3–4.1)
PLATELET # BLD AUTO: 353 THOUSANDS/UL (ref 149–390)
PLATELET BLD QL SMEAR: ADEQUATE
PMV BLD AUTO: 9.6 FL (ref 8.9–12.7)
POIKILOCYTOSIS BLD QL SMEAR: PRESENT
POLYCHROMASIA BLD QL SMEAR: PRESENT
POTASSIUM SERPL-SCNC: 3.9 MMOL/L (ref 3.5–5.3)
RBC # BLD AUTO: 3.11 MILLION/UL (ref 3.88–5.62)
RBC MORPH BLD: PRESENT
SODIUM SERPL-SCNC: 140 MMOL/L (ref 135–147)
UNIT DISPENSE STATUS: NORMAL
UNIT PRODUCT CODE: NORMAL
UNIT PRODUCT VOLUME: 300 ML
UNIT RH: NORMAL
VARIANT LYMPHS # BLD AUTO: 1 %
WBC # BLD AUTO: 9.34 THOUSAND/UL (ref 4.31–10.16)

## 2023-08-24 PROCEDURE — C2628 CATHETER, OCCLUSION: HCPCS | Performed by: THORACIC SURGERY (CARDIOTHORACIC VASCULAR SURGERY)

## 2023-08-24 PROCEDURE — 84100 ASSAY OF PHOSPHORUS: CPT

## 2023-08-24 PROCEDURE — 0JPT0PZ REMOVAL OF CARDIAC RHYTHM RELATED DEVICE FROM TRUNK SUBCUTANEOUS TISSUE AND FASCIA, OPEN APPROACH: ICD-10-PCS | Performed by: THORACIC SURGERY (CARDIOTHORACIC VASCULAR SURGERY)

## 2023-08-24 PROCEDURE — 85014 HEMATOCRIT: CPT | Performed by: PHYSICIAN ASSISTANT

## 2023-08-24 PROCEDURE — C2629 INTRO/SHEATH, LASER: HCPCS | Performed by: THORACIC SURGERY (CARDIOTHORACIC VASCULAR SURGERY)

## 2023-08-24 PROCEDURE — 93355 ECHO TRANSESOPHAGEAL (TEE): CPT

## 2023-08-24 PROCEDURE — 85007 BL SMEAR W/DIFF WBC COUNT: CPT

## 2023-08-24 PROCEDURE — 99232 SBSQ HOSP IP/OBS MODERATE 35: CPT | Performed by: INTERNAL MEDICINE

## 2023-08-24 PROCEDURE — 82948 REAGENT STRIP/BLOOD GLUCOSE: CPT

## 2023-08-24 PROCEDURE — 80048 BASIC METABOLIC PNL TOTAL CA: CPT | Performed by: PHYSICIAN ASSISTANT

## 2023-08-24 PROCEDURE — 85027 COMPLETE CBC AUTOMATED: CPT

## 2023-08-24 PROCEDURE — 33241 REMOVE PULSE GENERATOR: CPT | Performed by: PHYSICIAN ASSISTANT

## 2023-08-24 PROCEDURE — C1773 RET DEV, INSERTABLE: HCPCS | Performed by: THORACIC SURGERY (CARDIOTHORACIC VASCULAR SURGERY)

## 2023-08-24 PROCEDURE — 33244 REMOVE ELCTRD TRANSVENOUSLY: CPT | Performed by: THORACIC SURGERY (CARDIOTHORACIC VASCULAR SURGERY)

## 2023-08-24 PROCEDURE — C1760 CLOSURE DEV, VASC: HCPCS | Performed by: THORACIC SURGERY (CARDIOTHORACIC VASCULAR SURGERY)

## 2023-08-24 PROCEDURE — 02PA3MZ REMOVAL OF CARDIAC LEAD FROM HEART, PERCUTANEOUS APPROACH: ICD-10-PCS | Performed by: THORACIC SURGERY (CARDIOTHORACIC VASCULAR SURGERY)

## 2023-08-24 PROCEDURE — 33241 REMOVE PULSE GENERATOR: CPT | Performed by: THORACIC SURGERY (CARDIOTHORACIC VASCULAR SURGERY)

## 2023-08-24 PROCEDURE — 71045 X-RAY EXAM CHEST 1 VIEW: CPT

## 2023-08-24 PROCEDURE — 33244 REMOVE ELCTRD TRANSVENOUSLY: CPT | Performed by: PHYSICIAN ASSISTANT

## 2023-08-24 PROCEDURE — 85018 HEMOGLOBIN: CPT | Performed by: PHYSICIAN ASSISTANT

## 2023-08-24 DEVICE — PERCLOSE™ PROSTYLE™ SUTURE-MEDIATED CLOSURE AND REPAIR SYSTEM
Type: IMPLANTABLE DEVICE | Site: GROIN | Status: FUNCTIONAL
Brand: PERCLOSE™ PROSTYLE™

## 2023-08-24 RX ORDER — SODIUM CHLORIDE, SODIUM LACTATE, POTASSIUM CHLORIDE, CALCIUM CHLORIDE 600; 310; 30; 20 MG/100ML; MG/100ML; MG/100ML; MG/100ML
INJECTION, SOLUTION INTRAVENOUS CONTINUOUS PRN
Status: DISCONTINUED | OUTPATIENT
Start: 2023-08-24 | End: 2023-08-24

## 2023-08-24 RX ORDER — MIDAZOLAM HYDROCHLORIDE 2 MG/2ML
INJECTION, SOLUTION INTRAMUSCULAR; INTRAVENOUS AS NEEDED
Status: DISCONTINUED | OUTPATIENT
Start: 2023-08-24 | End: 2023-08-24

## 2023-08-24 RX ORDER — FENTANYL CITRATE 50 UG/ML
INJECTION, SOLUTION INTRAMUSCULAR; INTRAVENOUS AS NEEDED
Status: DISCONTINUED | OUTPATIENT
Start: 2023-08-24 | End: 2023-08-24

## 2023-08-24 RX ORDER — ONDANSETRON 2 MG/ML
INJECTION INTRAMUSCULAR; INTRAVENOUS AS NEEDED
Status: DISCONTINUED | OUTPATIENT
Start: 2023-08-24 | End: 2023-08-24

## 2023-08-24 RX ORDER — CEFAZOLIN SODIUM 1 G/3ML
INJECTION, POWDER, FOR SOLUTION INTRAMUSCULAR; INTRAVENOUS AS NEEDED
Status: DISCONTINUED | OUTPATIENT
Start: 2023-08-24 | End: 2023-08-24

## 2023-08-24 RX ORDER — ONDANSETRON 2 MG/ML
4 INJECTION INTRAMUSCULAR; INTRAVENOUS ONCE AS NEEDED
Status: DISCONTINUED | OUTPATIENT
Start: 2023-08-24 | End: 2023-08-24 | Stop reason: HOSPADM

## 2023-08-24 RX ORDER — SODIUM CHLORIDE 9 MG/ML
INJECTION, SOLUTION INTRAVENOUS CONTINUOUS PRN
Status: DISCONTINUED | OUTPATIENT
Start: 2023-08-24 | End: 2023-08-24

## 2023-08-24 RX ORDER — ROCURONIUM BROMIDE 10 MG/ML
INJECTION, SOLUTION INTRAVENOUS AS NEEDED
Status: DISCONTINUED | OUTPATIENT
Start: 2023-08-24 | End: 2023-08-24

## 2023-08-24 RX ORDER — FENTANYL CITRATE/PF 50 MCG/ML
25 SYRINGE (ML) INJECTION
Status: DISCONTINUED | OUTPATIENT
Start: 2023-08-24 | End: 2023-08-24 | Stop reason: HOSPADM

## 2023-08-24 RX ORDER — EPHEDRINE SULFATE 50 MG/ML
INJECTION INTRAVENOUS AS NEEDED
Status: DISCONTINUED | OUTPATIENT
Start: 2023-08-24 | End: 2023-08-24

## 2023-08-24 RX ORDER — SODIUM CHLORIDE, SODIUM LACTATE, POTASSIUM CHLORIDE, CALCIUM CHLORIDE 600; 310; 30; 20 MG/100ML; MG/100ML; MG/100ML; MG/100ML
75 INJECTION, SOLUTION INTRAVENOUS CONTINUOUS
Status: DISCONTINUED | OUTPATIENT
Start: 2023-08-24 | End: 2023-08-25

## 2023-08-24 RX ORDER — LIDOCAINE HYDROCHLORIDE 10 MG/ML
INJECTION, SOLUTION EPIDURAL; INFILTRATION; INTRACAUDAL; PERINEURAL AS NEEDED
Status: DISCONTINUED | OUTPATIENT
Start: 2023-08-24 | End: 2023-08-24

## 2023-08-24 RX ORDER — PROPOFOL 10 MG/ML
INJECTION, EMULSION INTRAVENOUS AS NEEDED
Status: DISCONTINUED | OUTPATIENT
Start: 2023-08-24 | End: 2023-08-24

## 2023-08-24 RX ADMIN — PHENYLEPHRINE HYDROCHLORIDE 50 MCG/MIN: 10 INJECTION INTRAVENOUS at 12:19

## 2023-08-24 RX ADMIN — CEFAZOLIN 2000 MG: 1 INJECTION, POWDER, FOR SOLUTION INTRAMUSCULAR; INTRAVENOUS at 12:39

## 2023-08-24 RX ADMIN — ATORVASTATIN CALCIUM 40 MG: 40 TABLET, FILM COATED ORAL at 17:17

## 2023-08-24 RX ADMIN — LORAZEPAM 1 MG: 1 TABLET ORAL at 21:22

## 2023-08-24 RX ADMIN — CARVEDILOL 12.5 MG: 12.5 TABLET, FILM COATED ORAL at 17:17

## 2023-08-24 RX ADMIN — LIDOCAINE HYDROCHLORIDE 50 MG: 10 INJECTION, SOLUTION EPIDURAL; INFILTRATION; INTRACAUDAL at 11:59

## 2023-08-24 RX ADMIN — EPHEDRINE SULFATE 15 MG: 50 INJECTION INTRAVENOUS at 13:20

## 2023-08-24 RX ADMIN — INSULIN LISPRO 13 UNITS: 100 INJECTION, SOLUTION INTRAVENOUS; SUBCUTANEOUS at 17:19

## 2023-08-24 RX ADMIN — HEPARIN SODIUM 5000 UNITS: 5000 INJECTION INTRAVENOUS; SUBCUTANEOUS at 21:15

## 2023-08-24 RX ADMIN — SOTALOL HYDROCHLORIDE 80 MG: 80 TABLET ORAL at 07:49

## 2023-08-24 RX ADMIN — ACETAMINOPHEN 975 MG: 325 TABLET, FILM COATED ORAL at 21:15

## 2023-08-24 RX ADMIN — EPHEDRINE SULFATE 15 MG: 50 INJECTION INTRAVENOUS at 13:01

## 2023-08-24 RX ADMIN — ACETAMINOPHEN 975 MG: 325 TABLET, FILM COATED ORAL at 05:03

## 2023-08-24 RX ADMIN — SOTALOL HYDROCHLORIDE 80 MG: 80 TABLET ORAL at 21:18

## 2023-08-24 RX ADMIN — MIDAZOLAM 2 MG: 1 INJECTION INTRAMUSCULAR; INTRAVENOUS at 11:49

## 2023-08-24 RX ADMIN — ONDANSETRON 4 MG: 2 INJECTION INTRAMUSCULAR; INTRAVENOUS at 13:29

## 2023-08-24 RX ADMIN — FENOFIBRATE 145 MG: 145 TABLET ORAL at 07:46

## 2023-08-24 RX ADMIN — ROCURONIUM BROMIDE 20 MG: 10 INJECTION, SOLUTION INTRAVENOUS at 12:36

## 2023-08-24 RX ADMIN — OXYCODONE HYDROCHLORIDE 10 MG: 10 TABLET ORAL at 21:17

## 2023-08-24 RX ADMIN — EZETIMIBE 10 MG: 10 TABLET ORAL at 07:46

## 2023-08-24 RX ADMIN — INSULIN LISPRO 2 UNITS: 100 INJECTION, SOLUTION INTRAVENOUS; SUBCUTANEOUS at 21:19

## 2023-08-24 RX ADMIN — MELATONIN 6 MG: at 21:16

## 2023-08-24 RX ADMIN — ESCITALOPRAM OXALATE 20 MG: 20 TABLET, FILM COATED ORAL at 07:46

## 2023-08-24 RX ADMIN — CARVEDILOL 12.5 MG: 12.5 TABLET, FILM COATED ORAL at 07:46

## 2023-08-24 RX ADMIN — CEFTRIAXONE SODIUM 2000 MG: 10 INJECTION, POWDER, FOR SOLUTION INTRAVENOUS at 09:17

## 2023-08-24 RX ADMIN — SODIUM CHLORIDE: 9 INJECTION, SOLUTION INTRAVENOUS at 12:09

## 2023-08-24 RX ADMIN — FENTANYL CITRATE 100 MCG: 50 INJECTION INTRAMUSCULAR; INTRAVENOUS at 11:59

## 2023-08-24 RX ADMIN — HEPARIN SODIUM 5000 UNITS: 5000 INJECTION INTRAVENOUS; SUBCUTANEOUS at 05:03

## 2023-08-24 RX ADMIN — SUGAMMADEX 200 MG: 100 INJECTION, SOLUTION INTRAVENOUS at 13:34

## 2023-08-24 RX ADMIN — INSULIN LISPRO 2 UNITS: 100 INJECTION, SOLUTION INTRAVENOUS; SUBCUTANEOUS at 17:19

## 2023-08-24 RX ADMIN — PROPOFOL 130 MG: 10 INJECTION, EMULSION INTRAVENOUS at 11:59

## 2023-08-24 RX ADMIN — SODIUM CHLORIDE, SODIUM LACTATE, POTASSIUM CHLORIDE, AND CALCIUM CHLORIDE: .6; .31; .03; .02 INJECTION, SOLUTION INTRAVENOUS at 11:25

## 2023-08-24 RX ADMIN — SENNOSIDES 8.6 MG: 8.6 TABLET, FILM COATED ORAL at 21:16

## 2023-08-24 RX ADMIN — OXYCODONE HYDROCHLORIDE 10 MG: 10 TABLET ORAL at 17:16

## 2023-08-24 RX ADMIN — ROCURONIUM BROMIDE 50 MG: 10 INJECTION, SOLUTION INTRAVENOUS at 12:00

## 2023-08-24 NOTE — QUICK NOTE
Wound assessment note:  Right groin wound changed at bedside. Previous packing removed and patient tolerated well with no immediate complications. Subsequent Kerlix was then replaced in the wound. Bolstered with gauze. No tape was applied. Family is at bedside and instructions were given regarding the current wound care. They were in agreement with plan. Plan for [facility] change tomorrow at bedside on 8/25/2023.

## 2023-08-24 NOTE — ANESTHESIA PREPROCEDURE EVALUATION
Procedure:  REMOVAL PACEMAKER/AICD LEADS W LASER- EXTRACTION ONLY (Chest)  Cardiac laser lead extraction (Chest)    Relevant Problems   CARDIO   (+) Benign essential hypertension   (+) Coronary atherosclerosis   (+) Mixed hyperlipidemia   (+) Ventricular tachycardia (HCC)      GI/HEPATIC   (+) Esophageal reflux      NEURO/PSYCH   (+) Generalized anxiety disorder      Other   (+) ICD (implantable cardioverter-defibrillator) infection (HCC)        Physical Exam    Airway    Mallampati score: II  TM Distance: >3 FB  Neck ROM: full     Dental   No notable dental hx     Cardiovascular  Rhythm: regular, Rate: normal, Cardiovascular exam normal    Pulmonary  Pulmonary exam normal Breath sounds clear to auscultation,     Other Findings        Anesthesia Plan  ASA Score- 3     Anesthesia Type- general with ASA Monitors. Additional Monitors: arterial line. Airway Plan: ETT. Comment: MARCELLA. Plan Factors-Exercise tolerance (METS): >4 METS. Chart reviewed. EKG reviewed. Imaging results reviewed. Existing labs reviewed. Patient summary reviewed. Patient is not a current smoker. Obstructive sleep apnea risk education given perioperatively. Induction- intravenous. Postoperative Plan- Plan for postoperative opioid use. Informed Consent- Anesthetic plan and risks discussed with patient and spouse. I personally reviewed this patient with the CRNA. Discussed and agreed on the Anesthesia Plan with the CRNA. Juan Joy

## 2023-08-24 NOTE — PROGRESS NOTES
Pt wound vac removed per Jadonoge Part and changed to a wet to dry dressing. Per Dustin Part will replace vac in the morning. Wound was packed with 1 saline soaked gauze and covered with a dry gauze and abd bandage.

## 2023-08-24 NOTE — ANESTHESIA PROCEDURE NOTES
Arterial Line Insertion    Performed by: Redd Boyd MD  Authorized by: Jean Pierre Whiting CRNA  Consent: Verbal consent obtained. Risks and benefits: risks, benefits and alternatives were discussed  Consent given by: patient  Patient understanding: patient states understanding of the procedure being performed  Patient consent: the patient's understanding of the procedure matches consent given  Procedure consent: procedure consent matches procedure scheduled  Relevant documents: relevant documents present and verified  Test results: test results available and properly labeled  Required items: required blood products, implants, devices, and special equipment available  Patient identity confirmed: verbally with patient, arm band and provided demographic data  Time out: Immediately prior to procedure a "time out" was called to verify the correct patient, procedure, equipment, support staff and site/side marked as required. Preparation: Patient was prepped and draped in the usual sterile fashion. Indications: hemodynamic monitoring  Orientation:  Right  Location: radial artery  Sedation:  Patient sedated: yes  Analgesia: see MAR for details  Vitals: Vital signs were monitored during sedation.     Procedure Details:  Needle gauge: 20  Number of attempts: 1    Post-procedure:  Post-procedure: dressing applied  Post-procedure CNS: normal  Patient tolerance: Patient tolerated the procedure well with no immediate complications and patient tolerated the procedure well with no immediate complications

## 2023-08-24 NOTE — INTERVAL H&P NOTE
H&P reviewed. After examining the patient I find no changes in the patients condition since the H&P had been written. Anticipated Length of Stay:  Patient will be admitted on an Inpatient basis with an anticipated length of stay of  greater than 2 midnights. Justification for Hospital Stay:  Post surgical recovery following open heart surgery.       Vitals:    08/23/23 2149   BP: 160/79   Pulse: 70   Resp: 18   Temp: 98.2 °F (36.8 °C)   SpO2: 93%

## 2023-08-24 NOTE — PLAN OF CARE
Problem: MOBILITY - ADULT  Goal: Maintain or return to baseline ADL function  Description: INTERVENTIONS:  -  Assess patient's ability to carry out ADLs; assess patient's baseline for ADL function and identify physical deficits which impact ability to perform ADLs (bathing, care of mouth/teeth, toileting, grooming, dressing, etc.)  - Assess/evaluate cause of self-care deficits   - Assess range of motion  - Assess patient's mobility; develop plan if impaired  - Assess patient's need for assistive devices and provide as appropriate  - Encourage maximum independence but intervene and supervise when necessary  - Involve family in performance of ADLs  - Assess for home care needs following discharge   - Consider OT consult to assist with ADL evaluation and planning for discharge  - Provide patient education as appropriate  Outcome: Progressing  Goal: Maintains/Returns to pre admission functional level  Description: INTERVENTIONS:  - Perform BMAT or MOVE assessment daily.   - Set and communicate daily mobility goal to care team and patient/family/caregiver.    - Collaborate with rehabilitation services on mobility goals if consulted  - Out of bed for toileting  - Record patient progress and toleration of activity level   Outcome: Progressing     Problem: Prexisting or High Potential for Compromised Skin Integrity  Goal: Skin integrity is maintained or improved  Description: INTERVENTIONS:  - Identify patients at risk for skin breakdown  - Assess and monitor skin integrity  - Assess and monitor nutrition and hydration status  - Monitor labs   - Assess for incontinence   - Turn and reposition patient  - Assist with mobility/ambulation  - Relieve pressure over bony prominences  - Avoid friction and shearing  - Provide appropriate hygiene as needed including keeping skin clean and dry  - Evaluate need for skin moisturizer/barrier cream  - Collaborate with interdisciplinary team   - Patient/family teaching  - Consider wound care consult   Outcome: Progressing     Problem: Nutrition/Hydration-ADULT  Goal: Nutrient/Hydration intake appropriate for improving, restoring or maintaining nutritional needs  Description: Monitor and assess patient's nutrition/hydration status for malnutrition. Collaborate with interdisciplinary team and initiate plan and interventions as ordered. Monitor patient's weight and dietary intake as ordered or per policy. Utilize nutrition screening tool and intervene as necessary. Determine patient's food preferences and provide high-protein, high-caloric foods as appropriate.      INTERVENTIONS:  - Monitor oral intake, urinary output, labs, and treatment plans  - Assess nutrition and hydration status and recommend course of action  - Evaluate amount of meals eaten  - Assist patient with eating if necessary   - Allow adequate time for meals  - Recommend/ encourage appropriate diets, oral nutritional supplements, and vitamin/mineral supplements  - Order, calculate, and assess calorie counts as needed  - Recommend, monitor, and adjust tube feedings and TPN/PPN based on assessed needs  - Assess need for intravenous fluids  - Provide specific nutrition/hydration education as appropriate  - Include patient/family/caregiver in decisions related to nutrition  Outcome: Progressing     Problem: PAIN - ADULT  Goal: Verbalizes/displays adequate comfort level or baseline comfort level  Description: Interventions:  - Encourage patient to monitor pain and request assistance  - Assess pain using appropriate pain scale  - Administer analgesics based on type and severity of pain and evaluate response  - Implement non-pharmacological measures as appropriate and evaluate response  - Consider cultural and social influences on pain and pain management  - Notify physician/advanced practitioner if interventions unsuccessful or patient reports new pain  Outcome: Progressing     Problem: INFECTION - ADULT  Goal: Absence or prevention of progression during hospitalization  Description: INTERVENTIONS:  - Assess and monitor for signs and symptoms of infection  - Monitor lab/diagnostic results  - Monitor all insertion sites, i.e. indwelling lines, tubes, and drains  - Monitor endotracheal if appropriate and nasal secretions for changes in amount and color  - Shannon appropriate cooling/warming therapies per order  - Administer medications as ordered  - Instruct and encourage patient and family to use good hand hygiene technique  - Identify and instruct in appropriate isolation precautions for identified infection/condition  Outcome: Progressing  Goal: Absence of fever/infection during neutropenic period  Description: INTERVENTIONS:  - Monitor WBC    Outcome: Progressing     Problem: SAFETY ADULT  Goal: Maintain or return to baseline ADL function  Description: INTERVENTIONS:  -  Assess patient's ability to carry out ADLs; assess patient's baseline for ADL function and identify physical deficits which impact ability to perform ADLs (bathing, care of mouth/teeth, toileting, grooming, dressing, etc.)  - Assess/evaluate cause of self-care deficits   - Assess range of motion  - Assess patient's mobility; develop plan if impaired  - Assess patient's need for assistive devices and provide as appropriate  - Encourage maximum independence but intervene and supervise when necessary  - Involve family in performance of ADLs  - Assess for home care needs following discharge   - Consider OT consult to assist with ADL evaluation and planning for discharge  - Provide patient education as appropriate  Outcome: Progressing  Goal: Maintains/Returns to pre admission functional level  Description: INTERVENTIONS:  - Perform BMAT or MOVE assessment daily.   - Set and communicate daily mobility goal to care team and patient/family/caregiver.    - Collaborate with rehabilitation services on mobility goals if consulted  - Out of bed for toileting  - Record patient progress and toleration of activity level   Outcome: Progressing  Goal: Patient will remain free of falls  Description: INTERVENTIONS:  - Educate patient/family on patient safety including physical limitations  - Instruct patient to call for assistance with activity   - Consult OT/PT to assist with strengthening/mobility   - Keep Call bell within reach  - Keep bed low and locked with side rails adjusted as appropriate  - Keep care items and personal belongings within reach  - Initiate and maintain comfort rounds  - Make Fall Risk Sign visible to staff  - Apply yellow socks and bracelet for high fall risk patients  - Consider moving patient to room near nurses station  Outcome: Progressing     Problem: DISCHARGE PLANNING  Goal: Discharge to home or other facility with appropriate resources  Description: INTERVENTIONS:  - Identify barriers to discharge w/patient and caregiver  - Arrange for needed discharge resources and transportation as appropriate  - Identify discharge learning needs (meds, wound care, etc.)  - Arrange for interpretive services to assist at discharge as needed  - Refer to Case Management Department for coordinating discharge planning if the patient needs post-hospital services based on physician/advanced practitioner order or complex needs related to functional status, cognitive ability, or social support system  Outcome: Progressing     Problem: Knowledge Deficit  Goal: Patient/family/caregiver demonstrates understanding of disease process, treatment plan, medications, and discharge instructions  Description: Complete learning assessment and assess knowledge base.   Interventions:  - Provide teaching at level of understanding  - Provide teaching via preferred learning methods  Outcome: Progressing

## 2023-08-24 NOTE — ASSESSMENT & PLAN NOTE
Status post perineal debridement/washout on 8/13, repeat washout on 8/14 and partial closure with wound VAC application on 3/89.    Possibly the source of streptococcal bacteremia. Continue high-dose ceftriaxone for now.

## 2023-08-24 NOTE — ASSESSMENT & PLAN NOTE
Wt Readings from Last 3 Encounters:   08/23/23 93.4 kg (205 lb 14.6 oz)   08/13/23 91 kg (200 lb 9.9 oz)   01/27/23 91.6 kg (202 lb)     MARCELLA done on 8/18 shows EF of 40%  Patient appears euvolemic on my evaluation this morning.   Continue aspirin, Coreg

## 2023-08-24 NOTE — OP NOTE
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 Regan Hernandez     ESTIMATED BLOOD LOSS 10 mL    TRANSFUSIONS None     SPECIMENS: None. OPERATIVE TECHNIQUE: The patient was taken to the operating room and placed supine on the operating table. Following the satisfactory induction of general anesthesia and with monitoring lines already in place, the patient was prepped and draped in the usual sterile fashion. A time-out procedure was performed. Venous access was obtained in the left groin using ultrasound guidance. One perclose device was deployed in the femoral vein. The wire for the bridging balloon was placed in the SVC under fluoroscopy. Attention was turned to the device. A 5cm incision was made overlying the device pocket and the device and leads were dissected free from surrounding structures. The leads were removed from the device and prepped with EZ locking stylets. Using a 16Fr laser and a 13Fr tightrail device, the AICD coil and atrial lead were removed intact and without complications. The bridging wire and sheath were removed. The perclose device didn't hold, so a purse string was placed at the venous access site to achieve hemostasis. Hemostasis was achieved in the device pocket with electrocautery and the pocket was closed with multiple layers of absorbable suture. As the attending surgeon, I was present and scrubbed for all critical portions of this procedure.  Sponge, needle, and instrument counts were reported as correct by the nursing staff. Final transesophageal echocardiogram demonstrated no pericardial effusion. There is no cardiac residency program at this facility and for complex procedures such as this, it is vital to have a physician assistant experienced in cardiac surgery. The assistance of Kim Lion PA-C was necessary for assistance with lead extraction and closure.     SIGNATURE: Misha Encarnacion,   DATE: August 24, 2023  TIME: 1:57 PM

## 2023-08-24 NOTE — ANESTHESIA PROCEDURE NOTES
Procedure Performed: MARCELLA Anesthesia  Start Time:  8/24/2023 12:43 PM        Preanesthesia Checklist    Patient identified, IV assessed, risks and benefits discussed, monitors and equipment assessed, procedure being performed at surgeon's request and anesthesia consent obtained. Procedure    Diagnostic Indications for MARCELLA:  assessment of surgical repair and suspected pericardial effusion. Type of MARCELLA: interventional MARCELLA with real time guidance of intracardiac procedure. Images Saved: ultrasound permanent image saved. Physician Requesting Echo: Spencer Almeida DO. Location performed: OR. Intubated. Bite block not placed. Heart visualized. Insertion of MARCELLA Probe:  Atraumatic. Probe Type:  Multiplane. Modalities:  2D only, color flow mapping, continuous wave Doppler and pulse wave Doppler. Echocardiographic and Doppler Measurements    PREPROCEDURE    LEFT VENTRICLE:  Systolic Function: normal. Ejection Fraction: 50%. Cavity size: normal.       RIGHT VENTRICLE:  Systolic Function: normal.  Cavity size normal. No hypertrophy              AORTIC VALVE:  Leaflets: normal and trileaflet. Leaflet motions normal and normal. Stenosis: none. Regurgitation: none. MITRAL VALVE:  Leaflets: normal. Leaflet Motions: normal. Regurgitation: none. Stenosis: none. TRICUSPID VALVE:  Leaflets: normal. Leaflet Motions: normal. Stenosis: none. Regurgitation: trace. PULMONIC VALVE:  Leaflets: normal. Regurgitation: none. Stenosis: none. OTHER VALVE FINDINGS:  Ventricular Defibrillator lead with mobile vegetation in RA.    ASCENDING AORTA:  Size:  normal.  Dissection not present. AORTIC ARCH:  Size:  normal.  dissection not present. Grade 1: normal to mild intimal thickening. DESCENDING AORTA:  Size: normal.  Dissection not present. Grade 1: normal to mild intimal thickening.         RIGHT ATRIUM:  Size:  normal. No spontaneous echo contrast.    LEFT ATRIUM:  Size: normal. No spontaneous echo contrast.    LEFT ATRIAL APPENDAGE:  Size: normal. No spontaneous echo contrast Emptying Velocity: 4 cm/sec. ATRIAL SEPTUM:  Intra-atrial septal morphology: normal.          VENTRICULAR SEPTUM:  Intra-ventricular septum morphology: normal.         EPIAORTIC:  Plaque Thickness: 0-5 mm. OTHER FINDINGS:  Pericardium:  normal. Pleural Effusion:  none. POSTPROCEDURE    LEFT VENTRICLE: Unchanged . AORTIC VALVE: Unchanged . MITRAL VALVE: Unchanged . TRICUSPID VALVE: Unchanged . PULMONIC VALVE: Unchanged             ATRIA:      OTHER ATRIAL FINDINGS: Defibrillator leads out, no residual mass. No Pericardial FLuid. AORTA: Unchanged . Dissection: Dissection not present. REMOVAL:  Probe Removal: atraumatic.

## 2023-08-24 NOTE — ASSESSMENT & PLAN NOTE
Lab Results   Component Value Date    HGBA1C 11.0 (H) 08/18/2023       Recent Labs     08/23/23  1056 08/23/23  1645 08/23/23 2032 08/24/23  0745   POCGLU 219* 126 178* 153*       Blood Sugar Average: Last 72 hrs:  (P) 169.4     Blood glucose currently well controlled.   Continue Lantus 30 units with Humalog 13 units 3 times daily

## 2023-08-24 NOTE — ANESTHESIA POSTPROCEDURE EVALUATION
Post-Op Assessment Note    CV Status:  Stable    Pain management: adequate     Mental Status:  Alert and awake   Hydration Status:  Euvolemic   PONV Controlled:  Controlled   Airway Patency:  Patent      Post Op Vitals Reviewed: Yes      Staff: CRNA         There were no known notable events for this encounter.     BP   132/71   Temp   97.2   Pulse  54   Resp   18   SpO2   98 FM

## 2023-08-24 NOTE — PROGRESS NOTES
Pt's wound vac is securely on pt's body however the tubing became disconnected from pt. Nurse was made aware and contacted [facility] immediately via [employer]. Resident replied stating he was on his way up to fix it at 2045.

## 2023-08-24 NOTE — ASSESSMENT & PLAN NOTE
Most likely from groin wound. Currently on high-dose ceftriaxone. Will need prolonged course of antibiotics.

## 2023-08-24 NOTE — PROGRESS NOTES
[facility]  Progress Note  Name: Lillie Kovacs  MRN: [id number]  Unit/Bed#: OR POOL I Date of Admission: 8/13/2023   Date of Service: 8/24/2023 I Hospital Day: 11    Assessment/Plan   Streptococcal bacteremia  Assessment & Plan  Most likely from groin wound. Currently on high-dose ceftriaxone. Will need prolonged course of antibiotics. ICD (implantable cardioverter-defibrillator) infection ([address])  Assessment & Plan  Most likely from streptococcal bacteremia. Plan for lead removal today. Jamarcus's gangrene  Assessment & Plan   Status post perineal debridement/washout on 8/13, repeat washout on 8/14 and partial closure with wound VAC application on 3/45.    Possibly the source of streptococcal bacteremia. Continue high-dose ceftriaxone for now. Chronic systolic CHF (congestive heart failure) (HCC)  Assessment & Plan  Wt Readings from Last 3 Encounters:   08/23/23 93.4 kg (205 lb 14.6 oz)   08/13/23 91 kg (200 lb 9.9 oz)   01/27/23 91.6 kg (202 lb)     MARCELLA done on 8/18 shows EF of 40%  Patient appears euvolemic on my evaluation this morning. Continue aspirin, Coreg      Uncontrolled type 2 diabetes mellitus with hyperglycemia [facility])  Assessment & Plan  Lab Results   Component Value Date    HGBA1C 11.0 (H) 08/18/2023       Recent Labs     08/23/23  1056 08/23/23  1645 08/23/23  2032 08/24/23  0745   POCGLU 219* 126 178* 153*       Blood Sugar Average: Last 72 hrs:  (P) 169.4     Blood glucose currently well controlled. Continue Lantus 30 units with Humalog 13 units 3 times daily    Benign essential hypertension  Assessment & Plan  Blood pressure stable. Continue Coreg, sotalol             VTE Pharmacologic Prophylaxis:   Pharmacologic: Heparin  Mechanical VTE Prophylaxis in Place: Yes    Patient Centered Rounds: I have performed bedside rounds with nursing staff today.     Discussions with Specialists or Other Care Team Provider:     Education and Discussions with Family / Patient: pt and wife    Time Spent for Care: 30 minutes. More than 50% of total time spent on counseling and coordination of care as described above. Current Length of Stay: 11 day(s)    Current Patient Status: Inpatient   Certification Statement: The patient will continue to require additional inpatient hospital stay due to ICD bhavya removal adn reimplantation    Discharge Plan:     Code Status: Level 1 - Full Code      Subjective:   Pt seen and examined by me in morning. Denied any specific complaints. Objective:     Vitals:   Temp (24hrs), Av.5 °F (36.9 °C), Min:98.2 °F (36.8 °C), Max:98.8 °F (37.1 °C)    Temp:  [98.2 °F (36.8 °C)-98.8 °F (37.1 °C)] 98.2 °F (36.8 °C)  HR:  [70-87] 87  Resp:  [18] 18  BP: (141-161)/(68-82) 158/81  SpO2:  [93 %-96 %] 94 %  Body mass index is 30.41 kg/m². Input and Output Summary (last 24 hours): Intake/Output Summary (Last 24 hours) at 2023 1303  Last data filed at 2023 0900  Gross per 24 hour   Intake 440 ml   Output 10 ml   Net 430 ml       Physical Exam:     Physical Exam    Constitutional: Pt appears comfortable. Not in any acute distress. HENT:   Head: Normocephalic and atraumatic. Eyes: EOM are normal.   Neck: Neck supple. Cardiovascular: Normal rate, regular rhythm, normal heart sounds. No murmur heard. Pulmonary/Chest: Effort normal, air entry b/l equal. No respiratory distress. Pt has no wheezes or crackles. Abdominal: Soft. Non-distended, Non-tender. Bowel sounds are normal.   Neurological: awake, alert. Moving all extremities spontaneously. Psychiatric: normal mood and affect.       Additional Data:     Labs:    Results from last 7 days   Lab Units 23  0504 23  0528   WBC Thousand/uL 9.34 8.34   HEMOGLOBIN g/dL 9.5* 9.1*   HEMATOCRIT % 29.8* 29.6*   PLATELETS Thousands/uL 353 310   BANDS PCT % 3  --    NEUTROS PCT %  --  54   LYMPHS PCT %  --  32   LYMPHO PCT % 35  --    MONOS PCT %  --  10   MONO PCT % 3*  -- EOS PCT % 3 2     Results from last 7 days   Lab Units 08/24/23  0504   SODIUM mmol/L 140   POTASSIUM mmol/L 3.9   CHLORIDE mmol/L 104   CO2 mmol/L 30   BUN mg/dL 8   CREATININE mg/dL 0.55*   ANION GAP mmol/L 6   CALCIUM mg/dL 8.2*   GLUCOSE RANDOM mg/dL 128         Results from last 7 days   Lab Units 08/24/23  0745 08/23/23  2032 08/23/23  1645 08/23/23  1056 08/23/23  0720 08/22/23  2102 08/22/23  1600 08/22/23  1129 08/22/23  0759 08/21/23  2119 08/21/23  1657 08/21/23  1130   POC GLUCOSE mg/dl 153* 178* 126 219* 238* 166* 110 235* 117 73 164* 240*     Results from last 7 days   Lab Units 08/18/23  0446   HEMOGLOBIN A1C % 11.0*               * I Have Reviewed All Lab Data Listed Above. * Additional Pertinent Lab Tests Reviewed:  [address] Admission Reviewed    Imaging:    Imaging Reports Reviewed Today Include:   Imaging Personally Reviewed by Myself Includes:      Recent Cultures (last 7 days):           Last 24 Hours Medication List:   Current Facility-Administered Medications   Medication Dose Route Frequency Provider Last Rate   • [MAR Hold] acetaminophen  975 mg Oral Q8H [address] Allen DO     • [MAR Hold] aspirin  81 mg Oral Daily Deri Deedee Villa, DO     • [MAR Hold] atorvastatin  40 mg Oral Daily With DO Diego     • [MAR Hold] carvedilol  12.5 mg Oral BID Mary Gonsales MD     • [facility] Hold] cefTRIAXone  2,000 mg Intravenous Q24H Tucker Bryson MD 2,000 mg (08/24/23 0917)   • cefTRIAXone  2,000 mg Intravenous Once Zahira Vizcarra PA-C     • [MAR Hold] escitalopram  20 mg Oral Daily Deri Deedee Villa DO     • [MAR Hold] ezetimibe  10 mg Oral Daily Deri Deedee Villa DO     • [MAR Hold] fenofibrate  145 mg Oral Daily Chong Villa DO     • [MAR Hold] heparin (porcine)  5,000 Units Subcutaneous Q8H [address] Zahira Vizcarra PA-C     • [MAR Hold] HYDROmorphone  0.5 mg Intravenous Q4H PRN Mookie Villa DO     • [MAR Hold] insulin glargine  30 Units Subcutaneous QAM Janet Wilkinson MD     • [facility] Hold] insulin lispro  1-6 Units Subcutaneous HS Janet Wilkinson MD     • [facility] Hold] insulin lispro  13 Units Subcutaneous TID With Meals Sarah Bennett MD     • [facility] Hold] insulin lispro  2-12 Units Subcutaneous TID AC Sarah Bennett MD     • [facility] Hold] LORazepam  1 mg Oral Daily PRN Daryel Long, DO     • [facility] Hold] melatonin  6 mg Oral HS Mckeon Grammes [place], DO     • [MAR Hold] ondansetron  4 mg Intravenous Q6H PRN Mckeon Grammes [place], DO     • [MAR Hold] oxyCODONE  10 mg Oral Q4H PRN Mckeon Grammes [place], DO     • [MAR Hold] oxyCODONE  5 mg Oral Q4H PRN Mckeon Grammes [place], DO     • [MAR Hold] pantoprazole  20 mg Oral Early Morning Mckeon Grammes [place], DO     • [MAR Hold] senna  1 tablet Oral HS Mckeon Grammes [place], DO     • [MAR Hold] sotalol  80 mg Oral BID Leopold Rink, MD       Facility-Administered Medications Ordered in Other Encounters   Medication Dose Route Frequency Provider Last Rate   • ceFAZolin   Intravenous PRN Genet Treviño CRNA     • ePHEDrine   Intravenous PRN Lazara Shah CRNA     • fentanyl citrate (PF)   Intravenous PRN Lazara Shah CRNA     • lactated ringers   Intravenous Continuous PRN Lazara Shah CRNA     • lidocaine (PF)   Intravenous PRN Lazara Shah CRNA     • midazolam   Intravenous PRN Lazara Shah CRNA     • phenylephrine (KATRINA-SYNEPHRINE) 50 mg (STANDARD CONCENTRATION) in sodium chloride 0.9% 250 mL   Intravenous Continuous PRN Lazara Shah CRNA 50 mcg/min (08/24/23 1255)   • propofol   Intravenous PRN Lazara Shah CRNA     • ROCuronium   Intravenous PRN Lazara Shah CRNA     • sodium chloride   Intravenous Continuous PRN Lazara Shah CRNA          Today, Patient Was Seen By: Giles Veronica MD    ** Please Note: Dictation voice to text software may have been used in the creation of this document.  **

## 2023-08-24 NOTE — PLAN OF CARE
Problem: Prexisting or High Potential for Compromised Skin Integrity  Goal: Skin integrity is maintained or improved  Description: INTERVENTIONS:  - Identify patients at risk for skin breakdown  - Assess and monitor skin integrity  - Assess and monitor nutrition and hydration status  - Monitor labs   - Assess for incontinence   - Turn and reposition patient  - Assist with mobility/ambulation  - Relieve pressure over bony prominences  - Avoid friction and shearing  - Provide appropriate hygiene as needed including keeping skin clean and dry  - Evaluate need for skin moisturizer/barrier cream  - Collaborate with interdisciplinary team   - Patient/family teaching  - Consider wound care consult   Outcome: Progressing     Problem: INFECTION - ADULT  Goal: Absence or prevention of progression during hospitalization  Description: INTERVENTIONS:  - Assess and monitor for signs and symptoms of infection  - Monitor lab/diagnostic results  - Monitor all insertion sites, i.e. indwelling lines, tubes, and drains  - Monitor endotracheal if appropriate and nasal secretions for changes in amount and color  - [place] appropriate cooling/warming therapies per order  - Administer medications as ordered  - Instruct and encourage patient and family to use good hand hygiene technique  - Identify and instruct in appropriate isolation precautions for identified infection/condition  Outcome: Progressing

## 2023-08-25 LAB
ANION GAP SERPL CALCULATED.3IONS-SCNC: 7 MMOL/L
BUN SERPL-MCNC: 7 MG/DL (ref 5–25)
CALCIUM SERPL-MCNC: 8.2 MG/DL (ref 8.4–10.2)
CHLORIDE SERPL-SCNC: 103 MMOL/L (ref 96–108)
CO2 SERPL-SCNC: 28 MMOL/L (ref 21–32)
CREAT SERPL-MCNC: 0.59 MG/DL (ref 0.6–1.3)
ERYTHROCYTE [DISTWIDTH] IN BLOOD BY AUTOMATED COUNT: 14.1 % (ref 11.6–15.1)
GFR SERPL CREATININE-BSD FRML MDRD: 104 ML/MIN/1.73SQ M
GLUCOSE SERPL-MCNC: 176 MG/DL (ref 65–140)
GLUCOSE SERPL-MCNC: 182 MG/DL (ref 65–140)
GLUCOSE SERPL-MCNC: 202 MG/DL (ref 65–140)
GLUCOSE SERPL-MCNC: 215 MG/DL (ref 65–140)
GLUCOSE SERPL-MCNC: 294 MG/DL (ref 65–140)
HCT VFR BLD AUTO: 28.5 % (ref 36.5–49.3)
HGB BLD-MCNC: 9.2 G/DL (ref 12–17)
MCH RBC QN AUTO: 30.6 PG (ref 26.8–34.3)
MCHC RBC AUTO-ENTMCNC: 32.3 G/DL (ref 31.4–37.4)
MCV RBC AUTO: 95 FL (ref 82–98)
PLATELET # BLD AUTO: 360 THOUSANDS/UL (ref 149–390)
PMV BLD AUTO: 9.6 FL (ref 8.9–12.7)
POTASSIUM SERPL-SCNC: 3.9 MMOL/L (ref 3.5–5.3)
RBC # BLD AUTO: 3.01 MILLION/UL (ref 3.88–5.62)
SODIUM SERPL-SCNC: 138 MMOL/L (ref 135–147)
WBC # BLD AUTO: 9.76 THOUSAND/UL (ref 4.31–10.16)

## 2023-08-25 PROCEDURE — 99232 SBSQ HOSP IP/OBS MODERATE 35: CPT | Performed by: INTERNAL MEDICINE

## 2023-08-25 PROCEDURE — 80048 BASIC METABOLIC PNL TOTAL CA: CPT | Performed by: INTERNAL MEDICINE

## 2023-08-25 PROCEDURE — 2W06X6Z CHANGE PRESSURE DRESSING ON RIGHT INGUINAL REGION: ICD-10-PCS | Performed by: SURGERY

## 2023-08-25 PROCEDURE — 97605 NEG PRS WND THER DME<=50SQCM: CPT | Performed by: PHYSICIAN ASSISTANT

## 2023-08-25 PROCEDURE — 82948 REAGENT STRIP/BLOOD GLUCOSE: CPT

## 2023-08-25 PROCEDURE — 85027 COMPLETE CBC AUTOMATED: CPT | Performed by: INTERNAL MEDICINE

## 2023-08-25 PROCEDURE — 87040 BLOOD CULTURE FOR BACTERIA: CPT | Performed by: PHYSICIAN ASSISTANT

## 2023-08-25 RX ORDER — INSULIN LISPRO 100 [IU]/ML
15 INJECTION, SOLUTION INTRAVENOUS; SUBCUTANEOUS
Status: DISCONTINUED | OUTPATIENT
Start: 2023-08-25 | End: 2023-08-31 | Stop reason: HOSPADM

## 2023-08-25 RX ADMIN — INSULIN LISPRO 4 UNITS: 100 INJECTION, SOLUTION INTRAVENOUS; SUBCUTANEOUS at 13:07

## 2023-08-25 RX ADMIN — ESCITALOPRAM OXALATE 20 MG: 20 TABLET, FILM COATED ORAL at 08:45

## 2023-08-25 RX ADMIN — FENOFIBRATE 145 MG: 145 TABLET ORAL at 08:45

## 2023-08-25 RX ADMIN — MELATONIN 6 MG: at 21:14

## 2023-08-25 RX ADMIN — INSULIN GLARGINE 30 UNITS: 100 INJECTION, SOLUTION SUBCUTANEOUS at 08:46

## 2023-08-25 RX ADMIN — INSULIN LISPRO 2 UNITS: 100 INJECTION, SOLUTION INTRAVENOUS; SUBCUTANEOUS at 21:14

## 2023-08-25 RX ADMIN — HEPARIN SODIUM 5000 UNITS: 5000 INJECTION INTRAVENOUS; SUBCUTANEOUS at 21:14

## 2023-08-25 RX ADMIN — INSULIN LISPRO 13 UNITS: 100 INJECTION, SOLUTION INTRAVENOUS; SUBCUTANEOUS at 13:07

## 2023-08-25 RX ADMIN — ATORVASTATIN CALCIUM 40 MG: 40 TABLET, FILM COATED ORAL at 18:21

## 2023-08-25 RX ADMIN — EZETIMIBE 10 MG: 10 TABLET ORAL at 08:45

## 2023-08-25 RX ADMIN — ACETAMINOPHEN 975 MG: 325 TABLET, FILM COATED ORAL at 13:07

## 2023-08-25 RX ADMIN — INSULIN LISPRO 6 UNITS: 100 INJECTION, SOLUTION INTRAVENOUS; SUBCUTANEOUS at 18:21

## 2023-08-25 RX ADMIN — PANTOPRAZOLE SODIUM 20 MG: 20 TABLET, DELAYED RELEASE ORAL at 05:29

## 2023-08-25 RX ADMIN — ACETAMINOPHEN 975 MG: 325 TABLET, FILM COATED ORAL at 21:14

## 2023-08-25 RX ADMIN — SOTALOL HYDROCHLORIDE 80 MG: 80 TABLET ORAL at 08:45

## 2023-08-25 RX ADMIN — CARVEDILOL 12.5 MG: 12.5 TABLET, FILM COATED ORAL at 18:21

## 2023-08-25 RX ADMIN — INSULIN LISPRO 2 UNITS: 100 INJECTION, SOLUTION INTRAVENOUS; SUBCUTANEOUS at 08:46

## 2023-08-25 RX ADMIN — SENNOSIDES 8.6 MG: 8.6 TABLET, FILM COATED ORAL at 21:14

## 2023-08-25 RX ADMIN — HEPARIN SODIUM 5000 UNITS: 5000 INJECTION INTRAVENOUS; SUBCUTANEOUS at 13:07

## 2023-08-25 RX ADMIN — OXYCODONE HYDROCHLORIDE 10 MG: 10 TABLET ORAL at 21:15

## 2023-08-25 RX ADMIN — CARVEDILOL 12.5 MG: 12.5 TABLET, FILM COATED ORAL at 08:45

## 2023-08-25 RX ADMIN — LORAZEPAM 1 MG: 1 TABLET ORAL at 21:15

## 2023-08-25 RX ADMIN — ACETAMINOPHEN 975 MG: 325 TABLET, FILM COATED ORAL at 05:29

## 2023-08-25 RX ADMIN — ASPIRIN 81 MG: 81 TABLET, COATED ORAL at 08:45

## 2023-08-25 RX ADMIN — CEFTRIAXONE SODIUM 2000 MG: 10 INJECTION, POWDER, FOR SOLUTION INTRAVENOUS at 08:45

## 2023-08-25 RX ADMIN — INSULIN LISPRO 13 UNITS: 100 INJECTION, SOLUTION INTRAVENOUS; SUBCUTANEOUS at 08:45

## 2023-08-25 RX ADMIN — SOTALOL HYDROCHLORIDE 80 MG: 80 TABLET ORAL at 21:14

## 2023-08-25 RX ADMIN — INSULIN LISPRO 15 UNITS: 100 INJECTION, SOLUTION INTRAVENOUS; SUBCUTANEOUS at 18:21

## 2023-08-25 RX ADMIN — HEPARIN SODIUM 5000 UNITS: 5000 INJECTION INTRAVENOUS; SUBCUTANEOUS at 05:29

## 2023-08-25 NOTE — ASSESSMENT & PLAN NOTE
Lab Results   Component Value Date    HGBA1C 11.0 (H) 08/18/2023       Recent Labs     08/24/23  1624 08/24/23  2044 08/25/23  0720 08/25/23  1108   POCGLU 171* 199* 182* 202*       Blood Sugar Average: Last 72 hrs:  (P) 175.2542739565081664     Blood glucose currently well controlled.   Continue Lantus 30 units with Humalog 13 units 3 times daily

## 2023-08-25 NOTE — PROGRESS NOTES
[facility]  Progress Note  Name: Foreign Nuno  MRN: [id number]  Unit/Bed#: PPHP 934-01 I Date of Admission: 8/13/2023   Date of Service: 8/25/2023 I Hospital Day: 12    Assessment/Plan   ICD (implantable cardioverter-defibrillator) infection [facility])  Assessment & Plan  Most likely from streptococcal bacteremia. Status post device removal on 8/24  To monitor on telemetry meanwhile. Pending final plans from EP/ID standpoint regarding reimplantation of ICD or discharged with LifeVest.    Streptococcal bacteremia  Assessment & Plan  Most likely from groin wound. Currently on high-dose ceftriaxone. Will need prolonged course of antibiotics. Jamarcus's gangrene  Assessment & Plan   Status post perineal debridement/washout on 8/13, repeat washout on 8/14 and partial closure with wound VAC application on 5/27.    Possibly the source of streptococcal bacteremia. Continue high-dose ceftriaxone for now. Chronic systolic CHF (congestive heart failure) (HCC)  Assessment & Plan  Wt Readings from Last 3 Encounters:   08/23/23 93.4 kg (205 lb 14.6 oz)   08/13/23 91 kg (200 lb 9.9 oz)   01/27/23 91.6 kg (202 lb)     MARCELLA done on 8/18 shows EF of 40%  Patient appears euvolemic on my evaluation this morning. Continue aspirin, Coreg      Uncontrolled type 2 diabetes mellitus with hyperglycemia [facility])  Assessment & Plan  Lab Results   Component Value Date    HGBA1C 11.0 (H) 08/18/2023       Recent Labs     08/24/23  1624 08/24/23  2044 08/25/23  0720 08/25/23  1108   POCGLU 171* 199* 182* 202*       Blood Sugar Average: Last 72 hrs:  (P) 175.2389764934622501     Blood glucose currently well controlled. Continue Lantus 30 units with Humalog 13 units 3 times daily    Benign essential hypertension  Assessment & Plan  Blood pressure stable.   Continue Coreg, sotalol             VTE Pharmacologic Prophylaxis:   Pharmacologic: Heparin  Mechanical VTE Prophylaxis in Place: Yes    Patient Centered Rounds: I have performed bedside rounds with nursing staff today. Discussions with Specialists or Other Care Team Provider:     Education and Discussions with Family / Patient: pt anf wife    Time Spent for Care: 30 minutes. More than 50% of total time spent on counseling and coordination of care as described above. Current Length of Stay: 12 day(s)    Current Patient Status: Inpatient   Certification Statement: The patient will continue to require additional inpatient hospital stay due to above    Discharge Plan:     Code Status: Level 1 - Full Code      Subjective:   Pt seen and examined by me in morning. Denied any specific com except for mild pain at the ICD site. Objective:     Vitals:   Temp (24hrs), Av.9 °F (36.6 °C), Min:97.2 °F (36.2 °C), Max:98.4 °F (36.9 °C)    Temp:  [97.2 °F (36.2 °C)-98.4 °F (36.9 °C)] 98.3 °F (36.8 °C)  HR:  [54-60] 58  Resp:  [12-21] 16  BP: (120-134)/(58-78) 122/58  SpO2:  [93 %-99 %] 96 %  Body mass index is 30.41 kg/m². Input and Output Summary (last 24 hours): Intake/Output Summary (Last 24 hours) at 2023 1307  Last data filed at 2023 0900  Gross per 24 hour   Intake 700 ml   Output 175 ml   Net 525 ml       Physical Exam:     Physical Exam    Constitutional: Pt appears comfortable. Not in any acute distress. Cardiovascular: Normal rate, regular rhythm, normal heart sounds. No murmur heard. Pulmonary/Chest: Effort normal, air entry b/l equal. No respiratory distress. Pt has no wheezes or crackles. Abdominal: Soft. Non-distended, Non-tender. Bowel sounds are normal.   Musculoskeletal: Normal range of motion. Neurological: awake, alert. Moving all extremities spontaneously. Psychiatric: normal mood and affect.       Additional Data:     Labs:    Results from last 7 days   Lab Units 23  0608 23  1423 23  0504 23  0528   WBC Thousand/uL 9.76  --  9.34 8.34   HEMOGLOBIN g/dL 9.2*   < > 9.5* 9.1*   HEMATOCRIT % 28.5*   < > 29.8* 29.6*   PLATELETS Thousands/uL 360  --  353 310   BANDS PCT %  --   --  3  --    NEUTROS PCT %  --   --   --  54   LYMPHS PCT %  --   --   --  32   LYMPHO PCT %  --   --  35  --    MONOS PCT %  --   --   --  10   MONO PCT %  --   --  3*  --    EOS PCT %  --   --  3 2    < > = values in this interval not displayed. Results from last 7 days   Lab Units 08/25/23  0608   SODIUM mmol/L 138   POTASSIUM mmol/L 3.9   CHLORIDE mmol/L 103   CO2 mmol/L 28   BUN mg/dL 7   CREATININE mg/dL 0.59*   ANION GAP mmol/L 7   CALCIUM mg/dL 8.2*   GLUCOSE RANDOM mg/dL 176*         Results from last 7 days   Lab Units 08/25/23  1108 08/25/23  0720 08/24/23  2044 08/24/23  1624 08/24/23  1416 08/24/23  0745 08/23/23  2032 08/23/23  1645 08/23/23  1056 08/23/23  0720 08/22/23  2102 08/22/23  1600   POC GLUCOSE mg/dl 202* 182* 199* 171* 167* 153* 178* 126 219* 238* 166* 110                   * I Have Reviewed All Lab Data Listed Above. * Additional Pertinent Lab Tests Reviewed: [address] Admission Reviewed    Imaging:    Imaging Reports Reviewed Today Include:   Imaging Personally Reviewed by Myself Includes:      Recent Cultures (last 7 days):     Results from last 7 days   Lab Units 08/25/23  0730 08/25/23  0141   BLOOD CULTURE  Received in Microbiology Lab. Culture in Progress. Received in Microbiology Lab. Culture in Progress.        Last 24 Hours Medication List:   Current Facility-Administered Medications   Medication Dose Route Frequency Provider Last Rate   • acetaminophen  975 mg Oral [facility] Raymond Lauren PA-C     • aspirin  81 mg Oral Daily Raymond Lauren PA-C     • atorvastatin  40 mg Oral Daily With Amaya Manzo PA-C     • carvedilol  12.5 mg Oral BID Raymond Lauren PA-C     • cefTRIAXone  2,000 mg Intravenous Q24H Raymond Lauren PA-C 2,000 mg (08/25/23 0845)   • escitalopram  20 mg Oral Daily Raymond Lauren PA-C     • ezetimibe  10 mg Oral Daily Raymond Lauren PA-C • fenofibrate  145 mg Oral Daily Mona Moreno PA-C     • heparin (porcine)  5,000 Units Subcutaneous [facility] Mona Moreno PA-C     • HYDROmorphone  0.5 mg Intravenous Q4H PRN Mona Moreno PA-C     • insulin glargine  30 Units Subcutaneous QAM Mona Moreno PA-C     • insulin lispro  1-6 Units Subcutaneous HS Mona Moreno PA-C     • insulin lispro  13 Units Subcutaneous TID With Meals Mona Moreno PA-C     • insulin lispro  2-12 Units Subcutaneous TID AC Mona Moreno PA-C     • LORazepam  1 mg Oral Daily PRN Mona Moreno PA-C     • melatonin  6 mg Oral HS Mona Moreno PA-C     • ondansetron  4 mg Intravenous Q6H PRN Mona Moreno PA-C     • oxyCODONE  10 mg Oral Q4H PRN Mona Moreno PA-C     • oxyCODONE  5 mg Oral Q4H PRN Mona Moreno PA-C     • pantoprazole  20 mg Oral Early Morning Mona Moreno PA-C     • senna  1 tablet Oral HS Mona Moreno PA-C     • sotalol  80 mg Oral BID Mona Moreno PA-C          Today, Patient Was Seen By: Luz Norwood MD    ** Please Note: Dictation voice to text software may have been used in the creation of this document.  **

## 2023-08-25 NOTE — PROGRESS NOTES
Acute Care Surgery  Bedside V.A.C. Procedure Note    A timeout was performed with the patient's nurse prior to beginning the dressing change. The nurse remained present to confirm the correct dressing counts on removal of the VAC dressing and was debriefed at the completion of the dressing change. Location of wound: Right groin    Dressings and Foam removed:  1 kerlix packing Dressings    Dimensions of wound: 4 cm x 3 cm x 8 cm deep    Description of wound: On inspection of the wound today, the wound bed appears healthy and pink. There was no necrotic or nonviable tissue requiring debridement. The periwound skin remains clean and intact and unremarkable. The suture laceration site is clean, dry, intact    VAC dressing application:  The periwound skin was cleaned and dried. 2 adaptic dressings placed on suture line, 1 black foam were cut to size of the wound and placed into the wound. The dressings were then covered with VAC drape. Additional VAC drape and black foam was used to create a bridge to the patient's right thigh and a base for the track pad. The track pad was then placed over the base of black foam. The VAC was then set to 125 mmHg low Continuous suction. The patient tolerated the procedure well and there were no complications. The patient did not require any excisional debridement during today's dressing change. VAC settings:  125 mmHg  Continuous    Wound Images: Total vac count:  1 in wound 1 for bridge  2 pieces of adaptic on suture line    Additional Notes: The [facility] sticker placed over the dressing per protocol. The next [facility] dressing change will be planned for 8/28/23. The [facility] paperwork was completed and give to the case management staff to arrange home VAC therapy. Resident staff was present for the dressing change. This dressing change took greater than 28 minutes to complete.     Kimberlyn Stinson PA-C  8/25/2023 1:20 PM

## 2023-08-25 NOTE — CASE MANAGEMENT
Case Management Progress Note    Patient name Benita Van  Location PPHP 934/PPHP 286-13 MRN [id number]  : 1955 Date 2023       LOS (days): 12  Geometric Mean LOS (GMLOS) (days): 9.60  Days to GMLOS:-2        OBJECTIVE:        Current admission status: Inpatient  Preferred Pharmacy:   [facility] [address] EDITH HAN [address], [address] [address] [facility] [address]  Phone: [phone] Fax: [phone]    [address], [facility] [address]  Phone: [phone] Fax: [phone]   [address], [address]  [address] [facility]   Phone: [phone] Fax: [phone]    [facility] ([address] [facility]  [address] [facility]  [address] [place] [address]  Phone: [phone] Fax: [phone] [address], [address] [facility] [address] [place] [address],[address]  [facility] [address]  Phone: [phone] Fax: [phone]    Primary Care Provider: Ronit Toledo MD    Primary Insurance: [facility]  Secondary Insurance:     PROGRESS NOTE:  SLIM = here through late next week. May have ICD lead replaced, awaiting final determination of IV abx. May need IV abx at dc, wound vac in room, may need life vest. Plan of care is TBD at this time. CM to continue to support.

## 2023-08-25 NOTE — PLAN OF CARE
Problem: Nutrition/Hydration-ADULT  Goal: Nutrient/Hydration intake appropriate for improving, restoring or maintaining nutritional needs  Description: Monitor and assess patient's nutrition/hydration status for malnutrition. Collaborate with interdisciplinary team and initiate plan and interventions as ordered. Monitor patient's weight and dietary intake as ordered or per policy. Utilize nutrition screening tool and intervene as necessary. Determine patient's food preferences and provide high-protein, high-caloric foods as appropriate.      INTERVENTIONS:  - Monitor oral intake, urinary output, labs, and treatment plans  - Assess nutrition and hydration status and recommend course of action  - Evaluate amount of meals eaten  - Assist patient with eating if necessary   - Allow adequate time for meals  - Recommend/ encourage appropriate diets, oral nutritional supplements, and vitamin/mineral supplements  - Order, calculate, and assess calorie counts as needed  - Recommend, monitor, and adjust tube feedings and TPN/PPN based on assessed needs  - Assess need for intravenous fluids  - Provide specific nutrition/hydration education as appropriate  - Include patient/family/caregiver in decisions related to nutrition  Outcome: Progressing     Problem: PAIN - ADULT  Goal: Verbalizes/displays adequate comfort level or baseline comfort level  Description: Interventions:  - Encourage patient to monitor pain and request assistance  - Assess pain using appropriate pain scale  - Administer analgesics based on type and severity of pain and evaluate response  - Implement non-pharmacological measures as appropriate and evaluate response  - Consider cultural and social influences on pain and pain management  - Notify physician/advanced practitioner if interventions unsuccessful or patient reports new pain  Outcome: Progressing

## 2023-08-25 NOTE — PROGRESS NOTES
Progress Note - [facility] Infectious Disease   Elias Ramirez 79 y.o. male MRN: [id number]  Unit/Bed#: [facility] 934-01 Encounter: [id number]      IMPRESSION & RECOMMENDATIONS:   1. 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 1 of 2 sets from 8/13 are growing Streptococcus anginosus. The likely source of bacteremia is his necrotizing perenial infection. Strep anginosus can cause endovascular infection and we need to consider the possibility of endocarditis and/or ICD infection.  2D echo was noted to be a poor quality study. MARCELLA 8/18 found vegetation on pacer wire of RV lead though valves spared. Repeat blood cultures 8/13 negative. S/p device and lead removal 8/24/23.   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  VUEVALQ appears there has been good source control of this infection and patient is clinically improved. Likely this was the source of his bacteremia as above. OR cultures have grown Group C Strep, Prevotella and E. Coli. He completed 5 day post op course of flagyl.   -Continue IV ceftriaxone 2g q24h  -Will need prolonged course of Ceftriaxone for ICD infection as above  -ongoing surgical management and wound care     3. Acute Hypoxic Respiratory Failure. Patient had been intubated for the Radha Sachs has now been successfully extubated.  Chest x-rays have shown linear atelectasis at the left lung base but no consolidation to suggest pneumonia. Currently on [facility].  -Monitor O2 needs  -Wean oxygen as able     4. T2DM. Risk factor for invasive infections including necrotizing soft tissue infection.  A1c was 12.3 in January of this year. Endocrinology following.   -Tight glycemic control     Antibiotics:  IV ceftriaxone D8   Total abx D13    I have discussed the above management plan in detail with patient and wife at bedside. I have discussed the above management plan in detail with patient's RN, and the primary service, SLIM.     24 Hour events:  Yesterday and overnight notes reviewed. S/p device extraction. Subjective:  Patient has no fever, chills, sweats; no nausea, vomiting, diarrhea; no cough, shortness of breath; no pain. No new symptoms. Groin, radial, and extraction sites c/d/i without pain. Objective:  Vitals:  Temp:  [97.2 °F (36.2 °C)-98.4 °F (36.9 °C)] 98.3 °F (36.8 °C)  HR:  [54-60] 58  Resp:  [12-21] 16  BP: (120-134)/(58-78) 122/58  SpO2:  [93 %-99 %] 96 %  Temp (24hrs), Av.9 °F (36.6 °C), Min:97.2 °F (36.2 °C), Max:98.4 °F (36.9 °C)  Current: Temperature: 98.3 °F (36.8 °C)    PHYSICAL EXAM:  General Appearance:  Appearing well, nontoxic, and in no distress   HEENT: Normocephalic, without obvious abnormality, atraumatic. Conjunctiva pink and sclera anicteric. Oropharynx moist without lesions. Lungs:   Clear to auscultation bilaterally, respirations unlabored   Heart:  RRR; no murmur, rub or gallop   Abdomen:   Soft, non-tender, non-distended, positive bowel sounds    Extremities: No cyanosis, clubbing or edema   Musculoskeletal: Back symmetric without curvature, ROM normal.    : No CVA or suprapubic tenderness. Wound vac in place. Left groin site pressure dressing intact. Skin: No rashes or lesions. No draining wounds noted. Right radial dressing intact. Left chest wall dressing intact. Peripheral IV intact without evidence of erythema, warmth, or exudate. LABS, IMAGING, & OTHER STUDIES:  Extensive review of the medical records in epic including review of the notes, radiographs, and laboratory results were reviewed personally as below.      Lab Results:  I have personally reviewed pertinent labs.  Results from last 7 days   Lab Units 08/25/23  0608 08/24/23  1423 08/24/23  0504 08/23/23  0528   WBC Thousand/uL 9.76  --  9.34 8.34   HEMOGLOBIN g/dL 9.2* 9.3* 9.5* 9.1*   PLATELETS Thousands/uL 360  --  353 310     Results from last 7 days   Lab Units 08/25/23  0608 08/24/23  0504 08/23/23  0528   SODIUM mmol/L 138 140 140   POTASSIUM mmol/L 3.9 3.9 3.8   CHLORIDE mmol/L 103 104 106   CO2 mmol/L 28 30 28   BUN mg/dL 7 8 7   CREATININE mg/dL 0.59* 0.55* 0.58*   EGFR ml/min/1.73sq m 104 107 105   CALCIUM mg/dL 8.2* 8.2* 8.1*                           Imaging Studies:   I have personally reviewed pertinent imaging study reports and images in PACS. Other Studies:   I have personally reviewed pertinent report including CXR, blood cx, device extraction OR report.

## 2023-08-25 NOTE — ASSESSMENT & PLAN NOTE
Status post perineal debridement/washout on 8/13, repeat washout on 8/14 and partial closure with wound VAC application on 5/66.    Possibly the source of streptococcal bacteremia. Continue high-dose ceftriaxone for now.

## 2023-08-25 NOTE — PROGRESS NOTES
Progress Note - Justice Davey 79 y.o. male MRN: [id number]    Unit/Bed#: [facility] 934-01 Encounter: [id number]      CC: diabetes f/u    Subjective:   Thehamzah feliciano 79y.o. 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 Hospital course complicated with bacteremia, R ventricular lead vegetation requiring extraction of pacemaker done by CT surgery on 8/24. Endocrinology consulted for management of type 2 diabetes mellitus. Feels well. No complaints. No hypoglycemia. He had 100% of his dinner after his surgery, and had 100% of his breakfast and did not drink any juices    Objective:     Vitals: Blood pressure 122/58, pulse 58, temperature 98.3 °F (36.8 °C), resp. rate 16, height 5' 9" (1.753 m), weight 93.4 kg (205 lb 14.6 oz), SpO2 96 %. ,Body mass index is 30.41 kg/m². Intake/Output Summary (Last 24 hours) at 8/25/2023 1141  Last data filed at 8/25/2023 0900  Gross per 24 hour   Intake 700 ml   Output 175 ml   Net 525 ml       Physical Exam:  General Appearance: awake, appears stated age and cooperative  Head: Normocephalic, without obvious abnormality, atraumatic  Extremities: moves all extremities  Skin: Skin color and temperature normal.   Pulm: no labored breathing    Lab, Imaging and other studies: I have personally reviewed pertinent reports. POC Glucose (mg/dl)   Date Value   08/25/2023 202 (H)   08/25/2023 182 (H)   08/24/2023 199 (H)   08/24/2023 171 (H)   08/24/2023 167 (H)   08/24/2023 153 (H)   08/23/2023 178 (H)   08/23/2023 126   08/23/2023 219 (H)   08/23/2023 238 (H)       Assessment and plan:    Type 2 diabetes mellitus with hyperglycemia  HbA1c 11 August 2023  Home regimen:  Toujeo U300 130 units at bedtime, Humalog U200 24 units with meals, Jardiance 25 mg daily, Trulicity 4.5 mg weekly, metformin 1000 mg twice daily, uses juany at home, follows up with [facility] endocrinology  Inpatient regimen: Lantus 30 Units, humalog 13 Units with meals, correctional scale algo 4 with meals & 3 at bedtime     Recommendations:  -Overall blood glucose above target range for inpatient  and noted to have postprandial hyperglycemia  - We recommend increasing Humalog to 15 units with meals from 13 units, continue with Lantus 30 units every morning, correctional scale algorithm 4 with meals and 3 at bedtime  - We will continue to monitor blood glucose and make adjustments as needed.  Monitor for hypoglycemia and treat according to the protocol    Portions of the record may have been created with voice recognition software. Please Tigertext questions to the clinician covering the "YFT-Dklm-Tuma" Role. Thank you.

## 2023-08-25 NOTE — RESTORATIVE TECHNICIAN NOTE
Restorative Technician Note      Patient Name: Maria E Reyes     Restorative Tech Visit Date: 08/25/23  Note Type: Mobility  Patient Position Upon Consult: Supine  Activity Performed: Ambulated  Assistive Device: Roller walker  Patient Position at End of Consult: Bedside chair;  All needs within reach    Nikki Ospina, Restorative Technician

## 2023-08-25 NOTE — ASSESSMENT & PLAN NOTE
Most likely from streptococcal bacteremia. Status post device removal on 8/24  To monitor on telemetry meanwhile.   Pending final plans from EP/ID standpoint regarding reimplantation of ICD or discharged with LifeVest.

## 2023-08-25 NOTE — PLAN OF CARE
Problem: MOBILITY - ADULT  Goal: Maintain or return to baseline ADL function  Description: INTERVENTIONS:  -  Assess patient's ability to carry out ADLs; assess patient's baseline for ADL function and identify physical deficits which impact ability to perform ADLs (bathing, care of mouth/teeth, toileting, grooming, dressing, etc.)  - Assess/evaluate cause of self-care deficits   - Assess range of motion  - Assess patient's mobility; develop plan if impaired  - Assess patient's need for assistive devices and provide as appropriate  - Encourage maximum independence but intervene and supervise when necessary  - Involve family in performance of ADLs  - Assess for home care needs following discharge   - Consider OT consult to assist with ADL evaluation and planning for discharge  - Provide patient education as appropriate  Outcome: Progressing  Goal: Maintains/Returns to pre admission functional level  Description: INTERVENTIONS:  - Perform BMAT or MOVE assessment daily.   - Set and communicate daily mobility goal to care team and patient/family/caregiver.    - Collaborate with rehabilitation services on mobility goals if consulted  - Out of bed for toileting  - Record patient progress and toleration of activity level   Outcome: Progressing     Problem: Prexisting or High Potential for Compromised Skin Integrity  Goal: Skin integrity is maintained or improved  Description: INTERVENTIONS:  - Identify patients at risk for skin breakdown  - Assess and monitor skin integrity  - Assess and monitor nutrition and hydration status  - Monitor labs   - Assess for incontinence   - Turn and reposition patient  - Assist with mobility/ambulation  - Relieve pressure over bony prominences  - Avoid friction and shearing  - Provide appropriate hygiene as needed including keeping skin clean and dry  - Evaluate need for skin moisturizer/barrier cream  - Collaborate with interdisciplinary team   - Patient/family teaching  - Consider wound care consult   Outcome: Progressing     Problem: Nutrition/Hydration-ADULT  Goal: Nutrient/Hydration intake appropriate for improving, restoring or maintaining nutritional needs  Description: Monitor and assess patient's nutrition/hydration status for malnutrition. Collaborate with interdisciplinary team and initiate plan and interventions as ordered. Monitor patient's weight and dietary intake as ordered or per policy. Utilize nutrition screening tool and intervene as necessary. Determine patient's food preferences and provide high-protein, high-caloric foods as appropriate.      INTERVENTIONS:  - Monitor oral intake, urinary output, labs, and treatment plans  - Assess nutrition and hydration status and recommend course of action  - Evaluate amount of meals eaten  - Assist patient with eating if necessary   - Allow adequate time for meals  - Recommend/ encourage appropriate diets, oral nutritional supplements, and vitamin/mineral supplements  - Order, calculate, and assess calorie counts as needed  - Recommend, monitor, and adjust tube feedings and TPN/PPN based on assessed needs  - Assess need for intravenous fluids  - Provide specific nutrition/hydration education as appropriate  - Include patient/family/caregiver in decisions related to nutrition  Outcome: Progressing     Problem: PAIN - ADULT  Goal: Verbalizes/displays adequate comfort level or baseline comfort level  Description: Interventions:  - Encourage patient to monitor pain and request assistance  - Assess pain using appropriate pain scale  - Administer analgesics based on type and severity of pain and evaluate response  - Implement non-pharmacological measures as appropriate and evaluate response  - Consider cultural and social influences on pain and pain management  - Notify physician/advanced practitioner if interventions unsuccessful or patient reports new pain  Outcome: Progressing     Problem: INFECTION - ADULT  Goal: Absence or prevention of progression during hospitalization  Description: INTERVENTIONS:  - Assess and monitor for signs and symptoms of infection  - Monitor lab/diagnostic results  - Monitor all insertion sites, i.e. indwelling lines, tubes, and drains  - Monitor endotracheal if appropriate and nasal secretions for changes in amount and color  - Jackson appropriate cooling/warming therapies per order  - Administer medications as ordered  - Instruct and encourage patient and family to use good hand hygiene technique  - Identify and instruct in appropriate isolation precautions for identified infection/condition  Outcome: Progressing  Goal: Absence of fever/infection during neutropenic period  Description: INTERVENTIONS:  - Monitor WBC    Outcome: Progressing     Problem: SAFETY ADULT  Goal: Maintain or return to baseline ADL function  Description: INTERVENTIONS:  -  Assess patient's ability to carry out ADLs; assess patient's baseline for ADL function and identify physical deficits which impact ability to perform ADLs (bathing, care of mouth/teeth, toileting, grooming, dressing, etc.)  - Assess/evaluate cause of self-care deficits   - Assess range of motion  - Assess patient's mobility; develop plan if impaired  - Assess patient's need for assistive devices and provide as appropriate  - Encourage maximum independence but intervene and supervise when necessary  - Involve family in performance of ADLs  - Assess for home care needs following discharge   - Consider OT consult to assist with ADL evaluation and planning for discharge  - Provide patient education as appropriate  Outcome: Progressing  Goal: Maintains/Returns to pre admission functional level  Description: INTERVENTIONS:  - Perform BMAT or MOVE assessment daily.   - Set and communicate daily mobility goal to care team and patient/family/caregiver.    - Collaborate with rehabilitation services on mobility goals if consulted  - Out of bed for toileting  - Record patient progress and toleration of activity level   Outcome: Progressing  Goal: Patient will remain free of falls  Description: INTERVENTIONS:  - Educate patient/family on patient safety including physical limitations  - Instruct patient to call for assistance with activity   - Consult OT/PT to assist with strengthening/mobility   - Keep Call bell within reach  - Keep bed low and locked with side rails adjusted as appropriate  - Keep care items and personal belongings within reach  - Initiate and maintain comfort rounds  - Make Fall Risk Sign visible to staff  - Apply yellow socks and bracelet for high fall risk patients  - Consider moving patient to room near nurses station  Outcome: Progressing     Problem: DISCHARGE PLANNING  Goal: Discharge to home or other facility with appropriate resources  Description: INTERVENTIONS:  - Identify barriers to discharge w/patient and caregiver  - Arrange for needed discharge resources and transportation as appropriate  - Identify discharge learning needs (meds, wound care, etc.)  - Arrange for interpretive services to assist at discharge as needed  - Refer to Case Management Department for coordinating discharge planning if the patient needs post-hospital services based on physician/advanced practitioner order or complex needs related to functional status, cognitive ability, or social support system  Outcome: Progressing     Problem: Knowledge Deficit  Goal: Patient/family/caregiver demonstrates understanding of disease process, treatment plan, medications, and discharge instructions  Description: Complete learning assessment and assess knowledge base.   Interventions:  - Provide teaching at level of understanding  - Provide teaching via preferred learning methods  Outcome: Progressing

## 2023-08-26 PROBLEM — D63.8 ANEMIA OF CHRONIC DISEASE: Status: ACTIVE | Noted: 2023-08-26

## 2023-08-26 LAB
GLUCOSE SERPL-MCNC: 102 MG/DL (ref 65–140)
GLUCOSE SERPL-MCNC: 181 MG/DL (ref 65–140)
GLUCOSE SERPL-MCNC: 209 MG/DL (ref 65–140)
GLUCOSE SERPL-MCNC: 292 MG/DL (ref 65–140)
GLUCOSE SERPL-MCNC: 69 MG/DL (ref 65–140)

## 2023-08-26 PROCEDURE — 82948 REAGENT STRIP/BLOOD GLUCOSE: CPT

## 2023-08-26 PROCEDURE — 99232 SBSQ HOSP IP/OBS MODERATE 35: CPT | Performed by: INTERNAL MEDICINE

## 2023-08-26 RX ORDER — SPIRONOLACTONE 25 MG/1
25 TABLET ORAL DAILY
Status: DISCONTINUED | OUTPATIENT
Start: 2023-08-26 | End: 2023-08-31 | Stop reason: HOSPADM

## 2023-08-26 RX ORDER — INSULIN GLARGINE 100 [IU]/ML
45 INJECTION, SOLUTION SUBCUTANEOUS EVERY MORNING
Status: DISCONTINUED | OUTPATIENT
Start: 2023-08-27 | End: 2023-08-31

## 2023-08-26 RX ORDER — INSULIN LISPRO 100 [IU]/ML
1-6 INJECTION, SOLUTION INTRAVENOUS; SUBCUTANEOUS
Status: DISCONTINUED | OUTPATIENT
Start: 2023-08-26 | End: 2023-08-31 | Stop reason: HOSPADM

## 2023-08-26 RX ORDER — INSULIN LISPRO 100 [IU]/ML
2-12 INJECTION, SOLUTION INTRAVENOUS; SUBCUTANEOUS
Status: DISCONTINUED | OUTPATIENT
Start: 2023-08-26 | End: 2023-08-31 | Stop reason: HOSPADM

## 2023-08-26 RX ORDER — ISOSORBIDE MONONITRATE 30 MG/1
30 TABLET, EXTENDED RELEASE ORAL DAILY
Status: DISCONTINUED | OUTPATIENT
Start: 2023-08-26 | End: 2023-08-31 | Stop reason: HOSPADM

## 2023-08-26 RX ADMIN — SOTALOL HYDROCHLORIDE 80 MG: 80 TABLET ORAL at 09:38

## 2023-08-26 RX ADMIN — INSULIN LISPRO 1 UNITS: 100 INJECTION, SOLUTION INTRAVENOUS; SUBCUTANEOUS at 21:24

## 2023-08-26 RX ADMIN — ASPIRIN 81 MG: 81 TABLET, COATED ORAL at 09:36

## 2023-08-26 RX ADMIN — HEPARIN SODIUM 5000 UNITS: 5000 INJECTION INTRAVENOUS; SUBCUTANEOUS at 14:21

## 2023-08-26 RX ADMIN — CEFTRIAXONE SODIUM 2000 MG: 10 INJECTION, POWDER, FOR SOLUTION INTRAVENOUS at 09:48

## 2023-08-26 RX ADMIN — ISOSORBIDE MONONITRATE 30 MG: 30 TABLET, EXTENDED RELEASE ORAL at 11:28

## 2023-08-26 RX ADMIN — ACETAMINOPHEN 975 MG: 325 TABLET, FILM COATED ORAL at 06:10

## 2023-08-26 RX ADMIN — CARVEDILOL 12.5 MG: 12.5 TABLET, FILM COATED ORAL at 09:36

## 2023-08-26 RX ADMIN — EZETIMIBE 10 MG: 10 TABLET ORAL at 09:36

## 2023-08-26 RX ADMIN — CARVEDILOL 12.5 MG: 12.5 TABLET, FILM COATED ORAL at 16:42

## 2023-08-26 RX ADMIN — HEPARIN SODIUM 5000 UNITS: 5000 INJECTION INTRAVENOUS; SUBCUTANEOUS at 21:25

## 2023-08-26 RX ADMIN — ESCITALOPRAM OXALATE 20 MG: 20 TABLET, FILM COATED ORAL at 09:36

## 2023-08-26 RX ADMIN — SPIRONOLACTONE 25 MG: 25 TABLET ORAL at 11:28

## 2023-08-26 RX ADMIN — ACETAMINOPHEN 975 MG: 325 TABLET, FILM COATED ORAL at 14:20

## 2023-08-26 RX ADMIN — PANTOPRAZOLE SODIUM 20 MG: 20 TABLET, DELAYED RELEASE ORAL at 06:10

## 2023-08-26 RX ADMIN — ATORVASTATIN CALCIUM 40 MG: 40 TABLET, FILM COATED ORAL at 16:42

## 2023-08-26 RX ADMIN — OXYCODONE HYDROCHLORIDE 10 MG: 10 TABLET ORAL at 21:24

## 2023-08-26 RX ADMIN — INSULIN GLARGINE 30 UNITS: 100 INJECTION, SOLUTION SUBCUTANEOUS at 09:47

## 2023-08-26 RX ADMIN — INSULIN LISPRO 6 UNITS: 100 INJECTION, SOLUTION INTRAVENOUS; SUBCUTANEOUS at 09:36

## 2023-08-26 RX ADMIN — FENOFIBRATE 145 MG: 145 TABLET ORAL at 09:36

## 2023-08-26 RX ADMIN — INSULIN LISPRO 15 UNITS: 100 INJECTION, SOLUTION INTRAVENOUS; SUBCUTANEOUS at 11:29

## 2023-08-26 RX ADMIN — SOTALOL HYDROCHLORIDE 80 MG: 80 TABLET ORAL at 21:26

## 2023-08-26 RX ADMIN — OXYCODONE HYDROCHLORIDE 10 MG: 10 TABLET ORAL at 06:13

## 2023-08-26 RX ADMIN — INSULIN LISPRO 15 UNITS: 100 INJECTION, SOLUTION INTRAVENOUS; SUBCUTANEOUS at 09:37

## 2023-08-26 RX ADMIN — INSULIN LISPRO 4 UNITS: 100 INJECTION, SOLUTION INTRAVENOUS; SUBCUTANEOUS at 11:29

## 2023-08-26 RX ADMIN — ACETAMINOPHEN 975 MG: 325 TABLET, FILM COATED ORAL at 21:24

## 2023-08-26 RX ADMIN — HEPARIN SODIUM 5000 UNITS: 5000 INJECTION INTRAVENOUS; SUBCUTANEOUS at 06:10

## 2023-08-26 RX ADMIN — MELATONIN 6 MG: at 21:25

## 2023-08-26 NOTE — ASSESSMENT & PLAN NOTE
Lab Results   Component Value Date    HGBA1C 11.0 (H) 08/18/2023     Continue basal/prandial insulin with additional SSI coverage per Accu-Cheks - titrate regimen daily  Carbohydrate restriction  Hypoglycemia protocol

## 2023-08-26 NOTE — PROGRESS NOTES
[facility]  Progress Note  Name: Lisbeth Alvarenga I  MRN: [id number]  [facility] Date of Admission: 8/13/2023   Date of Service: 8/26/2023 I Hospital Day: 13      Assessment & Plan:    * Jamarcus's gangrene  Assessment & Plan  Status post perineal debridement/washout on 8/13, repeat washout on 8/14, and partial closure with wound VAC application on 0/07.    Suspected source of streptococcal bacteremia (see below)  Continue high-dose IV Rocephin per Infectious disease  Local wound care/packing per surgery    Streptococcal bacteremia  Assessment & Plan  Possibly from groin wound/infection  Continue high-dose IV Rocephin for a multiweek course per Infectious disease  No evidence of valvular vegetations on AMRCELLA  Repeat blood cultures from 8/25 remain preliminarily negative    ICD (implantable cardioverter-defibrillator) infection ([address])  Assessment & Plan  Likely from streptococcal bacteremia  Status post device removal on 8/24  Pending final plans from Infectious disease and electrophysiology standpoint regarding reimplantation of ICD or discharge w/ LifeVest -> per infectious disease, if repeat blood cultures remain negative > 72 hrs, can reimplant new device while undergoing multiweek course of IV antibiotics    Chronic systolic CHF (congestive heart failure) ([address])  Assessment & Plan  EF of 40% earlier this month  Continue Aldactone/Coreg/Sotalol  Low-sodium and fluid restriction enforced  ICD removed due to suspected infection -> LifeVest encouraged    Insulin dependent diabetes mellitus ([address])  Assessment & Plan  Lab Results   Component Value Date    HGBA1C 11.0 (H) 08/18/2023     Continue basal/prandial insulin with additional SSI coverage per Accu-Cheks - titrate regimen daily  Carbohydrate restriction  Hypoglycemia protocol    Hyperlipidemia  Assessment & Plan  Continue statin/Tricor/Zetia    Essential hypertension  Assessment & Plan  Continue Imdur/Aldactone/Coreg  Low-sodium restriction    CAD (coronary artery disease)  Assessment & Plan  Status post CABG  Continue ASA/statin/Coreg/Imdur    Anemia of chronic disease  Assessment & Plan  Monitor H/H      DVT Prophylaxis:  Heparin SC    Patient Centered Rounds:  I have performed bedside rounds and discussed plan of care with nursing today. Discussions with Specialists or Other Care Team Provider:  see above assessments if applicable    Education and Discussions with Family / Patient:  Patient at bedside, who will self update family    Time Spent for Care:  35 minutes. More than 50% of total time spent on counseling and coordination of care, on one or more of the following: performing physical exam; counseling and coordination of care, obtaining or reviewing history, documenting in the medical record, reviewing/ordering tests/medications/procedures, and communicating with other healthcare professionals. Current Length of Stay: 13 day(s)  Current Patient Status: Inpatient   Certification Statement:  Patient will continue to require additional hospital stay due to assessments as noted above. Code Status: Level 1 - Full Code        Subjective:     Encountered earlier in the day. No new complaints this time. Denies fever/chills currently. Appetite is good and tolerating antibiotics well thus far. Objective:     Vitals:   Temp (24hrs), Av.8 °F (37.1 °C), Min:98.3 °F (36.8 °C), Max:99.4 °F (37.4 °C)    Temp:  [98.3 °F (36.8 °C)-99.4 °F (37.4 °C)] 98.3 °F (36.8 °C)  HR:  [62-67] 62  Resp:  [17-22] 19  BP: (118-147)/(47-65) 147/65  SpO2:  [91 %-94 %] 92 %  Body mass index is 30.41 kg/m². Input and Output Summary (last 24 hours):        Intake/Output Summary (Last 24 hours) at 2023 1419  Last data filed at 2023 3580  Gross per 24 hour   Intake 240 ml   Output 75 ml   Net 165 ml       Physical Exam:     GENERAL  improved weakness/fatigue   HEAD   Normocephalic - atraumatic   EYES nonicteric   MOUTH   Mucosa moist   NECK   Supple - full range of motion   CARDIAC  rate controlled - S1/S2 positive   PULMONARY  fairly clear to auscultation - nonlabored respirations   ABDOMEN   Soft - nontender/nondistended - active bowel sounds   MUSCULOSKELETAL   Motor strength/range of motion fairly intact   NEUROLOGIC   Alert/oriented at baseline   SKIN   Chronic wrinkles/blemishes - tattoos present - groin wound dressed/packed   PSYCHIATRIC   Mood/affect stable         Additional Data:     Labs & Recent Cultures:    Results from last 7 days   Lab Units 08/25/23  0608 08/24/23  1423 08/24/23  0504 08/23/23  0528   WBC Thousand/uL 9.76  --  9.34 8.34   HEMOGLOBIN g/dL 9.2*   < > 9.5* 9.1*   HEMATOCRIT % 28.5*   < > 29.8* 29.6*   PLATELETS Thousands/uL 360  --  353 310   BANDS PCT %  --   --  3  --    NEUTROS PCT %  --   --   --  54   LYMPHS PCT %  --   --   --  32   LYMPHO PCT %  --   --  35  --    MONOS PCT %  --   --   --  10   MONO PCT %  --   --  3*  --    EOS PCT %  --   --  3 2    < > = values in this interval not displayed. Results from last 7 days   Lab Units 08/25/23  0608   POTASSIUM mmol/L 3.9   CHLORIDE mmol/L 103   CO2 mmol/L 28   BUN mg/dL 7   CREATININE mg/dL 0.59*   CALCIUM mg/dL 8.2*         Results from last 7 days   Lab Units 08/26/23  1127 08/26/23  0935 08/25/23  2101 08/25/23  1652 08/25/23  1108 08/25/23  0720 08/24/23  2044 08/24/23  1624 08/24/23  1416 08/24/23  0745 08/23/23  2032 08/23/23  1645   POC GLUCOSE mg/dl 209* 292* 215* 294* 202* 182* 199* 171* 167* 153* 178* 126                 Results from last 7 days   Lab Units 08/25/23  0730 08/25/23  0607   BLOOD CULTURE  No Growth at 24 hrs. No Growth at 24 hrs.          Lines/Drains:  Invasive Devices     Peripherally Inserted Central Catheter Line  Duration           PICC Line 20/89/82 Right Basilic 9 days                  Last 24 Hours Medication List:   Current Facility-Administered Medications   Medication Dose Route Frequency Provider Last Rate   • acetaminophen  975 mg Oral [facility] Marene Olszewski, PA-C     • aspirin  81 mg Oral Daily Marene Olszewski, PA-C     • atorvastatin  40 mg Oral Daily With Luke Arreola PA-C     • carvedilol  12.5 mg Oral BID Marene Olszewski, PA-C     • cefTRIAXone  2,000 mg Intravenous Q24H Marene Olszewski, PA-C 2,000 mg (08/26/23 0948)   • escitalopram  20 mg Oral Daily Marene Olszewski, PA-C     • ezetimibe  10 mg Oral Daily Marene Olszewski, PA-C     • fenofibrate  145 mg Oral Daily Marene Olszewski, PA-C     • heparin (porcine)  5,000 Units Subcutaneous [facility] Marene Olszewski, PA-C     • HYDROmorphone  0.5 mg Intravenous Q4H PRN Marene Olszewski, PA-C     • [START ON 8/27/2023] insulin glargine  45 Units Subcutaneous [facility] Mirella Cruz DO     • insulin lispro  1-6 Units Subcutaneous HS Marene Olszewski, PA-C     • insulin lispro  1-6 Units Subcutaneous HS Mirella Cruz DO     • insulin lispro  15 Units Subcutaneous TID With Meals Sarah Mccullough MD     • insulin lispro  2-12 Units Subcutaneous TID  Mirella Cruz DO     • isosorbide mononitrate  30 mg Oral Daily Roselyn Champagne MD     • LORazepam  1 mg Oral Daily PRN Marene Olszewski, PA-C     • melatonin  6 mg Oral HS Marene Olszewski, PA-C     • ondansetron  4 mg Intravenous Q6H PRN Marene Olszewski, PA-C     • oxyCODONE  10 mg Oral Q4H PRN Marene Olszewski, PA-C     • oxyCODONE  5 mg Oral Q4H PRN Marene Olszewski, PA-C     • pantoprazole  20 mg Oral Early Morning Marene Olszewski, PA-C     • senna  1 tablet Oral HS Marene Olszewski, PA-C     • sotalol  80 mg Oral BID Marene Olszewski, PA-C     • spironolactone  25 mg Oral Daily Roselyn Champagne MD                    ** Please Note: This note is constructed using a voice recognition dictation system. An occasional wrong word/phrase or “sound-a-like” substitution may have been picked up by dictation device due to the inherent limitations of voice recognition software.   Read the chart carefully and recognize, using reasonable context, where substitutions may have occurred. **

## 2023-08-26 NOTE — ASSESSMENT & PLAN NOTE
Status post perineal debridement/washout on 8/13, repeat washout on 8/14, and partial closure with wound VAC application on 3/70.    Suspected source of streptococcal bacteremia (see below)  Continue high-dose IV Rocephin per Infectious disease  Local wound care/packing per surgery

## 2023-08-26 NOTE — ASSESSMENT & PLAN NOTE
Likely from streptococcal bacteremia  Status post device removal on 8/24  Pending final plans from Infectious disease and electrophysiology standpoint regarding reimplantation of ICD or discharge w/ LifeVest -> per infectious disease, if repeat blood cultures remain negative > 72 hrs, can reimplant new device while undergoing multiweek course of IV antibiotics

## 2023-08-26 NOTE — PROGRESS NOTES
Progress Note - Charleen Garcia 79 y.o. male MRN: [id number]    Unit/Bed#: [facility] 934-01 Encounter: [id number]      CC: diabetes f/u    Subjective:   Ashkan feliciano 79y.o. 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 Now is status post debridements, hospital course complicated with bacteremia, R ventricular lead vegetation requiring extraction of pacemaker done by CT surgery on 8/24. Endocrinology consulted for management of type 2 diabetes mellitus. Feels well. No complaints. No hypoglycemia. Blood glucose continues to run high, 202-290 range last 24h. Objective:     Vitals: Blood pressure 144/64, pulse 57, temperature 98.3 °F (36.8 °C), resp. rate 17, height 5' 9" (1.753 m), weight 93.4 kg (205 lb 14.6 oz), SpO2 98 %. ,Body mass index is 30.41 kg/m². Intake/Output Summary (Last 24 hours) at 8/26/2023 1529  Last data filed at 8/26/2023 4146  Gross per 24 hour   Intake 240 ml   Output 75 ml   Net 165 ml       Physical Exam:  General Appearance: awake, appears stated age and cooperative  Head: Normocephalic, without obvious abnormality, atraumatic  Extremities: moves all extremities  Skin: Skin color and temperature normal.   Pulm: no labored breathing    Lab, Imaging and other studies: I have personally reviewed pertinent reports. POC Glucose (mg/dl)   Date Value   08/26/2023 209 (H)   08/26/2023 292 (H)   08/25/2023 215 (H)   08/25/2023 294 (H)   08/25/2023 202 (H)   08/25/2023 182 (H)   08/24/2023 199 (H)   08/24/2023 171 (H)   08/24/2023 167 (H)   08/24/2023 153 (H)       Assessment and plan:    Type 2 diabetes mellitus with hyperglycemia  HbA1c 11 August 2023  Home regimen:  Toujeo U300 130 units at bedtime, Humalog U200 24 units with meals, Jardiance 25 mg daily, Trulicity 4.5 mg weekly, metformin 1000 mg twice daily, uses Merilee Plush at home, follows with [facility]  Inpatient regimen: Lantus 30 Units, humalog 15 Units with meals, correctional scale algo 4 with meals & 3 at bedtime     Recommendations:  -Overall blood glucose above target range for inpatient  and noted to have postprandial hyperglycemia  - We recommend continuing Humalog at 15 units with meals, and increasing Lantus to 45 units every morning, up from 30, continuing correctional scale algorithm 4 with meals and 3 at bedtime  - We will continue to monitor blood glucose and make adjustments as needed.  Monitor for hypoglycemia and treat according to the protocol    Portions of the record may have been created with voice recognition software. Please Tigertext questions to the clinician covering the "[id number]" Role. Thank you.

## 2023-08-26 NOTE — ASSESSMENT & PLAN NOTE
Possibly from groin wound/infection  Continue high-dose IV Rocephin for a multiweek course per Infectious disease  No evidence of valvular vegetations on MARCELLA  Repeat blood cultures from 8/25 remain preliminarily negative

## 2023-08-26 NOTE — PLAN OF CARE
Problem: MOBILITY - ADULT  Goal: Maintain or return to baseline ADL function  Description: INTERVENTIONS:  -  Assess patient's ability to carry out ADLs; assess patient's baseline for ADL function and identify physical deficits which impact ability to perform ADLs (bathing, care of mouth/teeth, toileting, grooming, dressing, etc.)  - Assess/evaluate cause of self-care deficits   - Assess range of motion  - Assess patient's mobility; develop plan if impaired  - Assess patient's need for assistive devices and provide as appropriate  - Encourage maximum independence but intervene and supervise when necessary  - Involve family in performance of ADLs  - Assess for home care needs following discharge   - Consider OT consult to assist with ADL evaluation and planning for discharge  - Provide patient education as appropriate  Outcome: Progressing  Goal: Maintains/Returns to pre admission functional level  Description: INTERVENTIONS:  - Perform BMAT or MOVE assessment daily.   - Set and communicate daily mobility goal to care team and patient/family/caregiver. - Collaborate with rehabilitation services on mobility goals if consulted  - Perform Range of Motion  times a day. - Reposition patient every  hours.   - Dangle patient  times a day  - Stand patient  times a day  - Ambulate patient  times a day  - Out of bed to chair  times a day   - Out of bed for meals  times a day  - Out of bed for toileting  - Record patient progress and toleration of activity level   Outcome: Progressing   Problem: Prexisting or High Potential for Compromised Skin Integrity  Goal: Skin integrity is maintained or improved  Description: INTERVENTIONS:  - Identify patients at risk for skin breakdown  - Assess and monitor skin integrity  - Assess and monitor nutrition and hydration status  - Monitor labs   - Assess for incontinence   - Turn and reposition patient  - Assist with mobility/ambulation  - Relieve pressure over bony prominences  - Avoid friction and shearing  - Provide appropriate hygiene as needed including keeping skin clean and dry  - Evaluate need for skin moisturizer/barrier cream  - Collaborate with interdisciplinary team   - Patient/family teaching  - Consider wound care consult   Outcome: Progressing

## 2023-08-26 NOTE — ASSESSMENT & PLAN NOTE
EF of 40% earlier this month  Continue Aldactone/Coreg/Sotalol  Low-sodium and fluid restriction enforced  ICD removed due to suspected infection -> LifeVest encouraged

## 2023-08-27 PROBLEM — E83.42 HYPOMAGNESEMIA: Status: ACTIVE | Noted: 2023-08-27

## 2023-08-27 LAB
ANION GAP SERPL CALCULATED.3IONS-SCNC: 6 MMOL/L
BASOPHILS # BLD AUTO: 0.07 THOUSANDS/ÂΜL (ref 0–0.1)
BASOPHILS NFR BLD AUTO: 1 % (ref 0–1)
BUN SERPL-MCNC: 7 MG/DL (ref 5–25)
CALCIUM SERPL-MCNC: 8.2 MG/DL (ref 8.4–10.2)
CHLORIDE SERPL-SCNC: 105 MMOL/L (ref 96–108)
CO2 SERPL-SCNC: 29 MMOL/L (ref 21–32)
CREAT SERPL-MCNC: 0.57 MG/DL (ref 0.6–1.3)
EOSINOPHIL # BLD AUTO: 0.26 THOUSAND/ÂΜL (ref 0–0.61)
EOSINOPHIL NFR BLD AUTO: 3 % (ref 0–6)
ERYTHROCYTE [DISTWIDTH] IN BLOOD BY AUTOMATED COUNT: 14.5 % (ref 11.6–15.1)
GFR SERPL CREATININE-BSD FRML MDRD: 106 ML/MIN/1.73SQ M
GLUCOSE SERPL-MCNC: 137 MG/DL (ref 65–140)
GLUCOSE SERPL-MCNC: 145 MG/DL (ref 65–140)
GLUCOSE SERPL-MCNC: 147 MG/DL (ref 65–140)
GLUCOSE SERPL-MCNC: 149 MG/DL (ref 65–140)
GLUCOSE SERPL-MCNC: 171 MG/DL (ref 65–140)
HCT VFR BLD AUTO: 27.8 % (ref 36.5–49.3)
HGB BLD-MCNC: 8.4 G/DL (ref 12–17)
IMM GRANULOCYTES # BLD AUTO: 0.07 THOUSAND/UL (ref 0–0.2)
IMM GRANULOCYTES NFR BLD AUTO: 1 % (ref 0–2)
LYMPHOCYTES # BLD AUTO: 2.97 THOUSANDS/ÂΜL (ref 0.6–4.47)
LYMPHOCYTES NFR BLD AUTO: 35 % (ref 14–44)
MAGNESIUM SERPL-MCNC: 1.5 MG/DL (ref 1.9–2.7)
MCH RBC QN AUTO: 29.7 PG (ref 26.8–34.3)
MCHC RBC AUTO-ENTMCNC: 30.2 G/DL (ref 31.4–37.4)
MCV RBC AUTO: 98 FL (ref 82–98)
MONOCYTES # BLD AUTO: 0.72 THOUSAND/ÂΜL (ref 0.17–1.22)
MONOCYTES NFR BLD AUTO: 9 % (ref 4–12)
NEUTROPHILS # BLD AUTO: 4.29 THOUSANDS/ÂΜL (ref 1.85–7.62)
NEUTS SEG NFR BLD AUTO: 51 % (ref 43–75)
NRBC BLD AUTO-RTO: 0 /100 WBCS
PLATELET # BLD AUTO: 363 THOUSANDS/UL (ref 149–390)
PMV BLD AUTO: 9.7 FL (ref 8.9–12.7)
POTASSIUM SERPL-SCNC: 3.7 MMOL/L (ref 3.5–5.3)
RBC # BLD AUTO: 2.83 MILLION/UL (ref 3.88–5.62)
SODIUM SERPL-SCNC: 140 MMOL/L (ref 135–147)
WBC # BLD AUTO: 8.38 THOUSAND/UL (ref 4.31–10.16)

## 2023-08-27 PROCEDURE — 82948 REAGENT STRIP/BLOOD GLUCOSE: CPT

## 2023-08-27 PROCEDURE — 85025 COMPLETE CBC W/AUTO DIFF WBC: CPT | Performed by: INTERNAL MEDICINE

## 2023-08-27 PROCEDURE — 83735 ASSAY OF MAGNESIUM: CPT | Performed by: INTERNAL MEDICINE

## 2023-08-27 PROCEDURE — 80048 BASIC METABOLIC PNL TOTAL CA: CPT | Performed by: INTERNAL MEDICINE

## 2023-08-27 PROCEDURE — 99232 SBSQ HOSP IP/OBS MODERATE 35: CPT | Performed by: INTERNAL MEDICINE

## 2023-08-27 RX ORDER — MAGNESIUM SULFATE HEPTAHYDRATE 40 MG/ML
2 INJECTION, SOLUTION INTRAVENOUS ONCE
Status: COMPLETED | OUTPATIENT
Start: 2023-08-27 | End: 2023-08-27

## 2023-08-27 RX ORDER — POTASSIUM CHLORIDE 20 MEQ/1
40 TABLET, EXTENDED RELEASE ORAL ONCE
Status: COMPLETED | OUTPATIENT
Start: 2023-08-27 | End: 2023-08-27

## 2023-08-27 RX ADMIN — SOTALOL HYDROCHLORIDE 80 MG: 80 TABLET ORAL at 09:47

## 2023-08-27 RX ADMIN — CARVEDILOL 12.5 MG: 12.5 TABLET, FILM COATED ORAL at 09:47

## 2023-08-27 RX ADMIN — HEPARIN SODIUM 5000 UNITS: 5000 INJECTION INTRAVENOUS; SUBCUTANEOUS at 21:36

## 2023-08-27 RX ADMIN — HEPARIN SODIUM 5000 UNITS: 5000 INJECTION INTRAVENOUS; SUBCUTANEOUS at 13:37

## 2023-08-27 RX ADMIN — HEPARIN SODIUM 5000 UNITS: 5000 INJECTION INTRAVENOUS; SUBCUTANEOUS at 05:27

## 2023-08-27 RX ADMIN — INSULIN LISPRO 15 UNITS: 100 INJECTION, SOLUTION INTRAVENOUS; SUBCUTANEOUS at 12:27

## 2023-08-27 RX ADMIN — ACETAMINOPHEN 975 MG: 325 TABLET, FILM COATED ORAL at 05:28

## 2023-08-27 RX ADMIN — MELATONIN 6 MG: at 21:37

## 2023-08-27 RX ADMIN — ACETAMINOPHEN 975 MG: 325 TABLET, FILM COATED ORAL at 21:35

## 2023-08-27 RX ADMIN — EZETIMIBE 10 MG: 10 TABLET ORAL at 09:47

## 2023-08-27 RX ADMIN — POTASSIUM CHLORIDE 40 MEQ: 1500 TABLET, EXTENDED RELEASE ORAL at 18:05

## 2023-08-27 RX ADMIN — FENOFIBRATE 145 MG: 145 TABLET ORAL at 09:47

## 2023-08-27 RX ADMIN — CARVEDILOL 12.5 MG: 12.5 TABLET, FILM COATED ORAL at 17:26

## 2023-08-27 RX ADMIN — ESCITALOPRAM OXALATE 20 MG: 20 TABLET, FILM COATED ORAL at 09:47

## 2023-08-27 RX ADMIN — ATORVASTATIN CALCIUM 40 MG: 40 TABLET, FILM COATED ORAL at 17:26

## 2023-08-27 RX ADMIN — INSULIN LISPRO 15 UNITS: 100 INJECTION, SOLUTION INTRAVENOUS; SUBCUTANEOUS at 09:46

## 2023-08-27 RX ADMIN — SPIRONOLACTONE 25 MG: 25 TABLET ORAL at 09:47

## 2023-08-27 RX ADMIN — INSULIN LISPRO 15 UNITS: 100 INJECTION, SOLUTION INTRAVENOUS; SUBCUTANEOUS at 17:26

## 2023-08-27 RX ADMIN — SOTALOL HYDROCHLORIDE 80 MG: 80 TABLET ORAL at 21:37

## 2023-08-27 RX ADMIN — ACETAMINOPHEN 975 MG: 325 TABLET, FILM COATED ORAL at 13:36

## 2023-08-27 RX ADMIN — ISOSORBIDE MONONITRATE 30 MG: 30 TABLET, EXTENDED RELEASE ORAL at 09:47

## 2023-08-27 RX ADMIN — PANTOPRAZOLE SODIUM 20 MG: 20 TABLET, DELAYED RELEASE ORAL at 05:27

## 2023-08-27 RX ADMIN — INSULIN LISPRO 2 UNITS: 100 INJECTION, SOLUTION INTRAVENOUS; SUBCUTANEOUS at 12:27

## 2023-08-27 RX ADMIN — MAGNESIUM SULFATE HEPTAHYDRATE 2 G: 40 INJECTION, SOLUTION INTRAVENOUS at 18:07

## 2023-08-27 RX ADMIN — ASPIRIN 81 MG: 81 TABLET, COATED ORAL at 09:47

## 2023-08-27 RX ADMIN — INSULIN GLARGINE 45 UNITS: 100 INJECTION, SOLUTION SUBCUTANEOUS at 09:46

## 2023-08-27 RX ADMIN — CEFTRIAXONE SODIUM 2000 MG: 10 INJECTION, POWDER, FOR SOLUTION INTRAVENOUS at 09:48

## 2023-08-27 NOTE — PROGRESS NOTES
[facility]  Progress Note  Name: Justin Virgen I  MRN: [id number]  Unit/Bed#: [facility] 934-01 I Date of Admission: 8/13/2023   Date of Service: 8/27/2023 I Hospital Day: 14      Assessment & Plan:    * Jamarcus's gangrene  Assessment & Plan  Status post perineal debridement/washout on 8/13, repeat washout on 8/14, and partial closure with wound VAC application on 6/92.    Suspected source of streptococcal bacteremia (see below)  Continue high-dose IV Rocephin per Infectious disease  Local wound care/packing per surgery    Streptococcal bacteremia  Assessment & Plan  Possibly from groin wound/infection  Continue high-dose IV Rocephin for a multiweek course per Infectious disease  No evidence of valvular vegetations on MARCELLA  Repeat blood cultures from 8/25 remain preliminarily negative thus far    ICD (implantable cardioverter-defibrillator) infection ([address])  Assessment & Plan  Likely from streptococcal bacteremia  Status post device removal on 8/24  Pending final plans from Infectious disease and electrophysiology standpoint regarding reimplantation of ICD or discharge w/ LifeVest -> per infectious disease, if repeat blood cultures remain negative > 72 hrs, can reimplant new device while undergoing multiweek course of IV antibiotics    Chronic systolic CHF (congestive heart failure) ([address])  Assessment & Plan  EF of 40% earlier this month  Continue Aldactone/Coreg/Sotalol  Low-sodium and fluid restriction enforced  ICD removed due to suspected infection -> LifeVest use encouraged    Insulin dependent diabetes mellitus ([address])  Assessment & Plan  Lab Results   Component Value Date    HGBA1C 11.0 (H) 08/18/2023     Continue basal/prandial insulin with additional SSI coverage per Accu-Cheks - titrate regimen daily  Carbohydrate restriction  Hypoglycemia protocol    Hyperlipidemia  Assessment & Plan  Continue statin/Tricor/Zetia    Essential hypertension  Assessment & Plan  Continue Imdur/Aldactone/Coreg  Low-sodium restriction    CAD (coronary artery disease)  Assessment & Plan  Status post CABG  Continue ASA/statin/Coreg/Imdur    Hypomagnesemia  Assessment & Plan  Monitor/replete serum magnesium and potassium as necessary    Anemia of chronic disease  Assessment & Plan  Monitor H/H      DVT Prophylaxis:  Heparin SC    Patient Centered Rounds:  I have performed bedside rounds and discussed plan of care with nursing today. Discussions with Specialists or Other Care Team Provider:  see above assessments if applicable    Education and Discussions with Family / Patient:  Patient, along with wife, at bedside today    Time Spent for Care:  35 minutes. More than 50% of total time spent on counseling and coordination of care, on one or more of the following: performing physical exam; counseling and coordination of care, obtaining or reviewing history, documenting in the medical record, reviewing/ordering tests/medications/procedures, and communicating with other healthcare professionals. Current Length of Stay: 14 day(s)  Current Patient Status: Inpatient   Certification Statement:  Patient will continue to require additional hospital stay due to assessments as noted above. Code Status: Level 1 - Full Code        Subjective:     Encountered earlier today with wife present at bedside. Continues to deny pain. Remains in relatively pleasant spirits. Objective:     Vitals:   Temp (24hrs), Av.5 °F (36.9 °C), Min:98.2 °F (36.8 °C), Max:98.7 °F (37.1 °C)    Temp:  [98.2 °F (36.8 °C)-98.7 °F (37.1 °C)] 98.7 °F (37.1 °C)  HR:  [51-61] 58  Resp:  [18-20] 18  BP: (121-147)/(55-67) 121/55  SpO2:  [94 %-96 %] 96 %  Body mass index is 30.41 kg/m². Input and Output Summary (last 24 hours):        Intake/Output Summary (Last 24 hours) at 2023 1742  Last data filed at 2023 1337  Gross per 24 hour   Intake --   Output 0 ml   Net 0 ml       Physical Exam:     GENERAL  generally improving weakness/fatigue   HEAD   Normocephalic - atraumatic   EYES  nonicteric   MOUTH   Mucosa moist   NECK   Supple - full range of motion   CARDIAC  rate controlled - S1/S2 positive   PULMONARY  clear bilateral breath sounds - nonlabored respirations   ABDOMEN   Soft - nontender/nondistended - active bowel sounds   MUSCULOSKELETAL   Motor strength/range of motion fairly intact   NEUROLOGIC   Alert/oriented at baseline   SKIN   Chronic wrinkles/blemishes - tattoos present - groin wound is dressed/packed s/p wound VAC   PSYCHIATRIC   Mood/affect pleasant         Additional Data:     Labs & Recent Cultures:    Results from last 7 days   Lab Units 08/27/23  0532 08/24/23  1423 08/24/23  0504   WBC Thousand/uL 8.38   < > 9.34   HEMOGLOBIN g/dL 8.4*   < > 9.5*   HEMATOCRIT % 27.8*   < > 29.8*   PLATELETS Thousands/uL 363   < > 353   BANDS PCT %  --   --  3   NEUTROS PCT % 51  --   --    LYMPHS PCT % 35  --   --    LYMPHO PCT %  --   --  35   MONOS PCT % 9  --   --    MONO PCT %  --   --  3*   EOS PCT % 3  --  3    < > = values in this interval not displayed. Results from last 7 days   Lab Units 08/27/23  0532   POTASSIUM mmol/L 3.7   CHLORIDE mmol/L 105   CO2 mmol/L 29   BUN mg/dL 7   CREATININE mg/dL 0.57*   CALCIUM mg/dL 8.2*         Results from last 7 days   Lab Units 08/27/23  1615 08/27/23  1124 08/27/23  0745 08/26/23  2112 08/26/23  1645 08/26/23  1616 08/26/23  1127 08/26/23  0935 08/25/23  2101 08/25/23  1652 08/25/23  1108 08/25/23  0720   POC GLUCOSE mg/dl 145* 171* 147* 181* 102 69 209* 292* 215* 294* 202* 182*                 Results from last 7 days   Lab Units 08/25/23  0730 08/25/23  0607   BLOOD CULTURE  No Growth at 48 hrs. No Growth at 48 hrs.          Lines/Drains:  Invasive Devices     Peripherally Inserted Central Catheter Line  Duration           PICC Line 75/18/24 Right Basilic 10 days                  Last 24 Hours Medication List:   Current Facility-Administered Medications   Medication Dose Route Frequency Provider Last Rate   • acetaminophen  975 mg Oral [facility] Doree Breeding, PA-C     • aspirin  81 mg Oral Daily Doree Breeding, PABiggC     • atorvastatin  40 mg Oral Daily With Frank Patton PA-C     • carvedilol  12.5 mg Oral BID Doree Breeding, PA-C     • cefTRIAXone  2,000 mg Intravenous Q24H Doree Breeding, PA-C 2,000 mg (08/27/23 0948)   • escitalopram  20 mg Oral Daily Doree Breeding, PABiggC     • ezetimibe  10 mg Oral Daily Doree Breeding, PA-C     • fenofibrate  145 mg Oral Daily Doree Breeding, PA-C     • gentamicin (GARAMYCIN) 160 mg in sodium chloride 0.9 % 1,000 mL irrigation bottle   Irrigation Once Rosy Thornton,      • heparin (porcine)  5,000 Units Subcutaneous [facility] Breeding, PA-C     • HYDROmorphone  0.5 mg Intravenous Q4H PRN Doree Breeding, PA-C     • insulin glargine  45 Units Subcutaneous QAM Nuno Cedeno, DO     • insulin lispro  1-6 Units Subcutaneous HS Doree Breeding, PA-C     • insulin lispro  1-6 Units Subcutaneous HS Nuno Drone, DO     • insulin lispro  15 Units Subcutaneous TID With Meals Sarah Pope MD     • insulin lispro  2-12 Units Subcutaneous TID AC Nunocaro Cedeno, DO     • isosorbide mononitrate  30 mg Oral Daily Ashley Mac MD     • LORazepam  1 mg Oral Daily PRN Doree Breeding, PA-C     • magnesium sulfate  2 g Intravenous Once Ashley Mac MD     • melatonin  6 mg Oral HS Doree Breeding, PABiggC     • ondansetron  4 mg Intravenous Q6H PRN Doree Breeding, PA-C     • oxyCODONE  10 mg Oral Q4H PRN Doree Breeding, PABiggC     • oxyCODONE  5 mg Oral Q4H PRN Doree Breeding, PABiggC     • pantoprazole  20 mg Oral Early Morning Doree Breeding, PABiggC     • potassium chloride  40 mEq Oral Once Ashley Mac MD     • senna  1 tablet Oral HS Doree Breeding, PABiggC     • sotalol  80 mg Oral BID Doree Breeding, PABiggC     • spironolactone  25 mg Oral Daily Ashley Mac MD                    ** Please Note: This note is constructed using a voice recognition dictation system. An occasional wrong word/phrase or “sound-a-like” substitution may have been picked up by dictation device due to the inherent limitations of voice recognition software. Read the chart carefully and recognize, using reasonable context, where substitutions may have occurred. **

## 2023-08-27 NOTE — PLAN OF CARE
Problem: Nutrition/Hydration-ADULT  Goal: Nutrient/Hydration intake appropriate for improving, restoring or maintaining nutritional needs  Description: Monitor and assess patient's nutrition/hydration status for malnutrition. Collaborate with interdisciplinary team and initiate plan and interventions as ordered. Monitor patient's weight and dietary intake as ordered or per policy. Utilize nutrition screening tool and intervene as necessary. Determine patient's food preferences and provide high-protein, high-caloric foods as appropriate.      INTERVENTIONS:  - Monitor oral intake, urinary output, labs, and treatment plans  - Assess nutrition and hydration status and recommend course of action  - Evaluate amount of meals eaten  - Assist patient with eating if necessary   - Allow adequate time for meals  - Recommend/ encourage appropriate diets, oral nutritional supplements, and vitamin/mineral supplements  - Order, calculate, and assess calorie counts as needed  - Recommend, monitor, and adjust tube feedings and TPN/PPN based on assessed needs  - Assess need for intravenous fluids  - Provide specific nutrition/hydration education as appropriate  - Include patient/family/caregiver in decisions related to nutrition  Outcome: Progressing     Problem: PAIN - ADULT  Goal: Verbalizes/displays adequate comfort level or baseline comfort level  Description: Interventions:  - Encourage patient to monitor pain and request assistance  - Assess pain using appropriate pain scale  - Administer analgesics based on type and severity of pain and evaluate response  - Implement non-pharmacological measures as appropriate and evaluate response  - Consider cultural and social influences on pain and pain management  - Notify physician/advanced practitioner if interventions unsuccessful or patient reports new pain  Outcome: Progressing     Problem: INFECTION - ADULT  Goal: Absence or prevention of progression during hospitalization  Description: INTERVENTIONS:  - Assess and monitor for signs and symptoms of infection  - Monitor lab/diagnostic results  - Monitor all insertion sites, i.e. indwelling lines, tubes, and drains  - Monitor endotracheal if appropriate and nasal secretions for changes in amount and color  - [place] appropriate cooling/warming therapies per order  - Administer medications as ordered  - Instruct and encourage patient and family to use good hand hygiene technique  - Identify and instruct in appropriate isolation precautions for identified infection/condition  Outcome: Progressing

## 2023-08-27 NOTE — ASSESSMENT & PLAN NOTE
Status post perineal debridement/washout on 8/13, repeat washout on 8/14, and partial closure with wound VAC application on 9/00 -> wound VAC now removed  Suspected source of streptococcal bacteremia (see below)  Continue high-dose IV Rocephin per Infectious disease for a multiweek course through 10/5  Continue to wound care per surgery/nursing

## 2023-08-27 NOTE — ASSESSMENT & PLAN NOTE
Possibly from groin wound/infection  Continue high-dose IV Rocephin for a multiweek course per Infectious disease through 10/5  No evidence of valvular vegetations on MARCELLA  Repeat blood cultures from 8/25 remain preliminarily negative thus far

## 2023-08-27 NOTE — ASSESSMENT & PLAN NOTE
Likely from streptococcal bacteremia  Status post device removal on 8/24  Pending final plans from Infectious disease and electrophysiology standpoint regarding reimplantation of ICD or discharge w/ LifeVest -> per Infectious disease, as repeat blood cultures remain negative > 72 hrs, cleared to re-implant new device while undergoing multiweek course of IV antibiotics  Plan for ICD re-implantation later today per electrophysiology

## 2023-08-27 NOTE — ASSESSMENT & PLAN NOTE
EF of 40% earlier this month  Continue Aldactone/Coreg/Sotalol  Low-sodium and fluid restriction enforced  ICD removed due to suspected infection -> tentative plan for new ICD re-implantation later today

## 2023-08-28 LAB
ANION GAP SERPL CALCULATED.3IONS-SCNC: 5 MMOL/L
BASOPHILS # BLD AUTO: 0.06 THOUSANDS/ÂΜL (ref 0–0.1)
BASOPHILS NFR BLD AUTO: 1 % (ref 0–1)
BUN SERPL-MCNC: 7 MG/DL (ref 5–25)
CALCIUM SERPL-MCNC: 8.6 MG/DL (ref 8.4–10.2)
CHLORIDE SERPL-SCNC: 107 MMOL/L (ref 96–108)
CO2 SERPL-SCNC: 28 MMOL/L (ref 21–32)
CREAT SERPL-MCNC: 0.61 MG/DL (ref 0.6–1.3)
EOSINOPHIL # BLD AUTO: 0.22 THOUSAND/ÂΜL (ref 0–0.61)
EOSINOPHIL NFR BLD AUTO: 3 % (ref 0–6)
ERYTHROCYTE [DISTWIDTH] IN BLOOD BY AUTOMATED COUNT: 14.6 % (ref 11.6–15.1)
GFR SERPL CREATININE-BSD FRML MDRD: 102 ML/MIN/1.73SQ M
GLUCOSE SERPL-MCNC: 126 MG/DL (ref 65–140)
GLUCOSE SERPL-MCNC: 127 MG/DL (ref 65–140)
GLUCOSE SERPL-MCNC: 136 MG/DL (ref 65–140)
GLUCOSE SERPL-MCNC: 148 MG/DL (ref 65–140)
GLUCOSE SERPL-MCNC: 201 MG/DL (ref 65–140)
HCT VFR BLD AUTO: 28.6 % (ref 36.5–49.3)
HGB BLD-MCNC: 9.2 G/DL (ref 12–17)
IMM GRANULOCYTES # BLD AUTO: 0.04 THOUSAND/UL (ref 0–0.2)
IMM GRANULOCYTES NFR BLD AUTO: 1 % (ref 0–2)
LYMPHOCYTES # BLD AUTO: 3.01 THOUSANDS/ÂΜL (ref 0.6–4.47)
LYMPHOCYTES NFR BLD AUTO: 35 % (ref 14–44)
MAGNESIUM SERPL-MCNC: 1.8 MG/DL (ref 1.9–2.7)
MCH RBC QN AUTO: 30.9 PG (ref 26.8–34.3)
MCHC RBC AUTO-ENTMCNC: 32.2 G/DL (ref 31.4–37.4)
MCV RBC AUTO: 96 FL (ref 82–98)
MONOCYTES # BLD AUTO: 0.74 THOUSAND/ÂΜL (ref 0.17–1.22)
MONOCYTES NFR BLD AUTO: 9 % (ref 4–12)
NEUTROPHILS # BLD AUTO: 4.49 THOUSANDS/ÂΜL (ref 1.85–7.62)
NEUTS SEG NFR BLD AUTO: 51 % (ref 43–75)
NRBC BLD AUTO-RTO: 0 /100 WBCS
PLATELET # BLD AUTO: 375 THOUSANDS/UL (ref 149–390)
PMV BLD AUTO: 9.8 FL (ref 8.9–12.7)
POTASSIUM SERPL-SCNC: 4.1 MMOL/L (ref 3.5–5.3)
RBC # BLD AUTO: 2.98 MILLION/UL (ref 3.88–5.62)
SODIUM SERPL-SCNC: 140 MMOL/L (ref 135–147)
WBC # BLD AUTO: 8.56 THOUSAND/UL (ref 4.31–10.16)

## 2023-08-28 PROCEDURE — 99232 SBSQ HOSP IP/OBS MODERATE 35: CPT | Performed by: INTERNAL MEDICINE

## 2023-08-28 PROCEDURE — 83735 ASSAY OF MAGNESIUM: CPT | Performed by: INTERNAL MEDICINE

## 2023-08-28 PROCEDURE — 99024 POSTOP FOLLOW-UP VISIT: CPT | Performed by: PHYSICIAN ASSISTANT

## 2023-08-28 PROCEDURE — 82948 REAGENT STRIP/BLOOD GLUCOSE: CPT

## 2023-08-28 PROCEDURE — NC001 PR NO CHARGE: Performed by: PHYSICIAN ASSISTANT

## 2023-08-28 PROCEDURE — 85025 COMPLETE CBC W/AUTO DIFF WBC: CPT | Performed by: INTERNAL MEDICINE

## 2023-08-28 PROCEDURE — 99024 POSTOP FOLLOW-UP VISIT: CPT | Performed by: STUDENT IN AN ORGANIZED HEALTH CARE EDUCATION/TRAINING PROGRAM

## 2023-08-28 PROCEDURE — 80048 BASIC METABOLIC PNL TOTAL CA: CPT | Performed by: INTERNAL MEDICINE

## 2023-08-28 RX ORDER — MAGNESIUM SULFATE HEPTAHYDRATE 40 MG/ML
2 INJECTION, SOLUTION INTRAVENOUS ONCE
Status: COMPLETED | OUTPATIENT
Start: 2023-08-28 | End: 2023-08-28

## 2023-08-28 RX ADMIN — MELATONIN 6 MG: at 22:45

## 2023-08-28 RX ADMIN — CARVEDILOL 12.5 MG: 12.5 TABLET, FILM COATED ORAL at 17:50

## 2023-08-28 RX ADMIN — ACETAMINOPHEN 975 MG: 325 TABLET, FILM COATED ORAL at 05:57

## 2023-08-28 RX ADMIN — FENOFIBRATE 145 MG: 145 TABLET ORAL at 09:55

## 2023-08-28 RX ADMIN — ASPIRIN 81 MG: 81 TABLET, COATED ORAL at 09:55

## 2023-08-28 RX ADMIN — ESCITALOPRAM OXALATE 20 MG: 20 TABLET, FILM COATED ORAL at 09:54

## 2023-08-28 RX ADMIN — PANTOPRAZOLE SODIUM 20 MG: 20 TABLET, DELAYED RELEASE ORAL at 05:57

## 2023-08-28 RX ADMIN — HEPARIN SODIUM 5000 UNITS: 5000 INJECTION INTRAVENOUS; SUBCUTANEOUS at 13:37

## 2023-08-28 RX ADMIN — CARVEDILOL 12.5 MG: 12.5 TABLET, FILM COATED ORAL at 09:55

## 2023-08-28 RX ADMIN — INSULIN LISPRO 15 UNITS: 100 INJECTION, SOLUTION INTRAVENOUS; SUBCUTANEOUS at 12:17

## 2023-08-28 RX ADMIN — SOTALOL HYDROCHLORIDE 80 MG: 80 TABLET ORAL at 22:46

## 2023-08-28 RX ADMIN — EZETIMIBE 10 MG: 10 TABLET ORAL at 09:55

## 2023-08-28 RX ADMIN — INSULIN LISPRO 15 UNITS: 100 INJECTION, SOLUTION INTRAVENOUS; SUBCUTANEOUS at 17:50

## 2023-08-28 RX ADMIN — INSULIN GLARGINE 45 UNITS: 100 INJECTION, SOLUTION SUBCUTANEOUS at 09:53

## 2023-08-28 RX ADMIN — MAGNESIUM SULFATE HEPTAHYDRATE 2 G: 40 INJECTION, SOLUTION INTRAVENOUS at 15:56

## 2023-08-28 RX ADMIN — INSULIN LISPRO 2 UNITS: 100 INJECTION, SOLUTION INTRAVENOUS; SUBCUTANEOUS at 17:51

## 2023-08-28 RX ADMIN — ATORVASTATIN CALCIUM 40 MG: 40 TABLET, FILM COATED ORAL at 17:50

## 2023-08-28 RX ADMIN — ISOSORBIDE MONONITRATE 30 MG: 30 TABLET, EXTENDED RELEASE ORAL at 09:55

## 2023-08-28 RX ADMIN — HEPARIN SODIUM 5000 UNITS: 5000 INJECTION INTRAVENOUS; SUBCUTANEOUS at 05:57

## 2023-08-28 RX ADMIN — ACETAMINOPHEN 975 MG: 325 TABLET, FILM COATED ORAL at 22:44

## 2023-08-28 RX ADMIN — INSULIN LISPRO 15 UNITS: 100 INJECTION, SOLUTION INTRAVENOUS; SUBCUTANEOUS at 09:54

## 2023-08-28 RX ADMIN — CEFTRIAXONE SODIUM 2000 MG: 10 INJECTION, POWDER, FOR SOLUTION INTRAVENOUS at 09:55

## 2023-08-28 RX ADMIN — SOTALOL HYDROCHLORIDE 80 MG: 80 TABLET ORAL at 09:55

## 2023-08-28 RX ADMIN — ACETAMINOPHEN 975 MG: 325 TABLET, FILM COATED ORAL at 13:37

## 2023-08-28 RX ADMIN — SPIRONOLACTONE 25 MG: 25 TABLET ORAL at 09:55

## 2023-08-28 RX ADMIN — HEPARIN SODIUM 5000 UNITS: 5000 INJECTION INTRAVENOUS; SUBCUTANEOUS at 22:45

## 2023-08-28 NOTE — PROGRESS NOTES
[facility]  Progress Note  Name: Kirsten Carpenter I  MRN: [id number]  Unit/Bed#: [facility] 934-01 I Date of Admission: 8/13/2023   Date of Service: 8/28/2023 I Hospital Day: 15      Assessment & Plan:    * Jamarcus's gangrene  Assessment & Plan  Status post perineal debridement/washout on 8/13, repeat washout on 8/14, and partial closure with wound VAC application on 3/27 -> wound VAC removed today  Suspected source of streptococcal bacteremia (see below)  Continue high-dose IV Rocephin per Infectious disease for a multiweek course through 10/5  Continue to wound care per surgery/nursing    Streptococcal bacteremia  Assessment & Plan  Possibly from groin wound/infection  Continue high-dose IV Rocephin for a multiweek course per Infectious disease through 10/5  No evidence of valvular vegetations on MARCELLA  Repeat blood cultures from 8/25 remain preliminarily negative thus far    ICD (implantable cardioverter-defibrillator) infection ([address])  Assessment & Plan  Likely from streptococcal bacteremia  Status post device removal on 8/24  Pending final plans from Infectious disease and electrophysiology standpoint regarding reimplantation of ICD or discharge w/ LifeVest -> per Infectious disease, as repeat blood cultures remain negative > 72 hrs, cleared to re-implant new device while undergoing multiweek course of IV antibiotics  Tentative plan for ICD re-implantation later this week per electrophysiology    Chronic systolic CHF (congestive heart failure) ([address])  Assessment & Plan  EF of 40% earlier this month  Continue Aldactone/Coreg/Sotalol  Low-sodium and fluid restriction enforced  ICD removed due to suspected infection -> tentative plan for new ICD re-implantation later this week    Insulin dependent diabetes mellitus ([address])  Assessment & Plan  Lab Results   Component Value Date    HGBA1C 11.0 (H) 08/18/2023     Continue basal/prandial insulin with additional SSI coverage per Accu-Cheks - titrate regimen daily  Carbohydrate restriction  Hypoglycemia protocol    Hyperlipidemia  Assessment & Plan  Continue statin/Tricor/Zetia    Essential hypertension  Assessment & Plan  Continue Imdur/Aldactone/Coreg  Low-sodium restriction    CAD (coronary artery disease)  Assessment & Plan  Status post CABG  Continue ASA/statin/Coreg/Imdur    Hypomagnesemia  Assessment & Plan  Monitor/replete serum magnesium and potassium as necessary    Anemia of chronic disease  Assessment & Plan  Monitor H/H      DVT Prophylaxis:  Heparin SC    Patient Centered Rounds:  I have performed bedside rounds and discussed plan of care with nursing today. Discussions with Specialists or Other Care Team Provider:  see above assessments if applicable    Education and Discussions with Family / Patient:  Patient, along with wife, at bedside today    Time Spent for Care:  35 minutes. More than 50% of total time spent on counseling and coordination of care, on one or more of the following: performing physical exam; counseling and coordination of care, obtaining or reviewing history, documenting in the medical record, reviewing/ordering tests/medications/procedures, and communicating with other healthcare professionals. Current Length of Stay: 15 day(s)  Current Patient Status: Inpatient   Certification Statement:  Patient will continue to require additional hospital stay due to assessments as noted above. Code Status: Level 1 - Full Code        Subjective:     Seen and examined earlier today. Wife present bedside. Overall, he remains in pleasant and hopeful spirits. Denies pain or fever/chills currently. He is happy that the wound VAC was removed.         Objective:     Vitals:   Temp (24hrs), Av.6 °F (37 °C), Min:97.9 °F (36.6 °C), Max:99.3 °F (37.4 °C)    Temp:  [97.9 °F (36.6 °C)-99.3 °F (37.4 °C)] 98.7 °F (37.1 °C)  HR:  [55-62] 56  Resp:  [14-22] 14  BP: (109-167)/(50-87) 109/50  SpO2:  [94 %-97 %] 96 %  Body mass index is 30.41 kg/m². Input and Output Summary (last 24 hours): Intake/Output Summary (Last 24 hours) at 8/28/2023 1617  Last data filed at 8/28/2023 0501  Gross per 24 hour   Intake 508 ml   Output 0 ml   Net 508 ml       Physical Exam:     GENERAL  improved weakness/fatigue   HEAD   Normocephalic - atraumatic   EYES  nonicteric   MOUTH   Mucosa moist   NECK   Supple - full range of motion   CARDIAC  rate controlled - S1/S2 positive   PULMONARY  fairly clear to auscultation- nonlabored respirations   ABDOMEN   Soft - nontender/nondistended - active bowel sounds   MUSCULOSKELETAL   Motor strength/range of motion fairly intact   NEUROLOGIC   Alert/oriented at baseline   SKIN   Chronic wrinkles/blemishes - tattoos present - groin wound status post removal of wound VAC   PSYCHIATRIC   Mood/affect pleasant         Additional Data:     Labs & Recent Cultures:    Results from last 7 days   Lab Units 08/28/23  0603 08/24/23  1423 08/24/23  0504   WBC Thousand/uL 8.56   < > 9.34   HEMOGLOBIN g/dL 9.2*   < > 9.5*   HEMATOCRIT % 28.6*   < > 29.8*   PLATELETS Thousands/uL 375   < > 353   BANDS PCT %  --   --  3   NEUTROS PCT % 51   < >  --    LYMPHS PCT % 35   < >  --    LYMPHO PCT %  --   --  35   MONOS PCT % 9   < >  --    MONO PCT %  --   --  3*   EOS PCT % 3   < > 3    < > = values in this interval not displayed. Results from last 7 days   Lab Units 08/28/23  0603   POTASSIUM mmol/L 4.1   CHLORIDE mmol/L 107   CO2 mmol/L 28   BUN mg/dL 7   CREATININE mg/dL 0.61   CALCIUM mg/dL 8.6         Results from last 7 days   Lab Units 08/28/23  1208 08/28/23  0719 08/27/23  2105 08/27/23  1615 08/27/23  1124 08/27/23  0745 08/26/23  2112 08/26/23  1645 08/26/23  1616 08/26/23  1127 08/26/23  0935 08/25/23  2101   POC GLUCOSE mg/dl 126 148* 137 145* 171* 147* 181* 102 69 209* 292* 215*                 Results from last 7 days   Lab Units 08/25/23  0730 08/25/23  0607   BLOOD CULTURE  No Growth at 72 hrs. No Growth at 72 hrs. Lines/Drains:  Invasive Devices     Peripherally Inserted Central Catheter Line  Duration           PICC Line 32/33/88 Right Basilic 11 days                  Last 24 Hours Medication List:   Current Facility-Administered Medications   Medication Dose Route Frequency Provider Last Rate   • acetaminophen  975 mg Oral Q8H [facility] Sheila Hale PA-C     • aspirin  81 mg Oral Daily Sheila Hale PA-C     • atorvastatin  40 mg Oral Daily With Mina Manzo PA-C     • carvedilol  12.5 mg Oral BID Sheila Hale PA-C     • cefTRIAXone  2,000 mg Intravenous Q24H Sheila Hale PA-C 2,000 mg (08/28/23 0955)   • escitalopram  20 mg Oral Daily Sheila Hale PA-C     • ezetimibe  10 mg Oral Daily Sheila Hale PA-C     • fenofibrate  145 mg Oral Daily Sheila Hale PA-C     • gentamicin (GARAMYCIN) 160 mg in sodium chloride 0.9 % 1,000 mL irrigation bottle   Irrigation Once Neto Shirley DO     • heparin (porcine)  5,000 Units Subcutaneous Q8H [facility] Calvin Morgan PA-C     • HYDROmorphone  0.5 mg Intravenous Q4H PRN Sheila Hale PA-C     • insulin glargine  45 Units Subcutaneous QA Brian Wang DO     • insulin lispro  1-6 Units Subcutaneous HS Sheila Hale PA-C     • insulin lispro  1-6 Units Subcutaneous HS Brian Wang DO     • insulin lispro  15 Units Subcutaneous TID With Meals Sarah Mays MD     • insulin lispro  2-12 Units Subcutaneous TID  Brian Wang DO     • isosorbide mononitrate  30 mg Oral Daily Garry Mao MD     • LORazepam  1 mg Oral Daily PRN Sheila Hale PA-C     • magnesium sulfate  2 g Intravenous Once Garry Mao MD 2 g (08/28/23 1556)   • melatonin  6 mg Oral HS Sheila Hale PA-C     • ondansetron  4 mg Intravenous Q6H PRN Sheila Hale PA-C     • oxyCODONE  10 mg Oral Q4H PRN Sheila Hale PA-C     • oxyCODONE  5 mg Oral Q4H PRN Sheila Hale PA-C     • pantoprazole  20 mg Oral Early Morning Sheila Hale PA-C     • claudia  1 tablet Oral HS Joaquin Grey PA-C     • sotalol  80 mg Oral BID Joaquin Grey PA-C     • spironolactone  25 mg Oral Daily Major Lundberg MD                    ** Please Note: This note is constructed using a voice recognition dictation system. An occasional wrong word/phrase or “sound-a-like” substitution may have been picked up by dictation device due to the inherent limitations of voice recognition software. Read the chart carefully and recognize, using reasonable context, where substitutions may have occurred. **

## 2023-08-28 NOTE — RESTORATIVE TECHNICIAN NOTE
Restorative Technician Note      Patient Name: Mariely Mckeon     Restorative Tech Visit Date: 08/28/23  Note Type: Mobility  Patient Position Upon Consult: Bedside chair  Activity Performed: Ambulated  Assistive Device: Roller walker  Patient Position at End of Consult: Bedside chair;  All needs within reach    Radha Bryson Restorative Technician

## 2023-08-28 NOTE — PROGRESS NOTES
Wound care note:  VAC taken down at bedside today. Total VAC count removed:  2 total; 1 in wound; 1 for skin bridge; 2 small pieces of Adaptic removed    Patient tolerated well with no immediate complications. At this time moving forward we will continue with wet-to-dry packing with Kerlix. Continue local wound care with daily wound assessments. Informed case management for home wound care. Patient may be discharged from surgical team perspective and we will follow-up outpatient.  surgery team to perform dressing change tomorrow on 8/29/2023; at that time may be discussed that this may be performed by nursing team.

## 2023-08-28 NOTE — PROCEDURES
08/28/23    Procedure: Groin Stitch removal    Groin stitch removed in routine fashion without incident. Insertion site left open to air. Summer Luis Alberto Gonzalez tolerated the procedure well. Nurse notified. Incisional Acticoat removed, to leave open to air, do not soak in water, clean daily w/ soap & water, no lotions per perfumes/cologne until wound has completely healed. SIGNATUREOllen ZACHARY Johnson  DATE: August 28, 2023  TIME: 7:17 AM    Cardiac sx will sing off. Call service w/ questions or concerns.

## 2023-08-28 NOTE — CASE MANAGEMENT
Received request from CM that assigned/released wound vac is no longer needed. Karyna Rogers with  contacted to voided the order. Wound vac unopened and returned by CM.

## 2023-08-28 NOTE — RESTORATIVE TECHNICIAN NOTE
Restorative Technician Note      Patient Name: Marcelo Forrester     Restorative Tech Visit Date: 08/28/23  Note Type: Mobility  Patient Position Upon Consult: Supine  Activity Performed: Ambulated  Assistive Device: Roller walker  Patient Position at End of Consult: Bedside chair;  All needs within reach    Manohar Walker, Restorative Technician

## 2023-08-28 NOTE — PROGRESS NOTES
Progress Note - Electrophysiology - Cardiology  Andrei Stinson 76 y.o. male MRN: [id number]  Unit/Bed#: [facility] 934-01 Encounter: [id number]      Assessment:  1. 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 Dual chamber [employer] ICD placed in 2011 2/2 primary prevention for NICM   b. MARCELLA with 1.7 cm strand-like echodensity on RA highly suggestive of vegetation   c. S/p extraction with CTS 8/24  d. Clear by ID for reimplant of ICD   2. Jamarcus's Gangrene   a. S/p perineal debridement and washout 8/13  b. Repeat washout and debridement 8/14  c. Repeat washout and debridement, partial closure, and wound VAC placement 8/16  3. Step Bacteremia w/ Step Anginosus   a. bcx negative at 72 hrs   b. Discussed with CTS ok for reimplant   4. HFrEF 2/2 to ischemia   a. Prior echo with EF 25% in the setting of ischemia   b. Repeat echo here with EF 40%   5. CAD s/p stenting in 2015 and CABG in 1994   6. DM II   7. HTN     Plan:  -for now tentatively plan for right sided dual chamber ICD reimplant on Thursday with Dr. Hayes Morales. Patient aware OR schedule may change. - was previous set to DDDR 70 with intrinsic rate 65 so he was pacing a decent amount. We can consider dropping the pacer rate to below intrinsic. Subjective/Objective   Subjective: patient doing well. He has no acute complaints. Would rather device reimplant then life vest.     TELE: sinus rhythm.        Objective:  Vitals: /85   Pulse 56   Temp 99.3 °F (37.4 °C)   Resp 22   Ht 5' 9" (1.753 m)   Wt 93.4 kg (205 lb 14.6 oz)   SpO2 96%   BMI 30.41 kg/m²     Vitals:    08/20/23 0600 08/23/23 1115   Weight: 97 kg (213 lb 13.5 oz) 93.4 kg (205 lb 14.6 oz)     Orthostatic Blood Pressures    Flowsheet Row Most Recent Value   Blood Pressure 164/85 filed at 08/28/2023 1101   Patient Position - Orthostatic VS Lying filed at 08/25/2023 2207            Intake/Output Summary (Last 24 hours) at 8/28/2023 1110  Last data filed at 8/28/2023 0501  Gross per 24 hour   Intake 508 ml   Output 0 ml   Net 508 ml       Invasive Devices     Peripherally Inserted Central Catheter Line  Duration           PICC Line 12/47/42 Right Basilic 10 days                          Scheduled Meds:  Current Facility-Administered Medications   Medication Dose Route Frequency Provider Last Rate   • acetaminophen  975 mg Oral Q8H [facility] ZACHARY Ceballos     • aspirin  81 mg Oral Daily Kody Ceballos PA-C     • atorvastatin  40 mg Oral Daily With Ez Manzo PA-C     • carvedilol  12.5 mg Oral BID Kodygaby Ceballos PA-C     • cefTRIAXone  2,000 mg Intravenous Q24H KodyANGELIQUE CejaC 2,000 mg (08/28/23 0955)   • escitalopram  20 mg Oral Daily Kodygaby Ceballos PA-C     • ezetimibe  10 mg Oral Daily [facility] ZACHARY Ceballos     • fenofibrate  145 mg Oral Daily Kody ZACHARY Ceballos     • gentamicin (GARAMYCIN) 160 mg in sodium chloride 0.9 % 1,000 mL irrigation bottle   Irrigation Once Zaid , DO     • heparin (porcine)  5,000 Units Subcutaneous Q8H [facility] Kodygaby Ceballos PA-C     • HYDROmorphone  0.5 mg Intravenous Q4H PRN Kody Ceballos PA-C     • insulin glargine  45 Units Subcutaneous QAM Margart Kitkalpana, DO     • insulin lispro  1-6 Units Subcutaneous HS Kodygaby Ceballos PA-C     • insulin lispro  1-6 Units Subcutaneous HS Margart Kitkalpana, DO     • insulin lispro  15 Units Subcutaneous TID With Meals Sarah Verdugo MD     • insulin lispro  2-12 Units Subcutaneous TID AC [facility] Amanda, DO     • isosorbide mononitrate  30 mg Oral Daily Evelyn Peres MD     • LORazepam  1 mg Oral Daily PRN Kody Ceballos PA-C     • melatonin  6 mg Oral HS Kody Ceballos PA-C     • ondansetron  4 mg Intravenous Q6H PRN Kody Ceballos PA-C     • oxyCODONE  10 mg Oral Q4H PRN Kody Ceballos PA-C     • oxyCODONE  5 mg Oral Q4H PRN Kody Ceballos PA-C     • pantoprazole  20 mg Oral Early Morning Kody Ceballos PA-C     • senna  1 tablet Oral HS Kody Ceballos, ZACHARY     • sotalol  80 mg Oral BID Rob Weiss PA-C     • spironolactone  25 mg Oral Daily Mali Moore MD       Continuous Infusions:   PRN Meds:.•  HYDROmorphone  •  LORazepam  •  ondansetron  •  oxyCODONE  •  oxyCODONE    Review of Systems:  Review of Systems   Constitutional: Negative for malaise/fatigue. Cardiovascular: Negative for chest pain, dyspnea on exertion, leg swelling, near-syncope and syncope. All other systems reviewed and are negative. ROS as noted above, otherwise 12 point review of systems was performed and is negative. Physical Exam  Vitals and nursing note reviewed. Constitutional:       General: He is not in acute distress. Appearance: He is obese. Cardiovascular:      Rate and Rhythm: Normal rate and regular rhythm. Pulmonary:      Effort: Pulmonary effort is normal. No respiratory distress. Musculoskeletal:      Right lower leg: No edema. Left lower leg: No edema. Skin:     Coloration: Skin is not jaundiced or pale. Neurological:      General: No focal deficit present. Mental Status: He is alert and oriented to person, place, and time. Psychiatric:         Mood and Affect: Mood normal.                   Lab Results: I have personally reviewed pertinent lab results. Results from last 7 days   Lab Units 08/28/23  0603 08/27/23  0532 08/25/23  0608   WBC Thousand/uL 8.56 8.38 9.76   HEMOGLOBIN g/dL 9.2* 8.4* 9.2*   HEMATOCRIT % 28.6* 27.8* 28.5*   PLATELETS Thousands/uL 375 363 360     Results from last 7 days   Lab Units 08/28/23  0603 08/27/23  0532 08/25/23  0608   POTASSIUM mmol/L 4.1 3.7 3.9   CHLORIDE mmol/L 107 105 103   CO2 mmol/L 28 29 28   BUN mg/dL 7 7 7   CREATININE mg/dL 0.61 0.57* 0.59*   CALCIUM mg/dL 8.6 8.2* 8.2*         Results from last 7 days   Lab Units 08/28/23  0603 08/27/23  0532   MAGNESIUM mg/dL 1.8* 1.5*       Imaging: I have personally reviewed pertinent reports.     No results found for this or any previous visit.      VTE Pharmacologic Prophylaxis: Heparin  VTE Mechanical Prophylaxis: sequential compression device

## 2023-08-28 NOTE — CASE MANAGEMENT
Case Management Progress Note    Patient name Maria E Reyes  Location [facility] 608-94 MRN [id number]  : 1955 Date 2023       LOS (days): 15  Geometric Mean LOS (GMLOS) (days): 9.60  Days to GMLOS:-4.9        OBJECTIVE:        Current admission status: Inpatient  Preferred Pharmacy:   [facility] [address] EDITH HAN [address], [address]  [address] [facility] [address] [address]  Phone: [phone] Fax: [phone]    [address], [facility] [address] [address]  Phone: [phone] Fax: [phone]   [address] [address]  Phone: [phone] Fax: [phone]    [facility] ([address] [place] [address]  [address] [facility]  [address] [place] [address]  Phone: [phone] Fax: [phone] [address], [address] [facility] [address]  [facility] [address]  Phone: [phone] Fax: [phone]    Primary Care Provider: Sonya Elkins MD    Primary Insurance: [facility]  Secondary Insurance:     PROGRESS NOTE:  CM advised by surgery that patient no longer needs wound vac. Vac VRTM [id number] returned to stock, unopened. DC support to notify [employer]. CM to continue to support. UPDATE 1145: Per SLIM - patient to have cardiac procedure tomorrow. Likely need IV abx at dc however, final med is TBD. Updated HH that no wound vac now but will likely need IV abx. CM to continue to support.

## 2023-08-28 NOTE — PROGRESS NOTES
Progress Note - Infectious Disease   Charleen Garcia 76 y.o. male MRN: [id number]  Unit/Bed#: [facility] 934-01 Encounter: [id number]      Impression/Plan:  1. 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 1 of 2 sets from 8/13 grew Streptococcus anginosus. The likely source of bacteremia is his necrotizing perenial infection.  2D echo was noted to be a poor quality study. MARCELLA 8/18 found vegetation on pacer wire of RV lead though valves spared. Repeat blood cultures 8/13 negative. S/p device and lead removal 8/24/23. Repeat blood cultures 8/25 negative to date.  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  KTVMZIS appears there has been good source control of this infection and patient is clinically improved. Likely this was the source of his bacteremia as above. OR cultures have grown Group C Strep, Prevotella and E. Coli. He completed 5 day post op course of flagyl.   -ongoing surgical management and wound care     3. T2DM. Risk factor for invasive infections including necrotizing soft tissue infection.  A1c was 12.3 in January of this year. Endocrinology following.   -Tight glycemic control     Plan and recommendations were discussed with patient, primary team and EP. Antibiotics:  Ceftriaxone    24 Hour Events:  No major events. Afebrile. Blood cultures 8/24 negative at >72 hours. Subjective:  Patient has no fever, chills, sweats; no nausea, vomiting, diarrhea; no cough, shortness of breath; no pain. No new symptoms.     Objective:  Vitals:  Temp: [97.9 °F (36.6 °C)-99.3 °F (37.4 °C)] 99.3 °F (37.4 °C)  HR:  [55-62] 56  Resp:  [18-22] 22  BP: (121-167)/(55-87) 164/85  SpO2:  [94 %-97 %] 96 %  Temp (24hrs), Av.6 °F (37 °C), Min:97.9 °F (36.6 °C), Max:99.3 °F (37.4 °C)  Current: Temperature: 99.3 °F (37.4 °C)    Physical Exam:   General Appearance:  Alert, interactive, nontoxic, no acute distress. Throat: Oropharynx moist without lesions. Lungs:   Clear to auscultation bilaterally; no wheezes, rhonchi or rales; respirations unlabored   Heart:  RRR; no murmur, rub or gallop   Abdomen:   Soft, non-tender, non-distended, positive bowel sounds. Extremities: No clubbing, cyanosis or edema   Skin: No new rashes or lesions. No draining wounds noted. Labs: All pertinent labs and imaging studies were personally reviewed  Results from last 7 days   Lab Units 23  0603 23  0532 23  0608   WBC Thousand/uL 8.56 8.38 9.76   HEMOGLOBIN g/dL 9.2* 8.4* 9.2*   PLATELETS Thousands/uL 375 363 360     Results from last 7 days   Lab Units 23  0603 23  0532 23  0608   SODIUM mmol/L 140 140 138   POTASSIUM mmol/L 4.1 3.7 3.9   CHLORIDE mmol/L 107 105 103   CO2 mmol/L 28 29 28   BUN mg/dL 7 7 7   CREATININE mg/dL 0.61 0.57* 0.59*   EGFR ml/min/1.73sq m 102 106 104   CALCIUM mg/dL 8.6 8.2* 8.2*                       Micro:  Results from last 7 days   Lab Units 23  0730 23  0607   BLOOD CULTURE  No Growth at 72 hrs. No Growth at 72 hrs.        Imaging:          Richie Emerson MD  [facility]

## 2023-08-29 LAB
BASOPHILS # BLD AUTO: 0.06 THOUSANDS/ÂΜL (ref 0–0.1)
BASOPHILS NFR BLD AUTO: 1 % (ref 0–1)
EOSINOPHIL # BLD AUTO: 0.19 THOUSAND/ÂΜL (ref 0–0.61)
EOSINOPHIL NFR BLD AUTO: 2 % (ref 0–6)
ERYTHROCYTE [DISTWIDTH] IN BLOOD BY AUTOMATED COUNT: 14.9 % (ref 11.6–15.1)
GLUCOSE SERPL-MCNC: 133 MG/DL (ref 65–140)
GLUCOSE SERPL-MCNC: 148 MG/DL (ref 65–140)
GLUCOSE SERPL-MCNC: 158 MG/DL (ref 65–140)
GLUCOSE SERPL-MCNC: 161 MG/DL (ref 65–140)
HCT VFR BLD AUTO: 28.5 % (ref 36.5–49.3)
HGB BLD-MCNC: 8.7 G/DL (ref 12–17)
IMM GRANULOCYTES # BLD AUTO: 0.03 THOUSAND/UL (ref 0–0.2)
IMM GRANULOCYTES NFR BLD AUTO: 0 % (ref 0–2)
LYMPHOCYTES # BLD AUTO: 2.66 THOUSANDS/ÂΜL (ref 0.6–4.47)
LYMPHOCYTES NFR BLD AUTO: 34 % (ref 14–44)
MAGNESIUM SERPL-MCNC: 1.7 MG/DL (ref 1.9–2.7)
MCH RBC QN AUTO: 29.7 PG (ref 26.8–34.3)
MCHC RBC AUTO-ENTMCNC: 30.5 G/DL (ref 31.4–37.4)
MCV RBC AUTO: 97 FL (ref 82–98)
MONOCYTES # BLD AUTO: 0.72 THOUSAND/ÂΜL (ref 0.17–1.22)
MONOCYTES NFR BLD AUTO: 9 % (ref 4–12)
NEUTROPHILS # BLD AUTO: 4.21 THOUSANDS/ÂΜL (ref 1.85–7.62)
NEUTS SEG NFR BLD AUTO: 54 % (ref 43–75)
NRBC BLD AUTO-RTO: 0 /100 WBCS
PLATELET # BLD AUTO: 284 THOUSANDS/UL (ref 149–390)
PMV BLD AUTO: 11.1 FL (ref 8.9–12.7)
RBC # BLD AUTO: 2.93 MILLION/UL (ref 3.88–5.62)
WBC # BLD AUTO: 7.87 THOUSAND/UL (ref 4.31–10.16)

## 2023-08-29 PROCEDURE — 99232 SBSQ HOSP IP/OBS MODERATE 35: CPT | Performed by: INTERNAL MEDICINE

## 2023-08-29 PROCEDURE — 85025 COMPLETE CBC W/AUTO DIFF WBC: CPT | Performed by: INTERNAL MEDICINE

## 2023-08-29 PROCEDURE — 82948 REAGENT STRIP/BLOOD GLUCOSE: CPT

## 2023-08-29 PROCEDURE — 83735 ASSAY OF MAGNESIUM: CPT | Performed by: INTERNAL MEDICINE

## 2023-08-29 RX ORDER — HEPARIN SODIUM 5000 [USP'U]/ML
5000 INJECTION, SOLUTION INTRAVENOUS; SUBCUTANEOUS EVERY 8 HOURS SCHEDULED
Status: COMPLETED | OUTPATIENT
Start: 2023-08-29 | End: 2023-08-29

## 2023-08-29 RX ORDER — MAGNESIUM SULFATE HEPTAHYDRATE 40 MG/ML
2 INJECTION, SOLUTION INTRAVENOUS ONCE
Status: COMPLETED | OUTPATIENT
Start: 2023-08-29 | End: 2023-08-30

## 2023-08-29 RX ADMIN — CEFTRIAXONE SODIUM 2000 MG: 10 INJECTION, POWDER, FOR SOLUTION INTRAVENOUS at 08:23

## 2023-08-29 RX ADMIN — CARVEDILOL 12.5 MG: 12.5 TABLET, FILM COATED ORAL at 18:23

## 2023-08-29 RX ADMIN — HEPARIN SODIUM 5000 UNITS: 5000 INJECTION INTRAVENOUS; SUBCUTANEOUS at 23:01

## 2023-08-29 RX ADMIN — INSULIN GLARGINE 45 UNITS: 100 INJECTION, SOLUTION SUBCUTANEOUS at 08:22

## 2023-08-29 RX ADMIN — SENNOSIDES 8.6 MG: 8.6 TABLET, FILM COATED ORAL at 23:00

## 2023-08-29 RX ADMIN — ATORVASTATIN CALCIUM 40 MG: 40 TABLET, FILM COATED ORAL at 18:23

## 2023-08-29 RX ADMIN — FENOFIBRATE 145 MG: 145 TABLET ORAL at 08:30

## 2023-08-29 RX ADMIN — INSULIN LISPRO 15 UNITS: 100 INJECTION, SOLUTION INTRAVENOUS; SUBCUTANEOUS at 08:22

## 2023-08-29 RX ADMIN — INSULIN LISPRO 15 UNITS: 100 INJECTION, SOLUTION INTRAVENOUS; SUBCUTANEOUS at 18:21

## 2023-08-29 RX ADMIN — ESCITALOPRAM OXALATE 20 MG: 20 TABLET, FILM COATED ORAL at 08:32

## 2023-08-29 RX ADMIN — INSULIN LISPRO 2 UNITS: 100 INJECTION, SOLUTION INTRAVENOUS; SUBCUTANEOUS at 12:39

## 2023-08-29 RX ADMIN — EZETIMIBE 10 MG: 10 TABLET ORAL at 08:31

## 2023-08-29 RX ADMIN — SOTALOL HYDROCHLORIDE 80 MG: 80 TABLET ORAL at 23:01

## 2023-08-29 RX ADMIN — PANTOPRAZOLE SODIUM 20 MG: 20 TABLET, DELAYED RELEASE ORAL at 05:23

## 2023-08-29 RX ADMIN — HEPARIN SODIUM 5000 UNITS: 5000 INJECTION INTRAVENOUS; SUBCUTANEOUS at 05:23

## 2023-08-29 RX ADMIN — HEPARIN SODIUM 5000 UNITS: 5000 INJECTION INTRAVENOUS; SUBCUTANEOUS at 15:32

## 2023-08-29 RX ADMIN — ACETAMINOPHEN 975 MG: 325 TABLET, FILM COATED ORAL at 05:23

## 2023-08-29 RX ADMIN — MAGNESIUM SULFATE HEPTAHYDRATE 2 G: 40 INJECTION, SOLUTION INTRAVENOUS at 23:02

## 2023-08-29 RX ADMIN — CARVEDILOL 12.5 MG: 12.5 TABLET, FILM COATED ORAL at 08:32

## 2023-08-29 RX ADMIN — ASPIRIN 81 MG: 81 TABLET, COATED ORAL at 08:31

## 2023-08-29 RX ADMIN — ACETAMINOPHEN 975 MG: 325 TABLET, FILM COATED ORAL at 15:31

## 2023-08-29 RX ADMIN — INSULIN LISPRO 15 UNITS: 100 INJECTION, SOLUTION INTRAVENOUS; SUBCUTANEOUS at 12:39

## 2023-08-29 RX ADMIN — SPIRONOLACTONE 25 MG: 25 TABLET ORAL at 08:32

## 2023-08-29 RX ADMIN — SOTALOL HYDROCHLORIDE 80 MG: 80 TABLET ORAL at 08:30

## 2023-08-29 RX ADMIN — MELATONIN 6 MG: at 23:00

## 2023-08-29 RX ADMIN — INSULIN LISPRO 2 UNITS: 100 INJECTION, SOLUTION INTRAVENOUS; SUBCUTANEOUS at 18:21

## 2023-08-29 RX ADMIN — ACETAMINOPHEN 975 MG: 325 TABLET, FILM COATED ORAL at 23:00

## 2023-08-29 RX ADMIN — ISOSORBIDE MONONITRATE 30 MG: 30 TABLET, EXTENDED RELEASE ORAL at 08:31

## 2023-08-29 NOTE — RESTORATIVE TECHNICIAN NOTE
Restorative Technician Note      Patient Name: Justin Virgen     Restorative Tech Visit Date: 08/29/23  Note Type: Mobility  Patient Position Upon Consult: Supine  Activity Performed: Ambulated  Assistive Device: Roller walker  Patient Position at End of Consult: Bedside chair;  All needs within reach    Kim Bryant Restorative Technician

## 2023-08-29 NOTE — PROGRESS NOTES
-- Patient:  -- MRN: [id number]  -- Aidin Request ID: [id number]  -- Level of care reserved:  Infusion  -- Partner Reserved: NOE Tolentino, [address] [place] [address] [phone]  -- Clinical needs requested:  -- Geography searched: [phone]  -- Start of Service:  -- Request sent: 10:45am EDT on 8/29/2023 by Pramod White  -- Partner reserved: 12:52pm EDT on 8/29/2023 by Pramod White  -- Choice list shared:

## 2023-08-29 NOTE — RESTORATIVE TECHNICIAN NOTE
Restorative Technician Note      Patient Name: Rufina Carter     Restorative Tech Visit Date: 08/29/23  Note Type: Mobility  Patient Position Upon Consult: Bedside chair  Activity Performed: Ambulated  Assistive Device: Other (Comment) (IV Pole)  Patient Position at End of Consult: Bedside chair;  All needs within reach    Merry Reynoso, Restorative Technician

## 2023-08-29 NOTE — PROGRESS NOTES
[address] [facility]  Progress Note  Name: Stevo Garcia I  MRN: [id number]  Unit/Bed#: [facility] 934-01 I Date of Admission: 8/13/2023   Date of Service: 8/29/2023 I Hospital Day: 16      Assessment & Plan:    * Jamarcus's gangrene  Assessment & Plan  Status post perineal debridement/washout on 8/13, repeat washout on 8/14, and partial closure with wound VAC application on 5/43 -> wound VAC removed yesterday  Suspected source of streptococcal bacteremia (see below)  Continue high-dose IV Rocephin per Infectious disease for a multiweek course through 10/5  Continue to wound care per surgery/nursing    Streptococcal bacteremia  Assessment & Plan  Possibly from groin wound/infection  Continue high-dose IV Rocephin for a multiweek course per Infectious disease through 10/5  No evidence of valvular vegetations on MARCELLA  Repeat blood cultures from 8/25 remain preliminarily negative thus far    ICD (implantable cardioverter-defibrillator) infection ([address])  Assessment & Plan  Likely from streptococcal bacteremia  Status post device removal on 8/24  Pending final plans from Infectious disease and electrophysiology standpoint regarding reimplantation of ICD or discharge w/ LifeVest -> per Infectious disease, as repeat blood cultures remain negative > 72 hrs, cleared to re-implant new device while undergoing multiweek course of IV antibiotics  Tentative plan for ICD re-implantation in the upcoming days per electrophysiology contingent on scheduling capability    Chronic systolic CHF (congestive heart failure) ([address])  Assessment & Plan  EF of 40% earlier this month  Continue Aldactone/Coreg/Sotalol  Low-sodium and fluid restriction enforced  ICD removed due to suspected infection -> tentative plan for new ICD re-implantation later this week    Insulin dependent diabetes mellitus [facility])  Assessment & Plan  Lab Results   Component Value Date    HGBA1C 11.0 (H) 08/18/2023     Continue basal/prandial insulin with additional SSI coverage per Accu-Cheks - titrate regimen daily  Carbohydrate restriction  Hypoglycemia protocol    Hyperlipidemia  Assessment & Plan  Continue statin/Tricor/Zetia    Essential hypertension  Assessment & Plan  Continue Imdur/Aldactone/Coreg  Low-sodium restriction    CAD (coronary artery disease)  Assessment & Plan  Status post CABG  Continue ASA/statin/Coreg/Imdur    Hypomagnesemia  Assessment & Plan  Monitor/replete serum magnesium and potassium as necessary    Anemia of chronic disease  Assessment & Plan  Monitor H/H      DVT Prophylaxis:  Heparin SC    Patient Centered Rounds:  I have performed bedside rounds and discussed plan of care with nursing today. Discussions with Specialists or Other Care Team Provider:  see above assessments if applicable    Education and Discussions with Family / Patient:  Patient at bedside today - discussed in depth with wife, at bedside, yesterday    Time Spent for Care:  35 minutes. More than 50% of total time spent on counseling and coordination of care, on one or more of the following: performing physical exam; counseling and coordination of care, obtaining or reviewing history, documenting in the medical record, reviewing/ordering tests/medications/procedures, and communicating with other healthcare professionals. Current Length of Stay: 16 day(s)  Current Patient Status: Inpatient   Certification Statement:  Patient will continue to require additional hospital stay due to assessments as noted above. Code Status: Level 1 - Full Code        Subjective:     Encountered earlier in the morning. In relatively pleasant spirits. Denies uncontrolled pain at this time. Eagerly anticipating re-implantation of ICD.         Objective:     Vitals:   Temp (24hrs), Av.4 °F (36.9 °C), Min:98 °F (36.7 °C), Max:98.6 °F (37 °C)    Temp:  [98 °F (36.7 °C)-98.6 °F (37 °C)] 98.6 °F (37 °C)  HR:  [55-64] 59  Resp:  [13] 13  BP: (125-186)/(55-87) 137/55  SpO2:  [94 %-97 %] 96 %  Body mass index is 30.41 kg/m². Input and Output Summary (last 24 hours): Intake/Output Summary (Last 24 hours) at 8/29/2023 1957  Last data filed at 8/29/2023 1345  Gross per 24 hour   Intake 840 ml   Output --   Net 840 ml       Physical Exam:     GENERAL  improved weakness/fatigue   HEAD   Normocephalic - atraumatic   EYES  nonicteric   MOUTH   Mucosa moist   NECK   Supple - full range of motion   CARDIAC  rate controlled   PULMONARY  nonlabored respirations at rest   ABDOMEN   Soft - nontender/nondistended - active bowel sounds   MUSCULOSKELETAL   Motor strength/range of motion fairly intact   NEUROLOGIC   Alert/oriented at baseline   SKIN   Chronic wrinkles/blemishes - tattoos present - groin wound status post removal of wound VAC   PSYCHIATRIC   Mood/affect remains pleasant         Additional Data:     Labs & Recent Cultures:    Results from last 7 days   Lab Units 08/29/23  0537 08/24/23  1423 08/24/23  0504   WBC Thousand/uL 7.87   < > 9.34   HEMOGLOBIN g/dL 8.7*   < > 9.5*   HEMATOCRIT % 28.5*   < > 29.8*   PLATELETS Thousands/uL 284   < > 353   BANDS PCT %  --   --  3   NEUTROS PCT % 54   < >  --    LYMPHS PCT % 34   < >  --    LYMPHO PCT %  --   --  35   MONOS PCT % 9   < >  --    MONO PCT %  --   --  3*   EOS PCT % 2   < > 3    < > = values in this interval not displayed. Results from last 7 days   Lab Units 08/28/23  0603   POTASSIUM mmol/L 4.1   CHLORIDE mmol/L 107   CO2 mmol/L 28   BUN mg/dL 7   CREATININE mg/dL 0.61   CALCIUM mg/dL 8.6         Results from last 7 days   Lab Units 08/29/23  1628 08/29/23  1136 08/29/23  0747 08/28/23  2135 08/28/23  1648 08/28/23  1208 08/28/23  0719 08/27/23  2105 08/27/23  1615 08/27/23  1124 08/27/23  0745 08/26/23  2112   POC GLUCOSE mg/dl 161* 158* 148* 127 201* 126 148* 137 145* 171* 147* 181*                 Results from last 7 days   Lab Units 08/25/23  0730 08/25/23  0607   BLOOD CULTURE  No Growth After 4 Days. No Growth After 4 Days. Lines/Drains:  Invasive Devices     Peripherally Inserted Central Catheter Line  Duration           PICC Line 39/62/16 Right Basilic 12 days                  Last 24 Hours Medication List:   Current Facility-Administered Medications   Medication Dose Route Frequency Provider Last Rate   • acetaminophen  975 mg Oral Q8H [address]  Letha Griffin PA-C     • aspirin  81 mg Oral Daily Letha Griffin PA-C     • atorvastatin  40 mg Oral Daily With Aditya Manzo PA-C     • carvedilol  12.5 mg Oral BID Letha Griffin PA-C     • cefTRIAXone  2,000 mg Intravenous Q24H Letha Griffin PA-C 2,000 mg (08/29/23 7542)   • escitalopram  20 mg Oral Daily Letha Griffin PA-C     • ezetimibe  10 mg Oral Daily Letha Griffin PA-C     • fenofibrate  145 mg Oral Daily Letha Griffin PA-C     • gentamicin (GARAMYCIN) 160 mg in sodium chloride 0.9 % 1,000 mL irrigation bottle   Irrigation Once Fiordaliza Li, DO     • heparin (porcine)  5,000 Units Subcutaneous Q8H [address] [facility]     • HYDROmorphone  0.5 mg Intravenous Q4H PRN Letha Griffin PA-C     • insulin glargine  45 Units Subcutaneous [facility] Fredrich Meigs, DO     • insulin lispro  1-6 Units Subcutaneous HS Letha Griffin PA-C     • insulin lispro  1-6 Units Subcutaneous HS Fredrich Meigs, DO     • insulin lispro  15 Units Subcutaneous TID With Meals Sarah Burgess MD     • insulin lispro  2-12 Units Subcutaneous TID  Fredrich Meigs, DO     • isosorbide mononitrate  30 mg Oral Daily Thelma Correia MD     • LORazepam  1 mg Oral Daily PRN Letha Griffin PA-C     • melatonin  6 mg Oral HS Letha Griffin PA-C     • ondansetron  4 mg Intravenous Q6H PRN Letha Griffin PA-C     • oxyCODONE  10 mg Oral Q4H PRN Letha Griffin PA-C     • oxyCODONE  5 mg Oral Q4H PRN Letha Griffin PA-C     • pantoprazole  20 mg Oral Early Morning Letha Griffin PA-C     • senna  1 tablet Oral HS Letha Griffin PA-C     • sotalol  80 mg Oral BID Letha Griffin PA-C • spironolactone  25 mg Oral Daily Car Javier MD                    ** Please Note: This note is constructed using a voice recognition dictation system. An occasional wrong word/phrase or “sound-a-like” substitution may have been picked up by dictation device due to the inherent limitations of voice recognition software. Read the chart carefully and recognize, using reasonable context, where substitutions may have occurred. **

## 2023-08-30 ENCOUNTER — ANESTHESIA EVENT (INPATIENT)
Dept: NON INVASIVE DIAGNOSTICS | Facility: HOSPITAL | Age: 68
DRG: 853 | End: 2023-08-30
Payer: COMMERCIAL

## 2023-08-30 ENCOUNTER — APPOINTMENT (OUTPATIENT)
Dept: RADIOLOGY | Facility: HOSPITAL | Age: 68
DRG: 853 | End: 2023-08-30
Payer: COMMERCIAL

## 2023-08-30 LAB
ANION GAP SERPL CALCULATED.3IONS-SCNC: 8 MMOL/L
BACTERIA BLD CULT: NORMAL
BACTERIA BLD CULT: NORMAL
BASOPHILS # BLD AUTO: 0.05 THOUSANDS/ÂΜL (ref 0–0.1)
BASOPHILS NFR BLD AUTO: 1 % (ref 0–1)
BUN SERPL-MCNC: 7 MG/DL (ref 5–25)
CALCIUM SERPL-MCNC: 8.7 MG/DL (ref 8.4–10.2)
CHLORIDE SERPL-SCNC: 104 MMOL/L (ref 96–108)
CO2 SERPL-SCNC: 26 MMOL/L (ref 21–32)
CREAT SERPL-MCNC: 0.66 MG/DL (ref 0.6–1.3)
EOSINOPHIL # BLD AUTO: 0.21 THOUSAND/ÂΜL (ref 0–0.61)
EOSINOPHIL NFR BLD AUTO: 3 % (ref 0–6)
ERYTHROCYTE [DISTWIDTH] IN BLOOD BY AUTOMATED COUNT: 14.8 % (ref 11.6–15.1)
GFR SERPL CREATININE-BSD FRML MDRD: 99 ML/MIN/1.73SQ M
GLUCOSE SERPL-MCNC: 104 MG/DL (ref 65–140)
GLUCOSE SERPL-MCNC: 113 MG/DL (ref 65–140)
GLUCOSE SERPL-MCNC: 129 MG/DL (ref 65–140)
GLUCOSE SERPL-MCNC: 162 MG/DL (ref 65–140)
HCT VFR BLD AUTO: 29.3 % (ref 36.5–49.3)
HGB BLD-MCNC: 9.1 G/DL (ref 12–17)
IMM GRANULOCYTES # BLD AUTO: 0.03 THOUSAND/UL (ref 0–0.2)
IMM GRANULOCYTES NFR BLD AUTO: 0 % (ref 0–2)
LYMPHOCYTES # BLD AUTO: 3.09 THOUSANDS/ÂΜL (ref 0.6–4.47)
LYMPHOCYTES NFR BLD AUTO: 39 % (ref 14–44)
MAGNESIUM SERPL-MCNC: 1.7 MG/DL (ref 1.9–2.7)
MCH RBC QN AUTO: 29.9 PG (ref 26.8–34.3)
MCHC RBC AUTO-ENTMCNC: 31.1 G/DL (ref 31.4–37.4)
MCV RBC AUTO: 96 FL (ref 82–98)
MONOCYTES # BLD AUTO: 0.77 THOUSAND/ÂΜL (ref 0.17–1.22)
MONOCYTES NFR BLD AUTO: 10 % (ref 4–12)
NEUTROPHILS # BLD AUTO: 3.76 THOUSANDS/ÂΜL (ref 1.85–7.62)
NEUTS SEG NFR BLD AUTO: 47 % (ref 43–75)
NRBC BLD AUTO-RTO: 0 /100 WBCS
PLATELET # BLD AUTO: 348 THOUSANDS/UL (ref 149–390)
PMV BLD AUTO: 9.8 FL (ref 8.9–12.7)
POTASSIUM SERPL-SCNC: 3.9 MMOL/L (ref 3.5–5.3)
RBC # BLD AUTO: 3.04 MILLION/UL (ref 3.88–5.62)
SODIUM SERPL-SCNC: 138 MMOL/L (ref 135–147)
WBC # BLD AUTO: 7.91 THOUSAND/UL (ref 4.31–10.16)

## 2023-08-30 PROCEDURE — 02H63KZ INSERTION OF DEFIBRILLATOR LEAD INTO RIGHT ATRIUM, PERCUTANEOUS APPROACH: ICD-10-PCS | Performed by: INTERNAL MEDICINE

## 2023-08-30 PROCEDURE — 80048 BASIC METABOLIC PNL TOTAL CA: CPT | Performed by: INTERNAL MEDICINE

## 2023-08-30 PROCEDURE — 99232 SBSQ HOSP IP/OBS MODERATE 35: CPT | Performed by: INTERNAL MEDICINE

## 2023-08-30 PROCEDURE — 0JH608Z INSERTION OF DEFIBRILLATOR GENERATOR INTO CHEST SUBCUTANEOUS TISSUE AND FASCIA, OPEN APPROACH: ICD-10-PCS | Performed by: INTERNAL MEDICINE

## 2023-08-30 PROCEDURE — 33249 INSJ/RPLCMT DEFIB W/LEAD(S): CPT | Performed by: INTERNAL MEDICINE

## 2023-08-30 PROCEDURE — 82948 REAGENT STRIP/BLOOD GLUCOSE: CPT

## 2023-08-30 PROCEDURE — 71045 X-RAY EXAM CHEST 1 VIEW: CPT

## 2023-08-30 PROCEDURE — C1721 AICD, DUAL CHAMBER: HCPCS | Performed by: INTERNAL MEDICINE

## 2023-08-30 PROCEDURE — 3E0102A INTRODUCTION OF ANTI-INFECTIVE ENVELOPE INTO SUBCUTANEOUS TISSUE, OPEN APPROACH: ICD-10-PCS | Performed by: INTERNAL MEDICINE

## 2023-08-30 PROCEDURE — 83735 ASSAY OF MAGNESIUM: CPT | Performed by: INTERNAL MEDICINE

## 2023-08-30 PROCEDURE — 85025 COMPLETE CBC W/AUTO DIFF WBC: CPT | Performed by: INTERNAL MEDICINE

## 2023-08-30 PROCEDURE — C1892 INTRO/SHEATH,FIXED,PEEL-AWAY: HCPCS | Performed by: INTERNAL MEDICINE

## 2023-08-30 PROCEDURE — 02HK3KZ INSERTION OF DEFIBRILLATOR LEAD INTO RIGHT VENTRICLE, PERCUTANEOUS APPROACH: ICD-10-PCS | Performed by: INTERNAL MEDICINE

## 2023-08-30 PROCEDURE — C1777 LEAD, AICD, ENDO SINGLE COIL: HCPCS | Performed by: INTERNAL MEDICINE

## 2023-08-30 PROCEDURE — C1898 LEAD, PMKR, OTHER THAN TRANS: HCPCS | Performed by: INTERNAL MEDICINE

## 2023-08-30 DEVICE — ICD DDPB3D4 COBALT DR MRI DF4 USA
Type: IMPLANTABLE DEVICE | Site: CHEST  WALL | Status: FUNCTIONAL
Brand: COBALT™ DR MRI SURESCAN™

## 2023-08-30 DEVICE — ENVELOPE CMRM6133 ABSORB LRG MR
Type: IMPLANTABLE DEVICE | Site: CHEST  WALL | Status: FUNCTIONAL
Brand: TYRX™

## 2023-08-30 DEVICE — LEAD 5076-52 MRI US RCMCRD
Type: IMPLANTABLE DEVICE | Site: HEART | Status: FUNCTIONAL
Brand: CAPSUREFIX NOVUS MRI™ SURESCAN®

## 2023-08-30 RX ORDER — MAGNESIUM SULFATE HEPTAHYDRATE 40 MG/ML
2 INJECTION, SOLUTION INTRAVENOUS ONCE
Status: COMPLETED | OUTPATIENT
Start: 2023-08-30 | End: 2023-08-30

## 2023-08-30 RX ORDER — GENTAMICIN SULFATE 40 MG/ML
INJECTION, SOLUTION INTRAMUSCULAR; INTRAVENOUS CODE/TRAUMA/SEDATION MEDICATION
Status: DISCONTINUED | OUTPATIENT
Start: 2023-08-30 | End: 2023-08-30 | Stop reason: HOSPADM

## 2023-08-30 RX ORDER — LIDOCAINE HYDROCHLORIDE 10 MG/ML
INJECTION, SOLUTION EPIDURAL; INFILTRATION; INTRACAUDAL; PERINEURAL CODE/TRAUMA/SEDATION MEDICATION
Status: DISCONTINUED | OUTPATIENT
Start: 2023-08-30 | End: 2023-08-30 | Stop reason: HOSPADM

## 2023-08-30 RX ORDER — MIDAZOLAM HYDROCHLORIDE 2 MG/2ML
INJECTION, SOLUTION INTRAMUSCULAR; INTRAVENOUS AS NEEDED
Status: DISCONTINUED | OUTPATIENT
Start: 2023-08-30 | End: 2023-08-30

## 2023-08-30 RX ORDER — PROPOFOL 10 MG/ML
INJECTION, EMULSION INTRAVENOUS CONTINUOUS PRN
Status: DISCONTINUED | OUTPATIENT
Start: 2023-08-30 | End: 2023-08-30

## 2023-08-30 RX ORDER — POTASSIUM CHLORIDE 20 MEQ/1
40 TABLET, EXTENDED RELEASE ORAL ONCE
Status: COMPLETED | OUTPATIENT
Start: 2023-08-30 | End: 2023-08-30

## 2023-08-30 RX ORDER — CEFAZOLIN SODIUM 2 G/50ML
SOLUTION INTRAVENOUS AS NEEDED
Status: DISCONTINUED | OUTPATIENT
Start: 2023-08-30 | End: 2023-08-30

## 2023-08-30 RX ORDER — LIDOCAINE HYDROCHLORIDE 10 MG/ML
INJECTION, SOLUTION EPIDURAL; INFILTRATION; INTRACAUDAL; PERINEURAL AS NEEDED
Status: DISCONTINUED | OUTPATIENT
Start: 2023-08-30 | End: 2023-08-30

## 2023-08-30 RX ORDER — PROPOFOL 10 MG/ML
INJECTION, EMULSION INTRAVENOUS AS NEEDED
Status: DISCONTINUED | OUTPATIENT
Start: 2023-08-30 | End: 2023-08-30

## 2023-08-30 RX ORDER — FENTANYL CITRATE 50 UG/ML
INJECTION, SOLUTION INTRAMUSCULAR; INTRAVENOUS AS NEEDED
Status: DISCONTINUED | OUTPATIENT
Start: 2023-08-30 | End: 2023-08-30

## 2023-08-30 RX ORDER — CEFAZOLIN SODIUM 2 G/50ML
2000 SOLUTION INTRAVENOUS ONCE
Status: COMPLETED | OUTPATIENT
Start: 2023-08-30 | End: 2023-08-31

## 2023-08-30 RX ORDER — SODIUM CHLORIDE, SODIUM LACTATE, POTASSIUM CHLORIDE, CALCIUM CHLORIDE 600; 310; 30; 20 MG/100ML; MG/100ML; MG/100ML; MG/100ML
INJECTION, SOLUTION INTRAVENOUS CONTINUOUS PRN
Status: DISCONTINUED | OUTPATIENT
Start: 2023-08-30 | End: 2023-08-30

## 2023-08-30 RX ORDER — EPHEDRINE SULFATE 50 MG/ML
INJECTION INTRAVENOUS AS NEEDED
Status: DISCONTINUED | OUTPATIENT
Start: 2023-08-30 | End: 2023-08-30

## 2023-08-30 RX ADMIN — FENTANYL CITRATE 25 MCG: 50 INJECTION, SOLUTION INTRAMUSCULAR; INTRAVENOUS at 12:32

## 2023-08-30 RX ADMIN — POTASSIUM CHLORIDE 40 MEQ: 1500 TABLET, EXTENDED RELEASE ORAL at 18:22

## 2023-08-30 RX ADMIN — INSULIN GLARGINE 45 UNITS: 100 INJECTION, SOLUTION SUBCUTANEOUS at 09:55

## 2023-08-30 RX ADMIN — LIDOCAINE HYDROCHLORIDE 50 MG: 10 INJECTION, SOLUTION EPIDURAL; INFILTRATION; INTRACAUDAL; PERINEURAL at 11:30

## 2023-08-30 RX ADMIN — PHENYLEPHRINE HYDROCHLORIDE 40 MCG/MIN: 10 INJECTION INTRAVENOUS at 12:23

## 2023-08-30 RX ADMIN — ATORVASTATIN CALCIUM 40 MG: 40 TABLET, FILM COATED ORAL at 16:41

## 2023-08-30 RX ADMIN — ISOSORBIDE MONONITRATE 30 MG: 30 TABLET, EXTENDED RELEASE ORAL at 07:37

## 2023-08-30 RX ADMIN — PROPOFOL 30 MG: 10 INJECTION, EMULSION INTRAVENOUS at 11:31

## 2023-08-30 RX ADMIN — CARVEDILOL 12.5 MG: 12.5 TABLET, FILM COATED ORAL at 07:37

## 2023-08-30 RX ADMIN — ACETAMINOPHEN 975 MG: 325 TABLET, FILM COATED ORAL at 05:26

## 2023-08-30 RX ADMIN — FENTANYL CITRATE 25 MCG: 50 INJECTION, SOLUTION INTRAMUSCULAR; INTRAVENOUS at 13:11

## 2023-08-30 RX ADMIN — EPHEDRINE SULFATE 10 MG: 50 INJECTION INTRAVENOUS at 11:58

## 2023-08-30 RX ADMIN — SPIRONOLACTONE 25 MG: 25 TABLET ORAL at 07:37

## 2023-08-30 RX ADMIN — SOTALOL HYDROCHLORIDE 80 MG: 80 TABLET ORAL at 07:37

## 2023-08-30 RX ADMIN — SODIUM CHLORIDE, SODIUM LACTATE, POTASSIUM CHLORIDE, AND CALCIUM CHLORIDE: .6; .31; .03; .02 INJECTION, SOLUTION INTRAVENOUS at 11:25

## 2023-08-30 RX ADMIN — CARVEDILOL 12.5 MG: 12.5 TABLET, FILM COATED ORAL at 16:41

## 2023-08-30 RX ADMIN — INSULIN LISPRO 15 UNITS: 100 INJECTION, SOLUTION INTRAVENOUS; SUBCUTANEOUS at 18:22

## 2023-08-30 RX ADMIN — MIDAZOLAM 2 MG: 1 INJECTION INTRAMUSCULAR; INTRAVENOUS at 11:29

## 2023-08-30 RX ADMIN — CEFTRIAXONE SODIUM 2000 MG: 10 INJECTION, POWDER, FOR SOLUTION INTRAVENOUS at 09:55

## 2023-08-30 RX ADMIN — MELATONIN 6 MG: at 21:53

## 2023-08-30 RX ADMIN — INSULIN LISPRO 1 UNITS: 100 INJECTION, SOLUTION INTRAVENOUS; SUBCUTANEOUS at 21:54

## 2023-08-30 RX ADMIN — CEFAZOLIN SODIUM 2000 MG: 2 SOLUTION INTRAVENOUS at 11:40

## 2023-08-30 RX ADMIN — FENTANYL CITRATE 25 MCG: 50 INJECTION, SOLUTION INTRAMUSCULAR; INTRAVENOUS at 13:31

## 2023-08-30 RX ADMIN — SOTALOL HYDROCHLORIDE 80 MG: 80 TABLET ORAL at 22:04

## 2023-08-30 RX ADMIN — PANTOPRAZOLE SODIUM 20 MG: 20 TABLET, DELAYED RELEASE ORAL at 05:26

## 2023-08-30 RX ADMIN — PROPOFOL 100 MCG/KG/MIN: 10 INJECTION, EMULSION INTRAVENOUS at 11:31

## 2023-08-30 RX ADMIN — ACETAMINOPHEN 975 MG: 325 TABLET, FILM COATED ORAL at 16:41

## 2023-08-30 RX ADMIN — MAGNESIUM SULFATE HEPTAHYDRATE 2 G: 40 INJECTION, SOLUTION INTRAVENOUS at 18:21

## 2023-08-30 RX ADMIN — ESCITALOPRAM OXALATE 20 MG: 20 TABLET, FILM COATED ORAL at 07:37

## 2023-08-30 RX ADMIN — ACETAMINOPHEN 975 MG: 325 TABLET, FILM COATED ORAL at 21:53

## 2023-08-30 RX ADMIN — CEFAZOLIN SODIUM 2000 MG: 2 SOLUTION INTRAVENOUS at 11:25

## 2023-08-30 RX ADMIN — FENTANYL CITRATE 25 MCG: 50 INJECTION, SOLUTION INTRAMUSCULAR; INTRAVENOUS at 13:49

## 2023-08-30 NOTE — NURSING NOTE
Received msg from Dr Mirella Escalera that [address] was left in place d/t venogram study findings of DANDRE. DYANA PICC consult order d/c'd. Dr Mirella Escalera updated via TT. DYANA Pacheco RN, Tiff, updated via TT also.

## 2023-08-30 NOTE — NURSING NOTE
Received consult for PICC replacement to LUE post ICD placement. RUE PICC was difficult to place at bedside and required IR intervention/repo. Reached out to IR and they will place LUE PICC under fluoro. IR PICC order placed.  Dr Mariaa Delcid updated via TT.

## 2023-08-30 NOTE — PROGRESS NOTES
[address] [facility]  Progress Note  Name: Justin Virgen I  MRN: [id number]  Unit/Bed#: [facility] 934-01 I Date of Admission: 8/13/2023   Date of Service: 8/30/2023 I Hospital Day: 17      Assessment & Plan:    * Jamarcus's gangrene  Assessment & Plan  Status post perineal debridement/washout on 8/13, repeat washout on 8/14, and partial closure with wound VAC application on 5/33 -> wound VAC now removed  Suspected source of streptococcal bacteremia (see below)  Continue high-dose IV Rocephin per Infectious disease for a multiweek course through 10/5  Continue to wound care per surgery/nursing    Streptococcal bacteremia  Assessment & Plan  Possibly from groin wound/infection  Continue high-dose IV Rocephin for a multiweek course per Infectious disease through 10/5  No evidence of valvular vegetations on MARCELLA  Repeat blood cultures from 8/25 remain preliminarily negative thus far    ICD (implantable cardioverter-defibrillator) infection ([address])  Assessment & Plan  Likely from streptococcal bacteremia  Status post device removal on 8/24  Pending final plans from Infectious disease and electrophysiology standpoint regarding reimplantation of ICD or discharge w/ LifeVest -> per Infectious disease, as repeat blood cultures remain negative > 72 hrs, cleared to re-implant new device while undergoing multiweek course of IV antibiotics  Plan for ICD re-implantation later today per electrophysiology     Chronic systolic CHF (congestive heart failure) ([address])  Assessment & Plan  EF of 40% earlier this month  Continue Aldactone/Coreg/Sotalol  Low-sodium and fluid restriction enforced  ICD removed due to suspected infection -> tentative plan for new ICD re-implantation later today    Insulin dependent diabetes mellitus ([address])  Assessment & Plan  Lab Results   Component Value Date    HGBA1C 11.0 (H) 08/18/2023     Continue basal/prandial insulin with additional SSI coverage per Accu-Cheks - titrate regimen daily  Carbohydrate restriction  Hypoglycemia protocol    Hyperlipidemia  Assessment & Plan  Continue statin/Tricor/Zetia    Essential hypertension  Assessment & Plan  Continue Imdur/Aldactone/Coreg  Low-sodium restriction    CAD (coronary artery disease)  Assessment & Plan  Status post CABG  Continue ASA/statin/Coreg/Imdur    Hypomagnesemia  Assessment & Plan  Monitor/replete serum magnesium and potassium as necessary    Anemia of chronic disease  Assessment & Plan  Monitor H/H      DVT Prophylaxis:  Heparin SC    Patient Centered Rounds:  I have performed bedside rounds and discussed plan of care with nursing today. Discussions with Specialists or Other Care Team Provider:  see above assessments if applicable    Education and Discussions with Family / Patient:  Patient at bedside today - discussed in depth with wife, at bedside, yesterday    Time Spent for Care:  35 minutes. More than 50% of total time spent on counseling and coordination of care, on one or more of the following: performing physical exam; counseling and coordination of care, obtaining or reviewing history, documenting in the medical record, reviewing/ordering tests/medications/procedures, and communicating with other healthcare professionals. Current Length of Stay: 17 day(s)  Current Patient Status: Inpatient   Certification Statement:  Patient will continue to require additional hospital stay due to assessments as noted above. Code Status: Level 1 - Full Code        Subjective:     Seen and examined earlier in the morning today. Overall, he remains in pleasant spirits. At the time my encounter, he was eagerly anticipating being transported down to the OR for ICD re-implantation.         Objective:     Vitals:   Temp (24hrs), Av.4 °F (36.9 °C), Min:97.7 °F (36.5 °C), Max:98.8 °F (37.1 °C)    Temp:  [97.7 °F (36.5 °C)-98.8 °F (37.1 °C)] 97.7 °F (36.5 °C)  HR:  [53-63] 60  Resp:  [19] 19  BP: (123-157)/(55-86) 144/74  SpO2:  [88 %-96 %] 94 %  Body mass index is 31.32 kg/m². Input and Output Summary (last 24 hours): Intake/Output Summary (Last 24 hours) at 8/30/2023 1737  Last data filed at 8/30/2023 1419  Gross per 24 hour   Intake 500 ml   Output --   Net 500 ml       Physical Exam:     GENERAL  waxing/waning but generally improved weakness/fatigue   HEAD   Normocephalic - atraumatic   EYES  nonicteric   MOUTH   Mucosa moist   NECK   Supple - full range of motion   CARDIAC  rate controlled   PULMONARY  nonlabored respirations at rest   ABDOMEN   Soft - nontender/nondistended - active bowel sounds   MUSCULOSKELETAL   Motor strength/range of motion fairly intact   NEUROLOGIC   Alert/oriented at baseline   SKIN   Chronic wrinkles/blemishes - tattoos present - groin wound status post removal of wound VAC   PSYCHIATRIC   Mood/affect stable         Additional Data:     Labs & Recent Cultures:    Results from last 7 days   Lab Units 08/30/23  0527 08/24/23  1423 08/24/23  0504   WBC Thousand/uL 7.91   < > 9.34   HEMOGLOBIN g/dL 9.1*   < > 9.5*   HEMATOCRIT % 29.3*   < > 29.8*   PLATELETS Thousands/uL 348   < > 353   BANDS PCT %  --   --  3   NEUTROS PCT % 47   < >  --    LYMPHS PCT % 39   < >  --    LYMPHO PCT %  --   --  35   MONOS PCT % 10   < >  --    MONO PCT %  --   --  3*   EOS PCT % 3   < > 3    < > = values in this interval not displayed. Results from last 7 days   Lab Units 08/30/23  0527   POTASSIUM mmol/L 3.9   CHLORIDE mmol/L 104   CO2 mmol/L 26   BUN mg/dL 7   CREATININE mg/dL 0.66   CALCIUM mg/dL 8.7         Results from last 7 days   Lab Units 08/30/23  1533 08/30/23  0735 08/29/23  2116 08/29/23  1628 08/29/23  1136 08/29/23  0747 08/28/23  2135 08/28/23  1648 08/28/23  1208 08/28/23  0719 08/27/23  2105 08/27/23  1615   POC GLUCOSE mg/dl 104 129 133 161* 158* 148* 127 201* 126 148* 137 145*                 Results from last 7 days   Lab Units 08/25/23  0730 08/25/23  0607   BLOOD CULTURE  No Growth After 5 Days.  No Growth After 5 Days.          Lines/Drains:  Invasive Devices     Peripherally Inserted Central Catheter Line  Duration           PICC Line 30/28/38 Right Basilic 13 days          Peripheral Intravenous Line  Duration           Peripheral IV 08/30/23 Left Antecubital <1 day    Peripheral IV 08/30/23 Right Forearm <1 day          Airway  Duration           Non-Surgical Airway Oral pharyngeal airway <1 day                  Last 24 Hours Medication List:   Current Facility-Administered Medications   Medication Dose Route Frequency Provider Last Rate   • acetaminophen  975 mg Oral Q8H [facility] Tracie Ramirez PA-C     • aspirin  81 mg Oral Daily Tracie Raimrez PA-C     • atorvastatin  40 mg Oral Daily With Keily Manzo PA-C     • carvedilol  12.5 mg Oral BID Tracie Ramirez PA-C     • cefTRIAXone  2,000 mg Intravenous Q24H Tracie Ramirez PA-C 2,000 mg (08/30/23 0955)   • escitalopram  20 mg Oral Daily Tracie Ramirez PA-C     • ezetimibe  10 mg Oral Daily Tracie Ramirez PA-C     • fenofibrate  145 mg Oral Daily Tracie Ramirez PA-C     • HYDROmorphone  0.5 mg Intravenous Q4H PRN Tracie Ramirez PA-C     • insulin glargine  45 Units Subcutaneous QA Silvano Wells DO     • insulin lispro  1-6 Units Subcutaneous HS Tracie Ramirez PA-C     • insulin lispro  1-6 Units Subcutaneous HS Silvano Wells DO     • insulin lispro  15 Units Subcutaneous TID With Meals Sarah Cisneros MD     • insulin lispro  2-12 Units Subcutaneous TID  Silvano Wells DO     • isosorbide mononitrate  30 mg Oral Daily Sherwood Soulier, MD     • LORazepam  1 mg Oral Daily PRN Tracie Ramirez PA-C     • magnesium sulfate  2 g Intravenous Once Sherwood Soulier, MD     • melatonin  6 mg Oral HS Tracie Ramirez PA-C     • ondansetron  4 mg Intravenous Q6H PRN Tracie Ramirez PA-C     • oxyCODONE  10 mg Oral Q4H PRN Tracie Ramirez PA-C     • oxyCODONE  5 mg Oral Q4H PRN Tracie Ramirez PA-C     • pantoprazole  20 mg Oral Early Morning [facility] ZACHARY Manzo     • potassium chloride  40 mEq Oral Once Shanon Agustin MD     • senna  1 tablet Oral HS Leona Boyd PA-C     • sotalol  80 mg Oral BID Leona Boyd PA-C     • spironolactone  25 mg Oral Daily Shanon Agustin MD                    ** Please Note: This note is constructed using a voice recognition dictation system. An occasional wrong word/phrase or “sound-a-like” substitution may have been picked up by dictation device due to the inherent limitations of voice recognition software. Read the chart carefully and recognize, using reasonable context, where substitutions may have occurred. **

## 2023-08-30 NOTE — ANESTHESIA PREPROCEDURE EVALUATION
Procedure:  Cardiac icd implant right sided dual chamber (Chest)    Relevant Problems   CARDIO   (+) CAD (coronary artery disease)   (+) Essential hypertension   (+) Hyperlipidemia   (+) Ventricular tachycardia (HCC)      ENDO   (+) Insulin dependent diabetes mellitus (HCC)      GI/HEPATIC   (+) Esophageal reflux      HEMATOLOGY   (+) Anemia of chronic disease      NEURO/PSYCH   (+) Generalized anxiety disorder      Cardiovascular and Mediastinum   (+) Chronic systolic CHF (congestive heart failure) (HCC)   (+) Ischemic cardiomyopathy      Musculoskeletal and Integument   (+) Jamarcus's gangrene      Other   (+) Hypomagnesemia   (+) ICD (implantable cardioverter-defibrillator) in place   (+) ICD (implantable cardioverter-defibrillator) infection ([address])   (+) Necrotizing soft tissue infection   (+) Streptococcal bacteremia      MARCELLA 8/24/23:     LEFT VENTRICLE:  Systolic Function: normal. Ejection Fraction: 50%. Cavity size: normal.        RIGHT VENTRICLE:  Systolic Function: normal.  Cavity size normal. No hypertrophy                   AORTIC VALVE:  Leaflets: normal and trileaflet. Leaflet motions normal and normal. Stenosis: none. Regurgitation: none.       MITRAL VALVE:  Leaflets: normal. Leaflet Motions: normal. Regurgitation: none. Stenosis: none.        TRICUSPID VALVE:  Leaflets: normal. Leaflet Motions: normal. Stenosis: none. Regurgitation: trace.        PULMONIC VALVE:  Leaflets: normal. Regurgitation: none. Stenosis: none.     OTHER VALVE FINDINGS:  Ventricular Defibrillator lead with mobile vegetation in RA.     ASCENDING AORTA:  Size:  normal.  Dissection not present.       AORTIC ARCH:  Size:  normal.  dissection not present. Grade 1: normal to mild intimal thickening.     DESCENDING AORTA:  Size: normal.  Dissection not present.  Grade 1: normal to mild intimal thickening.       Physical Exam    Airway    Mallampati score: III  TM Distance: >3 FB  Neck ROM: full     Dental Cardiovascular      Pulmonary      Other Findings     Sinus tachycardia with occasional Premature supraventricular complexes  Inferior infarct (cited on or before 16-AUG-2011)  Abnormal ECG  When compared with ECG of 13-AUG-2023 23:17, (unconfirmed)  Significant changes have occurred  Confirmed by Marta Billy ([id number]) on 8/14/2023 3:07:09 PM    Anesthesia Plan  ASA Score- 4     Anesthesia Type- IV sedation with anesthesia with ASA Monitors. Additional Monitors:   Airway Plan:           Plan Factors-    Chart reviewed. EKG reviewed. Imaging results reviewed. Existing labs reviewed. Patient summary reviewed. Patient is not a current smoker. Patient did not smoke on day of surgery. Induction- intravenous. Postoperative Plan-     Informed Consent- Anesthetic plan and risks discussed with patient. Hemoglobin 12.0 - 17.0 g/dL 9.1 Low   8.7 Low   9.2 Low   8.4 Low   9.2 Low   9.3 Low    9.5 Low     Hematocrit 36.5 - 49.3 % 29.3 Low   28.5 Low   28.6 Low   27.8 Low   28.5 Low   29.        BMP: wnl

## 2023-08-30 NOTE — ANESTHESIA POSTPROCEDURE EVALUATION
Post-Op Assessment Note    CV Status:  Stable  Pain Score: 0    Pain management: adequate     Mental Status:  Sleepy and arousable   Hydration Status:  Stable   PONV Controlled:  None   Airway Patency:  Patent      Post Op Vitals Reviewed: Yes      Staff: CRNA         No notable events documented.     BP   128/68   Temp      Pulse 58   Resp 14   SpO2 96% room air

## 2023-08-30 NOTE — RESTORATIVE TECHNICIAN NOTE
Restorative Technician Note      Patient Name: Elias Ramirez     Restorative Tech Visit Date: 08/30/23  Note Type: Mobility  Patient Position Upon Consult: Supine  Activity Performed: Ambulated  Assistive Device: Roller walker  Patient Position at End of Consult: Bedside chair;  All needs within reach    Yeison Rincon, Restorative Technician

## 2023-08-31 ENCOUNTER — APPOINTMENT (OUTPATIENT)
Dept: RADIOLOGY | Facility: HOSPITAL | Age: 68
DRG: 853 | End: 2023-08-31
Payer: COMMERCIAL

## 2023-08-31 VITALS
TEMPERATURE: 99.2 F | SYSTOLIC BLOOD PRESSURE: 139 MMHG | HEIGHT: 69 IN | OXYGEN SATURATION: 92 % | RESPIRATION RATE: 19 BRPM | DIASTOLIC BLOOD PRESSURE: 68 MMHG | WEIGHT: 212.08 LBS | BODY MASS INDEX: 31.41 KG/M2 | HEART RATE: 60 BPM

## 2023-08-31 LAB
ANION GAP SERPL CALCULATED.3IONS-SCNC: 9 MMOL/L
BASOPHILS # BLD AUTO: 0.05 THOUSANDS/ÂΜL (ref 0–0.1)
BASOPHILS NFR BLD AUTO: 1 % (ref 0–1)
BUN SERPL-MCNC: 5 MG/DL (ref 5–25)
CALCIUM SERPL-MCNC: 8.8 MG/DL (ref 8.4–10.2)
CHLORIDE SERPL-SCNC: 103 MMOL/L (ref 96–108)
CO2 SERPL-SCNC: 27 MMOL/L (ref 21–32)
CREAT SERPL-MCNC: 0.59 MG/DL (ref 0.6–1.3)
EOSINOPHIL # BLD AUTO: 0.22 THOUSAND/ÂΜL (ref 0–0.61)
EOSINOPHIL NFR BLD AUTO: 3 % (ref 0–6)
ERYTHROCYTE [DISTWIDTH] IN BLOOD BY AUTOMATED COUNT: 14.6 % (ref 11.6–15.1)
GFR SERPL CREATININE-BSD FRML MDRD: 104 ML/MIN/1.73SQ M
GLUCOSE SERPL-MCNC: 101 MG/DL (ref 65–140)
GLUCOSE SERPL-MCNC: 161 MG/DL (ref 65–140)
GLUCOSE SERPL-MCNC: 89 MG/DL (ref 65–140)
HCT VFR BLD AUTO: 30.4 % (ref 36.5–49.3)
HGB BLD-MCNC: 9.7 G/DL (ref 12–17)
IMM GRANULOCYTES # BLD AUTO: 0.02 THOUSAND/UL (ref 0–0.2)
IMM GRANULOCYTES NFR BLD AUTO: 0 % (ref 0–2)
LYMPHOCYTES # BLD AUTO: 2.79 THOUSANDS/ÂΜL (ref 0.6–4.47)
LYMPHOCYTES NFR BLD AUTO: 35 % (ref 14–44)
MAGNESIUM SERPL-MCNC: 1.6 MG/DL (ref 1.9–2.7)
MCH RBC QN AUTO: 30.6 PG (ref 26.8–34.3)
MCHC RBC AUTO-ENTMCNC: 31.9 G/DL (ref 31.4–37.4)
MCV RBC AUTO: 96 FL (ref 82–98)
MONOCYTES # BLD AUTO: 0.76 THOUSAND/ÂΜL (ref 0.17–1.22)
MONOCYTES NFR BLD AUTO: 10 % (ref 4–12)
NEUTROPHILS # BLD AUTO: 4.11 THOUSANDS/ÂΜL (ref 1.85–7.62)
NEUTS SEG NFR BLD AUTO: 51 % (ref 43–75)
NRBC BLD AUTO-RTO: 0 /100 WBCS
PLATELET # BLD AUTO: 324 THOUSANDS/UL (ref 149–390)
PMV BLD AUTO: 9.4 FL (ref 8.9–12.7)
POTASSIUM SERPL-SCNC: 3.9 MMOL/L (ref 3.5–5.3)
RBC # BLD AUTO: 3.17 MILLION/UL (ref 3.88–5.62)
SODIUM SERPL-SCNC: 139 MMOL/L (ref 135–147)
WBC # BLD AUTO: 7.95 THOUSAND/UL (ref 4.31–10.16)

## 2023-08-31 PROCEDURE — 83735 ASSAY OF MAGNESIUM: CPT | Performed by: INTERNAL MEDICINE

## 2023-08-31 PROCEDURE — 71046 X-RAY EXAM CHEST 2 VIEWS: CPT

## 2023-08-31 PROCEDURE — 99024 POSTOP FOLLOW-UP VISIT: CPT | Performed by: PHYSICIAN ASSISTANT

## 2023-08-31 PROCEDURE — 82948 REAGENT STRIP/BLOOD GLUCOSE: CPT

## 2023-08-31 PROCEDURE — 99239 HOSP IP/OBS DSCHRG MGMT >30: CPT | Performed by: INTERNAL MEDICINE

## 2023-08-31 PROCEDURE — 99232 SBSQ HOSP IP/OBS MODERATE 35: CPT | Performed by: INTERNAL MEDICINE

## 2023-08-31 PROCEDURE — 85025 COMPLETE CBC W/AUTO DIFF WBC: CPT | Performed by: INTERNAL MEDICINE

## 2023-08-31 PROCEDURE — 80048 BASIC METABOLIC PNL TOTAL CA: CPT | Performed by: INTERNAL MEDICINE

## 2023-08-31 RX ORDER — INSULIN GLARGINE 100 [IU]/ML
40 INJECTION, SOLUTION SUBCUTANEOUS EVERY MORNING
Status: DISCONTINUED | OUTPATIENT
Start: 2023-09-01 | End: 2023-08-31 | Stop reason: HOSPADM

## 2023-08-31 RX ORDER — INSULIN GLARGINE 100 [IU]/ML
40 INJECTION, SOLUTION SUBCUTANEOUS
Qty: 12 ML | Refills: 0 | Status: SHIPPED | OUTPATIENT
Start: 2023-08-31 | End: 2023-09-30

## 2023-08-31 RX ORDER — OXYCODONE HYDROCHLORIDE 5 MG/1
5 TABLET ORAL EVERY 4 HOURS PRN
Qty: 20 TABLET | Refills: 0 | Status: SHIPPED | OUTPATIENT
Start: 2023-08-31

## 2023-08-31 RX ORDER — INSULIN LISPRO 100 [IU]/ML
15 INJECTION, SOLUTION INTRAVENOUS; SUBCUTANEOUS
Qty: 20 ML | Refills: 0 | Status: SHIPPED | OUTPATIENT
Start: 2023-08-31

## 2023-08-31 RX ADMIN — EZETIMIBE 10 MG: 10 TABLET ORAL at 09:51

## 2023-08-31 RX ADMIN — INSULIN LISPRO 2 UNITS: 100 INJECTION, SOLUTION INTRAVENOUS; SUBCUTANEOUS at 12:03

## 2023-08-31 RX ADMIN — SPIRONOLACTONE 25 MG: 25 TABLET ORAL at 09:51

## 2023-08-31 RX ADMIN — OXYCODONE HYDROCHLORIDE 10 MG: 10 TABLET ORAL at 15:39

## 2023-08-31 RX ADMIN — OXYCODONE HYDROCHLORIDE 10 MG: 10 TABLET ORAL at 09:51

## 2023-08-31 RX ADMIN — CEFTRIAXONE SODIUM 2000 MG: 10 INJECTION, POWDER, FOR SOLUTION INTRAVENOUS at 09:52

## 2023-08-31 RX ADMIN — INSULIN LISPRO 15 UNITS: 100 INJECTION, SOLUTION INTRAVENOUS; SUBCUTANEOUS at 09:52

## 2023-08-31 RX ADMIN — INSULIN GLARGINE 45 UNITS: 100 INJECTION, SOLUTION SUBCUTANEOUS at 09:51

## 2023-08-31 RX ADMIN — PANTOPRAZOLE SODIUM 20 MG: 20 TABLET, DELAYED RELEASE ORAL at 05:51

## 2023-08-31 RX ADMIN — ASPIRIN 81 MG: 81 TABLET, COATED ORAL at 09:50

## 2023-08-31 RX ADMIN — ESCITALOPRAM OXALATE 20 MG: 20 TABLET, FILM COATED ORAL at 09:50

## 2023-08-31 RX ADMIN — ACETAMINOPHEN 975 MG: 325 TABLET, FILM COATED ORAL at 05:50

## 2023-08-31 RX ADMIN — ACETAMINOPHEN 975 MG: 325 TABLET, FILM COATED ORAL at 13:05

## 2023-08-31 RX ADMIN — ISOSORBIDE MONONITRATE 30 MG: 30 TABLET, EXTENDED RELEASE ORAL at 09:51

## 2023-08-31 RX ADMIN — INSULIN LISPRO 15 UNITS: 100 INJECTION, SOLUTION INTRAVENOUS; SUBCUTANEOUS at 12:02

## 2023-08-31 RX ADMIN — FENOFIBRATE 145 MG: 145 TABLET ORAL at 09:51

## 2023-08-31 RX ADMIN — SOTALOL HYDROCHLORIDE 80 MG: 80 TABLET ORAL at 09:53

## 2023-08-31 RX ADMIN — OXYCODONE HYDROCHLORIDE 10 MG: 10 TABLET ORAL at 05:53

## 2023-08-31 RX ADMIN — CARVEDILOL 12.5 MG: 12.5 TABLET, FILM COATED ORAL at 09:51

## 2023-08-31 NOTE — RESTORATIVE TECHNICIAN NOTE
Restorative Technician Note      Patient Name: Andrei Stinson     Restorative Tech Visit Date: 08/31/23  Note Type: Mobility  Patient Position Upon Consult: Bedside chair  Activity Performed: Ambulated  Assistive Device: Roller walker  Patient Position at End of Consult: Bedside chair;  All needs within reach    Opal James Restorative Technician

## 2023-08-31 NOTE — DISCHARGE SUMMARY
Discharge Summary - [facility] Internal Medicine  Patient: Baljit Garvin 76 y.o. male   MRN: [id number]  PCP: Alison Russo MD  Unit/Bed#: [facility] 934-01 Encounter: [id number]            Discharging Physician / Practitioner: Shaun Gore MD  PCP: Alison Russo MD  Admission Date:   Admission Orders (From admission, onward)     Ordered        08/13/23 2241  Inpatient Admission  Once                      Discharge Date: 08/31/23      Reason for Admission:  Necrotizing infection      Discharge Diagnoses:     Principal Problem:    Jamarcus's gangrene    Active Problems:    Streptococcal bacteremia    ICD (implantable cardioverter-defibrillator) infection     Chronic systolic CHF (congestive heart failure)     Insulin dependent diabetes mellitus    Hyperlipidemia    Essential hypertension    CAD (coronary artery disease)    Hypomagnesemia    Anemia of chronic disease      Consultations During Hospital Stay:  · Electrophysiology  · Infectious diseases  · Endocrinology  · CT surgery  · General surgery      Hospital Course:     * Jamarcus's gangrene  Assessment & Plan  Status post perineal debridement/washout on 8/13, repeat washout on 8/14, and partial closure with wound VAC application on 7/78 -> wound VAC now removed  Suspected source of streptococcal bacteremia (see below)  Continue high-dose IV Rocephin per Infectious disease for a multiweek course through 10/5  Appreciate wound care per surgery/nursing     Streptococcal bacteremia  Assessment & Plan  Possibly from groin wound/infection  Continue high-dose IV Rocephin for a multiweek course per Infectious disease through 10/5  No evidence of valvular vegetations on MARCELLA  Repeat blood cultures from 8/25 are negative     ICD (implantable cardioverter-defibrillator) infection ([address])  Assessment & Plan  Likely from streptococcal bacteremia  Status post device removal on 8/24  Per Infectious disease, as repeat blood cultures remain negative > 72 hrs, cleared to re-implant new device while undergoing multiweek course of IV antibiotics  Status post new ICD implantation on 8/30 -> cleared for discharge by electrophysiology  Plan for discharge home with home health services today     Chronic systolic CHF (congestive heart failure) ([address])  Assessment & Plan  EF of 40% earlier this month  Continue Aldactone/Coreg/Sotalol  Low-sodium and fluid restriction enforced during hospital course  ICD removed due to suspected infection -> underwent new ICD implantation on 8/30     Insulin dependent diabetes mellitus ([address])  Assessment & Plan        Lab Results   Component Value Date     HGBA1C 11.0 (H) 08/18/2023      Maintained on a basal/prandial insulin with additional SSI coverage per Accu-Cheks through hospital course per endocrinology -> recommendations for postdischarge regimen provided  Carbohydrate restriction encouraged     Hyperlipidemia  Assessment & Plan  Continue statin/Tricor/Zetia     Essential hypertension  Assessment & Plan  Continue Imdur/Aldactone/Coreg  Low-sodium restriction encouraged     CAD (coronary artery disease)  Assessment & Plan  Status post CABG  Continue ASA/statin/Coreg/Imdur     Hypomagnesemia  Assessment & Plan  Monitored/repleted serum magnesium and potassium through hospital course     Anemia of chronic disease  Assessment & Plan  H/H stable at 9.7 on day of discharge      Condition at Discharge: fair       Discharge Day Visit / Exam:     Vitals:  Blood Pressure: 139/68 (08/31/23 1136)  Pulse: 60 (08/31/23 1136)  Temperature: 99.2 °F (37.3 °C) (08/31/23 1136)  Temp Source: Oral (08/25/23 2207)  Respirations: 19 (08/30/23 0232)  Height: 5' 9" (175.3 cm) (08/18/23 0943)  Weight - Scale: 96.2 kg (212 lb 1.3 oz) (08/30/23 0549)  SpO2: 92 % (08/31/23 1136)      Physical exam:  I had a face-to-face encounter with the patient on day of discharge.       Discussion with Patient and/or Family:  The patient has been advised to return to the ER immediately if any symptoms recur or worsen. Discharge instructions/Information to Patient and/or Family:   See after visit summary for information provided to patient and/or family. Provisions for Follow-Up Care:  See after visit summary for information related to follow-up care and any pertinent home health orders. Disposition:   Home with home health services      Discharge Medications:  See after visit summary for reconciled discharge medications provided to patient and/or family. Discharge Statement:  I spent 38 minutes discharging the patient. This time was spent on the day of discharge. I had direct contact with the patient on the day of discharge. Greater than 50% of the total time was spent examining patient, answering all patient questions, arranging and discussing plan of care with patient as well as directly providing post-discharge instructions. Additional time then spent on discharge activities. ** Please Note: This note is constructed using a voice recognition dictation system. An occasional wrong word/phrase or “sound-a-like” substitution may have been picked up by dictation device due to the inherent limitations of voice recognition software. Read the chart carefully and recognize, using reasonable context, where substitutions may have occurred. **

## 2023-08-31 NOTE — PROGRESS NOTES
Progress Note - Electrophysiology  Randall Walter 76 y.o. male MRN: [id number]  Unit/Bed#: [facility] 934-01 Encounter: [id number]      Assessment:  1. [address] dual chamber ICD placed in 2011 with RA lead infection   a. S/p Medtronic dual chamber ICD right sided reimplantation   2. Jamarcus's Gangrene   3. Step Bacteremia w/ Step Anginosus   4. HFrEF 2/2 to ischemia   5. CAD s/p stenting in 2015 and CABG in 1994   6. DM II   1. HTN       Plan:  1. Device - Patient is doing well status post right sided medtronic dual chamber ICD. His incision is clean, dry, and intact without evidence of hematoma or ecchymosis or signs of infection. Vital signs and labs were reviewed. Assessment of chest x-rays show proper lead placement without evidence of pneumothorax. Device interrogation showed appropriate device function with lead parameters such as sensing, threshold, and impedance being tested. 2. Post care - Discharge instructions and restrictions were discussed with the patient in detail. He will also be provided with written instructions detailing what was discussed. Patient also requires a 2 week device and site check which has already been arranged and is in Epic. 3. Medications - In terms of medications, there will be no changes from an EP perspective to his medications. 4. Patient is stable from an EP standpoint for discharge at this time. Subjective/Objective   Subjective: Randall Walter is a 76y.o. 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 He is hospital stay day 18 and is status post medtronic dual chamber ICD reimplant with right sided device. Darylene Pipe He reports no issues overnight, denies chest pain, shortness of breath, palpitations, lightheadedness or dizziness.      Objective:  Vitals: /80   Pulse 60   Temp 99.1 °F (37.3 °C)   Resp 19   Ht 5' 9" (1.753 m)   Wt 96.2 kg (212 lb 1.3 oz)   SpO2 91%   BMI 31.32 kg/m²     Vitals:    08/23/23 1115 08/30/23 0549 Weight: 93.4 kg (205 lb 14.6 oz) 96.2 kg (212 lb 1.3 oz)     Orthostatic Blood Pressures    Flowsheet Row Most Recent Value   Blood Pressure 162/80 filed at 08/31/2023 5941   Patient Position - Orthostatic VS Lying filed at 08/25/2023 2207            Intake/Output Summary (Last 24 hours) at 8/31/2023 0916  Last data filed at 8/31/2023 0831  Gross per 24 hour   Intake 500 ml   Output 650 ml   Net -150 ml       Invasive Devices     Peripherally Inserted Central Catheter Line  Duration           PICC Line 73/45/51 Right Basilic 13 days          Peripheral Intravenous Line  Duration           Peripheral IV 08/30/23 Left Antecubital <1 day    Peripheral IV 08/30/23 Right Forearm <1 day          Airway  Duration           Non-Surgical Airway Oral pharyngeal airway <1 day                          Scheduled Meds:  Current Facility-Administered Medications   Medication Dose Route Frequency Provider Last Rate   • acetaminophen  975 mg Oral Q8H [facility] Eliza Aleman PA-C     • aspirin  81 mg Oral Daily Eliza Aleman PA-C     • atorvastatin  40 mg Oral Daily With Tim Manzo PA-C     • carvedilol  12.5 mg Oral BID Eliza Aleman PA-C     • cefTRIAXone  2,000 mg Intravenous Q24H Eliza Aleman PA-C 2,000 mg (08/30/23 0955)   • escitalopram  20 mg Oral Daily Eliza Aleman PA-C     • ezetimibe  10 mg Oral Daily Eliza Aleman PA-C     • fenofibrate  145 mg Oral Daily Eliza Aleman PA-C     • HYDROmorphone  0.5 mg Intravenous Q4H PRN Eliza Aleman PA-C     • insulin glargine  45 Units Subcutaneous QAM Corona Ax, DO     • insulin lispro  1-6 Units Subcutaneous HS Eliza Aleman PA-C     • insulin lispro  1-6 Units Subcutaneous HS Corona Ax, DO     • insulin lispro  15 Units Subcutaneous TID With Meals Sarah Salazar MD     • insulin lispro  2-12 Units Subcutaneous TID AC Corona Ax, DO     • isosorbide mononitrate  30 mg Oral Daily Quintin Chairez MD     • LORazepam  1 mg Oral Daily PRN Leopoldo Sanchez, ZACHARY     • melatonin  6 mg Oral HS Leopoldo Sanchez, ZACHARY     • ondansetron  4 mg Intravenous Q6H PRN Leopoldo Sanchez, ZACHARY     • oxyCODONE  10 mg Oral Q4H PRN Leopoldo Sanchez, ZACHARY     • oxyCODONE  5 mg Oral Q4H PRN Leopoldo Sanchez, ZACHARY     • pantoprazole  20 mg Oral Early Morning Leopoldo Asnchez, ZACHARY     • senna  1 tablet Oral HS Leopoldo Sanchez, ZACHARY     • sotalol  80 mg Oral BID Leopoldo Sanchez, ZACHARY     • spironolactone  25 mg Oral Daily Delisa Mathew MD       Continuous Infusions:   PRN Meds:.•  HYDROmorphone  •  LORazepam  •  ondansetron  •  oxyCODONE  •  oxyCODONE    Review of Systems:  ROS as noted above, otherwise 12 point review of systems was performed and is negative. Physical Exam:   GEN: NAD, alert and oriented, well appearing  SKIN: dry without significant lesions or rashes  HEENT: NCAT, PERRL, EOMs intact  NECK: No JVD or carotid bruits appreciated  CARDIOVASCULAR: RRR, normal S1, S2 without murmurs, rubs, or gallops appreciated  LUNGS: Clear to auscultation bilaterally without wheezes, rhonchi, or rales  ABDOMEN: Soft, nontender, nondistended  EXTREMITIES/VASCULAR: perfused without clubbing, cyanosis, or edema b/l  PSYCH: Normal mood and affect  NEURO: CN ll-Xll grossly intact              Lab Results: I have personally reviewed pertinent lab results.     Results from last 7 days   Lab Units 08/31/23 0558 08/30/23 0527 08/29/23  0537   WBC Thousand/uL 7.95 7.91 7.87   HEMOGLOBIN g/dL 9.7* 9.1* 8.7*   HEMATOCRIT % 30.4* 29.3* 28.5*   PLATELETS Thousands/uL 324 348 284     Results from last 7 days   Lab Units 08/31/23  0558 08/30/23  0527 08/28/23  0603   POTASSIUM mmol/L 3.9 3.9 4.1   CHLORIDE mmol/L 103 104 107   CO2 mmol/L 27 26 28   BUN mg/dL 5 7 7   CREATININE mg/dL 0.59* 0.66 0.61   CALCIUM mg/dL 8.8 8.7 8.6         Results from last 7 days   Lab Units 08/31/23  0558 08/30/23  0527 08/29/23  0537   MAGNESIUM mg/dL 1.6* 1.7* 1.7*       Imaging: I have personally reviewed pertinent reports. No results found for this or any previous visit.       VTE Pharmacologic Prophylaxis: hold heparin for 72 hours post device due to risk of pocket hematoma   VTE Mechanical Prophylaxis: sequential compression device

## 2023-08-31 NOTE — CASE MANAGEMENT
Case Management Discharge Planning Note    Patient name Jaida Palmer  Location [facility] 934/[facility] 088-64 MRN [id number]  : 1955 Date 2023       Current Admission Date: 2023  Current Admission Diagnosis:Jamarcus's gangrene   Patient Active Problem List    Diagnosis Date Noted   • Hypomagnesemia 2023   • Anemia of chronic disease 2023   • Jamarcus's gangrene 2023   • ICD (implantable cardioverter-defibrillator) infection ([address]) 2023   • Streptococcal bacteremia 2023   • Necrotizing soft tissue infection 2023   • Cholelithiasis 2023   • Chronic systolic CHF (congestive heart failure) ([address]) 2021   • Ventricular tachycardia ([address]) 2021   • ICD (implantable cardioverter-defibrillator) in place 2019   • Insulin dependent diabetes mellitus ([address]) 09/15/2016   • Esophageal reflux 2013   • Ischemic cardiomyopathy 2012   • Generalized anxiety disorder 2012   • Essential hypertension 2011   • CAD (coronary artery disease) 2010   • Hyperlipidemia 2010      LOS (days): 18  Geometric Mean LOS (GMLOS) (days): 9.60  Days to GMLOS:-8     OBJECTIVE:  Risk of Unplanned Readmission Score: 19.19         Current admission status: Inpatient   Preferred Pharmacy:   [facility] [id number] [facility] 77  [facility] [address]  Phone: [phone] Fax: [phone] [address] [place] [address], 10 42 [facility] [address]  Phone: [phone] Fax: [phone], [address] [place] [address]  [address] [facility] [address]  Phone: [phone] Fax: [phone]    [facility] ([address] [place] [address] Media  [address] [place] [address]  Phone: [phone] Fax: [phone] [address], [address] [place] [address],[address]  [address] [facility] [address] [facility] [address] [address]  Phone: [phone] Fax: [phone]    Primary Care Provider: Erin Miranda MD    Primary Insurance: [address] [facility]  Secondary Insurance:     DISCHARGE DETAILS:    Discharge planning discussed with[de-identified] patient       [address]         Is the patient interested in [address] at discharge?: Yes  [facility] requested[de-identified] [facility] Name[de-identified] Christina Provider[de-identified] PCP  Home Health Services Needed[de-identified] Wound/Ostomy Care, Administration of IV, IM or SC Medications  Homebound Criteria Met[de-identified] Requires the Assistance of Another Person for Safe Ambulation or to Leave the Home  Supporting Clincal Findings[de-identified] Fatigues Easliy in [employer], Limited Endurance    IMM Given to[de-identified] Patient    Pt is medically cleared for d/c home today  [address] will be delivering IV medications and supplies to pt's room  [facility] is set with Start of care for tomorrow 9/123 @ 0968

## 2023-08-31 NOTE — DISCHARGE INSTR - AVS FIRST PAGE
Please refer to post ICD implantation discharge instructions and restrictions below and your ICD booklet/temporary card. Keep incision dry for one week. Do not use lotions/powders/creams on incision. Leave outer bandage in place for 1 week - it is water proof, and as long as it is fully adhered to your skin you may shower with it. If it appears as though the bandage is coming off and/or there is any communication to the area of device incision, please then keep the whole area dry for the remaining week. After 1 week, please remove by pulling all edges away from the center of the bandage. No overhead reaching/pushing/pulling/lifting greater than 5-10lbs with left arm for six weeks. Please call the office if you notice redness, swelling, bleeding, or drainage from incision or if you develop fevers    Implantable Cardioverter Defibrillator      If you have any questions, please call [phone] to speak with a nurse (8:30am-4pm, or [phone] after hours). For appointments, please call [phone]. WHAT YOU SHOULD KNOW:    An implantable cardioverter defibrillator (ICD) is a small device that monitors your heart rate and rhythm. It is commonly placed inside your upper chest region. It may be used if you have a ventricular arrhythmia, which is an irregular, dangerous rhythm from the bottom chamber of your heart. Some arrhythmias may cause your heart to suddenly stop beating. An ICD can give a shock to your heart to make it start beating again, or it can give pacing therapy (also known as pain-free therapy) to return your heart to normal rhythm. It is also a pacemaker, so it will pace your heart if needed to prevent it from beating too slowly. AFTER YOU LEAVE:      Follow up with your primary healthcare provider or cardiologist as directed: You will need to follow-up to have your ICD checked and make sure you are not having problems.  Write down your questions so you remember to ask them during your visits. Driving: you are ok to drive 48 hours after device is implanted     Self-care:   Ask about activity: Ask if you need to avoid moving your shoulder or arm, and for how long. Ask if you should avoid lifting heavy objects. Do not play any contact sports, such as football or wrestling, until your primary healthcare provider [facility] or cardiologist tells you it is okay. You may only be able to drive for a certain amount of time per day, or during certain hours. Ask when you can return to work. Care for your skin over the ICD: see instructions above     When you get a shock from your ICD: A shock may feel like someone has hit you, or you may feel a thump in the chest. If someone is touching you when you get a shock, they may feel a tingling feeling. The first time you feel a shock, it may scare you. Sit or lie down and stay calm. Ask someone to stay with you if possible. Please either call your cardiologist or report to an emergency room. Safety instructions when you have an ICD:   Carry an ID card for the ICD: This card has important information about your ICD. Stay away from magnets or machines with electric fields: This includes MRI machines. Avoid leaning into a car engine or doing welding. These things can interfere with how your ICD works. Tell airport security you have an ICD: You may need to be searched by hand when you go through a security gate. The security gate or handheld wand could harm your ICD. Keep an ICD diary: Record when you get a shock and what you were doing before you got the shock. Keep track of how you felt before and after the shock, as well as how many shocks you received. Write down the day and time of each shock. Bring the diary with you when you see your PHP or cardiologist.   Camila Tinoco medical alert identification: Wear medical alert jewelry or carry a card that says you have an ICD. Ask your PHP or cardiologist where to get these items.     Contact your primary healthcare provider or cardiologist if:   You have a fever. You feel 1 or more shocks from your ICD and feel fine afterwards. Your feet or ankles swell. The area around your ICD is painful or tender after surgery. The skin around your stitches or staples is red, swollen, or draining pus or fluid. You have chills, a cough, and feel weak or achy. You are sad or anxious and find it hard to do your usual activities. You have questions or concerns about your condition or care. Seek care immediately or call 911 if:   Your stitches or staples come apart. Blood soaks through your bandage. You feel more than 3 shocks in a row from your ICD. You become weak, dizzy, or faint. You feel your heart skip beats or beat very fast or slow, but you do not feel a shock from your ICD. You have chest pain that does not go away with rest or medicine. © 2014 [facility] is for End User's use only and may not be sold, redistributed or otherwise used for commercial purposes. All illustrations and images included in CareNotes® are the copyrighted property of [employer] or Kingston Hernandez. The above information is an  only. It is not intended as medical advice for individual conditions or treatments. Talk to your doctor, nurse or pharmacist before following any medical regimen to see if it is safe and effective for you.

## 2023-09-01 ENCOUNTER — HOME CARE VISIT (OUTPATIENT)
Dept: HOME HEALTH SERVICES | Facility: HOME HEALTHCARE | Age: 68
End: 2023-09-01
Payer: COMMERCIAL

## 2023-09-01 ENCOUNTER — TELEPHONE (OUTPATIENT)
Dept: INFECTIOUS DISEASES | Facility: CLINIC | Age: 68
End: 2023-09-01

## 2023-09-01 ENCOUNTER — TRANSITIONAL CARE MANAGEMENT (OUTPATIENT)
Dept: INTERNAL MEDICINE CLINIC | Facility: CLINIC | Age: 68
End: 2023-09-01

## 2023-09-01 VITALS
OXYGEN SATURATION: 96 % | RESPIRATION RATE: 18 BRPM | WEIGHT: 182 LBS | SYSTOLIC BLOOD PRESSURE: 130 MMHG | HEART RATE: 66 BPM | DIASTOLIC BLOOD PRESSURE: 62 MMHG | TEMPERATURE: 97.8 F | BODY MASS INDEX: 26.96 KG/M2 | HEIGHT: 69 IN

## 2023-09-01 DIAGNOSIS — B95.5 STREPTOCOCCAL BACTEREMIA: Primary | ICD-10-CM

## 2023-09-01 DIAGNOSIS — R78.81 STREPTOCOCCAL BACTEREMIA: Primary | ICD-10-CM

## 2023-09-01 PROCEDURE — G0299 HHS/HOSPICE OF RN EA 15 MIN: HCPCS

## 2023-09-01 PROCEDURE — 400013 VN SOC

## 2023-09-01 NOTE — PROGRESS NOTES
OPAT NOTE    Supervising/Discharge physician:Trena     Diagnosis:Strep bacteremia     Drug:Ceftriaxone     Dose/Frequency:2g Q24h    Labs/Frequency:weekly cbcd cmp     End Date:10/5    Infusion/VNA contact:nadia/SAW     Next appointment:9/12      MA assigned: Ave Santamaria

## 2023-09-01 NOTE — TELEPHONE ENCOUNTER
Patient spouse calls to schedule follow up. Provided patient's follow up as scheduled. She confirms date/time and states that works for them.

## 2023-09-04 ENCOUNTER — HOME CARE VISIT (OUTPATIENT)
Dept: HOME HEALTH SERVICES | Facility: HOME HEALTHCARE | Age: 68
End: 2023-09-04
Payer: COMMERCIAL

## 2023-09-04 ENCOUNTER — LAB REQUISITION (OUTPATIENT)
Dept: LAB | Facility: HOSPITAL | Age: 68
End: 2023-09-04
Payer: COMMERCIAL

## 2023-09-04 VITALS
DIASTOLIC BLOOD PRESSURE: 56 MMHG | TEMPERATURE: 97.1 F | SYSTOLIC BLOOD PRESSURE: 124 MMHG | HEART RATE: 55 BPM | RESPIRATION RATE: 18 BRPM | OXYGEN SATURATION: 94 %

## 2023-09-04 DIAGNOSIS — N49.3 FOURNIER GANGRENE: ICD-10-CM

## 2023-09-04 LAB
ALBUMIN SERPL BCP-MCNC: 2.7 G/DL (ref 3.5–5)
ALP SERPL-CCNC: 50 U/L (ref 34–104)
ALT SERPL W P-5'-P-CCNC: 7 U/L (ref 7–52)
ANION GAP SERPL CALCULATED.3IONS-SCNC: 9 MMOL/L
AST SERPL W P-5'-P-CCNC: 26 U/L (ref 13–39)
BILIRUB SERPL-MCNC: 0.3 MG/DL (ref 0.2–1)
BUN SERPL-MCNC: 8 MG/DL (ref 5–25)
CALCIUM ALBUM COR SERPL-MCNC: 8.5 MG/DL (ref 8.3–10.1)
CALCIUM SERPL-MCNC: 7.5 MG/DL (ref 8.4–10.2)
CHLORIDE SERPL-SCNC: 105 MMOL/L (ref 96–108)
CO2 SERPL-SCNC: 23 MMOL/L (ref 21–32)
CREAT SERPL-MCNC: 0.55 MG/DL (ref 0.6–1.3)
GFR SERPL CREATININE-BSD FRML MDRD: 107 ML/MIN/1.73SQ M
GLUCOSE SERPL-MCNC: 136 MG/DL (ref 65–140)
POTASSIUM SERPL-SCNC: 4.7 MMOL/L (ref 3.5–5.3)
PROT SERPL-MCNC: 5.7 G/DL (ref 6.4–8.4)
SODIUM SERPL-SCNC: 137 MMOL/L (ref 135–147)

## 2023-09-04 PROCEDURE — 10330064 PAD, ABD 5X9" STR LF (1/PK 20PK/BX) MGM1

## 2023-09-04 PROCEDURE — 80053 COMPREHEN METABOLIC PANEL: CPT | Performed by: INTERNAL MEDICINE

## 2023-09-04 PROCEDURE — G0299 HHS/HOSPICE OF RN EA 15 MIN: HCPCS

## 2023-09-04 PROCEDURE — 10330064 TAPE, ADHSV TRANSPORE WHT 2" (6RL/BX 10B

## 2023-09-04 PROCEDURE — 10330064 CLEANSER, WND SEA-CLEANS 6OZ  COLPLT

## 2023-09-04 PROCEDURE — 10330064 SPONGE, GAUZE 8PLY N/S 4"X4" (200/PK 20P

## 2023-09-04 PROCEDURE — 10330064 SPONGE, GAUZE 12PLY STR 4"X4" (1/PK 50/B

## 2023-09-05 ENCOUNTER — HOME CARE VISIT (OUTPATIENT)
Dept: HOME HEALTH SERVICES | Facility: HOME HEALTHCARE | Age: 68
End: 2023-09-05
Payer: COMMERCIAL

## 2023-09-05 PROCEDURE — G0152 HHCP-SERV OF OT,EA 15 MIN: HCPCS

## 2023-09-06 VITALS — OXYGEN SATURATION: 91 % | SYSTOLIC BLOOD PRESSURE: 105 MMHG | DIASTOLIC BLOOD PRESSURE: 58 MMHG | HEART RATE: 63 BPM

## 2023-09-07 ENCOUNTER — TELEPHONE (OUTPATIENT)
Dept: INFECTIOUS DISEASES | Facility: CLINIC | Age: 68
End: 2023-09-07

## 2023-09-07 ENCOUNTER — HOME CARE VISIT (OUTPATIENT)
Dept: HOME HEALTH SERVICES | Facility: HOME HEALTHCARE | Age: 68
End: 2023-09-07
Payer: COMMERCIAL

## 2023-09-07 ENCOUNTER — OFFICE VISIT (OUTPATIENT)
Dept: SURGERY | Facility: CLINIC | Age: 68
End: 2023-09-07

## 2023-09-07 VITALS — WEIGHT: 193.4 LBS | HEIGHT: 69 IN | TEMPERATURE: 97.4 F | BODY MASS INDEX: 28.64 KG/M2

## 2023-09-07 VITALS — DIASTOLIC BLOOD PRESSURE: 78 MMHG | SYSTOLIC BLOOD PRESSURE: 121 MMHG | HEART RATE: 71 BPM | OXYGEN SATURATION: 94 %

## 2023-09-07 DIAGNOSIS — N49.3 FOURNIER'S GANGRENE: Primary | ICD-10-CM

## 2023-09-07 PROCEDURE — G0152 HHCP-SERV OF OT,EA 15 MIN: HCPCS

## 2023-09-07 PROCEDURE — 99024 POSTOP FOLLOW-UP VISIT: CPT | Performed by: STUDENT IN AN ORGANIZED HEALTH CARE EDUCATION/TRAINING PROGRAM

## 2023-09-07 NOTE — UTILIZATION REVIEW
NOTIFICATION OF ADMISSION DISCHARGE   This is a Notification of Discharge from [facility]. Please be advised that this patient has been discharge from our facility. Below you will find the admission and discharge date and time including the patient’s disposition. UTILIZATION REVIEW CONTACT:  Michelle Dhaliwal  Utilization   Network Utilization Review Department  Phone: [phone] x carefully listen to the prompts. All voicemails are confidential.  Email: [email or web address]     ADMISSION INFORMATION  PRESENTATION DATE: 8/13/2023 10:25 PM  OBERVATION ADMISSION DATE:  INPATIENT ADMISSION DATE: 8/13/23 10:25 PM   DISCHARGE DATE: 8/31/2023  6:50 PM   DISPOSITION:Home with Home Health Care    IMPORTANT INFORMATION:  Send all requests for admission clinical reviews, approved or denied determinations and any other requests to dedicated fax number below belonging to the campus where the patient is receiving treatment.  List of dedicated fax numbers:  [place] [facility] (Administrative/Medical Necessity) [phone] [address] [facility] (Maternity/NICU/Pediatrics) [phone]   [facility] [phone]   [facility] [phone] 16[address] [facility] [phone]   [address] [place] [address] [phone]   [facility] [phone]   [address] [place] [address] 608 [facility] [phone]   [address] [place] [address] [phone] 3[address] [facility] [phone] 2720 [facility] [address] [facility] [phone]

## 2023-09-07 NOTE — PROGRESS NOTES
Office Visit - General Surgery  Marcelo Forrester MRN: [id number]  Encounter: [id number]    Assessment and Plan  Problem List Items Addressed This Visit        Musculoskeletal and Integument    Sheyla's gangrene - Primary   -Discussed performing WTD dressing with 4x4 instead of packing. Encouraged to place 4x4 and abd over incision to prevent moisture/maceration of skin, had only been placing dry dressing around packing site.   -Due to maceration, will have patient follow up next week for wound check and suture removal    Chief Complaint:  Marcelo Forrester is a 76 y.o. male who presents for Post-op (P/o debridement of groin.)    Subjective  76 yr old male who presents for a wound check s/p multiple washouts and closure 8/18 of his right groin secondary to sheyla gangrene. Overall, patient and spouse state things are going well. They have been performing daily packing changes to the open area of the incision. Spouse notes that patient may need a fungal cream for his incision site, she has noticed increased redness.      Past Medical History:   Diagnosis Date   • Benign essential hypertension 06/02/2011    Last Assessment & Plan:  BP stable on current meds   • Chronic systolic CHF (congestive heart failure) ([address]) 04/12/2021   • Coronary atherosclerosis 04/08/2010    Overview:  Managed by cardiology   • Generalized anxiety disorder 05/22/2012   • ICD (implantable cardioverter-defibrillator) in place 12/04/2019   • Ischemic cardiomyopathy 09/13/2012   • Mixed hyperlipidemia 04/08/2010    Managed by cardiology   Last Assessment & Plan:  on lipitor 40 mg, zetia, lovaza ,LDL 83. continue   • Necrotizing soft tissue infection 08/13/2023   • Rectal hemorrhage     last assessed: May 29, 2015   • S/P CABG (coronary artery bypass graft) 1994   • Uncontrolled type 2 diabetes mellitus with hyperglycemia ([address]) 09/15/2016    Transitioned From: Diabetes Mellitus   • Ventricular tachycardia ([address]) 04/12/2021       Past Surgical History: Procedure Laterality Date   • CARDIAC ELECTROPHYSIOLOGY PROCEDURE N/A 8/24/2023    Procedure: Cardiac laser lead extraction;  Surgeon: Tashi Cullen DO;  [facility];  Service: Cardiac Surgery   • CARDIAC ELECTROPHYSIOLOGY PROCEDURE N/A 8/30/2023    Procedure: Cardiac icd implant right sided dual chamber;  Surgeon: Dilcia Oquendo MD;  Location[facility];   Service: Cardiology   • CORONARY ARTERY BYPASS GRAFT     • DECUBITUS ULCER EXCISION Right 8/14/2023    Procedure: Washout and debridement of perineal wound;  Surgeon: Jaimee Singh MD;  [facility];  Service: General   • ELBOW SURGERY Right     Bursitis - last assessed: Sept 15, 2016   • IR PICC REPOSITION  8/18/2023   • MI RMVL TRANSVNS PM ELTRD DUAL LEAD SYS N/A 8/24/2023    Procedure: REMOVAL PACEMAKER/AICD LEADS W LASER- EXTRACTION ONLY;  Surgeon: Tashi Cullen DO;  [facility];  Service: Cardiac Surgery   • VAC DRESSING APPLICATION N/A 2/95/1381    Procedure: APPLICATION VAC DRESSING;  Surgeon: Mahi Jolly DO;  [facility];  Service: General   • WOUND DEBRIDEMENT N/A 8/13/2023    Procedure: EXCISIONAL DEBRIDEMENT Perineum;  Surgeon: Gladis Mendez DO;  [facility]: [facility];  Service: General   • WOUND DEBRIDEMENT N/A 8/16/2023    Procedure: DEBRIDEMENT WOUND, [address] OUT, PARTIAL CLOSURE;  Surgeon: Mahi Jolly DO;  [facility];  Service: General       Family History   Problem Relation Age of Onset   • No Known Problems Mother    • Cancer Father    • Coronary artery disease Father        Social History     Tobacco Use   • Smoking status: Never   • Smokeless tobacco: Never   Substance Use Topics   • Alcohol use: No     Comment: being a social drinker noted in "allscripts"    • Drug use: No        Medications  Current Outpatient Medications on File Prior to Visit   Medication Sig Dispense Refill   • carvedilol (COREG) 12.5 mg tablet Take 1 tablet by mouth 2 (two) times a day     • cefTRIAXone 2,000 mg in dextrose 5 % 50 mL IVPB Inject 2,000 mg into a catheter in a vein over 30 minutes at 100 mL/hr every 24 hours Through 10/5 Do not start before September 1, 2023.  0   • dulaglutide (Trulicity) 3 GP/0.6BF injection Inject 0.5 mL (3 mg total) under the skin every 7 days     • escitalopram (LEXAPRO) 20 mg tablet Take 1 tablet (20 mg total) by mouth daily 90 tablet 3   • ezetimibe (ZETIA) 10 mg tablet Take 1 tablet by mouth daily     • fenofibrate (TRICOR) 145 mg tablet Take 145 mg by mouth daily     • isosorbide mononitrate (IMDUR) 30 mg 24 hr tablet Take 30 mg by mouth daily at bedtime  3   • lansoprazole (PREVACID) 30 mg capsule Take 1 capsule (30 mg total) by mouth daily 90 capsule 3   • LORazepam (ATIVAN) 1 mg tablet TAKE 1 TABLET DAILY AS NEEDED FOR ANXIETY 30 tablet 3   • metFORMIN (GLUCOPHAGE) 500 mg tablet Take 1,000 mg by mouth 2 (two) times a day     • Misc Natural Products (BLOOD SUGAR 360 PO) ONETOUCH ULTRA TEST STRIPS Test once daily E11.65     • omega-3-acid ethyl esters (LOVAZA) 1 g capsule Take 2 g by mouth 2 (two) times a day      • oxyCODONE (ROXICODONE) 5 immediate release tablet Take 1 tablet (5 mg total) by mouth every 4 (four) hours as needed for moderate pain or severe pain Max Daily Amount: 30 mg 20 tablet 0   • sotalol (BETAPACE) 80 mg tablet Take 1 tablet by mouth 2 (two) times a day     • spironolactone (ALDACTONE) 25 mg tablet Take 1 tablet by mouth daily     • aspirin 81 MG tablet Take 1 tablet by mouth 2 (two) times a day      • atorvastatin (LIPITOR) 40 mg tablet Take 1 tablet by mouth daily     • B-D ULTRAFINE III SHORT PEN 31G X 8 MM MISC  (Patient not taking: Reported on 9/7/2023)     • BD ULTRA-FINE PEN NEEDLES 29G X 12.7MM MISC  (Patient not taking: Reported on 9/7/2023)     • Continuous Blood Gluc Sensor (Blue EggE SENSOR SYSTEM) MISC 1 each by Does not apply route every 14 (fourteen) days (Patient not taking: Reported on 9/7/2023)     • Empagliflozin (Karole Binford) 25 MG TABS Take 1 tablet (25 mg total) by mouth every morning     • Insulin Glargine Solostar (Lantus SoloStar) 100 UNIT/ML SOPN Inject 0.4 mL (40 Units total) under the skin daily at bedtime (Patient not taking: Reported on 9/7/2023) 12 mL 0   • insulin lispro (HumaLOG) 100 units/mL injection Inject 15 Units under the skin 3 (three) times a day with meals (Patient not taking: Reported on 9/7/2023) 20 mL 0   • Insulin Pen Needle (PEN NEEDLES 29GX1/2") 29G X 12MM MISC 2 X daily subcutaneously dx: E11.65 (Patient not taking: Reported on 9/7/2023)     • Lancets MISC test once times daily E11.65 (Patient not taking: Reported on 9/7/2023)       No current facility-administered medications on file prior to visit. Allergies  Allergies   Allergen Reactions   • Ace Inhibitors Cough       Review of Systems   Constitutional: Negative. HENT: Negative. Respiratory: Negative. Cardiovascular: Negative. Gastrointestinal: Negative. Genitourinary: Negative. Musculoskeletal: Negative. Skin: Negative. Neurological: Negative. Hematological: Negative. Psychiatric/Behavioral: Negative. Objective  Vitals:    09/07/23 1618   Temp: (!) 97.4 °F (36.3 °C)       Physical Exam  Constitutional:       Appearance: Normal appearance. Cardiovascular:      Rate and Rhythm: Normal rate and regular rhythm. Pulses: Normal pulses. Pulmonary:      Effort: Pulmonary effort is normal.   Genitourinary:     Comments: +right sided incision with maceration around inferior portion of incision/sutures due to moisture. Open area of wound c/d/i with granulation tissue in base. Removed small amount of fibrinous exudate. Neurological:      Mental Status: He is alert.

## 2023-09-07 NOTE — CASE COMMUNICATION
OT  to continue 2 x per week x two weeks for ADL training for review of safety with showering/ upper extremity dressing and review of safety with transfers/mobility.

## 2023-09-11 ENCOUNTER — HOME CARE VISIT (OUTPATIENT)
Dept: HOME HEALTH SERVICES | Facility: HOME HEALTHCARE | Age: 68
End: 2023-09-11
Payer: COMMERCIAL

## 2023-09-11 ENCOUNTER — APPOINTMENT (OUTPATIENT)
Dept: LAB | Facility: CLINIC | Age: 68
End: 2023-09-11
Payer: COMMERCIAL

## 2023-09-11 PROCEDURE — G0299 HHS/HOSPICE OF RN EA 15 MIN: HCPCS

## 2023-09-11 NOTE — PROGRESS NOTES
Progress Note - Infectious Disease   Kirsten Carpenter 76 y.o. male MRN: [id number]  Unit/Bed#:  Encounter: [id number]      Impression/Plan:  1. Streptococcus Bacteremia and ICD infection:  -During hospital admission in August, 1 of 2 sets of blood cultures from 8/13 grew Streptococcus anginosus. The likely source of bacteremia was his necrotizing perenial infection.  2D echo was noted to be a poor quality study. MARCELLA 8/18 found vegetation on pacer wire of RV lead though valves spared. Repeat blood cultures 8/13 negative. S/p device and lead removal 8/24/23. Repeat blood cultures 8/25 negative. He had device reimplantation on 8/30. Labs reviewed, all appear stable. He is tolerating IV antibiotics well via RUE PICC line. No complaints of fevers or chills.  -Continue IV Ceftriaxone 2g daily   -Plan for 6 week course of IV abx from device removal, through 10/05/2023  -PICC to be removed on 10/05 after last dose of antibiotic  -Continue weekly CBCD, CMP while on IV antibiotic  -EP follow up upcoming  -Follow up in ID office in about 2-3 weeks, can be Telemed     2. Mild Erythema around PICC Exit Site:  - Noted on exam. Per patient's wife, appears stable. There is no drainage, non-tender and not increasing in size. At this time suspect is mild skin irritation at exit site only. -counseled patient and wife to continue to monitor site and call our office if the erythema increases, drainage develops or pain/swelling develop  -ongoing dressing changes with home nursing    3. Jamarcus's Gangrene:  -Treated during hospitalization, status post perineal debridement/washout on 8/13, repeat washout on 8/14 and partial closure with wound VAC application on 5/20. YSKTKL MXQI was the source of his bacteremia as above. OR cultures grew grown Group C Strep, Prevotella and E. Coli. He completed 5 day post op course of flagyl. Recently saw surgery 9/12, still with open areas in the wound.  They are closely monitoring.  -ongoing surgical management and wound care9/11     4. T2DM. Risk factor for invasive infections including necrotizing soft tissue infection.  A1c was 12.3 in January of this year.   -PCP and Endocrinology follow up      Antibiotics:  Ceftriaxone    Subjective:  69-year-old male with coronary artery disease status post CABG, heart failure with reduced ejection fraction, history of ventricular tachycardia status post ICD, type 2 diabetes. He was recently admitted for Jamarcus's gangrene and underwent multiple surgical debridements. He was found to have Streptococcus anginosus bacteremia, presumably from his groin infection. MARCELLA on 8/18 found a vegetation on the pacer wire of his RV lead, but no cardiac or valvular vegetations. He underwent device and lead removal on 8/24. He was discharged on IV ceftriaxone via PICC line to complete a total 6-week course from his device removal, through October 5. He has been doing well overall. He is tolerating IV antibiotic well. He has PICC in right arm. Per wife they noticed mild erythema at exit site but has been stable and is non-tender, without swelling and no drainage. Patient denies fever, chills, nausea, diarrhea. They saw their surgery team today for his healing perineal wound. Doing well overall. Objective:  Vitals:  Vitals:    09/12/23 0958   BP: 132/70   Pulse: 77   Resp: 18   Temp: (!) 97.3 °F (36.3 °C)   SpO2: 97%       Physical Exam:   General Appearance:  Alert, interactive, nontoxic, no acute distress. Throat: Oropharynx moist without lesions. Lungs:   Clear to auscultation bilaterally; no wheezes, rhonchi or rales; respirations unlabored   Heart:  RRR; no murmur, rub or gallop   Abdomen:   Soft, non-tender   Extremities: Right arm with PICC line present, mild erythema at exit site but no swelling, non-tender and no drainage   Skin: No new rashes or lesions. No draining wounds noted. Labs:    All pertinent labs and imaging studies were personally reviewed  Results from last 7 days   Lab Units 09/11/23  1238   WBC Thousand/uL 7.58   HEMOGLOBIN g/dL 10.5*   PLATELETS Thousands/uL 253       Micro:        Imaging:          Jason Whitley MD  [facility]

## 2023-09-12 ENCOUNTER — HOME CARE VISIT (OUTPATIENT)
Dept: HOME HEALTH SERVICES | Facility: HOME HEALTHCARE | Age: 68
End: 2023-09-12
Payer: COMMERCIAL

## 2023-09-12 ENCOUNTER — OFFICE VISIT (OUTPATIENT)
Dept: INFECTIOUS DISEASES | Facility: CLINIC | Age: 68
End: 2023-09-12
Payer: COMMERCIAL

## 2023-09-12 ENCOUNTER — OFFICE VISIT (OUTPATIENT)
Dept: SURGERY | Facility: CLINIC | Age: 68
End: 2023-09-12

## 2023-09-12 VITALS — WEIGHT: 195 LBS | HEIGHT: 69 IN | TEMPERATURE: 97.3 F | BODY MASS INDEX: 28.88 KG/M2

## 2023-09-12 VITALS
HEIGHT: 69 IN | DIASTOLIC BLOOD PRESSURE: 70 MMHG | RESPIRATION RATE: 18 BRPM | OXYGEN SATURATION: 97 % | WEIGHT: 195.11 LBS | HEART RATE: 77 BPM | BODY MASS INDEX: 28.9 KG/M2 | TEMPERATURE: 97.3 F | SYSTOLIC BLOOD PRESSURE: 132 MMHG

## 2023-09-12 DIAGNOSIS — R78.81 STREPTOCOCCAL BACTEREMIA: Primary | ICD-10-CM

## 2023-09-12 DIAGNOSIS — T81.89XA NONHEALING SURGICAL WOUND, INITIAL ENCOUNTER: Primary | ICD-10-CM

## 2023-09-12 DIAGNOSIS — B95.5 STREPTOCOCCAL BACTEREMIA: Primary | ICD-10-CM

## 2023-09-12 PROCEDURE — 99214 OFFICE O/P EST MOD 30 MIN: CPT | Performed by: INTERNAL MEDICINE

## 2023-09-12 NOTE — ASSESSMENT & PLAN NOTE
There is still open areas in the wound and the area where sutures are in place is not well approximated. Wife is a nurse so I told her to use alginate that she can talk in the open areas of the wound. Okay to shower. We will try to get him an appointment at [facility] since they live in that area. We will see him back down in [facility] if needed.

## 2023-09-12 NOTE — PATIENT INSTRUCTIONS
Continue your antibiotic as ordered. Continue weekly labs/PICC care. Follow up with us in 2-3 weeks. Call in the interim with questions/concerns.

## 2023-09-12 NOTE — PROGRESS NOTES
Office Visit - General Surgery  Kirsten Carpenter MRN: [id number]  Encounter: [id number]    Assessment and Plan  Problem List Items Addressed This Visit        Other    Nonhealing surgical wound, initial encounter - Primary     There is still open areas in the wound and the area where sutures are in place is not well approximated. Wife is a nurse so I told her to use alginate that she can talk in the open areas of the wound. Okay to shower. We will try to get him an appointment at [facility] since they live in that area. We will see him back down in [facility] if needed. Chief Complaint:  Kirsten Carpenter is a 76 y.o. male who presents for Wound Check (Wound check.)    Subjective  29-year-old male status post right groin debridements for necrotizing fasciitis. Here for wound follow-up. Still having drainage from the wound.     Past Medical History:   Diagnosis Date   • Benign essential hypertension 06/02/2011    Last Assessment & Plan:  BP stable on current meds   • Chronic systolic CHF (congestive heart failure) ([address]) 04/12/2021   • Coronary atherosclerosis 04/08/2010    Overview:  Managed by cardiology   • Generalized anxiety disorder 05/22/2012   • ICD (implantable cardioverter-defibrillator) in place 12/04/2019   • Ischemic cardiomyopathy 09/13/2012   • Mixed hyperlipidemia 04/08/2010    Managed by cardiology   Last Assessment & Plan:  on lipitor 40 mg, zetia, lovaza ,LDL 83. continue   • Necrotizing soft tissue infection 08/13/2023   • Rectal hemorrhage     last assessed: May 29, 2015   • S/P CABG (coronary artery bypass graft) 1994   • Uncontrolled type 2 diabetes mellitus with hyperglycemia ([address]) 09/15/2016    Transitioned From: Diabetes Mellitus   • Ventricular tachycardia ([address]) 04/12/2021       Past Surgical History:   Procedure Laterality Date   • CARDIAC ELECTROPHYSIOLOGY PROCEDURE N/A 8/24/2023    Procedure: Cardiac laser lead extraction;  Surgeon: Moe Garces DO;  [facility];  Service: Cardiac Surgery   • CARDIAC ELECTROPHYSIOLOGY PROCEDURE N/A 8/30/2023    Procedure: Cardiac icd implant right sided dual chamber;  Surgeon: James Garcia MD;  [facility];   Service: Cardiology   • CORONARY ARTERY BYPASS GRAFT     • DECUBITUS ULCER EXCISION Right 8/14/2023    Procedure: Washout and debridement of perineal wound;  Surgeon: Rachell Chavez MD;  [facility];  Service: General   • ELBOW SURGERY Right     Bursitis - last assessed: Sept 15, 2016   • IR PICC REPOSITION  8/18/2023   • [place] RMVL TRANSVNS PM ELTRD DUAL LEAD SYS N/A 8/24/2023    Procedure: REMOVAL PACEMAKER/AICD LEADS W LASER- EXTRACTION ONLY;  Surgeon: Ruddy Clements DO;  [facility];  Service: Cardiac Surgery   • VAC DRESSING APPLICATION N/A 7/75/4572    Procedure: APPLICATION VAC DRESSING;  Surgeon: Marcelo Jolly DO;  [facility];  Service: General   • WOUND DEBRIDEMENT N/A 8/13/2023    Procedure: EXCISIONAL DEBRIDEMENT Perineum;  Surgeon: Eugenia Friend DO;  [facility];  Service: General   • WOUND DEBRIDEMENT N/A 8/16/2023    Procedure: DEBRIDEMENT WOUND, [address] OUT, PARTIAL CLOSURE;  Surgeon: Marcelo Jolly DO;  [facility];  Service: General       Family History   Problem Relation Age of Onset   • No Known Problems Mother    • Cancer Father    • Coronary artery disease Father        Social History     Tobacco Use   • Smoking status: Never   • Smokeless tobacco: Never   Substance Use Topics   • Alcohol use: No     Comment: being a social drinker noted in "allscripts"    • Drug use: No        Medications  Current Outpatient Medications on File Prior to Visit   Medication Sig Dispense Refill   • aspirin 81 MG tablet Take 1 tablet by mouth 2 (two) times a day      • atorvastatin (LIPITOR) 40 mg tablet Take 1 tablet by mouth daily     • carvedilol (COREG) 12.5 mg tablet Take 1 tablet by mouth 2 (two) times a day     • cefTRIAXone 2,000 mg in dextrose 5 % 50 mL IVPB Inject 2,000 mg into a catheter in a vein over 30 minutes at 100 mL/hr every 24 hours Through 10/5 Do not start before September 1, 2023.  0   • dulaglutide (Trulicity) 3 UM/6.5YS injection Inject 0.5 mL (3 mg total) under the skin every 7 days     • escitalopram (LEXAPRO) 20 mg tablet Take 1 tablet (20 mg total) by mouth daily 90 tablet 3   • ezetimibe (ZETIA) 10 mg tablet Take 1 tablet by mouth daily     • fenofibrate (TRICOR) 145 mg tablet Take 145 mg by mouth daily     • isosorbide mononitrate (IMDUR) 30 mg 24 hr tablet Take 30 mg by mouth daily at bedtime  3   • lansoprazole (PREVACID) 30 mg capsule Take 1 capsule (30 mg total) by mouth daily 90 capsule 3   • LORazepam (ATIVAN) 1 mg tablet TAKE 1 TABLET DAILY AS NEEDED FOR ANXIETY 30 tablet 3   • metFORMIN (GLUCOPHAGE) 500 mg tablet Take 1,000 mg by mouth 2 (two) times a day     • Misc Natural Products (BLOOD SUGAR 360 PO) ONETOUCH ULTRA TEST STRIPS Test once daily E11.65     • omega-3-acid ethyl esters (LOVAZA) 1 g capsule Take 2 g by mouth 2 (two) times a day      • oxyCODONE (ROXICODONE) 5 immediate release tablet Take 1 tablet (5 mg total) by mouth every 4 (four) hours as needed for moderate pain or severe pain Max Daily Amount: 30 mg 20 tablet 0   • sotalol (BETAPACE) 80 mg tablet Take 1 tablet by mouth 2 (two) times a day     • spironolactone (ALDACTONE) 25 mg tablet Take 1 tablet by mouth daily     • B-D ULTRAFINE III SHORT PEN 31G X 8 MM MISC  (Patient not taking: Reported on 9/7/2023)     • BD ULTRA-FINE PEN NEEDLES 29G X 12.7MM MISC  (Patient not taking: Reported on 9/7/2023)     • Continuous Blood Gluc Sensor (FREESTYLE DRU SENSOR SYSTEM) MISC 1 each by Does not apply route every 14 (fourteen) days (Patient not taking: Reported on 9/7/2023)     • Empagliflozin (JARDIANCE) 25 MG TABS Take 1 tablet (25 mg total) by mouth every morning     • Insulin Glargine Solostar (Lantus SoloStar) 100 UNIT/ML SOPN Inject 0.4 mL (40 Units total) under the skin daily at bedtime (Patient not taking: Reported on 9/7/2023) 12 mL 0   • insulin lispro (HumaLOG) 100 units/mL injection Inject 15 Units under the skin 3 (three) times a day with meals (Patient not taking: Reported on 9/7/2023) 20 mL 0   • Insulin Pen Needle (PEN NEEDLES 29GX1/2") 29G X 12MM MISC 2 X daily subcutaneously dx: E11.65 (Patient not taking: Reported on 9/7/2023)     • Lancets MISC test once times daily E11.65 (Patient not taking: Reported on 9/7/2023)       No current facility-administered medications on file prior to visit. Allergies  Allergies   Allergen Reactions   • Ace Inhibitors Cough       Review of Systems    Objective  Vitals:    09/12/23 0920   Temp: (!) 97.3 °F (36.3 °C)       Physical Exam   Right groin incision which is open at the top and shows pink granular tissue. There is sutures in the midportion however the skin edges are not approximated. There is no erythema around the wound. Most of the wound looks pink and healthy.

## 2023-09-13 PROCEDURE — G0180 MD CERTIFICATION HHA PATIENT: HCPCS | Performed by: INTERNAL MEDICINE

## 2023-09-14 ENCOUNTER — HOME CARE VISIT (OUTPATIENT)
Dept: HOME HEALTH SERVICES | Facility: HOME HEALTHCARE | Age: 68
End: 2023-09-14
Payer: COMMERCIAL

## 2023-09-14 VITALS — DIASTOLIC BLOOD PRESSURE: 62 MMHG | HEART RATE: 60 BPM | SYSTOLIC BLOOD PRESSURE: 120 MMHG | OXYGEN SATURATION: 97 %

## 2023-09-14 PROCEDURE — G0152 HHCP-SERV OF OT,EA 15 MIN: HCPCS

## 2023-09-15 ENCOUNTER — IN-CLINIC DEVICE VISIT (OUTPATIENT)
Dept: CARDIOLOGY CLINIC | Facility: CLINIC | Age: 68
End: 2023-09-15

## 2023-09-15 VITALS
OXYGEN SATURATION: 98 % | SYSTOLIC BLOOD PRESSURE: 124 MMHG | HEART RATE: 68 BPM | DIASTOLIC BLOOD PRESSURE: 68 MMHG | TEMPERATURE: 97.3 F | RESPIRATION RATE: 20 BRPM

## 2023-09-15 DIAGNOSIS — Z95.810 PRESENCE OF IMPLANTABLE CARDIOVERTER-DEFIBRILLATOR (ICD): Primary | ICD-10-CM

## 2023-09-15 PROCEDURE — 99024 POSTOP FOLLOW-UP VISIT: CPT | Performed by: INTERNAL MEDICINE

## 2023-09-15 NOTE — CASE COMMUNICATION
Patient noted he would not be available for OT visit today. Patient requested decreasing visit to 1 visit in this week due  to 2 medical appointments today and it would be too much to try to do the appointments and have OT visit.   OT reduce OT plan to 1 visit in 7 day at patient's request.

## 2023-09-15 NOTE — PROGRESS NOTES
MDT DUAL CHAMBER ICD/ACTIVE SYSTEM IS MRI CONDITIONAL   DEVICE INTERROGATED IN THE [place] OFFICE:  BATTERY VOLTAGE ADEQUATE (11. 5 YR.).  AP 49.8%  <0.1%.   ALL LEAD PARAMETERS WITHIN NORMAL LIMITS.  NO SIGNIFICANT HIGH RATE EPISODES.  NO PROGRAMMING CHANGES MADE TO DEVICE PARAMETERS.  LEFT AND RIGHT PECTORAL INCISIONS CLEAN AND DRY WITH EDGES APPROXIMATED.  WOUND CARE AND RESTRICTIONS REVIEWED WITH PATIENT.  NORMAL DEVICE FUNCTION. [id number]

## 2023-09-18 ENCOUNTER — HOME CARE VISIT (OUTPATIENT)
Dept: HOME HEALTH SERVICES | Facility: HOME HEALTHCARE | Age: 68
End: 2023-09-18
Payer: COMMERCIAL

## 2023-09-18 NOTE — CASE COMMUNICATION
Unable to reach anyone for visit today. Left vm x 2. Received a TT from OT that pt would be involoved in  services today.

## 2023-09-18 NOTE — CASE COMMUNICATION
OT to continue for 1 additional visit to assist patient with obtaining a 4ww/ Rollator style walker from local service organization and  further review of safety with transfers /mobility followed by discharge with goals met.

## 2023-09-20 ENCOUNTER — HOME CARE VISIT (OUTPATIENT)
Dept: HOME HEALTH SERVICES | Facility: HOME HEALTHCARE | Age: 68
End: 2023-09-20
Payer: COMMERCIAL

## 2023-09-20 VITALS — HEART RATE: 97 BPM | SYSTOLIC BLOOD PRESSURE: 135 MMHG | DIASTOLIC BLOOD PRESSURE: 80 MMHG | OXYGEN SATURATION: 97 %

## 2023-09-20 PROCEDURE — G0152 HHCP-SERV OF OT,EA 15 MIN: HCPCS

## 2023-09-22 ENCOUNTER — HOME CARE VISIT (OUTPATIENT)
Dept: HOME HEALTH SERVICES | Facility: HOME HEALTHCARE | Age: 68
End: 2023-09-22
Payer: COMMERCIAL

## 2023-09-22 ENCOUNTER — OFFICE VISIT (OUTPATIENT)
Dept: WOUND CARE | Facility: CLINIC | Age: 68
End: 2023-09-22
Payer: COMMERCIAL

## 2023-09-22 VITALS
DIASTOLIC BLOOD PRESSURE: 72 MMHG | RESPIRATION RATE: 18 BRPM | SYSTOLIC BLOOD PRESSURE: 151 MMHG | WEIGHT: 195 LBS | HEART RATE: 75 BPM | HEIGHT: 69 IN | BODY MASS INDEX: 28.88 KG/M2 | TEMPERATURE: 97.6 F

## 2023-09-22 DIAGNOSIS — T81.89XA NONHEALING SURGICAL WOUND, INITIAL ENCOUNTER: Primary | ICD-10-CM

## 2023-09-22 PROCEDURE — 99204 OFFICE O/P NEW MOD 45 MIN: CPT | Performed by: ORTHOPAEDIC SURGERY

## 2023-09-22 PROCEDURE — 11042 DBRDMT SUBQ TIS 1ST 20SQCM/<: CPT | Performed by: ORTHOPAEDIC SURGERY

## 2023-09-22 PROCEDURE — 97597 DBRDMT OPN WND 1ST 20 CM/<: CPT | Performed by: ORTHOPAEDIC SURGERY

## 2023-09-22 PROCEDURE — 97598 DBRDMT OPN WND ADDL 20CM/<: CPT | Performed by: ORTHOPAEDIC SURGERY

## 2023-09-22 PROCEDURE — 99203 OFFICE O/P NEW LOW 30 MIN: CPT | Performed by: ORTHOPAEDIC SURGERY

## 2023-09-22 PROCEDURE — 11045 DBRDMT SUBQ TISS EACH ADDL: CPT | Performed by: ORTHOPAEDIC SURGERY

## 2023-09-22 NOTE — PATIENT INSTRUCTIONS
Orders Placed This Encounter   Procedures    Wound cleansing and dressings     Right groin wound    Wash your hands with soap and water. Remove old dressing, discard into plastic bag and place in trash. Cleanse the wound with saline jet squirt in to flush then dab wound with prophase (at wound center only did a dakins cleanse) prior to applying a clean dressing. Do not use tissue or cotton balls. Do not scrub the wound. Pat dry using gauze  . Shower no       Apply aquacel AG to the groin wound. Tuck the aquacel ag into the two small area of depth then lay the aquacel ag along the rest of the incision line.  Cover with gauze or ABD pad  Secure with underwear     Change dressing daily     Standing Status:   Future     Standing Expiration Date:   9/22/2024

## 2023-09-22 NOTE — PROGRESS NOTES
Patient ID: Yaw Hearn is a 76 y.o. male Date of Birth 1955       Chief Complaint   Patient presents with   • New Patient Visit     Right groin wound       Allergies:  Ace inhibitors    Diagnosis:      Diagnosis ICD-10-CM Associated Orders   1. Nonhealing surgical wound, initial encounter  T81.89XA Wound cleansing and dressings              Assessment and Plan :  • Initial Evaluation of non healing surgical wound on right groin extending down perineum s/p I&D due to a necrotizing soft tissue infection. Wound with areas that are not well approximated despite intact sutures No signs of infection today. • Debrided as below  • Wound management with Aquacel Ag ribbon. See wound orders below   • May irrigate wound with NSS and then cleanse with Prophase. • Do not shower nor wet wound with any harsh cleansers such as alcohol, peroxide, or antibacterial soap, do not submerge in water  • Turn and reposition every 1-2 hours for pressure redistribution on skin. Avoid direct pressure to Wound site. Do Not Sit for Long Periods of Time. • Followup in 1 week(s) or call sooner with questions or concerns or any signs of infection such as redness, swelling, increased/purulent drainage, fever, chills, increased severe pain.      Subjective:   HPI    The following portions of the patient's history were reviewed and updated as appropriate:   Patient Active Problem List   Diagnosis   • Essential hypertension   • Insulin dependent diabetes mellitus ([address])   • CAD (coronary artery disease)   • Esophageal reflux   • Generalized anxiety disorder   • Ischemic cardiomyopathy   • Hyperlipidemia   • ICD (implantable cardioverter-defibrillator) in place   • Chronic systolic CHF (congestive heart failure) ([address])   • Ventricular tachycardia ([address])   • Necrotizing soft tissue infection   • Cholelithiasis   • Jamarcus's gangrene   • ICD (implantable cardioverter-defibrillator) infection (HCC)   • Streptococcal bacteremia   • Anemia of chronic disease   • Hypomagnesemia   • Nonhealing surgical wound, initial encounter     Past Medical History:   Diagnosis Date   • Benign essential hypertension 06/02/2011    Last Assessment & Plan:  BP stable on current meds   • Chronic systolic CHF (congestive heart failure) ([address]) 04/12/2021   • Coronary atherosclerosis 04/08/2010    Overview:  Managed by cardiology   • Generalized anxiety disorder 05/22/2012   • ICD (implantable cardioverter-defibrillator) in place 12/04/2019   • Ischemic cardiomyopathy 09/13/2012   • Mixed hyperlipidemia 04/08/2010    Managed by cardiology   Last Assessment & Plan:  on lipitor 40 mg, zetia, lovaza ,LDL 83. continue   • Necrotizing soft tissue infection 08/13/2023   • Rectal hemorrhage     last assessed: May 29, 2015   • S/P CABG (coronary artery bypass graft) 1994   • Uncontrolled type 2 diabetes mellitus with hyperglycemia ([address]) 09/15/2016    Transitioned From: Diabetes Mellitus   • Ventricular tachycardia ([address]) 04/12/2021     Past Surgical History:   Procedure Laterality Date   • CARDIAC ELECTROPHYSIOLOGY PROCEDURE N/A 8/24/2023    Procedure: Cardiac laser lead extraction;  Surgeon: Ervin Almeida DO;  [facility];  Service: Cardiac Surgery   • CARDIAC ELECTROPHYSIOLOGY PROCEDURE N/A 8/30/2023    Procedure: Cardiac icd implant right sided dual chamber;  Surgeon: Amaya Reese MD;  Location: [facility];   Service: Cardiology   • CORONARY ARTERY BYPASS GRAFT     • DECUBITUS ULCER EXCISION Right 8/14/2023    Procedure: Washout and debridement of perineal wound;  Surgeon: Patricia Bonilla MD;  [facility];  Service: General   • ELBOW SURGERY Right     Bursitis - last assessed: Sept 15, 2016   • IR PICC REPOSITION  8/18/2023   • ND RMVL TRANSVNS PM ELTRD DUAL LEAD SYS N/A 8/24/2023    Procedure: REMOVAL PACEMAKER/AICD LEADS W LASER- EXTRACTION ONLY;  Surgeon: Ervin Almeida DO;  [facility];  Service: Cardiac Surgery   • VAC DRESSING APPLICATION N/A 8/16/2023    Procedure: APPLICATION VAC DRESSING;  Surgeon: Cara Jolly DO;  [facility];  Service: General   • WOUND DEBRIDEMENT N/A 8/13/2023    Procedure: EXCISIONAL DEBRIDEMENT Perineum;  Surgeon: Monico Cruz DO;  [facility];  Service: General   • WOUND DEBRIDEMENT N/A 8/16/2023    Procedure: DEBRIDEMENT WOUND, [address] OUT, PARTIAL CLOSURE;  Surgeon: Cara Jolly DO;  Location[facility];  Service: General     Family History   Problem Relation Age of Onset   • No Known Problems Mother    • Cancer Father    • Coronary artery disease Father       Social History     Socioeconomic History   • Marital status: /Civil Union     Spouse name: None   • Number of children: None   • Years of education: None   • Highest education level: None   Occupational History   • None   Tobacco Use   • Smoking status: Never   • Smokeless tobacco: Never   Substance and Sexual Activity   • Alcohol use: No     Comment: being a social drinker noted in "allscripts"    • Drug use: No   • Sexual activity: None   Other Topics Concern   • None   Social History Narrative   • None     Social Determinants of Health     Financial Resource Strain: Low Risk  (1/27/2023)    Overall Financial Resource Strain (CARDIA)    • Difficulty of Paying Living Expenses: Not hard at all   Food Insecurity: No Food Insecurity (8/15/2023)    Hunger Vital Sign    • Worried About Running Out of Food in the Last Year: Never true    • Ran Out of Food in the Last Year: Never true   Transportation Needs: No Transportation Needs (8/15/2023)    PRAPARE - Transportation    • Lack of Transportation (Medical): No    • Lack of Transportation (Non-Medical):  No   Physical Activity: Not on file   Stress: Not on file   Social Connections: Not on file   Intimate Partner Violence: Not on file   Housing Stability: Low Risk  (8/15/2023)    Housing Stability Vital Sign    • Unable to Pay for Housing in the Last Year: No    • Number of Places Lived in the Last Year: 1    • Unstable Housing in the Last Year: No        Current Outpatient Medications:   •  aspirin 81 MG tablet, Take 1 tablet by mouth 2 (two) times a day , Disp: , Rfl:   •  atorvastatin (LIPITOR) 40 mg tablet, Take 1 tablet by mouth daily, Disp: , Rfl:   •  B-D ULTRAFINE III SHORT PEN 31G X 8 MM MISC, , Disp: , Rfl:   •  BD ULTRA-FINE PEN NEEDLES 29G X 12.7MM MISC, , Disp: , Rfl:   •  carvedilol (COREG) 12.5 mg tablet, Take 1 tablet by mouth 2 (two) times a day, Disp: , Rfl:   •  cefTRIAXone 2,000 mg in dextrose 5 % 50 mL IVPB, Inject 2,000 mg into a catheter in a vein over 30 minutes at 100 mL/hr every 24 hours Through 10/5 Do not start before September 1, 2023., Disp: , Rfl: 0  •  Continuous Blood Gluc Sensor (FREESTYLE DRU SENSOR SYSTEM) MISC, Use 1 each every 14 (fourteen) days, Disp: , Rfl:   •  dulaglutide (Trulicity) 3 SV/8.0GZ injection, Inject 0.5 mL (3 mg total) under the skin every 7 days, Disp: , Rfl:   •  Empagliflozin (JARDIANCE) 25 MG TABS, Take 1 tablet (25 mg total) by mouth every morning, Disp: , Rfl:   •  escitalopram (LEXAPRO) 20 mg tablet, Take 1 tablet (20 mg total) by mouth daily, Disp: 90 tablet, Rfl: 3  •  ezetimibe (ZETIA) 10 mg tablet, Take 1 tablet by mouth daily, Disp: , Rfl:   •  fenofibrate (TRICOR) 145 mg tablet, Take 145 mg by mouth daily, Disp: , Rfl:   •  Insulin Glargine Solostar (Lantus SoloStar) 100 UNIT/ML SOPN, Inject 0.4 mL (40 Units total) under the skin daily at bedtime, Disp: 12 mL, Rfl: 0  •  insulin lispro (HumaLOG) 100 units/mL injection, Inject 15 Units under the skin 3 (three) times a day with meals (Patient taking differently: Inject 8 Units under the skin 3 (three) times a day with meals), Disp: 20 mL, Rfl: 0  •  Insulin Pen Needle (PEN NEEDLES 29GX1/2") 29G X 12MM MISC, , Disp: , Rfl:   •  isosorbide mononitrate (IMDUR) 30 mg 24 hr tablet, Take 30 mg by mouth daily at bedtime, Disp: , Rfl: 3  •  Lancets MISC, test once times daily E11.65 (Patient not taking: Reported on 9/7/2023), Disp: , Rfl:   •  lansoprazole (PREVACID) 30 mg capsule, Take 1 capsule (30 mg total) by mouth daily, Disp: 90 capsule, Rfl: 3  •  LORazepam (ATIVAN) 1 mg tablet, TAKE 1 TABLET DAILY AS NEEDED FOR ANXIETY, Disp: 30 tablet, Rfl: 3  •  metFORMIN (GLUCOPHAGE) 500 mg tablet, Take 1,000 mg by mouth 2 (two) times a day, Disp: , Rfl:   •  Misc Natural Products (BLOOD SUGAR 360 PO), ONETOUCH ULTRA TEST STRIPS Test once daily E11.65, Disp: , Rfl:   •  omega-3-acid ethyl esters (LOVAZA) 1 g capsule, Take 2 g by mouth 2 (two) times a day , Disp: , Rfl:   •  oxyCODONE (ROXICODONE) 5 immediate release tablet, Take 1 tablet (5 mg total) by mouth every 4 (four) hours as needed for moderate pain or severe pain Max Daily Amount: 30 mg, Disp: 20 tablet, Rfl: 0  •  sodium chloride, PF, 0.9 %, Inject 10 mL into a catheter in a vein daily.  Indications: Treatment with Antibiotics, Disp: , Rfl:   •  sotalol (BETAPACE) 80 mg tablet, Take 1 tablet by mouth 2 (two) times a day, Disp: , Rfl:   •  spironolactone (ALDACTONE) 25 mg tablet, Take 1 tablet by mouth daily, Disp: , Rfl:     Review of Systems    Objective:  /72   Pulse 75   Temp 97.6 °F (36.4 °C)   Resp 18   Ht 5' 9" (1.753 m)   Wt 88.5 kg (195 lb)   BMI 28.80 kg/m²   Pain Score:   3     Physical Exam                      Wound 09/22/23 Surgical Groin Anterior;Right (Active)   Fatimah-wound Assessment Pink 09/22/23 1419   Wound Length (cm) 12.5 cm 09/22/23 1419   Wound Width (cm) 4.9 cm 09/22/23 1419   Wound Depth (cm) 0.8 cm 09/22/23 1419   Wound Surface Area (cm^2) 61.25 cm^2 09/22/23 1419   Wound Volume (cm^3) 49 cm^3 09/22/23 1419   Calculated Wound Volume (cm^3) 49 cm^3 09/22/23 1419   Wound Site Closure Sutures 09/22/23 1419   Drainage Amount Moderate 09/22/23 1419   Drainage Description Yellow 09/22/23 1419   Non-staged Wound Description Full thickness 09/22/23 1419   Dressing Status Intact 09/22/23 1419         Debridement Wound 09/22/23 Surgical Groin Anterior;Right    Universal Protocol:  Consent: Verbal consent obtained. Written consent obtained. Risks and benefits: risks, benefits and alternatives were discussed  Consent given by: patient  Time out: Immediately prior to procedure a "time out" was called to verify the correct patient, procedure, equipment, support staff and site/side marked as required. Patient understanding: patient states understanding of the procedure being performed  Patient identity confirmed: verbally with patient      Performed by: PA  Debridement type: selective  Pain control: lidocaine 4%  Post-debridement measurements  Length (cm): 12.5  Width (cm): 4.9  Depth (cm): 0.8  Percent debrided: 50%  Surface Area (cm^2): 61.25  Area debrided (cm^2): 30.63  Volume (cm^3): 49  Tissue and other material debrided: subcutaneous tissue  Devitalized tissue debrided: biofilm  Instrument(s) utilized: curette  Bleeding: small  Hemostasis obtained with: pressure  Procedural pain (0-10): 0  Post-procedural pain: 0   Response to treatment: procedure was tolerated well           Results from last 6 Months   Lab Units 08/13/23  1930   WOUND CULTURE  3+ Growth of Beta Hemolytic Streptococcus Group C*  Growth in Broth culture only Escherichia coli*       Wound Instructions:  Orders Placed This Encounter   Procedures   • Wound cleansing and dressings     Right groin wound    Wash your hands with soap and water. Remove old dressing, discard into plastic bag and place in trash. Cleanse the wound with saline jet squirt in to flush then dab wound with prophase (at wound center only did a dakins cleanse) prior to applying a clean dressing. Do not use tissue or cotton balls. Do not scrub the wound. Pat dry using gauze  . Shower no       Apply aquacel AG to the groin wound. Tuck the aquacel ag into the two small area of depth then lay the aquacel ag along the rest of the incision line.  Cover with gauze or ABD pad  Secure with underwear     Change dressing daily     Standing Status:   Future     Standing Expiration Date:   9/22/2024       Total time spent today:  *** minutes. This includes reviewing the patient's chart, pertinent physician records *** and ***. Little Randolph PA-C, [facility]    Portions of the record may have been created with voice recognition software. Occasional wrong word or "sound alike" substitutions may have occurred due to the inherent limitations of voice recognition software. Read the chart carefully and recognize, using context, where substitutions have occurred. dakins cleanse) prior to applying a clean dressing. Do not use tissue or cotton balls. Do not scrub the wound. Pat dry using gauze  . Shower no       Apply aquacel AG to the groin wound. Tuck the aquacel ag into the two small area of depth then lay the aquacel ag along the rest of the incision line. Cover with gauze or ABD pad  Secure with underwear     Change dressing daily     Standing Status:   Future     Standing Expiration Date:   9/22/2024       Total time spent today:  45 minutes. This includes reviewing the patient's chart, pertinent physician records from Dr. Amaya Rice, Trauma Surgery, 9/12/23, Dr. Luis F Aguillon, Critical Medicine, 9/7/23, Dr. Sergey Russo, 8/26/23, 8/27/23, 8/28/23, 8/29/23, 8/30/23, 8/31/23, Dr. Leonard Rodriguez, Hospitalist, 8/24/23, 8/25/23, Dr. Angel Layton, Cardiothoracic Surgery, 8/24/23, Dr. Jonn Freeman, General Surgery, 8/23/23, Dr. Fadia Tovar, Endocrinology, 8/20/23, 8/21/23, 8/22/23, 8/25/23, Dr. Monse Ramos, General Surgery, 8/17/23, Dr. Kat Gill, Infectious Disease, 8/16/23, 8/17/23,8/28/23, Dr. Capo Humphries, General Surgery, 8/16/23, 8/21/23, 8/22/23, 8/23/23, Dr. Scooby Aleman, General Surgery, 8/16/23, Dr. Jesús Roberts, General Surgery 8/15/23, 8/16/23, Dr. Sunita Unger, General Surgery, 8/14/23, 8/17/23, 8/18/23, Dr. Thor Arellano, [address] [place] [address],[facility] Floor, 8/14/23, Dr. Miguel Ángel Aguilar, Emergency Medicine, 8/13/23, Dr. Kacy Fu, General Surgery, 8/13/23, Abdominal CT scan on 8/13/23, Wound cultures on 8/13/23, Anaerobic cultures on 8/13, Blood cultures on 8/13/23 and A1C lab 1/27/23. Trisha Umana PA-C, [facility]    Portions of the record may have been created with voice recognition software. Occasional wrong word or "sound alike" substitutions may have occurred due to the inherent limitations of voice recognition software. Read the chart carefully and recognize, using context, where substitutions have occurred.

## 2023-09-25 NOTE — PROGRESS NOTES
REQUIRED DOCUMENTATION:      1. This service was provided via [address]. 2. Provider located at [address] [place] [address]. 3. TeleMed provider: Scooter Bermudez MD  4. Identify all parties in room with patient during tele consult: patient, patient's wife    5. After connecting through [employer], patient was identified by name and date of birth and assistant checked wristband. Patient was then informed that this was a Telemedicine visit and that the exam was being conducted confidentially over secure lines. My office door was closed. No one else was in the room. Patient acknowledged consent and understanding of privacy and security of the Telemedicine visit, and gave us permission to have the assistant stay in the room in order to assist with the history and to conduct the exam.  I informed the patient that I have reviewed their record in Epic and presented the opportunity for them to ask any questions regarding the visit today. The patient agreed to participate. Progress Note - Infectious Disease   Rufina Carter 76 y.o. male MRN: [id number]  Unit/Bed#:  Encounter: [id number]      Impression/Plan:  1. Streptococcus Bacteremia and ICD infection:  -During hospital admission in August, 1 of 2 sets of blood cultures from 8/13 grew Streptococcus anginosus. The likely source of bacteremia was his necrotizing perenial infection.  2D echo was noted to be a poor quality study. MARCELLA 8/18 found vegetation on pacer wire of RV lead though valves spared. Repeat blood cultures 8/13 negative. S/p device and lead removal 8/24/23. Repeat blood cultures 8/25 negative. He had device reimplantation on 8/30. Labs reviewed, all appear stable. He is tolerating IV antibiotics well via RUE PICC line.  No complaints of fevers or chills.  -Continue IV Ceftriaxone 2g daily   -Plan for 6 week course of IV abx from device removal, through 10/05/2023  -PICC to be removed on 10/05 after last dose of antibiotic  -Continue weekly CBCD, CMP while on IV antibiotic  -Cardiology follow up in December  -No formal need for ID follow up as patient has less than two weeks of antibiotics, but can return to clinic PRN     2. Mild Erythema around PICC Exit Site:  - Noted on exam. Per patient's wife, appears stable. There is no drainage, non-tender and not increasing in size. At this time suspect is mild skin irritation at exit site only. -counseled patient and wife to continue to monitor site and call our office if the erythema increases, drainage develops or pain/swelling develop  -ongoing dressing changes with home nursing; next is planned for today  -PICC to be removed soon, as above     3. Jamarcus's Gangrene:  -Treated during hospitalization, status post perineal debridement/washout on 8/13, repeat washout on 8/14 and partial closure with wound VAC application on 5/82. VQFFPM RYHP was the source of his bacteremia as above. OR cultures grew grown Group C Strep, Prevotella and E. Coli. He completed 5 day post op course of flagyl. Last saw surgery 9/12, still with open areas in the wound. They are closely monitoring. Last Wound Care visit on 9/22, per note no signs of infection. Per wife, wound overall doing well and no current concerns.  -ongoing follow up with Surgery and Wound Care     4. T2DM. Risk factor for invasive infections including necrotizing soft tissue infection.  A1c was 12.3 in January of this year.   -PCP and Endocrinology follow up       Above management plan discussed in detail with the patient and his wife. Antibiotics:  Ceftriaxone    Subjective:  Patient following with our clinic as he remains on IV Ceftriaxone as part of treatment for Streptococcus bacteremia and cardiac device infection. Doing well overall. No fever or chills. No nausea, diarrhea. PICC site is ok, no pain or drainage. No issues with the antibiotics. Seeing new wound care specialist closer to them, reports wound is doing well.  No other new concerns currently. Objective:     Physical Exam:     Documented physical exam is limited due to limited examination abilities on telemedicine     General Appearance:  Alert, interactive, nontoxic, no acute distress. Throat: Oropharynx moist without lesions. Lungs:   respirations unlabored   Heart:     Abdomen:    non-distended   Extremities: No clubbing, cyanosis or edema   Skin: No new rashes or lesions on exposed skin. Labs:    All pertinent labs and imaging studies were personally reviewed            Micro:        Imaging:      Kylee Luna MD  [facility]

## 2023-09-26 ENCOUNTER — TELEMEDICINE (OUTPATIENT)
Dept: INFECTIOUS DISEASES | Facility: CLINIC | Age: 68
End: 2023-09-26
Payer: COMMERCIAL

## 2023-09-26 ENCOUNTER — LAB REQUISITION (OUTPATIENT)
Dept: LAB | Facility: HOSPITAL | Age: 68
End: 2023-09-26
Payer: COMMERCIAL

## 2023-09-26 ENCOUNTER — HOME CARE VISIT (OUTPATIENT)
Dept: HOME HEALTH SERVICES | Facility: HOME HEALTHCARE | Age: 68
End: 2023-09-26
Payer: COMMERCIAL

## 2023-09-26 DIAGNOSIS — R78.81 STREPTOCOCCAL BACTEREMIA: Primary | ICD-10-CM

## 2023-09-26 DIAGNOSIS — B95.5 STREPTOCOCCAL BACTEREMIA: Primary | ICD-10-CM

## 2023-09-26 DIAGNOSIS — B95.5 UNSPECIFIED STREPTOCOCCUS AS THE CAUSE OF DISEASES CLASSIFIED ELSEWHERE: ICD-10-CM

## 2023-09-26 DIAGNOSIS — T82.7XXD INFECTION AND INFLAMMATORY REACTION DUE TO OTHER CARDIAC AND VASCULAR DEVICES, IMPLANTS AND GRAFTS, SUBSEQUENT ENCOUNTER: ICD-10-CM

## 2023-09-26 LAB
ALBUMIN SERPL BCP-MCNC: 4.1 G/DL (ref 3.5–5)
ALP SERPL-CCNC: 56 U/L (ref 34–104)
ALT SERPL W P-5'-P-CCNC: 7 U/L (ref 7–52)
ANION GAP SERPL CALCULATED.3IONS-SCNC: 8 MMOL/L
AST SERPL W P-5'-P-CCNC: 11 U/L (ref 13–39)
BASOPHILS # BLD AUTO: 0.06 THOUSANDS/ÂΜL (ref 0–0.1)
BASOPHILS NFR BLD AUTO: 1 % (ref 0–1)
BILIRUB SERPL-MCNC: 0.3 MG/DL (ref 0.2–1)
BUN SERPL-MCNC: 12 MG/DL (ref 5–25)
CALCIUM SERPL-MCNC: 9.6 MG/DL (ref 8.4–10.2)
CHLORIDE SERPL-SCNC: 102 MMOL/L (ref 96–108)
CO2 SERPL-SCNC: 25 MMOL/L (ref 21–32)
CREAT SERPL-MCNC: 0.71 MG/DL (ref 0.6–1.3)
EOSINOPHIL # BLD AUTO: 0.35 THOUSAND/ÂΜL (ref 0–0.61)
EOSINOPHIL NFR BLD AUTO: 4 % (ref 0–6)
ERYTHROCYTE [DISTWIDTH] IN BLOOD BY AUTOMATED COUNT: 13.3 % (ref 11.6–15.1)
GFR SERPL CREATININE-BSD FRML MDRD: 96 ML/MIN/1.73SQ M
GLUCOSE SERPL-MCNC: 109 MG/DL (ref 65–140)
HCT VFR BLD AUTO: 36.1 % (ref 36.5–49.3)
HGB BLD-MCNC: 11.2 G/DL (ref 12–17)
IMM GRANULOCYTES # BLD AUTO: 0.02 THOUSAND/UL (ref 0–0.2)
IMM GRANULOCYTES NFR BLD AUTO: 0 % (ref 0–2)
LYMPHOCYTES # BLD AUTO: 4.36 THOUSANDS/ÂΜL (ref 0.6–4.47)
LYMPHOCYTES NFR BLD AUTO: 49 % (ref 14–44)
MCH RBC QN AUTO: 29.5 PG (ref 26.8–34.3)
MCHC RBC AUTO-ENTMCNC: 31 G/DL (ref 31.4–37.4)
MCV RBC AUTO: 95 FL (ref 82–98)
MONOCYTES # BLD AUTO: 0.66 THOUSAND/ÂΜL (ref 0.17–1.22)
MONOCYTES NFR BLD AUTO: 7 % (ref 4–12)
NEUTROPHILS # BLD AUTO: 3.47 THOUSANDS/ÂΜL (ref 1.85–7.62)
NEUTS SEG NFR BLD AUTO: 39 % (ref 43–75)
NRBC BLD AUTO-RTO: 0 /100 WBCS
PLATELET # BLD AUTO: 261 THOUSANDS/UL (ref 149–390)
PMV BLD AUTO: 10.7 FL (ref 8.9–12.7)
POTASSIUM SERPL-SCNC: 4.5 MMOL/L (ref 3.5–5.3)
PROT SERPL-MCNC: 7.7 G/DL (ref 6.4–8.4)
RBC # BLD AUTO: 3.8 MILLION/UL (ref 3.88–5.62)
SODIUM SERPL-SCNC: 135 MMOL/L (ref 135–147)
WBC # BLD AUTO: 8.92 THOUSAND/UL (ref 4.31–10.16)

## 2023-09-26 PROCEDURE — 80053 COMPREHEN METABOLIC PANEL: CPT | Performed by: INTERNAL MEDICINE

## 2023-09-26 PROCEDURE — G0299 HHS/HOSPICE OF RN EA 15 MIN: HCPCS

## 2023-09-26 PROCEDURE — 99213 OFFICE O/P EST LOW 20 MIN: CPT | Performed by: INTERNAL MEDICINE

## 2023-09-26 PROCEDURE — 85025 COMPLETE CBC W/AUTO DIFF WBC: CPT | Performed by: INTERNAL MEDICINE

## 2023-09-26 NOTE — PROGRESS NOTES
Progress Note - Baljit Garvin 76 y.o. male MRN: [id number]    [facility] En[facility]: [id number]      CC: diabetes f/u    Subjective:   Heaven Stevens a 79y.o. 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 Now is status post debridements, hospital course complicated with bacteremia, R ventricular lead vegetation requiring extraction of pacemaker done by CT surgery on 8/24. Endocrinology consulted for management of type 2 diabetes mellitus. Feels well. No complaints. No hypoglycemia. Blood glucose slightly under goal range. Objective:     Vitals: Blood pressure 139/68, pulse 60, temperature 99.2 °F (37.3 °C), resp. rate 19, height 5' 9" (1.753 m), weight 96.2 kg (212 lb 1.3 oz), SpO2 92 %. ,Body mass index is 31.32 kg/m². Intake/Output Summary (Last 24 hours) at 8/31/2023 1637  Last data filed at 8/31/2023 0831  Gross per 24 hour   Intake --   Output 650 ml   Net -650 ml       Physical Exam:  General Appearance: awake, appears stated age and cooperative  Head: Normocephalic, without obvious abnormality, atraumatic  Extremities: moves all extremities  Skin: Skin color and temperature normal.   Pulm: no labored breathing    Lab, Imaging and other studies: I have personally reviewed pertinent reports. POC Glucose (mg/dl)   Date Value   08/31/2023 161 (H)   08/31/2023 101   08/30/2023 162 (H)   08/30/2023 104   08/30/2023 129   08/29/2023 133   08/29/2023 161 (H)   08/29/2023 158 (H)   08/29/2023 148 (H)   08/28/2023 127       Assessment and plan:    Type 2 diabetes mellitus with hyperglycemia  HbA1c 11 August 2023  Home regimen:  Toujeo U300 130 units at bedtime, Humalog U200 24 units with meals, Jardiance 25 mg daily, Trulicity 4.5 mg weekly, metformin 1000 mg twice daily, uses juany at home, follows with [facility]  Inpatient regimen: Lantus 45 Units, humalog 15 Units with meals, correctional scale algo 4 with meals & 3 at bedtime     Recommendations:  - We recommend discharging on Humalog at 15 units with meals, Lantus  40 units every morning, metformin 1000 mg twice daily, Jardiance 4.5 mg weekly, and stopping Jardiance indefinitely given Jamarcus's gangrene  --Extensive discussion with patient and wife at bedside. His outpatient diet is heavy on soda and snack cakes, he is not very active. We discussed how much less insulin he is requiring in the hospital when he does not eat and drink in the way that he does at home. -- Follow-up with [facility], already has scheduled    Portions of the record may have been created with voice recognition software. Please Tigertext questions to the clinician covering the "[id number]" Role. Thank you. Protopic Counseling: Patient may experience a mild burning sensation during topical application. Protopic is not approved in children less than 2 years of age. There have been case reports of hematologic and skin malignancies in patients using topical calcineurin inhibitors although causality is questionable.

## 2023-09-27 ENCOUNTER — HOME CARE VISIT (OUTPATIENT)
Dept: HOME HEALTH SERVICES | Facility: HOME HEALTHCARE | Age: 68
End: 2023-09-27
Payer: COMMERCIAL

## 2023-09-27 VITALS
TEMPERATURE: 96.7 F | SYSTOLIC BLOOD PRESSURE: 138 MMHG | RESPIRATION RATE: 20 BRPM | DIASTOLIC BLOOD PRESSURE: 72 MMHG | HEART RATE: 60 BPM | OXYGEN SATURATION: 95 %

## 2023-09-28 NOTE — CASE COMMUNICATION
Ship to  .  Home   Insurance [employer]     Wound 1 R groin      Full x            Dry Dressings     ABD [phone] [id number]    14    Aquacel Advantage ribbon 2cm x 45cm/ .75 in. x 18 in.   reference # [id number]      1

## 2023-09-29 ENCOUNTER — HOME CARE VISIT (OUTPATIENT)
Dept: HOME HEALTH SERVICES | Facility: HOME HEALTHCARE | Age: 68
End: 2023-09-29
Payer: COMMERCIAL

## 2023-09-29 ENCOUNTER — OFFICE VISIT (OUTPATIENT)
Dept: WOUND CARE | Facility: CLINIC | Age: 68
End: 2023-09-29
Payer: COMMERCIAL

## 2023-09-29 VITALS
DIASTOLIC BLOOD PRESSURE: 77 MMHG | SYSTOLIC BLOOD PRESSURE: 148 MMHG | RESPIRATION RATE: 18 BRPM | TEMPERATURE: 96.8 F | HEART RATE: 74 BPM

## 2023-09-29 DIAGNOSIS — Z48.02 ENCOUNTER FOR REMOVAL OF SUTURES: ICD-10-CM

## 2023-09-29 DIAGNOSIS — T81.89XA NONHEALING SURGICAL WOUND, INITIAL ENCOUNTER: Primary | ICD-10-CM

## 2023-09-29 PROCEDURE — 99213 OFFICE O/P EST LOW 20 MIN: CPT | Performed by: ORTHOPAEDIC SURGERY

## 2023-09-29 PROCEDURE — 17250 CHEM CAUT OF GRANLTJ TISSUE: CPT | Performed by: ORTHOPAEDIC SURGERY

## 2023-09-29 PROCEDURE — 15853 REMOVAL SUTR/STAPL XREQ ANES: CPT | Performed by: ORTHOPAEDIC SURGERY

## 2023-09-29 NOTE — PATIENT INSTRUCTIONS
Orders Placed This Encounter   Procedures    Wound cleansing and dressings     Right groin wound     Wash your hands with soap and water. Remove old dressing, discard into plastic bag and place in trash. Cleanse the wound with saline jet squirt in to flush then dab wound with prophase (at wound center only did a chlorhexidene cleanse) prior to applying a clean dressing. Do not use tissue or cotton balls. Do not scrub the wound. Pat dry using gauze  . Shower no         Apply aquacel AG to the groin wound. Tuck the aquacel ag into the two small area of depth then lay the aquacel ag along the rest of the incision line.  Cover with gauze or ABD pad  Secure with underwear      Change dressing daily     Standing Status:   Future     Standing Expiration Date:   9/29/2024    Wound miscellaneous orders     5 sutures removed today and had chemical cauterization done normal to have a silver to blackened area     Standing Status:   Future     Standing Expiration Date:   9/29/2024

## 2023-09-29 NOTE — PROGRESS NOTES
Patient ID: Jaida Palmer is a 76 y.o. male Date of Birth 1955       Chief Complaint   Patient presents with   • Follow Up Wound Care Visit     Non healing surgical wound       Allergies:  Ace inhibitors    Diagnosis:   Diagnosis ICD-10-CM Associated Orders   1. Nonhealing surgical wound, initial encounter  T81.89XA Wound cleansing and dressings     Wound miscellaneous orders      2. Encounter for removal of sutures  Z48.02            Assessment and Plan :  • F/u Evaluation of non healing surgical wound on right groin extending down perineum with hypergranulation on open distal wound bed. Skin approximated well distally and 5 sutures were removed without difficulty. • Chemically cauterized hypergranular area on distal aspect of wound bed. • Continue wound management with Aquacel Ag ribbon. See wound orders below   • May irrigate wound with NSS and then cleanse with Prophase. • Do not shower nor wet wound with any harsh cleansers such as alcohol, peroxide, or antibacterial soap, do not submerge in water  • Turn and reposition every 1-2 hours for pressure redistribution on skin. Avoid direct pressure to Wound site. Do Not Sit for Long Periods of Time. • Followup in 1 week(s) or call sooner with questions or concerns or any signs of infection such as redness, swelling, increased/purulent drainage, fever, chills, increased severe pain. Subjective:   9/22:  Patient is a 76 y.o. male with pmhx DMII (A1C 12.3), HTN, HLD, GERD, Anxiety, CAD, Cardiomyopathy with ICD and CHF who presents for initial eval of of non-healing surgical wound on right groin extending down perineum s/p perineal debridement/washout on 8/13, repeat washout on 8/14 and partial closure on 8/16 due to a necrotizing soft tissue infection/Jamarcus's gangrene. Wound cultures grew Group C Strep, Prevotella and E. Coli. Pt developed Streptococcus Bacteremia and ICD infection as well.  Since then pt was treated with a 5 day course of IV Flagyl and placed on IV Ceftriaxone 2g daily for 6 weeks, to be completed on 10/05/2023.  Pt has been applying wet to dry dressings daily. Does not have an odor. Has intact sutures and moderate yellow drainage.  No smoking, ETOH or drug use. Pt denies any sob, fatigue, N/V, CP, fever or chills. Pt is accompanied by his wife who is actively involved in his care. 9/29:  Patient presents for followup evaluation of non healing surgical wound on right groin extending down perineum,  accompanied by his wife. No new complaints. No increased pain or drainage.  Has been lightly packing with Aquacel Ag ribbon on the wound bed. Pt denies any fever or chills.       The following portions of the patient's history were reviewed and updated as appropriate:   Patient Active Problem List   Diagnosis   • Essential hypertension   • Insulin dependent diabetes mellitus ([address])   • CAD (coronary artery disease)   • Esophageal reflux   • Generalized anxiety disorder   • Ischemic cardiomyopathy   • Hyperlipidemia   • ICD (implantable cardioverter-defibrillator) in place   • Chronic systolic CHF (congestive heart failure) ([address])   • Ventricular tachycardia ([address])   • Necrotizing soft tissue infection   • Cholelithiasis   • Jamarcus's gangrene   • ICD (implantable cardioverter-defibrillator) infection ([address])   • Streptococcal bacteremia   • Anemia of chronic disease   • Hypomagnesemia   • Nonhealing surgical wound, initial encounter     Past Medical History:   Diagnosis Date   • Benign essential hypertension 06/02/2011    Last Assessment & Plan:  BP stable on current meds   • Chronic systolic CHF (congestive heart failure) ([address]) 04/12/2021   • Coronary atherosclerosis 04/08/2010    Overview:  Managed by cardiology   • Generalized anxiety disorder 05/22/2012   • ICD (implantable cardioverter-defibrillator) in place 12/04/2019   • Ischemic cardiomyopathy 09/13/2012   • Mixed hyperlipidemia 04/08/2010    Managed by cardiology   Last Assessment & Plan: on lipitor 40 mg, zetia, lovaza ,LDL 83. continue   • Necrotizing soft tissue infection 08/13/2023   • Rectal hemorrhage     last assessed: May 29, 2015   • S/P CABG (coronary artery bypass graft) 1994   • Uncontrolled type 2 diabetes mellitus with hyperglycemia ([address]) 09/15/2016    Transitioned From: Diabetes Mellitus   • Ventricular tachycardia ([address]) 04/12/2021     Past Surgical History:   Procedure Laterality Date   • CARDIAC ELECTROPHYSIOLOGY PROCEDURE N/A 8/24/2023    Procedure: Cardiac laser lead extraction;  Surgeon: Samreen Dietz DO;  [facility];  Service: Cardiac Surgery   • CARDIAC ELECTROPHYSIOLOGY PROCEDURE N/A 8/30/2023    Procedure: Cardiac icd implant right sided dual chamber;  Surgeon: Tello Contreras MD;  Location: [facility];   Service: Cardiology   • CORONARY ARTERY BYPASS GRAFT     • DECUBITUS ULCER EXCISION Right 8/14/2023    Procedure: Washout and debridement of perineal wound;  Surgeon: Angelina Magana MD;  Location[facility];  Service: General   • ELBOW SURGERY Right     Bursitis - last assessed: Sept 15, 2016   • IR PICC REPOSITION  8/18/2023   • [place] RMVL TRANSVNS PM ELTRD DUAL LEAD SYS N/A 8/24/2023    Procedure: REMOVAL PACEMAKER/AICD LEADS W LASER- EXTRACTION ONLY;  Surgeon: Samreen Dietz DO;  [facility];  Service: Cardiac Surgery   • VAC DRESSING APPLICATION N/A 6/53/0236    Procedure: APPLICATION VAC DRESSING;  Surgeon: Marina Jolly DO;  [facility];  Service: General   • WOUND DEBRIDEMENT N/A 8/13/2023    Procedure: EXCISIONAL DEBRIDEMENT Perineum;  Surgeon: Kanu De La Fuente DO;  Location: [facility];  Service: General   • WOUND DEBRIDEMENT N/A 8/16/2023    Procedure: DEBRIDEMENT WOUND, [address] OUT, PARTIAL CLOSURE;  Surgeon: Marina Jolly DO;  [facility];  Service: General     Family History   Problem Relation Age of Onset   • No Known Problems Mother    • Cancer Father    • Coronary artery disease Father      Social History Socioeconomic History   • Marital status: /Civil Union     Spouse name: None   • Number of children: None   • Years of education: None   • Highest education level: None   Occupational History   • None   Tobacco Use   • Smoking status: Never   • Smokeless tobacco: Never   Substance and Sexual Activity   • Alcohol use: No     Comment: being a social drinker noted in "allscripts"    • Drug use: No   • Sexual activity: None   Other Topics Concern   • None   Social History Narrative   • None     Social Determinants of Health     Financial Resource Strain: Low Risk  (1/27/2023)    Overall Financial Resource Strain (CARDIA)    • Difficulty of Paying Living Expenses: Not hard at all   Food Insecurity: No Food Insecurity (8/15/2023)    Hunger Vital Sign    • Worried About Running Out of Food in the Last Year: Never true    • Ran Out of Food in the Last Year: Never true   Transportation Needs: No Transportation Needs (8/15/2023)    PRAPARE - Transportation    • Lack of Transportation (Medical): No    • Lack of Transportation (Non-Medical):  No   Physical Activity: Not on file   Stress: Not on file   Social Connections: Not on file   Intimate Partner Violence: Not on file   Housing Stability: Low Risk  (8/15/2023)    Housing Stability Vital Sign    • Unable to Pay for Housing in the Last Year: No    • Number of Places Lived in the Last Year: 1    • Unstable Housing in the Last Year: No       Current Outpatient Medications:   •  aspirin 81 MG tablet, Take 1 tablet by mouth 2 (two) times a day , Disp: , Rfl:   •  atorvastatin (LIPITOR) 40 mg tablet, Take 1 tablet by mouth daily, Disp: , Rfl:   •  B-D ULTRAFINE III SHORT PEN 31G X 8 MM MISC, , Disp: , Rfl:   •  BD ULTRA-FINE PEN NEEDLES 29G X 12.7MM MISC, , Disp: , Rfl:   •  carvedilol (COREG) 12.5 mg tablet, Take 1 tablet by mouth 2 (two) times a day, Disp: , Rfl:   •  cefTRIAXone 2,000 mg in dextrose 5 % 50 mL IVPB, Inject 2,000 mg into a catheter in a vein over 30 minutes at 100 mL/hr every 24 hours Through 10/5 Do not start before September 1, 2023., Disp: , Rfl: 0  •  Continuous Blood Gluc Sensor ([address] [place] [address] Road) [facility], Use 1 each every 14 (fourteen) days, Disp: , Rfl:   •  dulaglutide (Trulicity) 3 OX/2.2LV injection, Inject 0.5 mL (3 mg total) under the skin every 7 days, Disp: , Rfl:   •  Empagliflozin (JARDIANCE) 25 MG TABS, Take 1 tablet (25 mg total) by mouth every morning, Disp: , Rfl:   •  escitalopram (LEXAPRO) 20 mg tablet, Take 1 tablet (20 mg total) by mouth daily, Disp: 90 tablet, Rfl: 3  •  ezetimibe (ZETIA) 10 mg tablet, Take 1 tablet by mouth daily, Disp: , Rfl:   •  fenofibrate (TRICOR) 145 mg tablet, Take 145 mg by mouth daily, Disp: , Rfl:   •  Insulin Glargine Solostar (Lantus SoloStar) 100 UNIT/ML SOPN, Inject 0.4 mL (40 Units total) under the skin daily at bedtime, Disp: 12 mL, Rfl: 0  •  insulin lispro (HumaLOG) 100 units/mL injection, Inject 15 Units under the skin 3 (three) times a day with meals (Patient taking differently: Inject 8 Units under the skin 3 (three) times a day with meals), Disp: 20 mL, Rfl: 0  •  Insulin Pen Needle (PEN NEEDLES 29GX1/2") 29G X 12MM MISC, , Disp: , Rfl:   •  isosorbide mononitrate (IMDUR) 30 mg 24 hr tablet, Take 30 mg by mouth daily at bedtime, Disp: , Rfl: 3  •  Lancets MISC, test once times daily E11.65 (Patient not taking: Reported on 9/7/2023), Disp: , Rfl:   •  lansoprazole (PREVACID) 30 mg capsule, Take 1 capsule (30 mg total) by mouth daily, Disp: 90 capsule, Rfl: 3  •  LORazepam (ATIVAN) 1 mg tablet, TAKE 1 TABLET DAILY AS NEEDED FOR ANXIETY, Disp: 30 tablet, Rfl: 3  •  metFORMIN (GLUCOPHAGE) 500 mg tablet, Take 1,000 mg by mouth 2 (two) times a day, Disp: , Rfl:   •  Misc Natural Products (BLOOD SUGAR 360 PO), ONETOUCH ULTRA TEST STRIPS Test once daily E11.65, Disp: , Rfl:   •  omega-3-acid ethyl esters (LOVAZA) 1 g capsule, Take 2 g by mouth 2 (two) times a day , Disp: , Rfl:   •  oxyCODONE (ROXICODONE) 5 immediate release tablet, Take 1 tablet (5 mg total) by mouth every 4 (four) hours as needed for moderate pain or severe pain Max Daily Amount: 30 mg, Disp: 20 tablet, Rfl: 0  •  sodium chloride, PF, 0.9 %, Inject 10 mL into a catheter in a vein daily. Indications: Treatment with Antibiotics, Disp: , Rfl:   •  sotalol (BETAPACE) 80 mg tablet, Take 1 tablet by mouth 2 (two) times a day, Disp: , Rfl:   •  spironolactone (ALDACTONE) 25 mg tablet, Take 1 tablet by mouth daily, Disp: , Rfl:     Review of Systems   Constitutional: Negative for appetite change, chills, fatigue, fever and unexpected weight change. HENT: Negative for congestion, hearing loss and postnasal drip. Respiratory: Negative for cough and shortness of breath. Cardiovascular: Negative for leg swelling. Skin: Positive for wound (Right groin). Negative for rash. Neurological: Negative for numbness. Hematological: Does not bruise/bleed easily. Psychiatric/Behavioral: Negative. Objective:  /77   Pulse 74   Temp (!) 96.8 °F (36 °C)   Resp 18   Pain Score: 0-No pain     Physical Exam  Vitals reviewed. Constitutional:       General: He is not in acute distress. Appearance: Normal appearance. He is well-developed and normal weight. HENT:      Head: Normocephalic and atraumatic. Cardiovascular:      Rate and Rhythm: Normal rate. Pulmonary:      Effort: Pulmonary effort is normal.   Musculoskeletal:         General: No deformity. Right lower leg: No edema. Left lower leg: No edema. Skin:     General: Skin is warm and dry. Findings: Wound (right groin) present. Comments: intact sutures with non-approximated wound edges on proximal aspect of wound bed. Hypergranular tissue to distal wound bed. Skin well approximated at the very distal perineum and 5 sutures removed. See wound assessment   Neurological:      General: No focal deficit present.       Mental Status: He is alert and oriented to person, place, and time. Gait: Gait normal.   Psychiatric:         Mood and Affect: Mood and affect normal.         Behavior: Behavior normal. Behavior is cooperative. Wound 09/22/23 Surgical Groin Anterior;Right (Active)   Wound Description Pink;Epithelialization 09/29/23 1119   Fatimah-wound Assessment Pink 09/29/23 1119   Wound Length (cm) 11.5 cm 09/29/23 1119   Wound Width (cm) 0.5 cm 09/29/23 1119   Wound Depth (cm) 1 cm 09/29/23 1119   Wound Surface Area (cm^2) 5.75 cm^2 09/29/23 1119   Wound Volume (cm^3) 5.75 cm^3 09/29/23 1119   Calculated Wound Volume (cm^3) 5.75 cm^3 09/29/23 1119   Change in Wound Size % 88.27 09/29/23 1119   Wound Site Closure Sutures 09/29/23 1119   Drainage Amount Small 09/29/23 1119   Drainage Description Yellow;Serosanguineous 09/29/23 1119   Non-staged Wound Description Full thickness 09/29/23 1119   Dressing Status Intact 09/29/23 1119           Chemical Caut Of A Wound     Date/Time 9/29/2023 11:30 AM     Performed by  Mary Johnson PA-C   Authorized by Mary Johnson PA-C      Associated wounds:   Wound 09/22/23 Surgical Groin Anterior;Right   Universal Protocol   Consent: Verbal consent obtained. Written consent obtained. Risks and benefits: risks, benefits and alternatives were discussed  Consent given by: patient  Time out: Immediately prior to procedure a "time out" was called to verify the correct patient, procedure, equipment, support staff and site/side marked as required.   Patient understanding: patient states understanding of the procedure being performed  Patient identity confirmed: verbally with patient        Local anesthesia used: yes     Anesthesia   Local anesthesia used: yes  Local Anesthetic: topical anesthetic     Sedation   Patient sedated: no        Specimen: no    Culture: no   Procedure Details   Procedure Notes: After permission and placement of topical local anesthetic,1 Silver nitrate stick(s) were used to cauterize the hypergranular tissue of the open wound. Normal saline was used to neutralize the reaction. A dressing was applied, see orders. Patient tolerated procedure well. Patient tolerance: Patient tolerated the procedure well with no immediate complications              Results from last 6 Months   Lab Units 08/13/23 1930   WOUND CULTURE  3+ Growth of Beta Hemolytic Streptococcus Group C*  Growth in Broth culture only Escherichia coli*         Wound Instructions:  Orders Placed This Encounter   Procedures   • Wound cleansing and dressings     Right groin wound     Wash your hands with soap and water. Remove old dressing, discard into plastic bag and place in trash. Cleanse the wound with saline jet squirt in to flush then dab wound with prophase (at wound center only did a chlorhexidene cleanse) prior to applying a clean dressing. Do not use tissue or cotton balls. Do not scrub the wound. Pat dry using gauze  . Shower no         Apply aquacel AG to the groin wound. Tuck the aquacel ag into the two small area of depth then lay the aquacel ag along the rest of the incision line. Cover with gauze or ABD pad  Secure with underwear      Change dressing daily     Standing Status:   Future     Standing Expiration Date:   9/29/2024   • Wound miscellaneous orders     5 sutures removed today and had chemical cauterization done normal to have a silver to blackened area     Standing Status:   Future     Standing Expiration Date:   9/29/2024       Quentin Pierson PA-C, [facility]      Portions of the record may have been created with voice recognition software. Occasional wrong word or "sound alike" substitutions may have occurred due to the inherent limitations of voice recognition software. Read the chart carefully and recognize, using context, where substitutions have occurred.

## 2023-10-02 ENCOUNTER — APPOINTMENT (OUTPATIENT)
Dept: LAB | Facility: CLINIC | Age: 68
End: 2023-10-02
Payer: COMMERCIAL

## 2023-10-02 ENCOUNTER — HOME CARE VISIT (OUTPATIENT)
Dept: HOME HEALTH SERVICES | Facility: HOME HEALTHCARE | Age: 68
End: 2023-10-02
Payer: COMMERCIAL

## 2023-10-02 PROCEDURE — G0299 HHS/HOSPICE OF RN EA 15 MIN: HCPCS

## 2023-10-03 VITALS
TEMPERATURE: 97.1 F | DIASTOLIC BLOOD PRESSURE: 58 MMHG | HEART RATE: 60 BPM | RESPIRATION RATE: 20 BRPM | SYSTOLIC BLOOD PRESSURE: 126 MMHG | OXYGEN SATURATION: 95 %

## 2023-10-06 ENCOUNTER — OFFICE VISIT (OUTPATIENT)
Dept: WOUND CARE | Facility: CLINIC | Age: 68
End: 2023-10-06
Payer: COMMERCIAL

## 2023-10-06 VITALS
DIASTOLIC BLOOD PRESSURE: 70 MMHG | RESPIRATION RATE: 18 BRPM | HEART RATE: 62 BPM | TEMPERATURE: 97.8 F | SYSTOLIC BLOOD PRESSURE: 129 MMHG

## 2023-10-06 DIAGNOSIS — T81.89XA NONHEALING SURGICAL WOUND, INITIAL ENCOUNTER: Primary | ICD-10-CM

## 2023-10-06 DIAGNOSIS — Z48.02 ENCOUNTER FOR REMOVAL OF SUTURES: ICD-10-CM

## 2023-10-06 PROCEDURE — 99213 OFFICE O/P EST LOW 20 MIN: CPT | Performed by: ORTHOPAEDIC SURGERY

## 2023-10-06 PROCEDURE — 99024 POSTOP FOLLOW-UP VISIT: CPT | Performed by: ORTHOPAEDIC SURGERY

## 2023-10-06 NOTE — PATIENT INSTRUCTIONS
Orders Placed This Encounter   Procedures    Wound cleansing and dressings     Right groin wound  Wash your hands with soap and water. Remove old dressing,  discard into plastic bag and place in trash. Cleanse the wound with  saline jet squirt in to flush then dab wound with prophase (at  wound center only did a dakins cleanse) prior to applying a clean  dressing. Do not use tissue or cotton balls. Do not scrub the wound. Pat dry using gauze  Shower no  Apply aquacel AG to the groin wound. Tuck the aquacel ag into the  two small area of depth then lay the aquacel ag along the rest of  the incision line.    Cover with gauze or ABD pad  Secure with underwear  Change dressing daily    4 sutures removed today     Standing Status:   Future     Standing Expiration Date:   10/6/2024

## 2023-10-09 ENCOUNTER — HOME CARE VISIT (OUTPATIENT)
Dept: HOME HEALTH SERVICES | Facility: HOME HEALTHCARE | Age: 68
End: 2023-10-09
Payer: COMMERCIAL

## 2023-10-09 VITALS
TEMPERATURE: 97.4 F | DIASTOLIC BLOOD PRESSURE: 80 MMHG | RESPIRATION RATE: 20 BRPM | SYSTOLIC BLOOD PRESSURE: 158 MMHG | HEART RATE: 80 BPM | OXYGEN SATURATION: 96 %

## 2023-10-09 PROCEDURE — G0299 HHS/HOSPICE OF RN EA 15 MIN: HCPCS

## 2023-10-09 NOTE — PROGRESS NOTES
Patient ID: Eze La is a 76 y.o. male Date of Birth 1955       Chief Complaint   Patient presents with   • Follow Up Wound Care Visit       Allergies:  Ace inhibitors    Diagnosis:   Diagnosis ICD-10-CM Associated Orders   1. Nonhealing surgical wound, initial encounter  T81.89XA Wound cleansing and dressings      2. Encounter for removal of sutures  Z48.02            Assessment and Plan :  • F/u evaluation of non-healing surgical wound on right groin progressively healing. Five sutures removed today without difficulty. • Continue wound management with Aquacel Ag ribbon. See wound orders below   • May irrigate wound with NSS and then cleanse with Prophase. • Do not shower nor wet wound with any harsh cleansers such as alcohol, peroxide, or antibacterial soap, do not submerge in water  • Turn and reposition every 1-2 hours for pressure redistribution on skin. Avoid direct pressure to Wound site. Do Not Sit for Long Periods of Time. • Followup in 1 week(s) or call sooner with questions or concerns or any signs of infection such as redness, swelling, increased/purulent drainage, fever, chills, increased severe pain    Subjective:   9/22:  Patient is a 76 y.o. male with pmhx DMII (A1C 12.3), HTN, HLD, GERD, Anxiety, CAD, Cardiomyopathy with ICD and CHF who presents for initial eval of of non-healing surgical wound on right groin extending down perineum s/p perineal debridement/washout on 8/13, repeat washout on 8/14 and partial closure on 8/16 due to a necrotizing soft tissue infection/Jamarcus's gangrene. Wound cultures grew Group C Strep, Prevotella and E. Coli. Pt developed Streptococcus Bacteremia and ICD infection as well. Since then pt was treated with a 5 day course of IV Flagyl and placed on IV Ceftriaxone 2g daily for 6 weeks, to be completed on 10/05/2023.  Pt has been applying wet to dry dressings daily. Does not have an odor.  Has intact sutures and moderate yellow drainage.  No smoking, ETOH or drug use. Pt denies any sob, fatigue, N/V, CP, fever or chills. Pt is accompanied by his wife who is actively involved in his care. 9/29:  Patient presents for followup evaluation of non healing surgical wound on right groin extending down perineum,  accompanied by his wife. No new complaints. No increased pain or drainage.  Has been lightly packing with Aquacel Ag ribbon on the wound bed. Pt denies any fever or chills. 10/6: Pt presents for f/u evaluation of non healing surgical wound on right groin extending down perineum. No issues or complaints. Has been applying Aquacel Ag dressing to wound bed. Denies any fevers or chills.          The following portions of the patient's history were reviewed and updated as appropriate:   Patient Active Problem List   Diagnosis   • Essential hypertension   • Insulin dependent diabetes mellitus ([address])   • CAD (coronary artery disease)   • Esophageal reflux   • Generalized anxiety disorder   • Ischemic cardiomyopathy   • Hyperlipidemia   • ICD (implantable cardioverter-defibrillator) in place   • Chronic systolic CHF (congestive heart failure) ([address])   • Ventricular tachycardia ([address])   • Necrotizing soft tissue infection   • Cholelithiasis   • Jamarcus's gangrene   • ICD (implantable cardioverter-defibrillator) infection ([address])   • Streptococcal bacteremia   • Anemia of chronic disease   • Hypomagnesemia   • Nonhealing surgical wound, initial encounter     Past Medical History:   Diagnosis Date   • Benign essential hypertension 06/02/2011    Last Assessment & Plan:  BP stable on current meds   • Chronic systolic CHF (congestive heart failure) ([address]) 04/12/2021   • Coronary atherosclerosis 04/08/2010    Overview:  Managed by cardiology   • Generalized anxiety disorder 05/22/2012   • ICD (implantable cardioverter-defibrillator) in place 12/04/2019   • Ischemic cardiomyopathy 09/13/2012   • Mixed hyperlipidemia 04/08/2010    Managed by cardiology   Last Assessment & Plan: on lipitor 40 mg, zetia, lovaza ,LDL 83. continue   • Necrotizing soft tissue infection 08/13/2023   • Rectal hemorrhage     last assessed: May 29, 2015   • S/P CABG (coronary artery bypass graft) 1994   • Uncontrolled type 2 diabetes mellitus with hyperglycemia ([address]) 09/15/2016    Transitioned From: Diabetes Mellitus   • Ventricular tachycardia ([address]) 04/12/2021     Past Surgical History:   Procedure Laterality Date   • CARDIAC ELECTROPHYSIOLOGY PROCEDURE N/A 8/24/2023    Procedure: Cardiac laser lead extraction;  Surgeon: Primitivo Bartlett DO;  [facility]: [facility];  Service: Cardiac Surgery   • CARDIAC ELECTROPHYSIOLOGY PROCEDURE N/A 8/30/2023    Procedure: Cardiac icd implant right sided dual chamber;  Surgeon: Enoch Rachel MD;  Location: [facility];   Service: Cardiology   • CORONARY ARTERY BYPASS GRAFT     • DECUBITUS ULCER EXCISION Right 8/14/2023    Procedure: Washout and debridement of perineal wound;  Surgeon: Maxime Mo MD;  Location: [facility];  Service: General   • ELBOW SURGERY Right     Bursitis - last assessed: Sept 15, 2016   • IR PICC REPOSITION  8/18/2023   • MS RMVL TRANSVNS PM ELTRD DUAL LEAD SYS N/A 8/24/2023    Procedure: REMOVAL PACEMAKER/AICD LEADS W LASER- EXTRACTION ONLY;  Surgeon: Primitivo Bartlett DO;  Location[facility];  Service: Cardiac Surgery   • VAC DRESSING APPLICATION N/A 3/17/7587    Procedure: APPLICATION VAC DRESSING;  Surgeon: Herminio Jolly DO;  [facility]: [facility];  Service: General   • WOUND DEBRIDEMENT N/A 8/13/2023    Procedure: EXCISIONAL DEBRIDEMENT Perineum;  Surgeon: Omi Smith DO;  Location: [facility];  Service: General   • WOUND DEBRIDEMENT N/A 8/16/2023    Procedure: DEBRIDEMENT WOUND, [address] OUT, PARTIAL CLOSURE;  Surgeon: Herminio Jolly DO;  Location[facility];  Service: General     Family History   Problem Relation Age of Onset   • No Known Problems Mother    • Cancer Father    • Coronary artery disease Father      Social History Socioeconomic History   • Marital status: /Civil Union     Spouse name: None   • Number of children: None   • Years of education: None   • Highest education level: None   Occupational History   • None   Tobacco Use   • Smoking status: Never   • Smokeless tobacco: Never   Substance and Sexual Activity   • Alcohol use: No     Comment: being a social drinker noted in "[employer]scripts"    • Drug use: No   • Sexual activity: None   Other Topics Concern   • None   Social History Narrative   • None     Social Determinants of Health     Financial Resource Strain: Low Risk  (1/27/2023)    Overall Financial Resource Strain (CARDIA)    • Difficulty of Paying Living Expenses: Not hard at all   Food Insecurity: No Food Insecurity (8/15/2023)    Hunger Vital Sign    • Worried About Running Out of Food in the Last Year: Never true    • Ran Out of Food in the Last Year: Never true   Transportation Needs: No Transportation Needs (8/15/2023)    PRAPARE - Transportation    • Lack of Transportation (Medical): No    • Lack of Transportation (Non-Medical):  No   Physical Activity: Not on file   Stress: Not on file   Social Connections: Not on file   Intimate Partner Violence: Not on file   Housing Stability: Low Risk  (8/15/2023)    Housing Stability Vital Sign    • Unable to Pay for Housing in the Last Year: No    • Number of Places Lived in the Last Year: 1    • Unstable Housing in the Last Year: No       Current Outpatient Medications:   •  aspirin 81 MG tablet, Take 1 tablet by mouth 2 (two) times a day , Disp: , Rfl:   •  atorvastatin (LIPITOR) 40 mg tablet, Take 1 tablet by mouth daily, Disp: , Rfl:   •  B-D ULTRAFINE III SHORT PEN 31G X 8 MM MISC, , Disp: , Rfl:   •  BD ULTRA-FINE PEN NEEDLES 29G X 12.7MM MISC, , Disp: , Rfl:   •  carvedilol (COREG) 12.5 mg tablet, Take 1 tablet by mouth 2 (two) times a day, Disp: , Rfl:   •  Continuous Blood Gluc Sensor ([address]) [facility], Use 1 each every 14 (fourteen) days, Disp: , Rfl:   •  dulaglutide (Trulicity) 3 VF/7.1PI injection, Inject 0.5 mL (3 mg total) under the skin every 7 days, Disp: , Rfl:   •  Empagliflozin (JARDIANCE) 25 MG TABS, Take 1 tablet (25 mg total) by mouth every morning, Disp: , Rfl:   •  escitalopram (LEXAPRO) 20 mg tablet, Take 1 tablet (20 mg total) by mouth daily, Disp: 90 tablet, Rfl: 3  •  ezetimibe (ZETIA) 10 mg tablet, Take 1 tablet by mouth daily, Disp: , Rfl:   •  fenofibrate (TRICOR) 145 mg tablet, Take 145 mg by mouth daily, Disp: , Rfl:   •  Insulin Glargine Solostar (Lantus SoloStar) 100 UNIT/ML SOPN, Inject 0.4 mL (40 Units total) under the skin daily at bedtime, Disp: 12 mL, Rfl: 0  •  insulin lispro (HumaLOG) 100 units/mL injection, Inject 15 Units under the skin 3 (three) times a day with meals (Patient taking differently: Inject 8 Units under the skin 3 (three) times a day with meals), Disp: 20 mL, Rfl: 0  •  Insulin Pen Needle (PEN NEEDLES 29GX1/2") 29G X 12MM MISC, , Disp: , Rfl:   •  isosorbide mononitrate (IMDUR) 30 mg 24 hr tablet, Take 30 mg by mouth daily at bedtime, Disp: , Rfl: 3  •  Lancets MISC, test once times daily E11.65 (Patient not taking: Reported on 9/7/2023), Disp: , Rfl:   •  lansoprazole (PREVACID) 30 mg capsule, Take 1 capsule (30 mg total) by mouth daily, Disp: 90 capsule, Rfl: 3  •  LORazepam (ATIVAN) 1 mg tablet, TAKE 1 TABLET DAILY AS NEEDED FOR ANXIETY, Disp: 30 tablet, Rfl: 3  •  metFORMIN (GLUCOPHAGE) 500 mg tablet, Take 1,000 mg by mouth 2 (two) times a day, Disp: , Rfl:   •  Misc Natural Products (BLOOD SUGAR 360 PO), ONETOUCH ULTRA TEST STRIPS Test once daily E11.65, Disp: , Rfl:   •  omega-3-acid ethyl esters (LOVAZA) 1 g capsule, Take 2 g by mouth 2 (two) times a day , Disp: , Rfl:   •  oxyCODONE (ROXICODONE) 5 immediate release tablet, Take 1 tablet (5 mg total) by mouth every 4 (four) hours as needed for moderate pain or severe pain Max Daily Amount: 30 mg, Disp: 20 tablet, Rfl: 0  •  sodium chloride, PF, 0.9 %, Inject 10 mL into a catheter in a vein daily. Indications: Treatment with Antibiotics, Disp: , Rfl:   •  sotalol (BETAPACE) 80 mg tablet, Take 1 tablet by mouth 2 (two) times a day, Disp: , Rfl:   •  spironolactone (ALDACTONE) 25 mg tablet, Take 1 tablet by mouth daily, Disp: , Rfl:     Review of Systems   Constitutional: Negative for appetite change, chills, fatigue, fever and unexpected weight change. HENT: Negative for congestion, hearing loss and postnasal drip. Respiratory: Negative for cough and shortness of breath. Cardiovascular: Negative for leg swelling. Skin: Positive for wound (Right groin). Negative for rash. Neurological: Negative for numbness. Hematological: Does not bruise/bleed easily. Psychiatric/Behavioral: Negative. Objective:  /70   Pulse 62   Temp 97.8 °F (36.6 °C)   Resp 18   Pain Score: 0-No pain     Physical Exam  Vitals reviewed. Constitutional:       General: He is not in acute distress. Appearance: Normal appearance. He is well-developed and normal weight. HENT:      Head: Normocephalic and atraumatic. Cardiovascular:      Rate and Rhythm: Normal rate. Pulmonary:      Effort: Pulmonary effort is normal.   Musculoskeletal:         General: No deformity. Right lower leg: No edema. Left lower leg: No edema. Skin:     General: Skin is warm and dry. Findings: Wound (right groin) present. Comments: intact sutures with non-approximated wound edges on proximal aspect of wound bed. 4 intact sutures removed without difficulty. See wound assessment   Neurological:      General: No focal deficit present. Mental Status: He is alert and oriented to person, place, and time. Gait: Gait normal.   Psychiatric:         Mood and Affect: Mood and affect normal.         Behavior: Behavior normal. Behavior is cooperative.                  Wound 09/22/23 Surgical Groin Anterior;Right (Active)   Wound Description Pink;Epithelialization 10/06/23 1117   Fatimah-wound Assessment [place] 10/06/23 1117   Wound Length (cm) 11.5 cm 10/06/23 1117   Wound Width (cm) 0.2 cm 10/06/23 1117   Wound Depth (cm) 0.1 cm 10/06/23 1117   Wound Surface Area (cm^2) 2.3 cm^2 10/06/23 1117   Wound Volume (cm^3) 0.23 cm^3 10/06/23 1117   Calculated Wound Volume (cm^3) 0.23 cm^3 10/06/23 1117   Change in Wound Size % 99.53 10/06/23 1117   Wound Site Closure Sutures 10/06/23 1117   Drainage Amount Moderate 10/06/23 1117   Drainage Description Yellow;Serosanguineous 10/06/23 1117   Non-staged Wound Description Full thickness 10/06/23 1117   Dressing Status Intact 10/06/23 1117     Results from last 6 Months   Lab Units 08/13/23  1930   WOUND CULTURE  3+ Growth of Beta Hemolytic Streptococcus Group C*  Growth in Broth culture only Escherichia coli*     Wound Instructions:  Orders Placed This Encounter   Procedures   • Wound cleansing and dressings     Right groin wound  Wash your hands with soap and water. Remove old dressing,  discard into plastic bag and place in trash. Cleanse the wound with  saline jet squirt in to flush then dab wound with prophase (at  wound center only did a dakins cleanse) prior to applying a clean  dressing. Do not use tissue or cotton balls. Do not scrub the wound. Pat dry using gauze  Shower no  Apply aquacel AG to the groin wound. Tuck the aquacel ag into the  two small area of depth then lay the aquacel ag along the rest of  the incision line. Cover with gauze or ABD pad  Secure with underwear  Change dressing daily    4 sutures removed today     Standing Status:   Future     Standing Expiration Date:   10/6/2024       Stephanie Gavin PA-C, [facility]      Portions of the record may have been created with voice recognition software. Occasional wrong word or "sound alike" substitutions may have occurred due to the inherent limitations of voice recognition software.  Read the chart carefully and recognize, using context, where substitutions have occurred.

## 2023-10-11 DIAGNOSIS — F32.A DEPRESSION, UNSPECIFIED DEPRESSION TYPE: ICD-10-CM

## 2023-10-11 DIAGNOSIS — K21.9 GASTROESOPHAGEAL REFLUX DISEASE: ICD-10-CM

## 2023-10-11 RX ORDER — ESCITALOPRAM OXALATE 20 MG/1
20 TABLET ORAL DAILY
Qty: 90 TABLET | Refills: 1 | Status: SHIPPED | OUTPATIENT
Start: 2023-10-11

## 2023-10-11 RX ORDER — LANSOPRAZOLE 30 MG/1
30 CAPSULE, DELAYED RELEASE ORAL DAILY
Qty: 90 CAPSULE | Refills: 1 | Status: SHIPPED | OUTPATIENT
Start: 2023-10-11

## 2023-10-13 ENCOUNTER — OFFICE VISIT (OUTPATIENT)
Dept: WOUND CARE | Facility: CLINIC | Age: 68
End: 2023-10-13
Payer: COMMERCIAL

## 2023-10-13 VITALS
SYSTOLIC BLOOD PRESSURE: 162 MMHG | HEART RATE: 68 BPM | RESPIRATION RATE: 18 BRPM | TEMPERATURE: 96.4 F | DIASTOLIC BLOOD PRESSURE: 80 MMHG

## 2023-10-13 DIAGNOSIS — Z48.02 ENCOUNTER FOR REMOVAL OF SUTURES: ICD-10-CM

## 2023-10-13 DIAGNOSIS — T81.89XA NONHEALING SURGICAL WOUND, INITIAL ENCOUNTER: Primary | ICD-10-CM

## 2023-10-13 PROCEDURE — 99213 OFFICE O/P EST LOW 20 MIN: CPT | Performed by: ORTHOPAEDIC SURGERY

## 2023-10-13 PROCEDURE — 15853 REMOVAL SUTR/STAPL XREQ ANES: CPT | Performed by: ORTHOPAEDIC SURGERY

## 2023-10-13 RX ORDER — LIDOCAINE 40 MG/G
CREAM TOPICAL ONCE
Status: COMPLETED | OUTPATIENT
Start: 2023-10-13 | End: 2023-10-13

## 2023-10-13 RX ADMIN — LIDOCAINE: 40 CREAM TOPICAL at 11:25

## 2023-10-13 NOTE — PROGRESS NOTES
Patient ID: Charleen Garcia is a 76 y.o. male Date of Birth 1955       Chief Complaint   Patient presents with    Follow Up Wound Care Visit     Non healing surgical wound       Allergies:  Ace inhibitors    Diagnosis:   Diagnosis ICD-10-CM Associated Orders   1. Nonhealing surgical wound, initial encounter  T81.89XA lidocaine (LMX) 4 % cream     Wound cleansing and dressings      2. Encounter for removal of sutures  Z48.02            Assessment and Plan :  F/u evaluation of non-healing surgical wound on right groin progressively healing. Last five sutures removed today without difficulty. Continue wound management with Aquacel Ag ribbon. See wound orders below   May irrigate wound with NSS and then cleanse with Prophase. Do not shower nor wet wound with any harsh cleansers such as alcohol, peroxide, or antibacterial soap, do not submerge in water  Turn and reposition every 1-2 hours for pressure redistribution on skin. Avoid direct pressure to Wound site. Do Not Sit for Long Periods of Time. Followup in 1 week(s) or call sooner with questions or concerns or any signs of infection such as redness, swelling, increased/purulent drainage, fever, chills, increased severe pain       Subjective:   9/22:  Patient is a 76 y.o. male with pmhx DMII (A1C 12.3), HTN, HLD, GERD, Anxiety, CAD, Cardiomyopathy with ICD and CHF who presents for initial eval of of non-healing surgical wound on right groin extending down perineum s/p perineal debridement/washout on 8/13, repeat washout on 8/14 and partial closure on 8/16 due to a necrotizing soft tissue infection/Jamarcus's gangrene. Wound cultures grew Group C Strep, Prevotella and E. Coli. Pt developed Streptococcus Bacteremia and ICD infection as well. Since then pt was treated with a 5 day course of IV Flagyl and placed on IV Ceftriaxone 2g daily for 6 weeks, to be completed on 10/05/2023. Pt has been applying wet to dry dressings daily. Does not have an odor.  Has intact sutures and moderate yellow drainage. No smoking, ETOH or drug use. Pt denies any sob, fatigue, N/V, CP, fever or chills. Pt is accompanied by his wife who is actively involved in his care. 9/29:  Patient presents for followup evaluation of non healing surgical wound on right groin extending down perineum,  accompanied by his wife. No new complaints. No increased pain or drainage. Has been lightly packing with Aquacel Ag ribbon on the wound bed. Pt denies any fever or chills. 10/6: Pt presents for f/u evaluation of non healing surgical wound on right groin extending down perineum. No issues or complaints. Has been applying Aquacel Ag dressing to wound bed. Denies any fevers or chills. 10/13: Pt presents for f/u evaluation of non healing surgical wound on right groin extending down perineum. No complaints. Denies any fevers or chills.        The following portions of the patient's history were reviewed and updated as appropriate:   Patient Active Problem List   Diagnosis    Essential hypertension    Insulin dependent diabetes mellitus ([address])    CAD (coronary artery disease)    Esophageal reflux    Generalized anxiety disorder    Ischemic cardiomyopathy    Hyperlipidemia    ICD (implantable cardioverter-defibrillator) in place    Chronic systolic CHF (congestive heart failure) ([address])    Ventricular tachycardia ([address])    Necrotizing soft tissue infection    Cholelithiasis    Jamarcus's gangrene    ICD (implantable cardioverter-defibrillator) infection ([address])    Streptococcal bacteremia    Anemia of chronic disease    Hypomagnesemia    Nonhealing surgical wound, initial encounter     Past Medical History:   Diagnosis Date    Benign essential hypertension 06/02/2011    Last Assessment & Plan:  BP stable on current meds    Chronic systolic CHF (congestive heart failure) ([address]) 04/12/2021    Coronary atherosclerosis 04/08/2010    Overview:  Managed by cardiology    Generalized anxiety disorder 05/22/2012    ICD (implantable cardioverter-defibrillator) in place 12/04/2019    Ischemic cardiomyopathy 09/13/2012    Mixed hyperlipidemia 04/08/2010    Managed by cardiology   Last Assessment & Plan:  on lipitor 40 mg, zetia, lovaza ,LDL 83. continue    Necrotizing soft tissue infection 08/13/2023    Rectal hemorrhage     last assessed: May 29, 2015    S/P CABG (coronary artery bypass graft) 1994    Uncontrolled type 2 diabetes mellitus with hyperglycemia ([address]) 09/15/2016    Transitioned From: Diabetes Mellitus    Ventricular tachycardia ([address]) 04/12/2021     Past Surgical History:   Procedure Laterality Date    CARDIAC ELECTROPHYSIOLOGY PROCEDURE N/A 8/24/2023    Procedure: Cardiac laser lead extraction;  Surgeon: Misha Encarnacion DO;  [facility];  Service: Cardiac Surgery    CARDIAC ELECTROPHYSIOLOGY PROCEDURE N/A 8/30/2023    Procedure: Cardiac icd implant right sided dual chamber;  Surgeon: Avinash Alves MD;  Location: [facility];   Service: Cardiology    CORONARY ARTERY BYPASS GRAFT      DECUBITUS ULCER EXCISION Right 8/14/2023    Procedure: Washout and debridement of perineal wound;  Surgeon: Jose Ortiz MD;  Location: [facility];  Service: General    ELBOW SURGERY Right     Bursitis - last assessed: Sept 15, 2016    IR PICC REPOSITION  8/18/2023    MD RMVL TRANSVNS PM ELTRD DUAL LEAD SYS N/A 8/24/2023    Procedure: REMOVAL PACEMAKER/AICD LEADS W LASER- EXTRACTION ONLY;  Surgeon: Misha Encarnacion DO;  [facility];  Service: Cardiac Surgery    VAC DRESSING APPLICATION N/A 4/43/0391    Procedure: APPLICATION VAC DRESSING;  Surgeon: Philip Jolly DO;  [facility];  Service: General    WOUND DEBRIDEMENT N/A 8/13/2023    Procedure: EXCISIONAL DEBRIDEMENT Perineum;  Surgeon: Migdalia Mina DO;  Location: [facility];  Service: General    WOUND DEBRIDEMENT N/A 8/16/2023    Procedure: DEBRIDEMENT WOUND, [address] OUT, PARTIAL CLOSURE;  Surgeon: Philip Jolly DO;  Location[facility];  Service: General     Family History   Problem Relation Age of Onset    No Known Problems Mother     Cancer Father     Coronary artery disease Father      Social History     Socioeconomic History    Marital status: /Civil Union     Spouse name: None    Number of children: None    Years of education: None    Highest education level: None   Occupational History    None   Tobacco Use    Smoking status: Never    Smokeless tobacco: Never   Substance and Sexual Activity    Alcohol use: No     Comment: being a social drinker noted in "[employer]scripts"     Drug use: No    Sexual activity: None   Other Topics Concern    None   Social History Narrative    None     Social Determinants of Health     Financial Resource Strain: Low Risk  (1/27/2023)    Overall Financial Resource Strain (CARDIA)     Difficulty of Paying Living Expenses: Not hard at all   Food Insecurity: No Food Insecurity (8/15/2023)    Hunger Vital Sign     Worried About Running Out of Food in the Last Year: Never true     Ran Out of Food in the Last Year: Never true   Transportation Needs: No Transportation Needs (8/15/2023)    PRAPARE - Transportation     Lack of Transportation (Medical): No     Lack of Transportation (Non-Medical):  No   Physical Activity: Not on file   Stress: Not on file   Social Connections: Not on file   Intimate Partner Violence: Not on file   Housing Stability: Low Risk  (8/15/2023)    Housing Stability Vital Sign     Unable to Pay for Housing in the Last Year: No     Number of Places Lived in the Last Year: 1     Unstable Housing in the Last Year: No       Current Outpatient Medications:     aspirin 81 MG tablet, Take 1 tablet by mouth 2 (two) times a day , Disp: , Rfl:     atorvastatin (LIPITOR) 40 mg tablet, Take 1 tablet by mouth daily, Disp: , Rfl:     B-D ULTRAFINE III SHORT PEN 31G X 8 MM MISC, , Disp: , Rfl:     BD ULTRA-FINE PEN NEEDLES 29G X 12.7MM MISC, , Disp: , Rfl:     carvedilol (COREG) 12.5 mg tablet, Take 1 tablet by mouth 2 (two) times a day, Disp: , Rfl:     Continuous Blood Gluc Sensor ([address] [place] [address] Road) [facility], Use 1 each every 14 (fourteen) days, Disp: , Rfl:     dulaglutide (Trulicity) 3 BI/2.0OW injection, Inject 0.5 mL (3 mg total) under the skin every 7 days, Disp: , Rfl:     Empagliflozin (JARDIANCE) 25 MG TABS, Take 1 tablet (25 mg total) by mouth every morning, Disp: , Rfl:     escitalopram (LEXAPRO) 20 mg tablet, TAKE 1 TABLET DAILY, Disp: 90 tablet, Rfl: 1    ezetimibe (ZETIA) 10 mg tablet, Take 1 tablet by mouth daily, Disp: , Rfl:     fenofibrate (TRICOR) 145 mg tablet, Take 145 mg by mouth daily, Disp: , Rfl:     Insulin Glargine Solostar (Lantus SoloStar) 100 UNIT/ML SOPN, Inject 0.4 mL (40 Units total) under the skin daily at bedtime, Disp: 12 mL, Rfl: 0    insulin lispro (HumaLOG) 100 units/mL injection, Inject 15 Units under the skin 3 (three) times a day with meals (Patient taking differently: Inject 8 Units under the skin 3 (three) times a day with meals), Disp: 20 mL, Rfl: 0    Insulin Pen Needle (PEN NEEDLES 29GX1/2") 29G X 12MM MISC, , Disp: , Rfl:     isosorbide mononitrate (IMDUR) 30 mg 24 hr tablet, Take 30 mg by mouth daily at bedtime, Disp: , Rfl: 3    Lancets MISC, test once times daily E11.65 (Patient not taking: Reported on 9/7/2023), Disp: , Rfl:     lansoprazole (PREVACID) 30 mg capsule, TAKE 1 CAPSULE DAILY, Disp: 90 capsule, Rfl: 1    LORazepam (ATIVAN) 1 mg tablet, TAKE 1 TABLET DAILY AS NEEDED FOR ANXIETY, Disp: 30 tablet, Rfl: 3    metFORMIN (GLUCOPHAGE) 500 mg tablet, Take 1,000 mg by mouth 2 (two) times a day, Disp: , Rfl:     Misc Natural Products (BLOOD SUGAR 360 PO), ONETOUCH ULTRA TEST STRIPS Test once daily E11.65, Disp: , Rfl:     omega-3-acid ethyl esters (LOVAZA) 1 g capsule, Take 2 g by mouth 2 (two) times a day , Disp: , Rfl:     oxyCODONE (ROXICODONE) 5 immediate release tablet, Take 1 tablet (5 mg total) by mouth every 4 (four) hours as needed for moderate pain or severe pain Max Daily Amount: 30 mg, Disp: 20 tablet, Rfl: 0    sodium chloride, PF, 0.9 %, Inject 10 mL into a catheter in a vein daily. Indications: Treatment with Antibiotics, Disp: , Rfl:     sotalol (BETAPACE) 80 mg tablet, Take 1 tablet by mouth 2 (two) times a day, Disp: , Rfl:     spironolactone (ALDACTONE) 25 mg tablet, Take 1 tablet by mouth daily, Disp: , Rfl:   No current facility-administered medications for this visit. Review of Systems   Constitutional:  Negative for appetite change, chills, fatigue, fever and unexpected weight change. HENT:  Negative for congestion, hearing loss and postnasal drip. Respiratory:  Negative for cough and shortness of breath. Cardiovascular:  Negative for leg swelling. Skin:  Positive for wound (Right groin). Negative for rash. Neurological:  Negative for numbness. Hematological:  Does not bruise/bleed easily. Psychiatric/Behavioral: Negative. Objective:  /80   Pulse 68   Temp (!) 96.4 °F (35.8 °C)   Resp 18   Pain Score: 0-No pain     Physical Exam  Vitals reviewed. Constitutional:       General: He is not in acute distress. Appearance: Normal appearance. He is well-developed and normal weight. HENT:      Head: Normocephalic and atraumatic. Cardiovascular:      Rate and Rhythm: Normal rate. Pulmonary:      Effort: Pulmonary effort is normal.   Musculoskeletal:         General: No deformity. Right lower leg: No edema. Left lower leg: No edema. Skin:     General: Skin is warm and dry. Findings: Wound (right groin) present. Comments: intact sutures with non-approximated wound edges on proximal aspect of wound bed. 5 sutures removed without difficulty. See wound assessment   Neurological:      General: No focal deficit present. Mental Status: He is alert and oriented to person, place, and time.       Gait: Gait normal.   Psychiatric:         Mood and Affect: Mood and affect normal.         Behavior: Behavior normal. Behavior is cooperative. Wound 09/22/23 Surgical Groin Anterior;Right (Active)   Wound Description Pink;Epithelialization 10/13/23 1118   Fatimah-wound Assessment Furman 10/13/23 1118   Wound Length (cm) 6.8 cm 10/13/23 1118   Wound Width (cm) 1 cm 10/13/23 1118   Wound Depth (cm) 0.2 cm 10/13/23 1118   Wound Surface Area (cm^2) 6.8 cm^2 10/13/23 1118   Wound Volume (cm^3) 1.36 cm^3 10/13/23 1118   Calculated Wound Volume (cm^3) 1.36 cm^3 10/13/23 1118   Change in Wound Size % 97.22 10/13/23 1118   Wound Site Closure Sutures 10/13/23 1118   Drainage Amount Small 10/13/23 1118   Drainage Description Yellow;Serosanguineous 10/13/23 1118   Non-staged Wound Description Full thickness 10/13/23 1118   Dressing Status Intact 10/13/23 1118     Results from last 6 Months   Lab Units 08/13/23  1930   WOUND CULTURE  3+ Growth of Beta Hemolytic Streptococcus Group C*  Growth in Broth culture only Escherichia coli*         Wound Instructions:  Orders Placed This Encounter   Procedures    Wound cleansing and dressings     Groin wound    Wash your hands with soap and water. Remove old dressing,  discard into plastic bag and place in trash. Cleanse the wound with  saline jet squirt in to flush then dab wound with prophase (at  wound center only did a dakins cleanse) prior to applying a clean  dressing. Do not use tissue or cotton balls. Do not scrub the wound. Pat dry using gauze    Shower no    Apply aquacel AG to the groin wound. Tuck the aquacel ag into the  area of depth then lay the aquacel ag along the open area of   the incision line. Cover with gauze or ABD pad  Secure with underwear  Change dressing daily     Standing Status:   Future     Standing Expiration Date:   10/13/2024       Anh Gaytan PA-C, [facility]      Portions of the record may have been created with voice recognition software.  Occasional wrong word or "sound alike" substitutions may have occurred due to the inherent limitations of voice recognition software. Read the chart carefully and recognize, using context, where substitutions have occurred.

## 2023-10-13 NOTE — PATIENT INSTRUCTIONS
Orders Placed This Encounter   Procedures    Wound cleansing and dressings     Groin wound    Wash your hands with soap and water. Remove old dressing,  discard into plastic bag and place in trash. Cleanse the wound with  saline jet squirt in to flush then dab wound with prophase (at  wound center only did a dakins cleanse) prior to applying a clean  dressing. Do not use tissue or cotton balls. Do not scrub the wound. Pat dry using gauze    Shower no    Apply aquacel AG to the groin wound. Tuck the aquacel ag into the  area of depth then lay the aquacel ag along the open area of   the incision line.    Cover with gauze or ABD pad  Secure with underwear  Change dressing daily     Standing Status:   Future     Standing Expiration Date:   10/13/2024

## 2023-10-18 ENCOUNTER — OFFICE VISIT (OUTPATIENT)
Dept: INTERNAL MEDICINE CLINIC | Facility: CLINIC | Age: 68
End: 2023-10-18
Payer: COMMERCIAL

## 2023-10-18 VITALS
SYSTOLIC BLOOD PRESSURE: 110 MMHG | HEART RATE: 64 BPM | BODY MASS INDEX: 30.45 KG/M2 | DIASTOLIC BLOOD PRESSURE: 60 MMHG | WEIGHT: 205.6 LBS | HEIGHT: 69 IN | OXYGEN SATURATION: 96 %

## 2023-10-18 DIAGNOSIS — T82.7XXD INFECTION INVOLVING IMPLANTABLE CARDIOVERTER-DEFIBRILLATOR (ICD), SUBSEQUENT ENCOUNTER: ICD-10-CM

## 2023-10-18 DIAGNOSIS — M79.89 NECROTIZING SOFT TISSUE INFECTION: ICD-10-CM

## 2023-10-18 DIAGNOSIS — Z79.4 INSULIN DEPENDENT TYPE 2 DIABETES MELLITUS (HCC): ICD-10-CM

## 2023-10-18 DIAGNOSIS — I50.22 CHRONIC SYSTOLIC CHF (CONGESTIVE HEART FAILURE) (HCC): ICD-10-CM

## 2023-10-18 DIAGNOSIS — E11.9 INSULIN DEPENDENT TYPE 2 DIABETES MELLITUS (HCC): ICD-10-CM

## 2023-10-18 DIAGNOSIS — N49.3 FOURNIER'S GANGRENE: Primary | ICD-10-CM

## 2023-10-18 PROCEDURE — 99214 OFFICE O/P EST MOD 30 MIN: CPT | Performed by: INTERNAL MEDICINE

## 2023-10-18 NOTE — PROGRESS NOTES
Name: Kenan Sarkar      :       MRN: [id number]  Encounter Provider: Mary Nichols MD  Encounter Date: 10/18/2023   Encounter department: [facility]     1. Jamarcsu's gangrene  Assessment & Plan:  Local wound care, wound managed by his wife and he goes to wound care every 2 weeks      2. Insulin dependent diabetes mellitus ([address])  Assessment & Plan:    Lab Results   Component Value Date    HGBA1C 11.0 (H) 2023   He is on metformin, Trulicity, Lantus and Humalog  Jardiance was discontinued. Orders:  -     CBC and differential; Future  -     Comprehensive metabolic panel; Future  -     HEMOGLOBIN A1C W/ EAG ESTIMATION; Future  -     Lipid panel; Future  -     TSH, 3rd generation with Free T4 reflex; Future  -     Protein / creatinine ratio, urine    3. Chronic systolic CHF (congestive heart failure) (HCC)  Assessment & Plan:  Wt Readings from Last 3 Encounters:   10/18/23 93.3 kg (205 lb 9.6 oz)   23 88.5 kg (195 lb)   23 88.5 kg (195 lb 1.7 oz)       Appears euvolemic on spironolactone        4. Necrotizing soft tissue infection  Assessment & Plan:  Completed  6 weeks of ceftriaxone      5. Infection involving implantable cardioverter-defibrillator (ICD), subsequent encounter  Assessment & Plan:  Explanted on  and new one place don         BMI Counseling: Body mass index is 30.36 kg/m². The BMI is above normal. Nutrition recommendations include consuming healthier snacks and moderation in carbohydrate intake. Rationale for BMI follow-up plan is due to patient being overweight or obese.          Subjective      Presented to the ER on  with an infection in the right groin  Dx with Fourniers gangrene and underwent washout and debridement  ,  and   with VAC   Blood culture grew strep anginosus  MARCLELA showed vegetation in ICD RV lead, ICD removed on  and new iCD placed   He has completed IV ceftriaxone about 2 weeks ago  Continues to go to wound care. His wife manages the wound at home  He is feeling well overall, no fever, chills  Denies having any pain        Review of Systems   Constitutional:  Negative for fever and unexpected weight change. Respiratory:  Negative for shortness of breath. Cardiovascular:  Negative for chest pain and palpitations. Gastrointestinal:  Negative for abdominal pain. Genitourinary:  Negative for difficulty urinating. Skin:  Positive for wound.        Current Outpatient Medications on File Prior to Visit   Medication Sig   • aspirin 81 MG tablet Take 1 tablet by mouth 2 (two) times a day    • atorvastatin (LIPITOR) 40 mg tablet Take 1 tablet by mouth daily   • BD ULTRA-FINE PEN NEEDLES 29G X 12.7MM MISC    • carvedilol (COREG) 12.5 mg tablet Take 1 tablet by mouth 2 (two) times a day   • Continuous Blood Gluc  (FreeStyle Elizabeth 2 [place]) TRACEY    • Continuous Blood Gluc Sensor (FREESTYLE ELIZABETH SENSOR SYSTEM) MISC Use 1 each every 14 (fourteen) days   • dulaglutide (Trulicity) 3 LI/9.7JA injection Inject 0.5 mL (3 mg total) under the skin every 7 days   • escitalopram (LEXAPRO) 20 mg tablet TAKE 1 TABLET DAILY   • ezetimibe (ZETIA) 10 mg tablet Take 1 tablet by mouth daily   • fenofibrate (TRICOR) 145 mg tablet Take 145 mg by mouth daily   • Insulin Glargine Solostar (Lantus SoloStar) 100 UNIT/ML SOPN Inject 0.4 mL (40 Units total) under the skin daily at bedtime   • insulin lispro (HumaLOG) 100 units/mL injection Inject 15 Units under the skin 3 (three) times a day with meals (Patient taking differently: Inject 8 Units under the skin 3 (three) times a day with meals)   • Insulin Pen Needle (PEN NEEDLES 29GX1/2") 29G X 12MM MISC    • isosorbide mononitrate (IMDUR) 30 mg 24 hr tablet Take 30 mg by mouth daily at bedtime   • lansoprazole (PREVACID) 30 mg capsule TAKE 1 CAPSULE DAILY   • metFORMIN (GLUCOPHAGE) 500 mg tablet Take 1,000 mg by mouth 2 (two) times a day   • Misc Natural Products (BLOOD SUGAR 360 PO) ONETOUCH ULTRA TEST STRIPS Test once daily E11.65   • omega-3-acid ethyl esters (LOVAZA) 1 g capsule Take 2 g by mouth 2 (two) times a day    • sotalol (BETAPACE) 80 mg tablet Take 1 tablet by mouth 2 (two) times a day   • spironolactone (ALDACTONE) 25 mg tablet Take 1 tablet by mouth daily   • B-D ULTRAFINE III SHORT PEN 31G X 8 MM MISC  (Patient not taking: Reported on 9/7/2023)   • Lancets MISC test once times daily E11.65 (Patient not taking: Reported on 9/7/2023)   • LORazepam (ATIVAN) 1 mg tablet TAKE 1 TABLET DAILY AS NEEDED FOR ANXIETY   • oxyCODONE (ROXICODONE) 5 immediate release tablet Take 1 tablet (5 mg total) by mouth every 4 (four) hours as needed for moderate pain or severe pain Max Daily Amount: 30 mg       Objective     /60 (BP Location: Left arm, Patient Position: Sitting, Cuff Size: Large)   Pulse 64   Ht 5' 9" (1.753 m)   Wt 93.3 kg (205 lb 9.6 oz)   SpO2 96%   BMI 30.36 kg/m²     Physical Exam  Constitutional:       Appearance: He is not ill-appearing. Cardiovascular:      Rate and Rhythm: Regular rhythm. Heart sounds: Normal heart sounds. Pulmonary:      Breath sounds: Normal breath sounds. Abdominal:      Palpations: Abdomen is soft. Tenderness: There is no abdominal tenderness. Genitourinary:     Comments: Clean wound on the right groin with non approximated edges, no drainage, erythema  Musculoskeletal:      Cervical back: Neck supple. Right lower leg: No edema. Left lower leg: No edema.    Psychiatric:         Mood and Affect: Mood normal.         Behavior: Behavior normal.       Tori Larry MD

## 2023-10-23 NOTE — ASSESSMENT & PLAN NOTE
Lab Results   Component Value Date    HGBA1C 11.0 (H) 08/18/2023   He is on metformin, Trulicity, Lantus and Humalog  Jardiance was discontinued.

## 2023-10-23 NOTE — ASSESSMENT & PLAN NOTE
Wt Readings from Last 3 Encounters:   10/18/23 93.3 kg (205 lb 9.6 oz)   09/22/23 88.5 kg (195 lb)   09/12/23 88.5 kg (195 lb 1.7 oz)       Appears euvolemic on spironolactone

## 2023-10-27 ENCOUNTER — OFFICE VISIT (OUTPATIENT)
Dept: WOUND CARE | Facility: CLINIC | Age: 68
End: 2023-10-27
Payer: COMMERCIAL

## 2023-10-27 VITALS
RESPIRATION RATE: 18 BRPM | DIASTOLIC BLOOD PRESSURE: 74 MMHG | TEMPERATURE: 96.4 F | HEART RATE: 87 BPM | SYSTOLIC BLOOD PRESSURE: 140 MMHG

## 2023-10-27 DIAGNOSIS — Z87.438 HISTORY OF FOURNIER'S GANGRENE: ICD-10-CM

## 2023-10-27 DIAGNOSIS — T81.89XA NONHEALING SURGICAL WOUND, INITIAL ENCOUNTER: Primary | ICD-10-CM

## 2023-10-27 PROCEDURE — 11042 DBRDMT SUBQ TIS 1ST 20SQCM/<: CPT | Performed by: ORTHOPAEDIC SURGERY

## 2023-10-27 RX ORDER — LIDOCAINE 40 MG/G
CREAM TOPICAL ONCE
Status: COMPLETED | OUTPATIENT
Start: 2023-10-27 | End: 2023-10-27

## 2023-10-27 RX ADMIN — LIDOCAINE: 40 CREAM TOPICAL at 11:05

## 2023-10-27 NOTE — PROGRESS NOTES
Patient ID: Eze La is a 76 y.o. male Date of Birth 1955       Chief Complaint   Patient presents with    Follow Up Wound Care Visit     Non healing  surgical wound       Allergies:  Ace inhibitors    Diagnosis:   Diagnosis ICD-10-CM Associated Orders   1. Nonhealing surgical wound, initial encounter  T81.89XA lidocaine (LMX) 4 % cream     Wound cleansing and dressings      2. History of Jamarcus's gangrene  Z87.438            Assessment and Plan :  F/u evaluation of non-healing surgical wound on right groin significantly decreasing in size and slowly healing. Continue wound management with Aquacel Ag ribbon. See wound orders below   May irrigate wound with NSS and then cleanse with Prophase. Do not shower nor wet wound with any harsh cleansers such as alcohol, peroxide, or antibacterial soap, do not submerge in water  Turn and reposition every 1-2 hours for pressure redistribution on skin. Avoid direct pressure to Wound site. Do Not Sit for Long Periods of Time. Followup in 3 week(s) or call sooner with questions or concerns or any signs of infection such as redness, swelling, increased/purulent drainage, fever, chills, increased severe pain    Subjective:   9/22:  Patient is a 76 y.o. male with pmhx DMII (A1C 12.3), HTN, HLD, GERD, Anxiety, CAD, Cardiomyopathy with ICD and CHF who presents for initial eval of of non-healing surgical wound on right groin extending down perineum s/p perineal debridement/washout on 8/13, repeat washout on 8/14 and partial closure on 8/16 due to a necrotizing soft tissue infection/Jamarcus's gangrene. Wound cultures grew Group C Strep, Prevotella and E. Coli. Pt developed Streptococcus Bacteremia and ICD infection as well. Since then pt was treated with a 5 day course of IV Flagyl and placed on IV Ceftriaxone 2g daily for 6 weeks, to be completed on 10/05/2023. Pt has been applying wet to dry dressings daily. Does not have an odor.  Has intact sutures and moderate yellow drainage. No smoking, ETOH or drug use. Pt denies any sob, fatigue, N/V, CP, fever or chills. Pt is accompanied by his wife who is actively involved in his care. 9/29:  Patient presents for followup evaluation of non healing surgical wound on right groin extending down perineum,  accompanied by his wife. No new complaints. No increased pain or drainage. Has been lightly packing with Aquacel Ag ribbon on the wound bed. Pt denies any fever or chills. 10/6: Pt presents for f/u evaluation of non healing surgical wound on right groin extending down perineum. No issues or complaints. Has been applying Aquacel Ag dressing to wound bed. Denies any fevers or chills. 10/13: Pt presents for f/u evaluation of non healing surgical wound on right groin extending down perineum. No complaints. Denies any fevers or chills. 10/27:  Pt presents for f/u evaluation of non healing surgical wound on right groin. No issues. Denies any fevers or chills.          The following portions of the patient's history were reviewed and updated as appropriate:   Patient Active Problem List   Diagnosis    Essential hypertension    Insulin dependent diabetes mellitus ([address])    CAD (coronary artery disease)    Esophageal reflux    Generalized anxiety disorder    Ischemic cardiomyopathy    Hyperlipidemia    ICD (implantable cardioverter-defibrillator) in place    Chronic systolic CHF (congestive heart failure) ([address])    Ventricular tachycardia ([address])    Necrotizing soft tissue infection    Cholelithiasis    Jamarcus's gangrene    ICD (implantable cardioverter-defibrillator) infection ([address])    Streptococcal bacteremia    Anemia of chronic disease    Hypomagnesemia    Nonhealing surgical wound, initial encounter     Past Medical History:   Diagnosis Date    Benign essential hypertension 06/02/2011    Last Assessment & Plan:  BP stable on current meds    Chronic systolic CHF (congestive heart failure) ([address]) 04/12/2021    Coronary atherosclerosis 04/08/2010    Overview:  Managed by cardiology    Generalized anxiety disorder 05/22/2012    ICD (implantable cardioverter-defibrillator) in place 12/04/2019    Ischemic cardiomyopathy 09/13/2012    Mixed hyperlipidemia 04/08/2010    Managed by cardiology   Last Assessment & Plan:  on lipitor 40 mg, zetia, lovaza ,LDL 83. continue    Necrotizing soft tissue infection 08/13/2023    Rectal hemorrhage     last assessed: May 29, 2015    S/P CABG (coronary artery bypass graft) 1994    Uncontrolled type 2 diabetes mellitus with hyperglycemia ([address]) 09/15/2016    Transitioned From: Diabetes Mellitus    Ventricular tachycardia ([address]) 04/12/2021     Past Surgical History:   Procedure Laterality Date    CARDIAC ELECTROPHYSIOLOGY PROCEDURE N/A 8/24/2023    Procedure: Cardiac laser lead extraction;  Surgeon: Silvia Cedillo DO;  Location: [facility];  Service: Cardiac Surgery    CARDIAC ELECTROPHYSIOLOGY PROCEDURE N/A 8/30/2023    Procedure: Cardiac icd implant right sided dual chamber;  Surgeon: Romelia Doss MD;  Location: [facility];   Service: Cardiology    CORONARY ARTERY BYPASS GRAFT      DECUBITUS ULCER EXCISION Right 8/14/2023    Procedure: Washout and debridement of perineal wound;  Surgeon: Debbie Mathew MD;  Location: [facility];  Service: General    ELBOW SURGERY Right     Bursitis - last assessed: Sept 15, 2016    IR PICC REPOSITION  8/18/2023    TX RMVL TRANSVNS PM ELTRD DUAL LEAD SYS N/A 8/24/2023    Procedure: REMOVAL PACEMAKER/AICD LEADS W LASER- EXTRACTION ONLY;  Surgeon: Silvia Cedillo DO;  [facility];  Service: Cardiac Surgery    VAC DRESSING APPLICATION N/A 8/31/0862    Procedure: APPLICATION VAC DRESSING;  Surgeon: Kerline Jolly DO;  [facility]: [facility];  Service: General    WOUND DEBRIDEMENT N/A 8/13/2023    Procedure: EXCISIONAL DEBRIDEMENT Perineum;  Surgeon: Hernandez Aguirre DO;  Location: [facility];  Service: General    WOUND DEBRIDEMENT N/A 8/16/2023 Procedure: DEBRIDEMENT WOUND, [address], PARTIAL CLOSURE;  Surgeon: Brandi Villa To, DO;  Location: [facility];  Service: General     Family History   Problem Relation Age of Onset    No Known Problems Mother     Cancer Father     Coronary artery disease Father      Social History     Socioeconomic History    Marital status: /Civil Union     Spouse name: None    Number of children: None    Years of education: None    Highest education level: None   Occupational History    None   Tobacco Use    Smoking status: Never    Smokeless tobacco: Never   Substance and Sexual Activity    Alcohol use: No     Comment: being a social drinker noted in "[employer]scripts"     Drug use: No    Sexual activity: None   Other Topics Concern    None   Social History Narrative    None     Social Determinants of Health     Financial Resource Strain: Low Risk  (1/27/2023)    Overall Financial Resource Strain (CARDIA)     Difficulty of Paying Living Expenses: Not hard at all   Food Insecurity: No Food Insecurity (8/15/2023)    Hunger Vital Sign     Worried About Running Out of Food in the Last Year: Never true     Ran Out of Food in the Last Year: Never true   Transportation Needs: No Transportation Needs (8/15/2023)    PRAPARE - Transportation     Lack of Transportation (Medical): No     Lack of Transportation (Non-Medical):  No   Physical Activity: Not on file   Stress: Not on file   Social Connections: Not on file   Intimate Partner Violence: Not on file   Housing Stability: Low Risk  (8/15/2023)    Housing Stability Vital Sign     Unable to Pay for Housing in the Last Year: No     Number of Places Lived in the Last Year: 1     Unstable Housing in the Last Year: No       Current Outpatient Medications:     aspirin 81 MG tablet, Take 1 tablet by mouth 2 (two) times a day , Disp: , Rfl:     atorvastatin (LIPITOR) 40 mg tablet, Take 1 tablet by mouth daily, Disp: , Rfl:     B-D ULTRAFINE III SHORT PEN 31G X 8 MM MISC, , Disp: , Rfl:     BD ULTRA-FINE PEN NEEDLES 29G X 12.7MM MISC, , Disp: , Rfl:     carvedilol (COREG) 12.5 mg tablet, Take 1 tablet by mouth 2 (two) times a day, Disp: , Rfl:     Continuous Blood Gluc  (FreeStyle [place] [address] [place]) TRACEY, , Disp: , Rfl:     Continuous Blood Gluc Sensor ([address] [place] [address]) [facility] [place], Use 1 each every 14 (fourteen) days, Disp: , Rfl:     dulaglutide (Trulicity) 3 SM/9.7SD injection, Inject 0.5 mL (3 mg total) under the skin every 7 days, Disp: , Rfl:     escitalopram (LEXAPRO) 20 mg tablet, TAKE 1 TABLET DAILY, Disp: 90 tablet, Rfl: 1    ezetimibe (ZETIA) 10 mg tablet, Take 1 tablet by mouth daily, Disp: , Rfl:     fenofibrate (TRICOR) 145 mg tablet, Take 145 mg by mouth daily, Disp: , Rfl:     Insulin Glargine Solostar (Lantus SoloStar) 100 UNIT/ML SOPN, Inject 0.4 mL (40 Units total) under the skin daily at bedtime, Disp: 12 mL, Rfl: 0    insulin lispro (HumaLOG) 100 units/mL injection, Inject 15 Units under the skin 3 (three) times a day with meals (Patient taking differently: Inject 8 Units under the skin 3 (three) times a day with meals), Disp: 20 mL, Rfl: 0    Insulin Pen Needle (PEN NEEDLES 29GX1/2") 29G X 12MM MISC, , Disp: , Rfl:     isosorbide mononitrate (IMDUR) 30 mg 24 hr tablet, Take 30 mg by mouth daily at bedtime, Disp: , Rfl: 3    Lancets MISC, test once times daily E11.65 (Patient not taking: Reported on 9/7/2023), Disp: , Rfl:     lansoprazole (PREVACID) 30 mg capsule, TAKE 1 CAPSULE DAILY, Disp: 90 capsule, Rfl: 1    LORazepam (ATIVAN) 1 mg tablet, TAKE 1 TABLET DAILY AS NEEDED FOR ANXIETY, Disp: 30 tablet, Rfl: 3    metFORMIN (GLUCOPHAGE) 500 mg tablet, Take 1,000 mg by mouth 2 (two) times a day, Disp: , Rfl:     Misc Natural Products (BLOOD SUGAR 360 PO), ONETOUCH ULTRA TEST STRIPS Test once daily E11.65, Disp: , Rfl:     omega-3-acid ethyl esters (LOVAZA) 1 g capsule, Take 2 g by mouth 2 (two) times a day , Disp: , Rfl:     oxyCODONE (ROXICODONE) 5 immediate release tablet, Take 1 tablet (5 mg total) by mouth every 4 (four) hours as needed for moderate pain or severe pain Max Daily Amount: 30 mg, Disp: 20 tablet, Rfl: 0    sotalol (BETAPACE) 80 mg tablet, Take 1 tablet by mouth 2 (two) times a day, Disp: , Rfl:     spironolactone (ALDACTONE) 25 mg tablet, Take 1 tablet by mouth daily, Disp: , Rfl:   No current facility-administered medications for this visit. Review of Systems   Constitutional:  Negative for appetite change, chills, fatigue, fever and unexpected weight change. HENT:  Negative for congestion, hearing loss and postnasal drip. Respiratory:  Negative for cough and shortness of breath. Cardiovascular:  Negative for leg swelling. Skin:  Positive for wound (Right groin). Negative for rash. Neurological:  Negative for numbness. Hematological:  Does not bruise/bleed easily. Psychiatric/Behavioral: Negative. Objective:  /74   Pulse 87   Temp (!) 96.4 °F (35.8 °C)   Resp 18   Pain Score: 0-No pain     Physical Exam  Vitals reviewed. Constitutional:       General: He is not in acute distress. Appearance: Normal appearance. He is well-developed and normal weight. HENT:      Head: Normocephalic and atraumatic. Cardiovascular:      Rate and Rhythm: Normal rate. Pulmonary:      Effort: Pulmonary effort is normal.   Musculoskeletal:         General: No deformity. Right lower leg: No edema. Left lower leg: No edema. Skin:     General: Skin is warm and dry. Findings: Wound (right groin) present. Comments: Adherent yellow slough on pink granulated tissue. See wound assessment   Neurological:      General: No focal deficit present. Mental Status: He is alert and oriented to person, place, and time. Gait: Gait normal.   Psychiatric:         Mood and Affect: Mood and affect normal.         Behavior: Behavior normal. Behavior is cooperative.            Wound 09/22/23 Surgical Groin Anterior;Right (Active) Wound Description Guy 10/27/23 1058   Fatimah-wound Assessment Intact 10/27/23 1058   Wound Length (cm) 2 cm 10/27/23 1058   Wound Width (cm) 0.5 cm 10/27/23 1058   Wound Depth (cm) 0.1 cm 10/27/23 1058   Wound Surface Area (cm^2) 1 cm^2 10/27/23 1058   Wound Volume (cm^3) 0.1 cm^3 10/27/23 1058   Calculated Wound Volume (cm^3) 0.1 cm^3 10/27/23 1058   Change in Wound Size % 99.8 10/27/23 1058   Wound Site Closure Sutures 10/13/23 1118   Drainage Amount Small 10/27/23 1058   Drainage Description Serosanguineous; Sanguineous 10/27/23 1058   Non-staged Wound Description Full thickness 10/27/23 1058   Dressing Status Dry 10/27/23 1058            Debridement   Wound 09/22/23 Surgical Groin Anterior;Right    Universal Protocol:  Consent: Verbal consent obtained. Written consent obtained. Risks and benefits: risks, benefits and alternatives were discussed  Consent given by: patient  Time out: Immediately prior to procedure a "time out" was called to verify the correct patient, procedure, equipment, support staff and site/side marked as required. Patient understanding: patient states understanding of the procedure being performed  Patient identity confirmed: verbally with patient    Performed by: PA  Debridement type: surgical  Level of debridement: subcutaneous tissue  Pain control: lidocaine 4%  Post-debridement measurements  Length (cm): 2  Width (cm): 0.5  Depth (cm): 0.2  Percent debrided: 100%  Surface Area (cm^2): 1  Area debrided (cm^2):  1  Volume (cm^3): 0.2  Tissue and other material debrided: subcutaneous tissue  Devitalized tissue debrided: biofilm and slough  Instrument(s) utilized: curette  Bleeding: small  Hemostasis obtained with: pressure  Procedural pain (0-10): 0  Post-procedural pain: 0   Response to treatment: procedure was tolerated well             Results from last 6 Months   Lab Units 08/13/23  1930   WOUND CULTURE  3+ Growth of Beta Hemolytic Streptococcus Group C*  Growth in Broth culture only Escherichia coli*         Wound Instructions:  Orders Placed This Encounter   Procedures    Wound cleansing and dressings     Groin wound     Wash your hands with soap and water. Remove old dressing,  discard into plastic bag and place in trash. Cleanse the wound with  saline jet squirt in to flush then dab wound with prophase (at  wound center only did a dakins cleanse) prior to applying a clean  dressing. Do not use tissue or cotton balls. Do not scrub the wound. Pat dry using gauze     Shower no     Apply aquacel AG to the groin wound. Tuck the aquacel ag into the  area of depth then lay the aquacel ag along the open area of   the incision line. Cover with gauze or ABD pad  Secure with underwear  Change dressing daily     Standing Status:   Future     Standing Expiration Date:   10/27/2024       Samreen Rg PA-C, [facility]      Portions of the record may have been created with voice recognition software. Occasional wrong word or "sound alike" substitutions may have occurred due to the inherent limitations of voice recognition software. Read the chart carefully and recognize, using context, where substitutions have occurred.

## 2023-10-27 NOTE — PATIENT INSTRUCTIONS
Orders Placed This Encounter   Procedures    Wound cleansing and dressings     Groin wound     Wash your hands with soap and water. Remove old dressing,  discard into plastic bag and place in trash. Cleanse the wound with  saline jet squirt in to flush then dab wound with prophase (at  wound center only did a dakins cleanse) prior to applying a clean  dressing. Do not use tissue or cotton balls. Do not scrub the wound. Pat dry using gauze     Shower no     Apply aquacel AG to the groin wound. Tuck the aquacel ag into the  area of depth then lay the aquacel ag along the open area of   the incision line.    Cover with gauze or ABD pad  Secure with underwear  Change dressing daily     Standing Status:   Future     Standing Expiration Date:   10/27/2024

## 2023-11-17 ENCOUNTER — OFFICE VISIT (OUTPATIENT)
Dept: WOUND CARE | Facility: CLINIC | Age: 68
End: 2023-11-17
Payer: COMMERCIAL

## 2023-11-17 VITALS
RESPIRATION RATE: 18 BRPM | TEMPERATURE: 97 F | DIASTOLIC BLOOD PRESSURE: 86 MMHG | SYSTOLIC BLOOD PRESSURE: 144 MMHG | HEART RATE: 82 BPM

## 2023-11-17 DIAGNOSIS — T81.89XA NONHEALING SURGICAL WOUND, INITIAL ENCOUNTER: Primary | ICD-10-CM

## 2023-11-17 PROCEDURE — 99213 OFFICE O/P EST LOW 20 MIN: CPT | Performed by: ORTHOPAEDIC SURGERY

## 2023-11-17 RX ORDER — LIDOCAINE 40 MG/G
CREAM TOPICAL ONCE
Status: COMPLETED | OUTPATIENT
Start: 2023-11-17 | End: 2023-11-17

## 2023-11-17 RX ADMIN — LIDOCAINE: 40 CREAM TOPICAL at 11:17

## 2023-11-17 NOTE — PATIENT INSTRUCTIONS
Orders Placed This Encounter   Procedures    Wound cleansing and dressings     Groin wound     Wash your hands with soap and water. Remove old dressing,  discard into plastic bag and place in trash. Cleanse the wound with  NSS or saline wash (at wound center only did a dakins cleanse) prior to applying a clean  dressing. Do not use tissue or cotton balls. Do not scrub the wound. Pat dry using gauze     Shower no     Apply maxsorg AG(silver alginate) to the groin wound.    Cover with gauze or ABD pad  Secure with underwear  Change dressing daily     Standing Status:   Future     Standing Expiration Date:   11/17/2024

## 2023-11-17 NOTE — PROGRESS NOTES
Patient ID: Paul Lemon is a 76 y.o. male Date of Birth 1955       Chief Complaint   Patient presents with    Follow Up Wound Care Visit     Non healing surgical wound       Allergies:  Ace inhibitors    Diagnosis:   Diagnosis ICD-10-CM Associated Orders   1. Nonhealing surgical wound, initial encounter  T81.89XA lidocaine (LMX) 4 % cream     Wound cleansing and dressings           Assessment and Plan :  F/u evaluation of non-healing surgical wound on right groin slowly healing. Change wound management to  silver alginate. See wound orders below   May irrigate wound with NSS or wound wash. Do not shower nor wet wound with any harsh cleansers such as alcohol, peroxide, or antibacterial soap, do not submerge in water  Turn and reposition every 1-2 hours for pressure redistribution on skin. Avoid direct pressure to Wound site. Do Not Sit for Long Periods of Time. Followup in 3 week(s) or call sooner with questions or concerns or any signs of infection such as redness, swelling, increased/purulent drainage, fever, chills, increased severe pain. Subjective:   9/22:  Patient is a 76 y.o. male with pmhx DMII (A1C 12.3), HTN, HLD, GERD, Anxiety, CAD, Cardiomyopathy with ICD and CHF who presents for initial eval of of non-healing surgical wound on right groin extending down perineum s/p perineal debridement/washout on 8/13, repeat washout on 8/14 and partial closure on 8/16 due to a necrotizing soft tissue infection/Jamarcus's gangrene. Wound cultures grew Group C Strep, Prevotella and E. Coli. Pt developed Streptococcus Bacteremia and ICD infection as well. Since then pt was treated with a 5 day course of IV Flagyl and placed on IV Ceftriaxone 2g daily for 6 weeks, to be completed on 10/05/2023. Pt has been applying wet to dry dressings daily. Does not have an odor. Has intact sutures and moderate yellow drainage. No smoking, ETOH or drug use. Pt denies any sob, fatigue, N/V, CP, fever or chills.   Pt is accompanied by his wife who is actively involved in his care. 9/29:  Patient presents for followup evaluation of non healing surgical wound on right groin extending down perineum,  accompanied by his wife. No new complaints. No increased pain or drainage. Has been lightly packing with Aquacel Ag ribbon on the wound bed. Pt denies any fever or chills. 10/6: Pt presents for f/u evaluation of non healing surgical wound on right groin extending down perineum. No issues or complaints. Has been applying Aquacel Ag dressing to wound bed. Denies any fevers or chills. 10/13: Pt presents for f/u evaluation of non healing surgical wound on right groin extending down perineum. No complaints. Denies any fevers or chills. 10/27:  Pt presents for f/u evaluation of non healing surgical wound on right groin. No issues. Denies any fevers or chills. 11/17: Pt presents for f/u evaluation of non healing surgical wound on right groin accompanied by his wife. Pt has been applying Aquacel Ag ribbon to wound bed. Denies any fevers or chills.        The following portions of the patient's history were reviewed and updated as appropriate:   Patient Active Problem List   Diagnosis    Essential hypertension    Insulin dependent diabetes mellitus ([address])    CAD (coronary artery disease)    Esophageal reflux    Generalized anxiety disorder    Ischemic cardiomyopathy    Hyperlipidemia    ICD (implantable cardioverter-defibrillator) in place    Chronic systolic CHF (congestive heart failure) ([address])    Ventricular tachycardia ([address])    Necrotizing soft tissue infection    Cholelithiasis    Jamarcus's gangrene    ICD (implantable cardioverter-defibrillator) infection ([address])    Streptococcal bacteremia    Anemia of chronic disease    Hypomagnesemia    Nonhealing surgical wound, initial encounter     Past Medical History:   Diagnosis Date    Benign essential hypertension 06/02/2011    Last Assessment & Plan:  BP stable on current meds Chronic systolic CHF (congestive heart failure) ([address]) 04/12/2021    Coronary atherosclerosis 04/08/2010    Overview:  Managed by cardiology    Generalized anxiety disorder 05/22/2012    ICD (implantable cardioverter-defibrillator) in place 12/04/2019    Ischemic cardiomyopathy 09/13/2012    Mixed hyperlipidemia 04/08/2010    Managed by cardiology   Last Assessment & Plan:  on lipitor 40 mg, zetia, lovaza ,LDL 83. continue    Necrotizing soft tissue infection 08/13/2023    Rectal hemorrhage     last assessed: May 29, 2015    S/P CABG (coronary artery bypass graft) 1994    Uncontrolled type 2 diabetes mellitus with hyperglycemia ([address]) 09/15/2016    Transitioned From: Diabetes Mellitus    Ventricular tachycardia ([address]) 04/12/2021     Past Surgical History:   Procedure Laterality Date    CARDIAC ELECTROPHYSIOLOGY PROCEDURE N/A 8/24/2023    Procedure: Cardiac laser lead extraction;  Surgeon: Luretha Osler, DO;  Location: [facility];  Service: Cardiac Surgery    CARDIAC ELECTROPHYSIOLOGY PROCEDURE N/A 8/30/2023    Procedure: Cardiac icd implant right sided dual chamber;  Surgeon: Alyson Brooke MD;  Location: [facility];   Service: Cardiology    CORONARY ARTERY BYPASS GRAFT      DECUBITUS ULCER EXCISION Right 8/14/2023    Procedure: Washout and debridement of perineal wound;  Surgeon: Mercy Wyatt MD;  Location: [facility];  Service: General    ELBOW SURGERY Right     Bursitis - last assessed: Sept 15, 2016    IR PICC REPOSITION  8/18/2023    [place] RMVL TRANSVNS PM ELTRD DUAL LEAD SYS N/A 8/24/2023    Procedure: REMOVAL PACEMAKER/AICD LEADS W LASER- EXTRACTION ONLY;  Surgeon: Luretha Osler, DO;  Location: [facility];  Service: Cardiac Surgery    VAC DRESSING APPLICATION N/A 4/08/3024    Procedure: APPLICATION VAC DRESSING;  Surgeon: Adelia Jolly DO;  Location: [facility];  Service: General    WOUND DEBRIDEMENT N/A 8/13/2023    Procedure: EXCISIONAL DEBRIDEMENT Perineum;  Surgeon: Joann Vasquez DO; Location: [facility];  Service: General    WOUND DEBRIDEMENT N/A 8/16/2023    Procedure: DEBRIDEMENT WOUND, [address] OUT, PARTIAL CLOSURE;  Surgeon: Donovan Fernández ToDO;  Location: [facility];  Service: General     Family History   Problem Relation Age of Onset    No Known Problems Mother     Cancer Father     Coronary artery disease Father      Social History     Socioeconomic History    Marital status: /Civil Union     Spouse name: None    Number of children: None    Years of education: None    Highest education level: None   Occupational History    None   Tobacco Use    Smoking status: Never    Smokeless tobacco: Never   Substance and Sexual Activity    Alcohol use: No     Comment: being a social drinker noted in "[employer]scripts"     Drug use: No    Sexual activity: None   Other Topics Concern    None   Social History Narrative    None     Social Determinants of Health     Financial Resource Strain: Low Risk  (1/27/2023)    Overall Financial Resource Strain (CARDIA)     Difficulty of Paying Living Expenses: Not hard at all   Food Insecurity: No Food Insecurity (8/15/2023)    Hunger Vital Sign     Worried About Running Out of Food in the Last Year: Never true     Ran Out of Food in the Last Year: Never true   Transportation Needs: No Transportation Needs (8/15/2023)    PRAPARE - Transportation     Lack of Transportation (Medical): No     Lack of Transportation (Non-Medical):  No   Physical Activity: Not on file   Stress: Not on file   Social Connections: Not on file   Intimate Partner Violence: Not on file   Housing Stability: Low Risk  (8/15/2023)    Housing Stability Vital Sign     Unable to Pay for Housing in the Last Year: No     Number of Places Lived in the Last Year: 1     Unstable Housing in the Last Year: No       Current Outpatient Medications:     aspirin 81 MG tablet, Take 1 tablet by mouth 2 (two) times a day , Disp: , Rfl:     atorvastatin (LIPITOR) 40 mg tablet, Take 1 tablet by mouth daily, Disp: , Rfl:     B-D ULTRAFINE III SHORT PEN 31G X 8 MM MISC, , Disp: , Rfl:     BD ULTRA-FINE PEN NEEDLES 29G X 12.7MM MISC, , Disp: , Rfl:     carvedilol (COREG) 12.5 mg tablet, Take 1 tablet by mouth 2 (two) times a day, Disp: , Rfl:     Continuous Blood Gluc  (FreeStyle [place] [address] [place]) [facility], , Disp: , Rfl:     Continuous Blood Gluc Sensor (FREESTYLE ELIZABETH SENSOR SYSTEM) MISC, Use 1 each every 14 (fourteen) days, Disp: , Rfl:     dulaglutide (Trulicity) 3 TM/2.8VS injection, Inject 0.5 mL (3 mg total) under the skin every 7 days, Disp: , Rfl:     escitalopram (LEXAPRO) 20 mg tablet, TAKE 1 TABLET DAILY, Disp: 90 tablet, Rfl: 1    ezetimibe (ZETIA) 10 mg tablet, Take 1 tablet by mouth daily, Disp: , Rfl:     fenofibrate (TRICOR) 145 mg tablet, Take 145 mg by mouth daily, Disp: , Rfl:     Insulin Glargine Solostar (Lantus SoloStar) 100 UNIT/ML SOPN, Inject 0.4 mL (40 Units total) under the skin daily at bedtime, Disp: 12 mL, Rfl: 0    insulin lispro (HumaLOG) 100 units/mL injection, Inject 15 Units under the skin 3 (three) times a day with meals (Patient taking differently: Inject 8 Units under the skin 3 (three) times a day with meals), Disp: 20 mL, Rfl: 0    Insulin Pen Needle (PEN NEEDLES 29GX1/2") 29G X 12MM MISC, , Disp: , Rfl:     isosorbide mononitrate (IMDUR) 30 mg 24 hr tablet, Take 30 mg by mouth daily at bedtime, Disp: , Rfl: 3    Lancets MISC, test once times daily E11.65 (Patient not taking: Reported on 9/7/2023), Disp: , Rfl:     lansoprazole (PREVACID) 30 mg capsule, TAKE 1 CAPSULE DAILY, Disp: 90 capsule, Rfl: 1    LORazepam (ATIVAN) 1 mg tablet, TAKE 1 TABLET DAILY AS NEEDED FOR ANXIETY, Disp: 30 tablet, Rfl: 3    metFORMIN (GLUCOPHAGE) 500 mg tablet, Take 1,000 mg by mouth 2 (two) times a day, Disp: , Rfl:     Misc Natural Products (BLOOD SUGAR 360 PO), ONETOUCH ULTRA TEST STRIPS Test once daily E11.65, Disp: , Rfl:     omega-3-acid ethyl esters (LOVAZA) 1 g capsule, Take 2 g by mouth 2 (two) times a day , Disp: , Rfl:     oxyCODONE (ROXICODONE) 5 immediate release tablet, Take 1 tablet (5 mg total) by mouth every 4 (four) hours as needed for moderate pain or severe pain Max Daily Amount: 30 mg, Disp: 20 tablet, Rfl: 0    sotalol (BETAPACE) 80 mg tablet, Take 1 tablet by mouth 2 (two) times a day, Disp: , Rfl:     spironolactone (ALDACTONE) 25 mg tablet, Take 1 tablet by mouth daily, Disp: , Rfl:   No current facility-administered medications for this visit. Review of Systems   Constitutional:  Negative for appetite change, chills, fatigue, fever and unexpected weight change. HENT:  Negative for congestion, hearing loss and postnasal drip. Respiratory:  Negative for cough and shortness of breath. Cardiovascular:  Negative for leg swelling. Skin:  Positive for wound (Right groin). Negative for rash. Neurological:  Negative for numbness. Hematological:  Does not bruise/bleed easily. Psychiatric/Behavioral: Negative. Objective:  /86   Pulse 82   Temp (!) 97 °F (36.1 °C)   Resp 18   Pain Score:   1     Physical Exam  Vitals reviewed. Constitutional:       General: He is not in acute distress. Appearance: Normal appearance. He is well-developed and normal weight. HENT:      Head: Normocephalic and atraumatic. Cardiovascular:      Rate and Rhythm: Normal rate. Pulmonary:      Effort: Pulmonary effort is normal.   Musculoskeletal:         General: No deformity. Right lower leg: No edema. Left lower leg: No edema. Skin:     General: Skin is warm and dry. Findings: Wound (right groin) present. Comments: Serosanguinous drainage from pink granulated tissue. See wound assessment   Neurological:      General: No focal deficit present. Mental Status: He is alert and oriented to person, place, and time.       Gait: Gait normal.   Psychiatric:         Mood and Affect: Mood and affect normal.         Behavior: Behavior normal. Behavior is cooperative. Wound 09/22/23 Surgical Groin Anterior;Right (Active)   Wound Image Images linked 11/17/23 1112   Wound Description [place] 11/17/23 1113   Fatimah-wound Assessment Fragile;Scar Tissue 11/17/23 1113   Wound Length (cm) 0.4 cm 11/17/23 1113   Wound Width (cm) 0.4 cm 11/17/23 1113   Wound Depth (cm) 0.1 cm 11/17/23 1113   Wound Surface Area (cm^2) 0.16 cm^2 11/17/23 1113   Wound Volume (cm^3) 0.016 cm^3 11/17/23 1113   Calculated Wound Volume (cm^3) 0.02 cm^3 11/17/23 1113   Change in Wound Size % 99.96 11/17/23 1113   Drainage Amount Small 11/17/23 1113   Drainage Description Serosanguineous; Sanguineous 11/17/23 1113   Non-staged Wound Description Full thickness 11/17/23 1113   Dressing Status Intact 11/17/23 1113        Results from last 6 Months   Lab Units 08/13/23  1930   WOUND CULTURE  3+ Growth of Beta Hemolytic Streptococcus Group C*  Growth in Broth culture only Escherichia coli*         Wound Instructions:  Orders Placed This Encounter   Procedures    Wound cleansing and dressings     Groin wound     Wash your hands with soap and water. Remove old dressing,  discard into plastic bag and place in trash. Cleanse the wound with  NSS or saline wash (at wound center only did a dakins cleanse) prior to applying a clean  dressing. Do not use tissue or cotton balls. Do not scrub the wound. Pat dry using gauze     Shower no     Apply maxsorg AG(silver alginate) to the groin wound. Cover with gauze or ABD pad  Secure with underwear  Change dressing daily     Standing Status:   Future     Standing Expiration Date:   11/17/2024       Shannon Smyth PA-C, [facility]      Portions of the record may have been created with voice recognition software. Occasional wrong word or "sound alike" substitutions may have occurred due to the inherent limitations of voice recognition software. Read the chart carefully and recognize, using context, where substitutions have occurred.

## 2023-12-08 ENCOUNTER — OFFICE VISIT (OUTPATIENT)
Dept: WOUND CARE | Facility: CLINIC | Age: 68
End: 2023-12-08
Payer: COMMERCIAL

## 2023-12-08 VITALS
DIASTOLIC BLOOD PRESSURE: 72 MMHG | RESPIRATION RATE: 18 BRPM | SYSTOLIC BLOOD PRESSURE: 165 MMHG | TEMPERATURE: 95.8 F | HEART RATE: 87 BPM

## 2023-12-08 DIAGNOSIS — T81.89XA NONHEALING SURGICAL WOUND, INITIAL ENCOUNTER: Primary | ICD-10-CM

## 2023-12-08 PROCEDURE — 99212 OFFICE O/P EST SF 10 MIN: CPT | Performed by: ORTHOPAEDIC SURGERY

## 2023-12-08 NOTE — PROGRESS NOTES
Patient ID: Justice Davey is a 76 y.o. male Date of Birth 1955       Chief Complaint   Patient presents with    Follow Up Wound Care Visit     Non healing surgical wound       Allergies:  Ace inhibitors    Diagnosis:   Diagnosis ICD-10-CM Associated Orders   1. Nonhealing surgical wound, initial encounter  T81.89XA Wound cleansing and dressings           Assessment and Plan :  F/u evaluation of non-healing surgical wound on right groin is closed, completely epithelialized. May shower as usual.   Maintain area clean and dry. Follow up as needed or call with questions or concerns  slowly healing. Subjective:   9/22:  Patient is a 76 y.o. male with pmhx DMII (A1C 12.3), HTN, HLD, GERD, Anxiety, CAD, Cardiomyopathy with ICD and CHF who presents for initial eval of of non-healing surgical wound on right groin extending down perineum s/p perineal debridement/washout on 8/13, repeat washout on 8/14 and partial closure on 8/16 due to a necrotizing soft tissue infection/Jamarcus's gangrene. Wound cultures grew Group C Strep, Prevotella and E. Coli. Pt developed Streptococcus Bacteremia and ICD infection as well. Since then pt was treated with a 5 day course of IV Flagyl and placed on IV Ceftriaxone 2g daily for 6 weeks, to be completed on 10/05/2023. Pt has been applying wet to dry dressings daily. Does not have an odor. Has intact sutures and moderate yellow drainage. No smoking, ETOH or drug use. Pt denies any sob, fatigue, N/V, CP, fever or chills. Pt is accompanied by his wife who is actively involved in his care. 9/29:  Patient presents for followup evaluation of non healing surgical wound on right groin extending down perineum,  accompanied by his wife. No new complaints. No increased pain or drainage. Has been lightly packing with Aquacel Ag ribbon on the wound bed. Pt denies any fever or chills.     10/6: Pt presents for f/u evaluation of non healing surgical wound on right groin extending down perineum. No issues or complaints. Has been applying Aquacel Ag dressing to wound bed. Denies any fevers or chills. 10/13: Pt presents for f/u evaluation of non healing surgical wound on right groin extending down perineum. No complaints. Denies any fevers or chills. 10/27:  Pt presents for f/u evaluation of non healing surgical wound on right groin. No issues. Denies any fevers or chills. 11/17: Pt presents for f/u evaluation of non healing surgical wound on right groin accompanied by his wife. Pt has been applying Aquacel Ag ribbon to wound bed. Denies any fevers or chills. 12/8: Pt presents for f/u evaluation of non healing surgical wound on right groin accompanied by his wife. No complaints. Denies any fevers or chills.          The following portions of the patient's history were reviewed and updated as appropriate:   Patient Active Problem List   Diagnosis    Essential hypertension    Insulin dependent diabetes mellitus ([address])    CAD (coronary artery disease)    Esophageal reflux    Generalized anxiety disorder    Ischemic cardiomyopathy    Hyperlipidemia    ICD (implantable cardioverter-defibrillator) in place    Chronic systolic CHF (congestive heart failure) ([address])    Ventricular tachycardia ([address])    Necrotizing soft tissue infection    Cholelithiasis    Jamarcus's gangrene    ICD (implantable cardioverter-defibrillator) infection ([address])    Streptococcal bacteremia    Anemia of chronic disease    Hypomagnesemia    Nonhealing surgical wound, initial encounter     Past Medical History:   Diagnosis Date    Benign essential hypertension 06/02/2011    Last Assessment & Plan:  BP stable on current meds    Chronic systolic CHF (congestive heart failure) ([address]) 04/12/2021    Coronary atherosclerosis 04/08/2010    Overview:  Managed by cardiology    Generalized anxiety disorder 05/22/2012    ICD (implantable cardioverter-defibrillator) in place 12/04/2019    Ischemic cardiomyopathy 09/13/2012    Mixed hyperlipidemia 04/08/2010    Managed by cardiology   Last Assessment & Plan:  on lipitor 40 mg, zetia, lovaza ,LDL 83. continue    Necrotizing soft tissue infection 08/13/2023    Rectal hemorrhage     last assessed: May 29, 2015    S/P CABG (coronary artery bypass graft) 1994    Uncontrolled type 2 diabetes mellitus with hyperglycemia ([address]) 09/15/2016    Transitioned From: Diabetes Mellitus    Ventricular tachycardia ([address]) 04/12/2021     Past Surgical History:   Procedure Laterality Date    CARDIAC ELECTROPHYSIOLOGY PROCEDURE N/A 8/24/2023    Procedure: Cardiac laser lead extraction;  Surgeon: Erik Pena DO;  [facility];  Service: Cardiac Surgery    CARDIAC ELECTROPHYSIOLOGY PROCEDURE N/A 8/30/2023    Procedure: Cardiac icd implant right sided dual chamber;  Surgeon: Jenny Arora MD;  Location: [facility];   Service: Cardiology    CORONARY ARTERY BYPASS GRAFT      DECUBITUS ULCER EXCISION Right 8/14/2023    Procedure: Washout and debridement of perineal wound;  Surgeon: Josh Benjamin MD;  [facility];  Service: General    ELBOW SURGERY Right     Bursitis - last assessed: Sept 15, 2016    IR PICC REPOSITION  8/18/2023    WI RMVL TRANSVNS PM ELTRD DUAL LEAD SYS N/A 8/24/2023    Procedure: REMOVAL PACEMAKER/AICD LEADS W LASER- EXTRACTION ONLY;  Surgeon: Erik Pena DO;  [facility];  Service: Cardiac Surgery    VAC DRESSING APPLICATION N/A 7/48/5080    Procedure: APPLICATION VAC DRESSING;  Surgeon: Kj Jolly DO;  [facility];  Service: General    WOUND DEBRIDEMENT N/A 8/13/2023    Procedure: EXCISIONAL DEBRIDEMENT Perineum;  Surgeon: Pawel Sanchez DO;  Location: [facility];  Service: General    WOUND DEBRIDEMENT N/A 8/16/2023    Procedure: DEBRIDEMENT WOUND, [address] OUT, PARTIAL CLOSURE;  Surgeon: Kj Jolly DO;  [facility];  Service: General     Family History   Problem Relation Age of Onset    No Known Problems Mother     Cancer Father Coronary artery disease Father      Social History     Socioeconomic History    Marital status: /Civil Union     Spouse name: None    Number of children: None    Years of education: None    Highest education level: None   Occupational History    None   Tobacco Use    Smoking status: Never    Smokeless tobacco: Never   Substance and Sexual Activity    Alcohol use: No     Comment: being a social drinker noted in "allscripts"     Drug use: No    Sexual activity: None   Other Topics Concern    None   Social History Narrative    None     Social Determinants of Health     Financial Resource Strain: Low Risk  (1/27/2023)    Overall Financial Resource Strain (CARDIA)     Difficulty of Paying Living Expenses: Not hard at all   Food Insecurity: No Food Insecurity (8/15/2023)    Hunger Vital Sign     Worried About Running Out of Food in the Last Year: Never true     Ran Out of Food in the Last Year: Never true   Transportation Needs: No Transportation Needs (8/15/2023)    PRAPARE - Transportation     Lack of Transportation (Medical): No     Lack of Transportation (Non-Medical):  No   Physical Activity: Not on file   Stress: Not on file   Social Connections: Not on file   Intimate Partner Violence: Not on file   Housing Stability: Low Risk  (8/15/2023)    Housing Stability Vital Sign     Unable to Pay for Housing in the Last Year: No     Number of Places Lived in the Last Year: 1     Unstable Housing in the Last Year: No       Current Outpatient Medications:     aspirin 81 MG tablet, Take 1 tablet by mouth 2 (two) times a day , Disp: , Rfl:     atorvastatin (LIPITOR) 40 mg tablet, Take 1 tablet by mouth daily, Disp: , Rfl:     B-D ULTRAFINE III SHORT PEN 31G X 8 MM MISC, , Disp: , Rfl:     BD ULTRA-FINE PEN NEEDLES 29G X 12.7MM MISC, , Disp: , Rfl:     carvedilol (COREG) 12.5 mg tablet, Take 1 tablet by mouth 2 (two) times a day, Disp: , Rfl:     Continuous Blood Gluc  (FreeStyle Elizabeth 2 Rector) TRACEY, , Disp: , Rfl:     Continuous Blood Gluc Sensor ([address] [place] [address]) [facility], Use 1 each every 14 (fourteen) days, Disp: , Rfl:     dulaglutide (Trulicity) 3 GH/6.3DC injection, Inject 0.5 mL (3 mg total) under the skin every 7 days, Disp: , Rfl:     escitalopram (LEXAPRO) 20 mg tablet, TAKE 1 TABLET DAILY, Disp: 90 tablet, Rfl: 1    ezetimibe (ZETIA) 10 mg tablet, Take 1 tablet by mouth daily, Disp: , Rfl:     fenofibrate (TRICOR) 145 mg tablet, Take 145 mg by mouth daily, Disp: , Rfl:     Insulin Glargine Solostar (Lantus SoloStar) 100 UNIT/ML SOPN, Inject 0.4 mL (40 Units total) under the skin daily at bedtime, Disp: 12 mL, Rfl: 0    insulin lispro (HumaLOG) 100 units/mL injection, Inject 15 Units under the skin 3 (three) times a day with meals (Patient taking differently: Inject 8 Units under the skin 3 (three) times a day with meals), Disp: 20 mL, Rfl: 0    Insulin Pen Needle (PEN NEEDLES 29GX1/2") 29G X 12MM MISC, , Disp: , Rfl:     isosorbide mononitrate (IMDUR) 30 mg 24 hr tablet, Take 30 mg by mouth daily at bedtime, Disp: , Rfl: 3    Lancets MISC, test once times daily E11.65 (Patient not taking: Reported on 9/7/2023), Disp: , Rfl:     lansoprazole (PREVACID) 30 mg capsule, TAKE 1 CAPSULE DAILY, Disp: 90 capsule, Rfl: 1    LORazepam (ATIVAN) 1 mg tablet, TAKE 1 TABLET DAILY AS NEEDED FOR ANXIETY, Disp: 30 tablet, Rfl: 3    metFORMIN (GLUCOPHAGE) 500 mg tablet, Take 1,000 mg by mouth 2 (two) times a day, Disp: , Rfl:     Misc Natural Products (BLOOD SUGAR 360 PO), ONETOUCH ULTRA TEST STRIPS Test once daily E11.65, Disp: , Rfl:     omega-3-acid ethyl esters (LOVAZA) 1 g capsule, Take 2 g by mouth 2 (two) times a day , Disp: , Rfl:     oxyCODONE (ROXICODONE) 5 immediate release tablet, Take 1 tablet (5 mg total) by mouth every 4 (four) hours as needed for moderate pain or severe pain Max Daily Amount: 30 mg, Disp: 20 tablet, Rfl: 0    sotalol (BETAPACE) 80 mg tablet, Take 1 tablet by mouth 2 (two) times a day, Disp: , Rfl:     spironolactone (ALDACTONE) 25 mg tablet, Take 1 tablet by mouth daily, Disp: , Rfl:     Review of Systems   Constitutional:  Negative for appetite change, chills, fatigue, fever and unexpected weight change. HENT:  Negative for congestion, hearing loss and postnasal drip. Respiratory:  Negative for cough and shortness of breath. Cardiovascular:  Negative for leg swelling. Skin:  Positive for wound (Right groin). Negative for rash. Neurological:  Negative for numbness. Hematological:  Does not bruise/bleed easily. Psychiatric/Behavioral: Negative. Objective:  /72   Pulse 87   Temp (!) 95.8 °F (35.4 °C)   Resp 18   Pain Score: 0-No pain     Physical Exam  Vitals reviewed. Constitutional:       General: He is not in acute distress. Appearance: Normal appearance. He is well-developed and normal weight. HENT:      Head: Normocephalic and atraumatic. Cardiovascular:      Rate and Rhythm: Normal rate. Pulmonary:      Effort: Pulmonary effort is normal.   Musculoskeletal:         General: No deformity. Right lower leg: No edema. Left lower leg: No edema. Skin:     General: Skin is warm and dry. Findings: Wound (right groin) present. Comments: Healed. See wound assessment   Neurological:      General: No focal deficit present. Mental Status: He is alert and oriented to person, place, and time. Gait: Gait normal.   Psychiatric:         Mood and Affect: Mood and affect normal.         Behavior: Behavior normal. Behavior is cooperative.          Wound 09/22/23 Surgical Groin Anterior;Right (Active)   Wound Image Images linked 12/08/23 1106   Wound Description Epithelialization 12/08/23 1104   Fatimah-wound Assessment Fragile;Scar Tissue 12/08/23 1104   Wound Length (cm) 0 cm 12/08/23 1104   Wound Width (cm) 0 cm 12/08/23 1104   Wound Depth (cm) 0 cm 12/08/23 1104   Wound Surface Area (cm^2) 0 cm^2 12/08/23 1104   Wound Volume (cm^3) 0 cm^3 12/08/23 1104   Calculated Wound Volume (cm^3) 0 cm^3 12/08/23 1104   Change in Wound Size % 100 12/08/23 1104   Drainage Amount None 12/08/23 1104   Non-staged Wound Description Not applicable 26/07/43 3023   Dressing Status Intact 12/08/23 1104       Results from last 6 Months   Lab Units 08/13/23  1930   WOUND CULTURE  3+ Growth of Beta Hemolytic Streptococcus Group C*  Growth in Broth culture only Escherichia coli*         Wound Instructions:  Orders Placed This Encounter   Procedures    Wound cleansing and dressings     Right Groin Wound    You are healed    You may shower    Keep area clean and dry     Standing Status:   Future     Standing Expiration Date:   12/8/2024       Michelle Hagen PA-C, [facility]      Portions of the record may have been created with voice recognition software. Occasional wrong word or "sound alike" substitutions may have occurred due to the inherent limitations of voice recognition software. Read the chart carefully and recognize, using context, where substitutions have occurred.

## 2023-12-08 NOTE — PATIENT INSTRUCTIONS
- IV tylenol, PO oxycodone for pain control  - regular diet  - dc MIVF  - Ambulate pm of surgery and qid  - Drains: Maintain ZI, Home with erazo, leg bag,  bag, and teaching  - Prophylaxis: IS, SCDs, GI ppx    DC home this am   Orders Placed This Encounter   Procedures    Wound cleansing and dressings     Right Groin Wound    You are healed    You may shower    Keep area clean and dry     Standing Status:   Future     Standing Expiration Date:   12/8/2024

## 2023-12-21 ENCOUNTER — IN-CLINIC DEVICE VISIT (OUTPATIENT)
Dept: CARDIOLOGY CLINIC | Facility: CLINIC | Age: 68
End: 2023-12-21
Payer: COMMERCIAL

## 2023-12-21 DIAGNOSIS — Z95.810 PRESENCE OF IMPLANTABLE CARDIOVERTER-DEFIBRILLATOR (ICD): Primary | ICD-10-CM

## 2023-12-21 PROCEDURE — 93283 PRGRMG EVAL IMPLANTABLE DFB: CPT | Performed by: STUDENT IN AN ORGANIZED HEALTH CARE EDUCATION/TRAINING PROGRAM

## 2023-12-21 NOTE — PROGRESS NOTES
MDT DC ICD/ACTIVE SYSTEM IS MRI CONDITIONAL   DEVICE INTERROGATED IN THE [facility] OFFICE:  BATTERY VOLTAGE ADEQUATE (11.7 YR.).  AP 84.6%  <0.1%.  ALL LEAD PARAMETERS WITHIN NORMAL LIMITS.  NO SIGNIFICANT HIGH RATE EPISODES.  OPTI-VOL WITHIN NORMAL LIMITS.  NO PROGRAMMING CHANGES MADE TO DEVICE PARAMETERS.  NORMAL DEVICE FUNCTION.  RG

## 2023-12-22 DIAGNOSIS — K21.9 GASTROESOPHAGEAL REFLUX DISEASE: ICD-10-CM

## 2023-12-22 RX ORDER — LANSOPRAZOLE 30 MG/1
30 CAPSULE, DELAYED RELEASE ORAL DAILY
Qty: 90 CAPSULE | Refills: 1 | Status: SHIPPED | OUTPATIENT
Start: 2023-12-22 | End: 2023-12-27

## 2023-12-22 NOTE — TELEPHONE ENCOUNTER
Patients wife calling about message she sent through [employer] regarding medication being expensive. Adv we did forward to provider.. I told patient she could call the insurance company to find out which medication would be covered or would be cheaper in that class of medication. She will send a message through [employer] with alternative medication.

## 2023-12-27 DIAGNOSIS — K21.9 GASTROESOPHAGEAL REFLUX DISEASE, UNSPECIFIED WHETHER ESOPHAGITIS PRESENT: Primary | ICD-10-CM

## 2023-12-27 RX ORDER — OMEPRAZOLE 40 MG/1
40 CAPSULE, DELAYED RELEASE ORAL DAILY
Qty: 90 CAPSULE | Refills: 1 | Status: SHIPPED | OUTPATIENT
Start: 2023-12-27

## 2024-01-18 ENCOUNTER — TELEPHONE (OUTPATIENT)
Dept: CARDIOLOGY CLINIC | Facility: CLINIC | Age: 69
End: 2024-01-18

## 2024-01-18 NOTE — TELEPHONE ENCOUNTER
----- Message from Bethany Alix sent at 1/18/2024  6:56 AM EST -----  Regarding: opti-vol crossed  Hi,  Pts opti-vol crossed 1/18 and is ongoing. PT takes aldactone, asa 81mg, coreg, sotalol. EF: 40% (kelsey 8/18/23).  Thanks,  ~Bethany   NON-BILLABLE CARELINK TRANSMISSION: BATTERY VOLTAGE ADEQUATE (11.5 YRS). AP: 94.1%. : <0.1% (MVP-ON). ALL AVAILABLE LEAD PARAMETERS WITHIN NORMAL LIMITS. NO SIGNIFICANT HIGH RATE EPISODES. OPTI-VOL FLUID THRESHOLD CROSSED 1/18 AND IS ONGOING. PT TAKES ALDACTONE, ASA 81MG, COREG, SOTALOL. EF: 40% (ECHO 8/18/23). TASK TO HF TEAM. NORMAL DEVICE FUNCTION. CH

## 2024-01-18 NOTE — TELEPHONE ENCOUNTER
Spoke with wife and gave [facility] office number and several of the doctors that go to that office.    Will send message to [facility] to see if he needs to make appt with them.

## 2024-01-23 ENCOUNTER — TELEPHONE (OUTPATIENT)
Age: 69
End: 2024-01-23

## 2024-01-23 NOTE — TELEPHONE ENCOUNTER
Pt's wife called and cancelled an upcoming appt because they have moved. He will be not be establishing with a [facility] pcp.

## 2024-01-24 ENCOUNTER — TELEPHONE (OUTPATIENT)
Dept: CARDIOLOGY CLINIC | Facility: CLINIC | Age: 69
End: 2024-01-24

## 2024-01-26 NOTE — TELEPHONE ENCOUNTER
01/26/24 1:01 AM        The office's request has been received, reviewed, and the patient chart updated. The PCP has successfully been removed with a patient attribution note. This message will now be completed.        Thank you  Rocael Rolle

## 2024-02-16 ENCOUNTER — OFFICE VISIT (OUTPATIENT)
Dept: CARDIOLOGY CLINIC | Facility: CLINIC | Age: 69
End: 2024-02-16
Payer: COMMERCIAL

## 2024-02-16 VITALS
BODY MASS INDEX: 31.7 KG/M2 | HEIGHT: 69 IN | HEART RATE: 66 BPM | SYSTOLIC BLOOD PRESSURE: 110 MMHG | WEIGHT: 214 LBS | DIASTOLIC BLOOD PRESSURE: 70 MMHG

## 2024-02-16 DIAGNOSIS — E11.65 TYPE 2 DIABETES MELLITUS WITH HYPERGLYCEMIA, UNSPECIFIED WHETHER LONG TERM INSULIN USE (HCC): ICD-10-CM

## 2024-02-16 DIAGNOSIS — I25.5 ISCHEMIC CARDIOMYOPATHY: ICD-10-CM

## 2024-02-16 DIAGNOSIS — E11.628 TYPE 2 DIABETES MELLITUS WITH OTHER SKIN COMPLICATIONS (HCC): ICD-10-CM

## 2024-02-16 DIAGNOSIS — I25.10 CORONARY ARTERY DISEASE INVOLVING NATIVE CORONARY ARTERY OF NATIVE HEART WITHOUT ANGINA PECTORIS: Primary | ICD-10-CM

## 2024-02-16 DIAGNOSIS — I10 BENIGN ESSENTIAL HYPERTENSION: ICD-10-CM

## 2024-02-16 DIAGNOSIS — I47.20 V-TACH (HCC): ICD-10-CM

## 2024-02-16 DIAGNOSIS — Z95.810 ICD (IMPLANTABLE CARDIOVERTER-DEFIBRILLATOR) IN PLACE: ICD-10-CM

## 2024-02-16 DIAGNOSIS — R06.83 SNORING: ICD-10-CM

## 2024-02-16 DIAGNOSIS — I50.22 CHRONIC SYSTOLIC CHF (CONGESTIVE HEART FAILURE) (HCC): ICD-10-CM

## 2024-02-16 DIAGNOSIS — E78.2 MIXED HYPERLIPIDEMIA: ICD-10-CM

## 2024-02-16 PROCEDURE — 99204 OFFICE O/P NEW MOD 45 MIN: CPT | Performed by: INTERNAL MEDICINE

## 2024-02-16 RX ORDER — SOTALOL HYDROCHLORIDE 80 MG/1
80 TABLET ORAL 2 TIMES DAILY
Qty: 180 TABLET | Refills: 1 | Status: SHIPPED | OUTPATIENT
Start: 2024-02-16

## 2024-02-16 RX ORDER — INSULIN GLARGINE 300 U/ML
130 INJECTION, SOLUTION SUBCUTANEOUS
COMMUNITY

## 2024-02-16 NOTE — PROGRESS NOTES
Cardiology Consultation     Devin Gonzalez  [id number]  1955  PG  [facility] [address] [address]      1. Coronary artery disease involving native coronary artery of native heart without angina pectoris        2. Essential hypertension        3. Ischemic cardiomyopathy  sotalol (BETAPACE) 80 mg tablet      4. Hyperlipidemia        5. ICD (implantable cardioverter-defibrillator) in place  sotalol (BETAPACE) 80 mg tablet      6. V-tach (HCC)  sotalol (BETAPACE) 80 mg tablet      7. Type 2 diabetes mellitus with hyperglycemia, unspecified whether long term insulin use (HCC)        8. Chronic systolic CHF (congestive heart failure) (HCC)        9. Type 2 diabetes mellitus with other skin complications ([facility])            Discussion/Summary:  1.  Ischemic cardiomyopathy  2.  Coronary artery disease with history of CABG in 1994 and PCI in 2015  3.  Hypertension  4.  Hyperlipidemia  5.  Ventricular tachycardia with Medtronic ICD in place  6.  History of ICD infection  7.  Snoring with concern for obstructive sleep apnea  8.  Obesity    -Transesophageal echocardiogram 8/18/2023 showing left-ventricular systolic function moderately reduced estimated LVEF 40% with normal right ventricular systolic function mild mitral regurgitation mild tricuspid regurgitation with likely vegetation on RV device lead  -Intraoperative transesophageal echocardiogram 8/24/2023 showing left-ventricular systolic function 50% with postoperative statement with defibrillator leads with no residual mass  -Will follow-up pending laboratory study results and patient can continue aspirin, atorvastatin 40 mg daily, carvedilol 12.5 mg twice daily, Zetia 10 mg daily, fenofibrate 145 mg daily, Imdur 30 mg daily, sotalol 80 mg twice daily, spironolactone 25 mg daily  -Could consider addition of  angiotensin receptor blocker in future however we will monitor at this time as patient's blood pressure is soft at home  -Will give referral to sleep medicine for evaluation of possible obstructive sleep apnea severity  -As patient had concern for endocarditis would recommend dental prophylaxis prior to procedures to avoid reinfection and will allow for dental team to prescribe.  -Will give patient referral to electrophysiology for monitoring of his sotalol dosing and significant history of sudden cardiac death x 3 at least with ventricular tachycardia  -Patient will follow with endocrinology and primary care physician for management and treatment of diabetes mellitus  -Patient counseled on marijuana cessation if possible  -Patient counseled on dietary and lifestyle modifications including following a low-salt, low-fat, heart healthy diet with sodium restriction to less than 1800 mg of sodium daily fluid restriction to less than 1800 mL of fluid daily along with weight reduction with goal BMI less than 29  -Patient will be seen in 6 months or sooner if necessary  -Patient counseled if he were to have any warning or alarm type symptoms he is to seek emergency medical care immediately.  -Due to already frequent urination patient wishes to trial dietary lifestyle modifications prior to initiation of further diuretic regimen.      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  Patient also has obstructive sleep apnea but has not been tested in many years and has not undergone  treatment with PCP and hyperlipidemia and medical marijuana use who presents to the office today to establish care after previously being followed for many years by Dr. Lee.  -Currently in the office today denies any chest pain, palpitations, lightness or dizziness, loss conscious, shortness of breath, lower extremity edema, orthopnea or bendopnea.  He notes that he had been on Jardiance therapy in the past prior to his Jamarcus's gangrene however has not been on this since then and had cough with ACE inhibitor and therefore this was discontinued.  He notes that his blood pressures at home are very well-controlled and is otherwise doing reasonably well.  -He denies any tobacco, illicit drug use or alcohol use and has daily medical marijuana use.  -Patient notes significant family history of heart disease particularly premature coronary artery disease leading to sudden cardiac death.  -Per documentation from patient's previous cardiologist patient also himself survived sudden cardiac death at least 3 times likely due to him having ICD therapy in place.    Patient Active Problem List   Diagnosis    Essential hypertension    Type 2 diabetes mellitus with other skin complications (HCC)    CAD (coronary artery disease)    Esophageal reflux    Generalized anxiety disorder    Ischemic cardiomyopathy    Hyperlipidemia    ICD (implantable cardioverter-defibrillator) in place    Chronic systolic CHF (congestive heart failure) (HCC)    Ventricular tachycardia (HCC)    Necrotizing soft tissue infection    Cholelithiasis    Jamarcus's gangrene    ICD (implantable cardioverter-defibrillator) infection (HCC)    Streptococcal bacteremia    Anemia of chronic disease    Hypomagnesemia    Nonhealing surgical wound, initial encounter    Type 2 diabetes mellitus with hyperglycemia, unspecified whether long term insulin use (HCC)     Past Medical History:   Diagnosis Date    Benign essential hypertension 06/02/2011    Last Assessment &  "Plan:  BP stable on current meds    Chronic systolic CHF (congestive heart failure) ([facility]) 04/12/2021    Coronary atherosclerosis 04/08/2010    s/p DALI RI 7/17/2015; s/p CABG in his 30's    Generalized anxiety disorder 05/22/2012    ICD (implantable cardioverter-defibrillator) in place 12/04/2019    Ischemic cardiomyopathy 09/13/2012    s/p re-implant of single chamber medtronic ICD 8/30/2023; original implant 10/29/2009    Mixed hyperlipidemia 04/08/2010    Managed by cardiology   Last Assessment & Plan:  on lipitor 40 mg, zetia, lovaza ,LDL 83. continue    Necrotizing soft tissue infection 08/13/2023    Rectal hemorrhage     last assessed: May 29, 2015    S/P CABG (coronary artery bypass graft) 1994    Uncontrolled type 2 diabetes mellitus with hyperglycemia ([facility]) 09/15/2016    Transitioned From: Diabetes Mellitus    Ventricular tachycardia ([facility]) 04/12/2021     Social History     Socioeconomic History    Marital status: /Civil Union     Spouse name: Not on file    Number of children: Not on file    Years of education: Not on file    Highest education level: Not on file   Occupational History    Not on file   Tobacco Use    Smoking status: Never    Smokeless tobacco: Never   Substance and Sexual Activity    Alcohol use: No     Comment: being a social drinker noted in \"[employer]\"     Drug use: No    Sexual activity: Not on file   Other Topics Concern    Not on file   Social History Narrative    Not on file     Social Determinants of Health     Financial Resource Strain: Low Risk  (1/27/2023)    Overall Financial Resource Strain (CARDIA)     Difficulty of Paying Living Expenses: Not hard at all   Food Insecurity: No Food Insecurity (8/15/2023)    Hunger Vital Sign     Worried About Running Out of Food in the Last Year: Never true     Ran Out of Food in the Last Year: Never true   Transportation Needs: No Transportation Needs (8/15/2023)    PRAPARE - Transportation     Lack of Transportation (Medical): No     " Lack of Transportation (Non-Medical): No   Physical Activity: Not on file   Stress: Not on file   Social Connections: Not on file   Intimate Partner Violence: Not on file   Housing Stability: Low Risk  (8/15/2023)    Housing Stability Vital Sign     Unable to Pay for Housing in the Last Year: No     Number of Places Lived in the Last Year: 1     Unstable Housing in the Last Year: No      Family History   Problem Relation Age of Onset    No Known Problems Mother     Cancer Father     Coronary artery disease Father      Past Surgical History:   Procedure Laterality Date    CARDIAC DEFIBRILLATOR PLACEMENT  10/29/2009    original MDT ICD    CARDIAC ELECTROPHYSIOLOGY PROCEDURE N/A 08/24/2023    Procedure: Cardiac laser lead extraction;  Surgeon: Alan Tejeda DO;  Location: [facility];  Service: Cardiac Surgery    CARDIAC ELECTROPHYSIOLOGY PROCEDURE N/A 08/30/2023    Procedure: Cardiac icd implant right sided dual chamber;  Surgeon: Kristopher Stearns MD;  Location: [facility];  Service: Cardiology    CORONARY ANGIOPLASTY WITH STENT PLACEMENT  07/17/2015    s/p DALI RI    CORONARY ARTERY BYPASS GRAFT      in his 30's    DECUBITUS ULCER EXCISION Right 08/14/2023    Procedure: Washout and debridement of perineal wound;  Surgeon: Devin Ashley MD;  Location: [facility];  Service: General    ELBOW SURGERY Right     Bursitis - last assessed: Sept 15, 2016    IR PICC REPOSITION  08/18/2023    RI RMVL TRANSVNS PM ELTRD DUAL LEAD SYS N/A 08/24/2023    Procedure: REMOVAL PACEMAKER/AICD LEADS W LASER- EXTRACTION ONLY;  Surgeon: Alan Tejeda DO;  Location: [facility];  Service: Cardiac Surgery    VAC DRESSING APPLICATION N/A 08/16/2023    Procedure: APPLICATION VAC DRESSING;  Surgeon: Ann-Marie Jolly DO;  Location: [facility];  Service: General    WOUND DEBRIDEMENT N/A 08/13/2023    Procedure: EXCISIONAL DEBRIDEMENT Perineum;  Surgeon: Hema Cardenas DO;  Location: [facility];  Service: General    WOUND DEBRIDEMENT  "N/A 08/16/2023    Procedure: DEBRIDEMENT WOUND, WASH OUT, PARTIAL CLOSURE;  Surgeon: Ann-Marie Jolly DO;  Location: [facility];  Service: General       Current Outpatient Medications:     aspirin 81 MG tablet, Take 1 tablet by mouth 2 (two) times a day , Disp: , Rfl:     atorvastatin (LIPITOR) 40 mg tablet, Take 1 tablet by mouth daily, Disp: , Rfl:     BD ULTRA-FINE PEN NEEDLES 29G X 12.7MM MISC, , Disp: , Rfl:     carvedilol (COREG) 12.5 mg tablet, Take 1 tablet by mouth 2 (two) times a day, Disp: , Rfl:     Continuous Blood Gluc  (FreeStyle [place] 2 [place]) [facility], , Disp: , Rfl:     Continuous Blood Gluc Sensor (FREESTYLE ELIZABETH SENSOR SYSTEM) MISC, Use 1 each every 14 (fourteen) days, Disp: , Rfl:     dulaglutide (Trulicity) 3 MG/0.5ML injection, Inject 0.5 mL (3 mg total) under the skin every 7 days (Patient taking differently: Inject 3 mg under the skin every 7 days Pt taking 4.5 mg now), Disp: , Rfl:     escitalopram (LEXAPRO) 20 mg tablet, TAKE 1 TABLET DAILY, Disp: 90 tablet, Rfl: 1    ezetimibe (ZETIA) 10 mg tablet, Take 1 tablet by mouth daily, Disp: , Rfl:     fenofibrate (TRICOR) 145 mg tablet, Take 145 mg by mouth daily, Disp: , Rfl:     insulin glargine (Toujeo Max SoloStar) 300 units/mL CONCENTRATED U-300 injection pen (2-unit dial), Inject 130 Units under the skin daily at bedtime, Disp: , Rfl:     Insulin Pen Needle (PEN NEEDLES 29GX1/2\") 29G X 12MM MISC, , Disp: , Rfl:     isosorbide mononitrate (IMDUR) 30 mg 24 hr tablet, Take 30 mg by mouth daily at bedtime, Disp: , Rfl: 3    LORazepam (ATIVAN) 1 mg tablet, TAKE 1 TABLET DAILY AS NEEDED FOR ANXIETY, Disp: 30 tablet, Rfl: 3    metFORMIN (GLUCOPHAGE) 500 mg tablet, Take 1,000 mg by mouth 2 (two) times a day, Disp: , Rfl:     omega-3-acid ethyl esters (LOVAZA) 1 g capsule, Take 2 g by mouth 2 (two) times a day , Disp: , Rfl:     omeprazole (PriLOSEC) 40 MG capsule, Take 1 capsule (40 mg total) by mouth daily, Disp: 90 capsule, Rfl: 1    sotalol " (BETAPACE) 80 mg tablet, Take 1 tablet (80 mg total) by mouth 2 (two) times a day, Disp: 180 tablet, Rfl: 1    spironolactone (ALDACTONE) 25 mg tablet, Take 1 tablet by mouth daily, Disp: , Rfl:     B-D ULTRAFINE III SHORT PEN 31G X 8 MM MISC, , Disp: , Rfl:     Insulin Glargine Solostar (Lantus SoloStar) 100 UNIT/ML SOPN, Inject 0.4 mL (40 Units total) under the skin daily at bedtime, Disp: 12 mL, Rfl: 0    insulin lispro (HumaLOG) 100 units/mL injection, Inject 15 Units under the skin 3 (three) times a day with meals (Patient taking differently: Inject 8 Units under the skin 3 (three) times a day with meals), Disp: 20 mL, Rfl: 0    Lancets MISC, test once times daily E11.65 (Patient not taking: Reported on 9/7/2023), Disp: , Rfl:     Misc Natural Products (BLOOD SUGAR 360 PO), ONETOUCH ULTRA TEST STRIPS Test once daily E11.65 (Patient not taking: Reported on 2/16/2024), Disp: , Rfl:     oxyCODONE (ROXICODONE) 5 immediate release tablet, Take 1 tablet (5 mg total) by mouth every 4 (four) hours as needed for moderate pain or severe pain Max Daily Amount: 30 mg, Disp: 20 tablet, Rfl: 0  Allergies   Allergen Reactions    Ace Inhibitors Cough         Labs:  No visits with results within 2 Month(s) from this visit.   Latest known visit with results is:   Ancillary Orders on 09/29/2023   Component Date Value    WBC 10/02/2023 6.97     RBC 10/02/2023 3.82 (L)     Hemoglobin 10/02/2023 11.2 (L)     Hematocrit 10/02/2023 35.7 (L)     MCV 10/02/2023 94     MCH 10/02/2023 29.3     MCHC 10/02/2023 31.4     RDW 10/02/2023 13.2     MPV 10/02/2023 10.7     Platelets 10/02/2023 243     nRBC 10/02/2023 0     Neutrophils Relative 10/02/2023 42 (L)     Immat GRANS % 10/02/2023 0     Lymphocytes Relative 10/02/2023 46 (H)     Monocytes Relative 10/02/2023 8     Eosinophils Relative 10/02/2023 3     Basophils Relative 10/02/2023 1     Neutrophils Absolute 10/02/2023 2.91     Immature Grans Absolute 10/02/2023 0.02     Lymphocytes  Absolute 10/02/2023 3.23     Monocytes Absolute 10/02/2023 0.53     Eosinophils Absolute 10/02/2023 0.22     Basophils Absolute 10/02/2023 0.06     Sodium 10/02/2023 139     Potassium 10/02/2023 5.0     Chloride 10/02/2023 104     CO2 10/02/2023 25     ANION GAP 10/02/2023 10     BUN 10/02/2023 11     Creatinine 10/02/2023 0.73     Glucose 10/02/2023 213 (H)     Calcium 10/02/2023 9.2     AST 10/02/2023 13     ALT 10/02/2023 9     Alkaline Phosphatase 10/02/2023 54     Total Protein 10/02/2023 7.1     Albumin 10/02/2023 4.0     Total Bilirubin 10/02/2023 0.34     eGFR 10/02/2023 95         Imaging: Cardiac EP device report    Result Date: 1/18/2024  Narrative: MDT DC ICD/ACTIVE SYSTEM IS MRI CONDITIONAL NON-BILLABLE CARELINK TRANSMISSION: BATTERY VOLTAGE ADEQUATE (11.5 YRS). AP: 94.1%. : <0.1% (MVP-ON). ALL AVAILABLE LEAD PARAMETERS WITHIN NORMAL LIMITS. NO SIGNIFICANT HIGH RATE EPISODES. OPTI-VOL FLUID THRESHOLD CROSSED 1/18 AND IS ONGOING. PT TAKES ALDACTONE, ASA 81MG, COREG, SOTALOL. EF: 40% (ECHO 8/18/23). TASK TO HF TEAM. NORMAL DEVICE FUNCTION. CH     Review of Systems:  Review of Systems   Constitutional:  Negative for chills, diaphoresis, fatigue and fever.   HENT:  Negative for trouble swallowing and voice change.    Eyes:  Negative for pain and redness.   Respiratory:  Negative for shortness of breath and wheezing.    Cardiovascular:  Negative for chest pain, palpitations and leg swelling.   Gastrointestinal:  Negative for abdominal pain, constipation, diarrhea, nausea and vomiting.   Genitourinary:  Negative for dysuria.   Musculoskeletal:  Positive for arthralgias. Negative for neck pain and neck stiffness.   Skin:  Negative for rash.   Neurological:  Negative for dizziness, syncope, light-headedness and headaches.   Psychiatric/Behavioral:  Negative for agitation and confusion.    All other systems reviewed and are negative.        Vitals:    02/16/24 1458   BP: 110/70   Pulse: 66   Weight: 97.1 kg  "(214 lb)   Height: 5' 9\" (1.753 m)     Vitals:    02/16/24 1458   Weight: 97.1 kg (214 lb)     Height: 5' 9\" (175.3 cm)     Physical Exam:   Physical Exam  Vitals reviewed.   Constitutional:       General: He is not in acute distress.     Appearance: He is obese. He is not diaphoretic.   HENT:      Head: Normocephalic and atraumatic.   Eyes:      General:         Right eye: No discharge.         Left eye: No discharge.   Neck:      Comments: Trachea midline, neck obese, difficult assess JVD  Cardiovascular:      Rate and Rhythm: Normal rate and regular rhythm.      Heart sounds:      No friction rub.   Pulmonary:      Effort: Pulmonary effort is normal. No respiratory distress.      Breath sounds: No wheezing.   Chest:      Chest wall: No tenderness.   Abdominal:      General: Bowel sounds are normal.      Palpations: Abdomen is soft.      Tenderness: There is no abdominal tenderness. There is no rebound.   Musculoskeletal:      Right lower leg: No edema.      Left lower leg: No edema.   Skin:     General: Skin is warm and dry.   Neurological:      Mental Status: He is alert.      Comments: Awake, alert, able to answer questions appropriately, able move extremities bilaterally.   Psychiatric:         Mood and Affect: Mood normal.         Behavior: Behavior normal.        "

## 2024-02-22 ENCOUNTER — REMOTE DEVICE CLINIC VISIT (OUTPATIENT)
Dept: CARDIOLOGY CLINIC | Facility: CLINIC | Age: 69
End: 2024-02-22
Payer: COMMERCIAL

## 2024-02-22 DIAGNOSIS — Z95.810 AICD (AUTOMATIC CARDIOVERTER/DEFIBRILLATOR) PRESENT: Primary | ICD-10-CM

## 2024-02-22 DIAGNOSIS — I47.20 V-TACH (HCC): ICD-10-CM

## 2024-02-22 PROCEDURE — 93297 REM INTERROG DEV EVAL ICPMS: CPT | Performed by: STUDENT IN AN ORGANIZED HEALTH CARE EDUCATION/TRAINING PROGRAM

## 2024-02-22 NOTE — PROGRESS NOTES
Results for orders placed or performed in visit on 02/22/24   Cardiac EP device report    Narrative    MDT DC ICD/ACTIVE SYSTEM IS MRI CONDITIONAL  CARELINK TRANSMISSION - OPTI-VOL ONLY: OPTI-VOL WITHIN NORMAL LIMITS. BATTERY VOLTAGE ADEQUATE (11.4 YRS). AP-93%, <0.1%. ALL AVAILABLE LEAD PARAMETERS WITHIN NORMAL LIMITS. NO SIGNIFICANT HIGH RATE EPISODES. NORMAL DEVICE FUNCTION. GV

## 2024-02-28 ENCOUNTER — APPOINTMENT (EMERGENCY)
Dept: CT IMAGING | Facility: HOSPITAL | Age: 69
End: 2024-02-28
Payer: COMMERCIAL

## 2024-02-28 ENCOUNTER — TELEPHONE (OUTPATIENT)
Dept: INFECTIOUS DISEASES | Facility: CLINIC | Age: 69
End: 2024-02-28

## 2024-02-28 ENCOUNTER — HOSPITAL ENCOUNTER (EMERGENCY)
Facility: HOSPITAL | Age: 69
Discharge: HOME | End: 2024-02-28
Attending: EMERGENCY MEDICINE
Payer: COMMERCIAL

## 2024-02-28 VITALS
DIASTOLIC BLOOD PRESSURE: 69 MMHG | SYSTOLIC BLOOD PRESSURE: 153 MMHG | RESPIRATION RATE: 21 BRPM | TEMPERATURE: 97.6 F | HEART RATE: 68 BPM | OXYGEN SATURATION: 94 %

## 2024-02-28 DIAGNOSIS — Z87.39 HISTORY OF NECROTISING FASCIITIS: ICD-10-CM

## 2024-02-28 DIAGNOSIS — R78.81 BACTEREMIA: Primary | ICD-10-CM

## 2024-02-28 LAB
ALBUMIN SERPL BCP-MCNC: 4.6 G/DL (ref 3.5–5)
ALP SERPL-CCNC: 59 U/L (ref 34–104)
ALT SERPL W P-5'-P-CCNC: 17 U/L (ref 7–52)
ANION GAP SERPL CALCULATED.3IONS-SCNC: 12 MMOL/L
APTT PPP: 30 SECONDS (ref 23–37)
AST SERPL W P-5'-P-CCNC: 17 U/L (ref 13–39)
BACTERIA UR QL AUTO: ABNORMAL /HPF
BASOPHILS # BLD AUTO: 0.05 THOUSANDS/ÂΜL (ref 0–0.1)
BASOPHILS NFR BLD AUTO: 1 % (ref 0–1)
BILIRUB DIRECT SERPL-MCNC: 0.11 MG/DL (ref 0–0.2)
BILIRUB SERPL-MCNC: 0.6 MG/DL (ref 0.2–1)
BILIRUB UR QL STRIP: NEGATIVE
BUN SERPL-MCNC: 18 MG/DL (ref 5–25)
CALCIUM SERPL-MCNC: 10.1 MG/DL (ref 8.4–10.2)
CARDIAC TROPONIN I PNL SERPL HS: 8 NG/L
CHLORIDE SERPL-SCNC: 100 MMOL/L (ref 96–108)
CLARITY UR: CLEAR
CO2 SERPL-SCNC: 22 MMOL/L (ref 21–32)
COLOR UR: YELLOW
CREAT SERPL-MCNC: 0.8 MG/DL (ref 0.6–1.3)
EOSINOPHIL # BLD AUTO: 0.37 THOUSAND/ÂΜL (ref 0–0.61)
EOSINOPHIL NFR BLD AUTO: 4 % (ref 0–6)
ERYTHROCYTE [DISTWIDTH] IN BLOOD BY AUTOMATED COUNT: 14.2 % (ref 11.6–15.1)
FLUAV RNA RESP QL NAA+PROBE: NEGATIVE
FLUBV RNA RESP QL NAA+PROBE: NEGATIVE
GFR SERPL CREATININE-BSD FRML MDRD: 91 ML/MIN/1.73SQ M
GLUCOSE SERPL-MCNC: 268 MG/DL (ref 65–140)
GLUCOSE UR STRIP-MCNC: ABNORMAL MG/DL
HCT VFR BLD AUTO: 40.9 % (ref 36.5–49.3)
HGB BLD-MCNC: 13.5 G/DL (ref 12–17)
HGB UR QL STRIP.AUTO: ABNORMAL
IMM GRANULOCYTES # BLD AUTO: 0.03 THOUSAND/UL (ref 0–0.2)
IMM GRANULOCYTES NFR BLD AUTO: 0 % (ref 0–2)
INR PPP: 1.03 (ref 0.84–1.19)
KETONES UR STRIP-MCNC: NEGATIVE MG/DL
LACTATE SERPL-SCNC: 2.2 MMOL/L (ref 0.5–2)
LEUKOCYTE ESTERASE UR QL STRIP: NEGATIVE
LYMPHOCYTES # BLD AUTO: 3.11 THOUSANDS/ÂΜL (ref 0.6–4.47)
LYMPHOCYTES NFR BLD AUTO: 29 % (ref 14–44)
MCH RBC QN AUTO: 28 PG (ref 26.8–34.3)
MCHC RBC AUTO-ENTMCNC: 33 G/DL (ref 31.4–37.4)
MCV RBC AUTO: 85 FL (ref 82–98)
MONOCYTES # BLD AUTO: 0.83 THOUSAND/ÂΜL (ref 0.17–1.22)
MONOCYTES NFR BLD AUTO: 8 % (ref 4–12)
MUCOUS THREADS UR QL AUTO: ABNORMAL
NEUTROPHILS # BLD AUTO: 6.18 THOUSANDS/ÂΜL (ref 1.85–7.62)
NEUTS SEG NFR BLD AUTO: 58 % (ref 43–75)
NITRITE UR QL STRIP: NEGATIVE
NON-SQ EPI CELLS URNS QL MICRO: ABNORMAL /HPF
NRBC BLD AUTO-RTO: 0 /100 WBCS
PH UR STRIP.AUTO: 5.5 [PH]
PLATELET # BLD AUTO: 314 THOUSANDS/UL (ref 149–390)
PMV BLD AUTO: 9.9 FL (ref 8.9–12.7)
POTASSIUM SERPL-SCNC: 4.2 MMOL/L (ref 3.5–5.3)
PROT SERPL-MCNC: 8.1 G/DL (ref 6.4–8.4)
PROT UR STRIP-MCNC: ABNORMAL MG/DL
PROTHROMBIN TIME: 14.1 SECONDS (ref 11.6–14.5)
RBC # BLD AUTO: 4.82 MILLION/UL (ref 3.88–5.62)
RBC #/AREA URNS AUTO: ABNORMAL /HPF
RSV RNA RESP QL NAA+PROBE: NEGATIVE
SARS-COV-2 RNA RESP QL NAA+PROBE: NEGATIVE
SODIUM SERPL-SCNC: 134 MMOL/L (ref 135–147)
SP GR UR STRIP.AUTO: 1.02 (ref 1–1.03)
UROBILINOGEN UR STRIP-ACNC: <2 MG/DL
WBC # BLD AUTO: 10.57 THOUSAND/UL (ref 4.31–10.16)
WBC #/AREA URNS AUTO: ABNORMAL /HPF

## 2024-02-28 PROCEDURE — 84484 ASSAY OF TROPONIN QUANT: CPT | Performed by: EMERGENCY MEDICINE

## 2024-02-28 PROCEDURE — 96361 HYDRATE IV INFUSION ADD-ON: CPT

## 2024-02-28 PROCEDURE — 71260 CT THORAX DX C+: CPT

## 2024-02-28 PROCEDURE — 0241U HB NFCT DS VIR RESP RNA 4 TRGT: CPT | Performed by: EMERGENCY MEDICINE

## 2024-02-28 PROCEDURE — 87040 BLOOD CULTURE FOR BACTERIA: CPT | Performed by: EMERGENCY MEDICINE

## 2024-02-28 PROCEDURE — 85025 COMPLETE CBC W/AUTO DIFF WBC: CPT | Performed by: EMERGENCY MEDICINE

## 2024-02-28 PROCEDURE — 36415 COLL VENOUS BLD VENIPUNCTURE: CPT | Performed by: EMERGENCY MEDICINE

## 2024-02-28 PROCEDURE — 74177 CT ABD & PELVIS W/CONTRAST: CPT

## 2024-02-28 PROCEDURE — 96365 THER/PROPH/DIAG IV INF INIT: CPT

## 2024-02-28 PROCEDURE — 99284 EMERGENCY DEPT VISIT MOD MDM: CPT

## 2024-02-28 PROCEDURE — 81001 URINALYSIS AUTO W/SCOPE: CPT | Performed by: EMERGENCY MEDICINE

## 2024-02-28 PROCEDURE — 83605 ASSAY OF LACTIC ACID: CPT | Performed by: EMERGENCY MEDICINE

## 2024-02-28 PROCEDURE — 85610 PROTHROMBIN TIME: CPT | Performed by: EMERGENCY MEDICINE

## 2024-02-28 PROCEDURE — 80076 HEPATIC FUNCTION PANEL: CPT | Performed by: EMERGENCY MEDICINE

## 2024-02-28 PROCEDURE — 99285 EMERGENCY DEPT VISIT HI MDM: CPT | Performed by: EMERGENCY MEDICINE

## 2024-02-28 PROCEDURE — 87086 URINE CULTURE/COLONY COUNT: CPT | Performed by: EMERGENCY MEDICINE

## 2024-02-28 PROCEDURE — 85730 THROMBOPLASTIN TIME PARTIAL: CPT | Performed by: EMERGENCY MEDICINE

## 2024-02-28 PROCEDURE — 96366 THER/PROPH/DIAG IV INF ADDON: CPT

## 2024-02-28 PROCEDURE — 80048 BASIC METABOLIC PNL TOTAL CA: CPT | Performed by: EMERGENCY MEDICINE

## 2024-02-28 RX ORDER — SULFAMETHOXAZOLE AND TRIMETHOPRIM 800; 160 MG/1; MG/1
1 TABLET ORAL 2 TIMES DAILY
Qty: 14 TABLET | Refills: 0 | Status: SHIPPED | OUTPATIENT
Start: 2024-02-28 | End: 2024-03-06

## 2024-02-28 RX ORDER — CEPHALEXIN 500 MG/1
500 CAPSULE ORAL EVERY 6 HOURS SCHEDULED
Qty: 28 CAPSULE | Refills: 0 | Status: SHIPPED | OUTPATIENT
Start: 2024-02-28 | End: 2024-03-06

## 2024-02-28 RX ADMIN — VANCOMYCIN HYDROCHLORIDE 2000 MG: 5 INJECTION, POWDER, LYOPHILIZED, FOR SOLUTION INTRAVENOUS at 17:41

## 2024-02-28 RX ADMIN — SODIUM CHLORIDE 1000 ML: 0.9 INJECTION, SOLUTION INTRAVENOUS at 16:28

## 2024-02-28 RX ADMIN — IOHEXOL 100 ML: 350 INJECTION, SOLUTION INTRAVENOUS at 17:31

## 2024-02-28 NOTE — ED PROVIDER NOTES
History  Chief Complaint   Patient presents with    Abnormal Lab     vomiting that began a few days ago, hx of nec fasc. Out pt cultures ordered, call received today for positive findings.      69 yo male with h/o polymicrobial nec fasc who presents to ED for evaluation of chills and vomiting 2 days ago (similar to presenting sxs of NF). He states he had blood cultures done as an outpt yesterday which were + PCR for staph no otherwise speciated but the PCR for S. Epidermidis was negative. Pt is asx at this time; he denies pain in the R groin or drainage or opening of that prior surgical wound. No cp, sob, abd pain, back pain, cough, headache, neck pain, rash. Additional history from wife at bedside as well as review of prior ID office visit from October of last year listed below:     9/22:  Patient is a 68 y.o. male with pmhx DMII (A1C 12.3), HTN, HLD, GERD, Anxiety, CAD, Cardiomyopathy with ICD and CHF who presents for initial eval of of non-healing surgical wound on right groin extending down perineum s/p perineal debridement/washout on 8/13, repeat washout on 8/14 and partial closure on 8/16 due to a necrotizing soft tissue infection/Jamarcus's gangrene. Wound cultures grew Group C Strep, Prevotella and E. Coli. Pt developed Streptococcus Bacteremia and ICD infection as well. Since then pt was treated with a 5 day course of IV Flagyl and placed on IV Ceftriaxone 2g daily for 6 weeks, to be completed on 10/05/2023.  Pt has been applying wet to dry dressings daily. Does not have an odor. Has intact sutures and moderate yellow drainage.        Prior to Admission Medications   Prescriptions Last Dose Informant Patient Reported? Taking?   B-D ULTRAFINE III SHORT PEN 31G X 8 MM MISC   Yes No   Patient not taking: Reported on 9/7/2023   BD ULTRA-FINE PEN NEEDLES 29G X 12.7MM MISC   Yes No   Continuous Blood Gluc  (FreeStyle Elizabeth 2 [place]) TRACEY   Yes No   Continuous Blood Gluc Sensor (FREESTYLE ELIZABETH SENSOR  "SYSTEM) MISC   Yes No   Sig: Use 1 each every 14 (fourteen) days   Insulin Glargine Solostar (Lantus SoloStar) 100 UNIT/ML SOPN   No No   Sig: Inject 0.4 mL (40 Units total) under the skin daily at bedtime   Insulin Pen Needle (PEN NEEDLES 29GX1/2\") 29G X 12MM MISC   Yes No   LORazepam (ATIVAN) 1 mg tablet   No No   Sig: TAKE 1 TABLET DAILY AS NEEDED FOR ANXIETY   Lancets MISC   Yes No   Sig: test once times daily E11.65   Patient not taking: Reported on 9/7/2023   [facility] Natural Products (BLOOD SUGAR 360 PO)   Yes No   Sig: ONETOUCH ULTRA TEST STRIPS Test once daily E11.65   Patient not taking: Reported on 2/16/2024   aspirin 81 MG tablet   Yes No   Sig: Take 1 tablet by mouth 2 (two) times a day    atorvastatin (LIPITOR) 40 mg tablet   Yes No   Sig: Take 1 tablet by mouth daily   carvedilol (COREG) 12.5 mg tablet   Yes No   Sig: Take 1 tablet by mouth 2 (two) times a day   dulaglutide (Trulicity) 3 MG/0.5ML injection   No No   Sig: Inject 0.5 mL (3 mg total) under the skin every 7 days   Patient taking differently: Inject 3 mg under the skin every 7 days Pt taking 4.5 mg now   escitalopram (LEXAPRO) 20 mg tablet   No No   Sig: TAKE 1 TABLET DAILY   ezetimibe (ZETIA) 10 mg tablet   Yes No   Sig: Take 1 tablet by mouth daily   fenofibrate (TRICOR) 145 mg tablet   Yes No   Sig: Take 145 mg by mouth daily   insulin glargine (Toujeo Max SoloStar) 300 units/mL CONCENTRATED U-300 injection pen (2-unit dial)   Yes No   Sig: Inject 130 Units under the skin daily at bedtime   insulin lispro (HumaLOG) 100 units/mL injection   No No   Sig: Inject 15 Units under the skin 3 (three) times a day with meals   Patient taking differently: Inject 8 Units under the skin 3 (three) times a day with meals   isosorbide mononitrate (IMDUR) 30 mg 24 hr tablet   Yes No   Sig: Take 30 mg by mouth daily at bedtime   metFORMIN (GLUCOPHAGE) 500 mg tablet   Yes No   Sig: Take 1,000 mg by mouth 2 (two) times a day   omega-3-acid ethyl esters " (LOVAZA) 1 g capsule   Yes No   Sig: Take 2 g by mouth 2 (two) times a day    omeprazole (PriLOSEC) 40 MG capsule   No No   Sig: Take 1 capsule (40 mg total) by mouth daily   oxyCODONE (ROXICODONE) 5 immediate release tablet   No No   Sig: Take 1 tablet (5 mg total) by mouth every 4 (four) hours as needed for moderate pain or severe pain Max Daily Amount: 30 mg   sotalol (BETAPACE) 80 mg tablet   No No   Sig: Take 1 tablet (80 mg total) by mouth 2 (two) times a day   spironolactone (ALDACTONE) 25 mg tablet   Yes No   Sig: Take 1 tablet by mouth daily      Facility-Administered Medications: None       Past Medical History:   Diagnosis Date    Benign essential hypertension 06/02/2011    Last Assessment & Plan:  BP stable on current meds    Chronic systolic CHF (congestive heart failure) ([facility]) 04/12/2021    Coronary atherosclerosis 04/08/2010    s/p DALI RI 7/17/2015; s/p CABG in his 30's    Generalized anxiety disorder 05/22/2012    ICD (implantable cardioverter-defibrillator) in place 12/04/2019    Ischemic cardiomyopathy 09/13/2012    s/p re-implant of single chamber medtronic ICD 8/30/2023; original implant 10/29/2009    Mixed hyperlipidemia 04/08/2010    Managed by cardiology   Last Assessment & Plan:  on lipitor 40 mg, zetia, lovaza ,LDL 83. continue    Necrotizing soft tissue infection 08/13/2023    Rectal hemorrhage     last assessed: May 29, 2015    S/P CABG (coronary artery bypass graft) 1994    Uncontrolled type 2 diabetes mellitus with hyperglycemia ([facility]) 09/15/2016    Transitioned From: Diabetes Mellitus    Ventricular tachycardia ([facility]) 04/12/2021       Past Surgical History:   Procedure Laterality Date    CARDIAC DEFIBRILLATOR PLACEMENT  10/29/2009    original MDT ICD    CARDIAC ELECTROPHYSIOLOGY PROCEDURE N/A 08/24/2023    Procedure: Cardiac laser lead extraction;  Surgeon: Alan Tejeda DO;  Location: [facility];  Service: Cardiac Surgery    CARDIAC ELECTROPHYSIOLOGY PROCEDURE N/A 08/30/2023     "Procedure: Cardiac icd implant right sided dual chamber;  Surgeon: Kristopher Stearns MD;  Location: [facility] CARD[facility]AC C[facility];  Service: Cardiology    CORONARY ANGIOPLASTY WITH STENT PLACEMENT  07/17/2015    s/p DALI RI    CORONARY ARTERY BYPASS GRAFT      in his 30's    DECUBITUS ULCER EXCISION Right 08/14/2023    Procedure: Washout and debridement of perineal wound;  Surgeon: Devin Ashley MD;  Location: [facility];  Service: General    ELBOW SURGERY Right     Bursitis - last assessed: Sept 15, 2016    IR PICC REPOSITION  08/18/2023    VT RMVL TRANSVNS PM ELTRD DUAL LEAD SYS N/A 08/24/2023    Procedure: REMOVAL PACEMAKER/AICD LEADS W LASER- EXTRACTION ONLY;  Surgeon: Alan Tejeda DO;  [facility];  Service: Cardiac Surgery    VAC DRESSING APPLICATION N/A 08/16/2023    Procedure: APPLICATION VAC DRESSING;  Surgeon: Ann-Marie Jolly DO;  Location: [facility];  Service: General    WOUND DEBRIDEMENT N/A 08/13/2023    Procedure: EXCISIONAL DEBRIDEMENT Perineum;  Surgeon: Hema Cardenas DO;  Location: [facility];  Service: General    WOUND DEBRIDEMENT N/A 08/16/2023    Procedure: DEBRIDEMENT WOUND, WASH OUT, PARTIAL CLOSURE;  Surgeon: Ann-Marie Jolly DO;  Location: [facility];  Service: General       Family History   Problem Relation Age of Onset    No Known Problems Mother     Cancer Father     Coronary artery disease Father      I have reviewed and agree with the history as documented.    E-Cigarette/Vaping     E-Cigarette/Vaping Substances     Social History     Tobacco Use    Smoking status: Never    Smokeless tobacco: Never   Substance Use Topics    Alcohol use: No     Comment: being a social drinker noted in \"allscripts\"     Drug use: No       Review of Systems   Constitutional:  Positive for chills.   Gastrointestinal:  Positive for nausea and vomiting.       Physical Exam  Physical Exam  Vitals and nursing note reviewed.   Constitutional:       General: He is not in acute distress.     Appearance: Normal " appearance. He is well-developed. He is not ill-appearing, toxic-appearing or diaphoretic.   HENT:      Head: Normocephalic and atraumatic.      Mouth/Throat:      Mouth: Mucous membranes are moist.      Pharynx: Oropharynx is clear.   Eyes:      Conjunctiva/sclera: Conjunctivae normal.      Pupils: Pupils are equal, round, and reactive to light.   Neck:      Vascular: No JVD.   Cardiovascular:      Rate and Rhythm: Regular rhythm. Bradycardia present.      Pulses: Normal pulses.      Heart sounds: Normal heart sounds. No murmur heard.     No friction rub. No gallop.   Pulmonary:      Effort: Pulmonary effort is normal. No respiratory distress.      Breath sounds: Normal breath sounds. No stridor. No wheezing or rales.   Abdominal:      General: There is no distension.      Palpations: Abdomen is soft.      Tenderness: There is no abdominal tenderness. There is no guarding or rebound.      Comments: The R groin is without evidence of infection. No erythema, crepitus, fluctuance, skin breakdown or drainage. No perineal or scrotal swelling tenderness or erythema.    Musculoskeletal:         General: No swelling, tenderness, deformity or signs of injury. Normal range of motion.      Cervical back: Normal range of motion and neck supple. No rigidity.   Skin:     General: Skin is warm and dry.      Capillary Refill: Capillary refill takes less than 2 seconds.      Coloration: Skin is not jaundiced or pale.      Findings: No bruising or erythema.   Neurological:      General: No focal deficit present.      Mental Status: He is alert and oriented to person, place, and time.      Cranial Nerves: No cranial nerve deficit.      Sensory: No sensory deficit.      Motor: No weakness or abnormal muscle tone.      Coordination: Coordination normal.      Gait: Gait normal.         Vital Signs  ED Triage Vitals [02/28/24 1438]   Temperature Pulse Respirations Blood Pressure SpO2   97.6 °F (36.4 °C) (!) 44 18 170/90 97 %      Temp  src Heart Rate Source Patient Position - Orthostatic VS BP Location FiO2 (%)   -- -- -- -- --      Pain Score       --           Vitals:    02/28/24 1438   BP: 170/90   Pulse: (!) 44         Visual Acuity  Visual Acuity      Flowsheet Row Most Recent Value   L Pupil Size (mm) 3   R Pupil Size (mm) 3            ED Medications  Medications   sodium chloride 0.9 % bolus 1,000 mL (1,000 mL Intravenous New Bag 2/28/24 1628)   vancomycin (VANCOCIN) 2,000 mg in sodium chloride 0.9 % 500 mL IVPB (2,000 mg Intravenous New Bag 2/28/24 1741)   iohexol (OMNIPAQUE) 350 MG/ML injection (MULTI-DOSE) 100 mL (100 mL Intravenous Given 2/28/24 1731)       Diagnostic Studies  Results Reviewed       Procedure Component Value Units Date/Time    HS Troponin I 4hr [[id number]]     Lab Status: No result Specimen: Blood     Lactic acid, plasma (w/reflex if result > 2.0) [[id number]]  (Abnormal) Collected: 02/28/24 1619    Lab Status: Final result Specimen: Blood from Hand, Right Updated: 02/28/24 1732     LACTIC ACID 2.2 mmol/L     Narrative:      Result may be elevated if tourniquet was used during collection.    Lactic acid 2 Hours [[id number]]     Lab Status: No result Specimen: Blood     FLU/RSV/COVID - if FLU/RSV clinically relevant [[id number]]  (Normal) Collected: 02/28/24 1619    Lab Status: Final result Specimen: Nares from Nose Updated: 02/28/24 1726     SARS-CoV-2 Negative     INFLUENZA A PCR Negative     INFLUENZA B PCR Negative     RSV PCR Negative    Narrative:      FOR PEDIATRIC PATIENTS - copy/paste COVID Guidelines URL to browser: [email or web address]    SARS-CoV-2 assay is a Nucleic Acid Amplification assay intended for the  qualitative detection of nucleic acid from SARS-CoV-2 in nasopharyngeal  swabs. Results are for the presumptive identification of SARS-CoV-2 RNA.    Positive results are indicative of infection with SARS-CoV-2, the virus  causing COVID-19, but do not  rule out bacterial infection or co-infection  with other viruses. Laboratories within the [place] and its  territories are required to report all positive results to the appropriate  public health authorities. Negative results do not preclude SARS-CoV-2  infection and should not be used as the sole basis for treatment or other  patient management decisions. Negative results must be combined with  clinical observations, patient history, and epidemiological information.  This test has not been FDA cleared or approved.    This test has been authorized by FDA under an Emergency Use Authorization  (EUA). This test is only authorized for the duration of time the  declaration that circumstances exist justifying the authorization of the  emergency use of an in vitro diagnostic tests for detection of SARS-CoV-2  virus and/or diagnosis of COVID-19 infection under section 564(b)(1) of  the Act, 21 U.S.C. 360bbb-3(b)(1), unless the authorization is terminated  or revoked sooner. The test has been validated but independent review by FDA  and CLIA is pending.    Test performed using [employer] GeneXpert: This RT-PCR assay targets N2,  a region unique to SARS-CoV-2. A conserved region in the E-gene was chosen  for pan-Sarbecovirus detection which includes SARS-CoV-2.    According to CMS-2020-01-R, this platform meets the definition of high-throughput technology.    Basic metabolic panel [[id number]]  (Abnormal) Collected: 02/28/24 1619    Lab Status: Final result Specimen: Blood from Hand, Right Updated: 02/28/24 1713     Sodium 134 mmol/L      Potassium 4.2 mmol/L      Chloride 100 mmol/L      CO2 22 mmol/L      ANION GAP 12 mmol/L      BUN 18 mg/dL      Creatinine 0.80 mg/dL      Glucose 268 mg/dL      Calcium 10.1 mg/dL      eGFR 91 ml/min/1.73sq m     Narrative:      National Kidney Disease Foundation guidelines for Chronic Kidney Disease (CKD):     Stage 1 with normal or high GFR (GFR > 90 mL/min/1.73 square meters)     Stage 2 Mild CKD (GFR = 60-89 mL/min/1.73 square meters)    Stage 3A Moderate CKD (GFR = 45-59 mL/min/1.73 square meters)    Stage 3B Moderate CKD (GFR = 30-44 mL/min/1.73 square meters)    Stage 4 Severe CKD (GFR = 15-29 mL/min/1.73 square meters)    Stage 5 End Stage CKD (GFR <15 mL/min/1.73 square meters)  Note: GFR calculation is accurate only with a steady state creatinine    Hepatic function panel [[id number]]  (Normal) Collected: 02/28/24 1619    Lab Status: Final result Specimen: Blood from Hand, Right Updated: 02/28/24 1713     Total Bilirubin 0.60 mg/dL      Bilirubin, Direct 0.11 mg/dL      Alkaline Phosphatase 59 U/L      AST 17 U/L      ALT 17 U/L      Total Protein 8.1 g/dL      Albumin 4.6 g/dL     HS Troponin I 2hr [[id number]]     Lab Status: No result Specimen: Blood     HS Troponin 0hr (reflex protocol) [[id number]]  (Normal) Collected: 02/28/24 1619    Lab Status: Final result Specimen: Blood from Hand, Right Updated: 02/28/24 1711     hs TnI 0hr 8 ng/L     Protime-INR [[id number]]  (Normal) Collected: 02/28/24 1619    Lab Status: Final result Specimen: Blood from Arm, Right Updated: 02/28/24 1702     Protime 14.1 seconds      INR 1.03    APTT [[id number]]  (Normal) Collected: 02/28/24 1619    Lab Status: Final result Specimen: Blood from Arm, Right Updated: 02/28/24 1702     PTT 30 seconds     Urine Microscopic [[id number]]  (Abnormal) Collected: 02/28/24 1631    Lab Status: Final result Specimen: Urine, Clean Catch Updated: 02/28/24 1653     RBC, UA 2-4 /hpf      WBC, UA 1-2 /hpf      Epithelial Cells None Seen /hpf      Bacteria, UA None Seen /hpf      MUCUS THREADS Occasional    UA (URINE) with reflex to Scope [[id number]]  (Abnormal) Collected: 02/28/24 1631    Lab Status: Final result Specimen: Urine, Clean Catch Updated: 02/28/24 1652     Color, UA Yellow     Clarity, UA Clear     Specific Gravity, UA 1.025     pH, UA 5.5     Leukocytes, UA Negative     Nitrite, UA Negative     Protein, UA Trace  mg/dl      Glucose, UA 1000 (1%) mg/dl      Ketones, UA Negative mg/dl      Urobilinogen, UA <2.0 mg/dl      Bilirubin, UA Negative     Occult Blood, UA Large    CBC and differential [[id number]]  (Abnormal) Collected: 02/28/24 1619    Lab Status: Final result Specimen: Blood from Hand, Right Updated: 02/28/24 1650     WBC 10.57 Thousand/uL      RBC 4.82 Million/uL      Hemoglobin 13.5 g/dL      Hematocrit 40.9 %      MCV 85 fL      MCH 28.0 pg      MCHC 33.0 g/dL      RDW 14.2 %      MPV 9.9 fL      Platelets 314 Thousands/uL      nRBC 0 /100 WBCs      Neutrophils Relative 58 %      Immat GRANS % 0 %      Lymphocytes Relative 29 %      Monocytes Relative 8 %      Eosinophils Relative 4 %      Basophils Relative 1 %      Neutrophils Absolute 6.18 Thousands/µL      Immature Grans Absolute 0.03 Thousand/uL      Lymphocytes Absolute 3.11 Thousands/µL      Monocytes Absolute 0.83 Thousand/µL      Eosinophils Absolute 0.37 Thousand/µL      Basophils Absolute 0.05 Thousands/µL     Urine culture [[id number]] Collected: 02/28/24 1631    Lab Status: In process Specimen: Urine, Clean Catch Updated: 02/28/24 1647    Blood culture #2 [[id number]] Collected: 02/28/24 1626    Lab Status: In process Specimen: Blood from Hand, Left Updated: 02/28/24 1643    Blood culture #1 [[id number]] Collected: 02/28/24 1619    Lab Status: In process Specimen: Blood from Hand, Right Updated: 02/28/24 1641                   CT chest abdomen pelvis w contrast   Final Result by Leon Sheehan MD (02/28 1934)      Clear lungs.      No acute inflammatory process identified in the abdomen or pelvis.      3 mm right ureteral calculus without hydronephrosis. Punctate intrarenal calculi again noted on the right.      Mild bladder wall thickening. This could represent simple underdistention. Correlate for possible cystitis.               Workstation performed: [id number]                    Procedures  Procedures         ED Course                                SBIRT 20yo+      Flowsheet Row Most Recent Value   Initial Alcohol Screen: US AUDIT-C     1. How often do you have a drink containing alcohol? 0 Filed at: 02/28/2024 1540   2. How many drinks containing alcohol do you have on a typical day you are drinking?  0 Filed at: 02/28/2024 1540   Audit-C Score 0 Filed at: 02/28/2024 1540   NANNETTE: How many times in the past year have you...    Used an illegal drug or used a prescription medication for non-medical reasons? Never Filed at: 02/28/2024 1540                      Medical Decision Making  Problems Addressed:  Bacteremia: acute illness or injury     Details: Overall concerning hpi given h/o nec fasc. His chills/nausea/vomiting resolved approximately 48 hours ago and he is otherwise asx. I offered admission while a/w blood culture results for iv abx and monitoring, pt does not wish to be admitted at this time and wife at bedside supports his decision to d/c home. Will treat with tmp/smx and keflex. Outpt referral to ID.     Amount and/or Complexity of Data Reviewed  Labs: ordered. Decision-making details documented in ED Course.  Radiology: ordered and independent interpretation performed.    Risk  Prescription drug management.             Disposition  Final diagnoses:   Bacteremia   History of necrotising fasciitis     Time reflects when diagnosis was documented in both MDM as applicable and the Disposition within this note       Time User Action Codes Description Comment    2/28/2024  7:21 PM Gerald Ross Add [R78.81] Bacteremia     2/28/2024  7:22 PM Gerald Ross Add [Z87.39] History of necrotising fasciitis           ED Disposition       ED Disposition   Discharge    Condition   Stable    Date/Time   Wed Feb 28, 2024 1939    Comment   Devin Gonzalez discharge to home/self care.                   Follow-up Information       Follow up With Specialties Details Why Contact Info Additional Information    [facility]  Department Emergency Medicine  If symptoms worsen [address] [facility] [address]  [phone] [facility] Emergency Department, [address], [place], [address]            Patient's Medications   Discharge Prescriptions    CEPHALEXIN (KEFLEX) 500 MG CAPSULE    Take 1 capsule (500 mg total) by mouth every 6 (six) hours for 7 days       Start Date: 2/28/2024 End Date: 3/6/2024       Order Dose: 500 mg       Quantity: 28 capsule    Refills: 0    SULFAMETHOXAZOLE-TRIMETHOPRIM (BACTRIM DS) 800-160 MG PER TABLET    Take 1 tablet by mouth 2 (two) times a day for 7 days smx-tmp DS (BACTRIM) 800-160 mg tabs (1tab q12 D10)       Start Date: 2/28/2024 End Date: 3/6/2024       Order Dose: 1 tablet       Quantity: 14 tablet    Refills: 0           PDMP Review         Value Time User    PDMP Reviewed  Yes 3/6/2023  9:11 AM Lea Reyes, MD            ED Provider  Electronically Signed by             Gerald Ross MD  02/28/24 2013

## 2024-02-28 NOTE — TELEPHONE ENCOUNTER
Spouse called to say PCP ordered standard labs and blood cultures d/t patient vomiting, fever, not feeling right.    HNL Blood cultures (+), PCP advised to go to ER, spouse asking which ER.  Advised to follow PCP instructions.  Closest ER is [facility] going there.  Advised to have ID Consulted if needed.        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

## 2024-02-29 ENCOUNTER — TELEPHONE (OUTPATIENT)
Age: 69
End: 2024-02-29

## 2024-02-29 ENCOUNTER — TELEPHONE (OUTPATIENT)
Dept: INFECTIOUS DISEASES | Facility: CLINIC | Age: 69
End: 2024-02-29

## 2024-02-29 DIAGNOSIS — Z87.438 HISTORY OF FOURNIER'S GANGRENE: Primary | ICD-10-CM

## 2024-02-29 LAB — BACTERIA UR CULT: NORMAL

## 2024-02-29 NOTE — DISCHARGE INSTRUCTIONS
Clear lungs.     No acute inflammatory process identified in the abdomen or pelvis.     3 mm right ureteral calculus without hydronephrosis. Punctate intrarenal calculi again noted on the right.     Mild bladder wall thickening. This could represent simple underdistention. Correlate for possible cystitis.

## 2024-02-29 NOTE — TELEPHONE ENCOUNTER
Patient's wife called and informed that infectious disease will not be seeing the patient at this time due to referral from ER. Advised several times to visit PCP per practice instruction.  States that this is a delay of care and expressing high frustration and disappointment with decision.   Told Johana that I will escalate to leadership.    She will await a response.

## 2024-02-29 NOTE — TELEPHONE ENCOUNTER
Patients wife, Johana, calling: Received verbal permission by patient to speak with wife regarding care    Hx:   Aug'2023: Necrotizing fasciitis with gangrene. Hospitalized. Intubated and IV abx. ICD relocation to right side.  Discharged at the end of August.    Current:  2/26- Presented with similar initial symptoms of N/V and chills.    2/27- Blood cultures obtained by PCP.    2/28- ER- Ct abdomen, cultures, IV Vanco, discharged on PO antibiotics.  All questions answered. No further needs at the moment.       Calling to schedule ID appointment. Informed that we will review referral and return call with further instructions.     All questions answered. No further needs at the moment.

## 2024-02-29 NOTE — TELEPHONE ENCOUNTER
Patient's wife had called into office regarding recent outpatient blood cultures. Patient developed chill at home on 2/26, along with nausea, but no fevers. Was seen by PCP who ordered outpatient blood cultures. Results are scanned into media, one set had Staph epidermidis detected and the second set had Staph hominis detected. They went to ED at [place] on 2/28, repeat blood cultures were obtained. These are negative thus far.     Spoke with Johana (patient's wife). No fevers currently, no further chills or nausea. No erythema at prior surgical site. I discussed that with two different skin Staph bacteria in the cultures, very likely was contamination from skin. However the repeat blood cultures from 2/28, which were collected before he was given any antibiotics, would be expected to be positive if there was truly a blood infection. I explained that if these return positive, they will receive a phone call from the hospital and he will need to return to ER for further evaluation and admission. I also explained that should he develop any new symptoms, or fevers/chills, that they can call our office and we may need to discuss return to the hospital as well. She was in agreeable with this plan.    oDmingo Albrecht MD

## 2024-02-29 NOTE — TELEPHONE ENCOUNTER
Patient's wife called in reference to medication that was suppose to be sent [facility] to [facility] last night from ED and they called [facility] and nothing was there told her to call the ED to have the meds sent over again.

## 2024-03-03 LAB
BACTERIA BLD CULT: NORMAL
BACTERIA BLD CULT: NORMAL

## 2024-03-04 LAB
BACTERIA BLD CULT: NORMAL
BACTERIA BLD CULT: NORMAL

## 2024-03-17 DIAGNOSIS — E78.2 MIXED HYPERLIPIDEMIA: Primary | ICD-10-CM

## 2024-03-18 ENCOUNTER — TELEPHONE (OUTPATIENT)
Age: 69
End: 2024-03-18

## 2024-03-18 RX ORDER — OMEGA-3-ACID ETHYL ESTERS 1 G/1
2 CAPSULE, LIQUID FILLED ORAL 2 TIMES DAILY
Qty: 360 CAPSULE | Refills: 3 | Status: SHIPPED | OUTPATIENT
Start: 2024-03-18

## 2024-03-18 NOTE — TELEPHONE ENCOUNTER
03/18/24  Screened by: Lynnette Monzon    Referring Provider Dr. Espinosa     Pre- Screening:     There is no height or weight on file to calculate BMI.  Has patient been referred for a routine screening Colonoscopy? yes  Is the patient between 45-75 years old? yes      Previous Colonoscopy yes   If yes:    Date: Over 10 yrs ago    Facility:     Reason:         Does the patient want to see a Gastroenterologist prior to their procedure OR are they having any GI symptoms? no    Has the patient been hospitalized or had abdominal surgery in the past 6 months? no    Does the patient use supplemental oxygen? no    Does the patient take Coumadin, Lovenox, Plavix, Elliquis, Xarelto, or other blood thinning medication? no    Has the patient had a stroke, cardiac event, or stent placed in the past year? yes      If patient is between 45yrs - 49yrs, please advise patient that we will have to confirm benefits & coverage with their insurance company for a routine screening colonoscopy.

## 2024-03-22 RX ORDER — DULAGLUTIDE 4.5 MG/.5ML
INJECTION, SOLUTION SUBCUTANEOUS
COMMUNITY
Start: 2024-03-09

## 2024-03-24 ENCOUNTER — PATIENT MESSAGE (OUTPATIENT)
Dept: CARDIOLOGY CLINIC | Facility: CLINIC | Age: 69
End: 2024-03-24

## 2024-03-24 DIAGNOSIS — I25.5 ISCHEMIC CARDIOMYOPATHY: Primary | ICD-10-CM

## 2024-03-25 ENCOUNTER — REMOTE DEVICE CLINIC VISIT (OUTPATIENT)
Dept: CARDIOLOGY CLINIC | Facility: CLINIC | Age: 69
End: 2024-03-25
Payer: COMMERCIAL

## 2024-03-25 DIAGNOSIS — Z95.810 AICD (AUTOMATIC CARDIOVERTER/DEFIBRILLATOR) PRESENT: Primary | ICD-10-CM

## 2024-03-25 PROCEDURE — 93295 DEV INTERROG REMOTE 1/2/MLT: CPT | Performed by: STUDENT IN AN ORGANIZED HEALTH CARE EDUCATION/TRAINING PROGRAM

## 2024-03-25 PROCEDURE — 93296 REM INTERROG EVL PM/IDS: CPT | Performed by: STUDENT IN AN ORGANIZED HEALTH CARE EDUCATION/TRAINING PROGRAM

## 2024-03-25 RX ORDER — CARVEDILOL 12.5 MG/1
12.5 TABLET ORAL 2 TIMES DAILY
Qty: 180 TABLET | Refills: 3 | Status: SHIPPED | OUTPATIENT
Start: 2024-03-25

## 2024-03-25 RX ORDER — CARVEDILOL 12.5 MG/1
12.5 TABLET ORAL 2 TIMES DAILY
Qty: 180 TABLET | Refills: 3 | Status: SHIPPED | OUTPATIENT
Start: 2024-03-25 | End: 2024-03-25 | Stop reason: SDUPTHER

## 2024-03-25 NOTE — PROGRESS NOTES
"Results for orders placed or performed in visit on 03/25/24   Cardiac EP device report    Narrative    MDT DC ICD/ACTIVE SYSTEM IS MRI CONDITIONAL  CARELINK TRANSMISSION: BATTERY STATUS \"11 YRS.\" AP 84%  0%. ALL AVAILABLE LEAD PARAMETERS WITHIN NORMAL LIMITS. NO SIGNIFICANT HIGH RATE EPISODES. OPTI-VOL WITHIN NORMAL LIMITS. NORMAL DEVICE FUNCTION. NC         "

## 2024-03-26 ENCOUNTER — OFFICE VISIT (OUTPATIENT)
Dept: GASTROENTEROLOGY | Facility: CLINIC | Age: 69
End: 2024-03-26
Payer: COMMERCIAL

## 2024-03-26 VITALS
RESPIRATION RATE: 18 BRPM | WEIGHT: 209.4 LBS | OXYGEN SATURATION: 98 % | HEIGHT: 68 IN | BODY MASS INDEX: 31.74 KG/M2 | DIASTOLIC BLOOD PRESSURE: 82 MMHG | SYSTOLIC BLOOD PRESSURE: 124 MMHG | HEART RATE: 74 BPM | TEMPERATURE: 98 F

## 2024-03-26 DIAGNOSIS — R19.5 POSITIVE COLORECTAL CANCER SCREENING USING COLOGUARD TEST: Primary | ICD-10-CM

## 2024-03-26 PROCEDURE — 99203 OFFICE O/P NEW LOW 30 MIN: CPT | Performed by: PHYSICIAN ASSISTANT

## 2024-03-26 NOTE — PROGRESS NOTES
[facility] - Outpatient Consultation  Devin Gonzalez 68 y.o. male MRN: [id number]  Encounter: [id number]          ASSESSMENT AND PLAN:      1. Positive colorectal cancer screening using Cologuard test  - He had a positive cologuard test a few weeks ago and a history of colon cancer in his father without other alarm symptoms such as abdominal pain, changes in bowel habits, or blood in the stool  - Last colonoscopy was 10+ years ago and he reports no polyps on prior exams  - Schedule colonoscopy with miralax prep  - Reduce ASA from 162 mg to 81 mg daily for 1 week prior  - Hold Trulicity 1 week prior to procedures to reduce aspiration risk    ______________________________________________________________________    HPI:  Devin is a 67 yo M with a PMH of CAD s/p CABG x 4 35 years ago, ischemic cardiomyopathy with a EF of 40%, s/p ICD, DM2, HTN, recent hospitalization for Jamarcus's gangrene, presenting due to a positive Cologuard test he had a few weeks ago with his PCP.  He reports that he has done Cologuard tests several times over the past 10 years as his colon cancer screening.  His last colonoscopy was 10 or more years ago and he believes this was a normal exam without polyps.  He does believe his father had colon cancer. He denies any abdominal pain or changes in bowel habits recently. He denies blood in the stool.       REVIEW OF SYSTEMS:    CONSTITUTIONAL: Denies any fever, chills, rigors, and weight loss.  HEENT: No earache or tinnitus. Denies hearing loss or visual disturbances.  CARDIOVASCULAR: No chest pain or palpitations.   RESPIRATORY: Denies any cough, hemoptysis, shortness of breath or dyspnea on exertion.  GASTROINTESTINAL: As noted in the History of Present Illness.   GENITOURINARY: No problems with urination. Denies any hematuria or dysuria.  NEUROLOGIC: No dizziness or vertigo, denies headaches.   MUSCULOSKELETAL: Denies any muscle or joint pain.   SKIN: Denies skin  rashes or itching.   ENDOCRINE: Denies excessive thirst. Denies intolerance to heat or cold.  PSYCHOSOCIAL: Denies depression or anxiety. Denies any recent memory loss.       Historical Information   Past Medical History:   Diagnosis Date    Benign essential hypertension 06/02/2011    Last Assessment & Plan:  BP stable on current meds    Chronic systolic CHF (congestive heart failure) ([facility]) 04/12/2021    Coronary atherosclerosis 04/08/2010    s/p DAIL RI 7/17/2015; s/p CABG in his 30's    Generalized anxiety disorder 05/22/2012    ICD (implantable cardioverter-defibrillator) in place 12/04/2019    Ischemic cardiomyopathy 09/13/2012    s/p re-implant of single chamber medtronic ICD 8/30/2023; original implant 10/29/2009    Mixed hyperlipidemia 04/08/2010    Managed by cardiology   Last Assessment & Plan:  on lipitor 40 mg, zetia, lovaza ,LDL 83. continue    Necrotizing soft tissue infection 08/13/2023    Rectal hemorrhage     last assessed: May 29, 2015    S/P CABG (coronary artery bypass graft) 1994    Uncontrolled type 2 diabetes mellitus with hyperglycemia ([facility]) 09/15/2016    Transitioned From: Diabetes Mellitus    Ventricular tachycardia ([facility]) 04/12/2021     Past Surgical History:   Procedure Laterality Date    CARDIAC DEFIBRILLATOR PLACEMENT  10/29/2009    original MDT ICD    CARDIAC ELECTROPHYSIOLOGY PROCEDURE N/A 08/24/2023    Procedure: Cardiac laser lead extraction;  Surgeon: Alan Tejeda DO;  Location: [facility];  Service: Cardiac Surgery    CARDIAC ELECTROPHYSIOLOGY PROCEDURE N/A 08/30/2023    Procedure: Cardiac icd implant right sided dual chamber;  Surgeon: Kristopher Stearns MD;  Location: [facility] CARD[facility]AC [facility];  Service: Cardiology    CORONARY ANGIOPLASTY WITH STENT PLACEMENT  07/17/2015    s/p DALI RI    CORONARY ARTERY BYPASS GRAFT      in his 30's    DECUBITUS ULCER EXCISION Right 08/14/2023    Procedure: Washout and debridement of perineal wound;  Surgeon: Devin Ashley MD;  Location: [facility];   "Service: General    ELBOW SURGERY Right     Bursitis - last assessed: Sept 15, 2016    IR PICC REPOSITION  08/18/2023    DC RMVL TRANSVNS PM ELTRD DUAL LEAD SYS N/A 08/24/2023    Procedure: REMOVAL PACEMAKER/AICD LEADS W LASER- EXTRACTION ONLY;  Surgeon: Alan Tejeda DO;  Location: [facility];  Service: Cardiac Surgery    VAC DRESSING APPLICATION N/A 08/16/2023    Procedure: APPLICATION VAC DRESSING;  Surgeon: Ann-Marie Jolly DO;  Location: [facility];  Service: General    WOUND DEBRIDEMENT N/A 08/13/2023    Procedure: EXCISIONAL DEBRIDEMENT Perineum;  Surgeon: Hema Cardenas DO;  Location: [facility];  Service: General    WOUND DEBRIDEMENT N/A 08/16/2023    Procedure: DEBRIDEMENT WOUND, WASH OUT, PARTIAL CLOSURE;  Surgeon: Ann-Marie Jolly DO;  Location: [facility];  Service: General     Social History   Social History     Substance and Sexual Activity   Alcohol Use No    Comment: being a social drinker noted in \"[employer]\"      Social History     Substance and Sexual Activity   Drug Use No     Social History     Tobacco Use   Smoking Status Never   Smokeless Tobacco Never     Family History   Problem Relation Age of Onset    No Known Problems Mother     Cancer Father     Coronary artery disease Father        Meds/Allergies       Current Outpatient Medications:     aspirin 81 MG tablet    atorvastatin (LIPITOR) 40 mg tablet    BD ULTRA-FINE PEN NEEDLES 29G X 12.7MM MISC    carvedilol (COREG) 12.5 mg tablet    Continuous Blood Gluc  (FreeStyle [place] 2 [place]) TRACEY    Continuous Blood Gluc Sensor (FREESTYLE ELIZABETH SENSOR SYSTEM) MISC    escitalopram (LEXAPRO) 20 mg tablet    ezetimibe (ZETIA) 10 mg tablet    fenofibrate (TRICOR) 145 mg tablet    insulin glargine (Toujeo Max SoloStar) 300 units/mL CONCENTRATED U-300 injection pen (2-unit dial)    insulin lispro (HumaLOG) 100 units/mL injection    Insulin Pen Needle (PEN NEEDLES 29GX1/2\") 29G X 12MM MISC    isosorbide mononitrate (IMDUR) 30 mg 24 hr " "tablet    LORazepam (ATIVAN) 1 mg tablet    metFORMIN (GLUCOPHAGE) 1000 MG tablet    metFORMIN (GLUCOPHAGE) 500 mg tablet    omega-3-acid ethyl esters (LOVAZA) 1 g capsule    omeprazole (PriLOSEC) 40 MG capsule    sotalol (BETAPACE) 80 mg tablet    spironolactone (ALDACTONE) 25 mg tablet    Trulicity 4.5 MG/0.5ML injection    B-D ULTRAFINE III SHORT PEN 31G X 8 MM MISC    dulaglutide (Trulicity) 3 MG/0.5ML injection    Lancets MISC    Misc Natural Products (BLOOD SUGAR 360 PO)    Allergies   Allergen Reactions    Ace Inhibitors Cough           Objective     Blood pressure 124/82, pulse 74, temperature 98 °F (36.7 °C), temperature source Tympanic, resp. rate 18, height 5' 8\" (1.727 m), weight 95 kg (209 lb 6.4 oz), SpO2 98%. Body mass index is 31.84 kg/m².        PHYSICAL EXAM:      General Appearance:   Alert, cooperative, no distress   HEENT:   Normocephalic, atraumatic, anicteric.     Neck:  Supple, symmetrical, trachea midline   Lungs:   Clear to auscultation bilaterally; no rales, rhonchi or wheezing; respirations unlabored    Heart::   Regular rate and rhythm; no murmur, rub, or gallop.   Abdomen:   Soft, non-tender, non-distended; normal bowel sounds; no masses, no organomegaly    Genitalia:   Deferred    Rectal:   Deferred    Extremities:  No cyanosis, clubbing or edema    Pulses:  2+ and symmetric    Skin:  No jaundice, rashes, or lesions    Lymph nodes:  No palpable cervical lymphadenopathy        Lab Results:   No visits with results within 1 Day(s) from this visit.   Latest known visit with results is:   Admission on 02/28/2024, Discharged on 02/28/2024   Component Date Value    WBC 02/28/2024 10.57 (H)     RBC 02/28/2024 4.82     Hemoglobin 02/28/2024 13.5     Hematocrit 02/28/2024 40.9     MCV 02/28/2024 85     MCH 02/28/2024 28.0     MCHC 02/28/2024 33.0     RDW 02/28/2024 14.2     MPV 02/28/2024 9.9     Platelets 02/28/2024 314     nRBC 02/28/2024 0     Neutrophils Relative 02/28/2024 58     Immature " Grans % 02/28/2024 0     Lymphocytes Relative 02/28/2024 29     Monocytes Relative 02/28/2024 8     Eosinophils Relative 02/28/2024 4     Basophils Relative 02/28/2024 1     Neutrophils Absolute 02/28/2024 6.18     Absolute Immature Grans 02/28/2024 0.03     Absolute Lymphocytes 02/28/2024 3.11     Absolute Monocytes 02/28/2024 0.83     Eosinophils Absolute 02/28/2024 0.37     Basophils Absolute 02/28/2024 0.05     Protime 02/28/2024 14.1     INR 02/28/2024 1.03     PTT 02/28/2024 30     SARS-CoV-2 02/28/2024 Negative     INFLUENZA A PCR 02/28/2024 Negative     INFLUENZA B PCR 02/28/2024 Negative     RSV PCR 02/28/2024 Negative     Blood Culture 02/28/2024 No Growth After 5 Days.     Blood Culture 02/28/2024 No Growth After 5 Days.     Urine Culture 02/28/2024 No Growth <1000 cfu/mL     Color, UA 02/28/2024 Yellow     Clarity, UA 02/28/2024 Clear     Specific Gravity, UA 02/28/2024 1.025     pH, UA 02/28/2024 5.5     Leukocytes, UA 02/28/2024 Negative     Nitrite, UA 02/28/2024 Negative     Protein, UA 02/28/2024 Trace (A)     Glucose, UA 02/28/2024 1000 (1%) (A)     Ketones, UA 02/28/2024 Negative     Urobilinogen, UA 02/28/2024 <2.0     Bilirubin, UA 02/28/2024 Negative     Occult Blood, UA 02/28/2024 Large (A)     Sodium 02/28/2024 134 (L)     Potassium 02/28/2024 4.2     Chloride 02/28/2024 100     CO2 02/28/2024 22     ANION GAP 02/28/2024 12     BUN 02/28/2024 18     Creatinine 02/28/2024 0.80     Glucose 02/28/2024 268 (H)     Calcium 02/28/2024 10.1     eGFR 02/28/2024 91     Total Bilirubin 02/28/2024 0.60     Bilirubin, Direct 02/28/2024 0.11     Alkaline Phosphatase 02/28/2024 59     AST 02/28/2024 17     ALT 02/28/2024 17     Total Protein 02/28/2024 8.1     Albumin 02/28/2024 4.6     LACTIC ACID 02/28/2024 2.2 (HH)     hs TnI 0hr 02/28/2024 8     RBC, UA 02/28/2024 2-4 (A)     WBC, UA 02/28/2024 1-2     Epithelial Cells 02/28/2024 None Seen     Bacteria, UA 02/28/2024 None Seen     MUCUS THREADS  "02/28/2024 Occasional (A)          Radiology Results:   Cardiac EP device report    Result Date: 3/25/2024  Narrative: MDT DC ICD/ACTIVE SYSTEM IS MRI CONDITIONAL CARELINK TRANSMISSION: BATTERY STATUS \"11 YRS.\" AP 84%  0%. ALL AVAILABLE LEAD PARAMETERS WITHIN NORMAL LIMITS. NO SIGNIFICANT HIGH RATE EPISODES. OPTI-VOL WITHIN NORMAL LIMITS. NORMAL DEVICE FUNCTION. NC     CT chest abdomen pelvis w contrast    Result Date: 2/28/2024  Narrative: CT CHEST, ABDOMEN AND PELVIS WITH IV CONTRAST INDICATION: evaluate for source of sepsis. h/o necrotizing fasciitis. COMPARISON: Chest x-ray from 8/31/2023 and CT abdomen/pelvis from 8/13/2023. TECHNIQUE: CT examination of the chest, abdomen and pelvis was performed. Multiplanar 2D reformatted images were created from the source data. This examination, like all CT scans performed in the [facility], was performed utilizing techniques to minimize radiation dose exposure, including the use of iterative reconstruction and automated exposure control. Radiation dose length product (DLP) for this visit: 1188 mGy-cm IV Contrast: 100 mL of iohexol (OMNIPAQUE) Enteric Contrast: Not administered. FINDINGS: CHEST LUNGS: Lungs are clear. No tracheal or endobronchial lesion. PLEURA: Unremarkable. HEART/GREAT VESSELS: Heart is unremarkable for patient's age. No thoracic aortic aneurysm. Coronary artery calcifications. MEDIASTINUM AND DENZEL: Unremarkable. CHEST WALL AND LOWER NECK: Right chest wall intracardiac device noted. ABDOMEN LIVER/BILIARY TREE: Unremarkable. GALLBLADDER: Cholelithiasis without findings of acute cholecystitis. SPLEEN: Unremarkable. PANCREAS: Unremarkable. ADRENAL GLANDS: Unremarkable. KIDNEYS/URETERS: Stable punctate right-sided intrarenal calculi. Proximal right ureteral calculus measures 3 mm on image 2/181 without hydronephrosis. No hydronephrosis. STOMACH AND BOWEL: Colonic diverticulosis without findings of acute diverticulitis. No bowel obstruction. " APPENDIX: Normal. ABDOMINOPELVIC CAVITY: No ascites. No pneumoperitoneum. No lymphadenopathy. VESSELS: Atherosclerosis without abdominal aortic aneurysm. PELVIS REPRODUCTIVE ORGANS: Unremarkable for patient's age. URINARY BLADDER: Mild bladder wall thickening. ABDOMINAL WALL/INGUINAL REGIONS: Unremarkable. BONES: No acute fracture or suspicious osseous lesion.     Impression: Clear lungs. No acute inflammatory process identified in the abdomen or pelvis. 3 mm right ureteral calculus without hydronephrosis. Punctate intrarenal calculi again noted on the right. Mild bladder wall thickening. This could represent simple underdistention. Correlate for possible cystitis. Workstation performed: [id number]

## 2024-03-26 NOTE — PATIENT INSTRUCTIONS
Scheduled date of colonoscopy (as of today):4/19/24  Physician performing colonoscopy:Dwain  Location of colonoscopy:[place]  Bowel prep reviewed with patient:Xander/Miralax  Instructions reviewed with patient by:Taurus wiggins  Clearances:  none

## 2024-04-03 ENCOUNTER — TELEPHONE (OUTPATIENT)
Dept: CARDIOLOGY CLINIC | Facility: CLINIC | Age: 69
End: 2024-04-03

## 2024-04-03 ENCOUNTER — HOSPITAL ENCOUNTER (INPATIENT)
Facility: HOSPITAL | Age: 69
LOS: 3 days | Discharge: HOME | DRG: 321 | End: 2024-04-06
Attending: EMERGENCY MEDICINE | Admitting: INTERNAL MEDICINE
Payer: COMMERCIAL

## 2024-04-03 ENCOUNTER — APPOINTMENT (EMERGENCY)
Dept: RADIOLOGY | Facility: HOSPITAL | Age: 69
DRG: 321 | End: 2024-04-03
Payer: COMMERCIAL

## 2024-04-03 DIAGNOSIS — Z45.02 ICD (IMPLANTABLE CARDIOVERTER-DEFIBRILLATOR) DISCHARGE: Primary | ICD-10-CM

## 2024-04-03 DIAGNOSIS — I25.10 CORONARY ARTERY DISEASE INVOLVING NATIVE CORONARY ARTERY OF NATIVE HEART WITHOUT ANGINA PECTORIS: ICD-10-CM

## 2024-04-03 DIAGNOSIS — E11.65 UNCONTROLLED TYPE 2 DIABETES MELLITUS WITH HYPERGLYCEMIA (HCC): ICD-10-CM

## 2024-04-03 DIAGNOSIS — I25.5 ISCHEMIC CARDIOMYOPATHY: ICD-10-CM
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
## 2024-04-03 DIAGNOSIS — I47.20 VENTRICULAR TACHYCARDIA (HCC): ICD-10-CM
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
## 2024-04-03 PROBLEM — D72.829 LEUKOCYTOSIS: Status: ACTIVE | Noted: 2024-04-03

## 2024-04-03 LAB
ALBUMIN SERPL BCP-MCNC: 4.5 G/DL (ref 3.5–5)
ALP SERPL-CCNC: 51 U/L (ref 34–104)
ALT SERPL W P-5'-P-CCNC: 15 U/L (ref 7–52)
ANION GAP SERPL CALCULATED.3IONS-SCNC: 10 MMOL/L (ref 4–13)
AST SERPL W P-5'-P-CCNC: 23 U/L (ref 13–39)
ATRIAL RATE: 69 BPM
BASOPHILS # BLD AUTO: 0.05 THOUSANDS/ÂΜL (ref 0–0.1)
BASOPHILS NFR BLD AUTO: 0 % (ref 0–1)
BILIRUB SERPL-MCNC: 0.61 MG/DL (ref 0.2–1)
BUN SERPL-MCNC: 11 MG/DL (ref 5–25)
CALCIUM SERPL-MCNC: 9.7 MG/DL (ref 8.4–10.2)
CARDIAC TROPONIN I PNL SERPL HS: 16 NG/L
CHLORIDE SERPL-SCNC: 103 MMOL/L (ref 96–108)
CO2 SERPL-SCNC: 24 MMOL/L (ref 21–32)
CREAT SERPL-MCNC: 0.81 MG/DL (ref 0.6–1.3)
EOSINOPHIL # BLD AUTO: 0.18 THOUSAND/ÂΜL (ref 0–0.61)
EOSINOPHIL NFR BLD AUTO: 2 % (ref 0–6)
ERYTHROCYTE [DISTWIDTH] IN BLOOD BY AUTOMATED COUNT: 13.6 % (ref 11.6–15.1)
GFR SERPL CREATININE-BSD FRML MDRD: 91 ML/MIN/1.73SQ M
GLUCOSE SERPL-MCNC: 114 MG/DL (ref 65–140)
GLUCOSE SERPL-MCNC: 192 MG/DL (ref 65–140)
HCT VFR BLD AUTO: 42.3 % (ref 36.5–49.3)
HGB BLD-MCNC: 13.8 G/DL (ref 12–17)
IMM GRANULOCYTES # BLD AUTO: 0.04 THOUSAND/UL (ref 0–0.2)
IMM GRANULOCYTES NFR BLD AUTO: 0 % (ref 0–2)
LYMPHOCYTES # BLD AUTO: 3.54 THOUSANDS/ÂΜL (ref 0.6–4.47)
LYMPHOCYTES NFR BLD AUTO: 29 % (ref 14–44)
MCH RBC QN AUTO: 28.3 PG (ref 26.8–34.3)
MCHC RBC AUTO-ENTMCNC: 32.6 G/DL (ref 31.4–37.4)
MCV RBC AUTO: 87 FL (ref 82–98)
MONOCYTES # BLD AUTO: 0.71 THOUSAND/ÂΜL (ref 0.17–1.22)
MONOCYTES NFR BLD AUTO: 6 % (ref 4–12)
NEUTROPHILS # BLD AUTO: 7.58 THOUSANDS/ÂΜL (ref 1.85–7.62)
NEUTS SEG NFR BLD AUTO: 63 % (ref 43–75)
NRBC BLD AUTO-RTO: 0 /100 WBCS
P AXIS: 30 DEGREES
PLATELET # BLD AUTO: 360 THOUSANDS/UL (ref 149–390)
PMV BLD AUTO: 9.4 FL (ref 8.9–12.7)
POTASSIUM SERPL-SCNC: 4.3 MMOL/L (ref 3.5–5.3)
PR INTERVAL: 126 MS
PROT SERPL-MCNC: 7.9 G/DL (ref 6.4–8.4)
QRS AXIS: 22 DEGREES
QRSD INTERVAL: 106 MS
QT INTERVAL: 450 MS
QTC INTERVAL: 482 MS
RBC # BLD AUTO: 4.87 MILLION/UL (ref 3.88–5.62)
SODIUM SERPL-SCNC: 137 MMOL/L (ref 135–147)
T WAVE AXIS: -14 DEGREES
VENTRICULAR RATE: 69 BPM
WBC # BLD AUTO: 12.1 THOUSAND/UL (ref 4.31–10.16)

## 2024-04-03 PROCEDURE — 80053 COMPREHEN METABOLIC PANEL: CPT | Performed by: EMERGENCY MEDICINE

## 2024-04-03 PROCEDURE — 73130 X-RAY EXAM OF HAND: CPT

## 2024-04-03 PROCEDURE — 99223 1ST HOSP IP/OBS HIGH 75: CPT | Performed by: INTERNAL MEDICINE

## 2024-04-03 PROCEDURE — 93010 ELECTROCARDIOGRAM REPORT: CPT | Performed by: INTERNAL MEDICINE

## 2024-04-03 PROCEDURE — 99291 CRITICAL CARE FIRST HOUR: CPT | Performed by: EMERGENCY MEDICINE

## 2024-04-03 PROCEDURE — 99285 EMERGENCY DEPT VISIT HI MDM: CPT

## 2024-04-03 PROCEDURE — 82948 REAGENT STRIP/BLOOD GLUCOSE: CPT

## 2024-04-03 PROCEDURE — 71045 X-RAY EXAM CHEST 1 VIEW: CPT

## 2024-04-03 PROCEDURE — 85025 COMPLETE CBC W/AUTO DIFF WBC: CPT | Performed by: EMERGENCY MEDICINE

## 2024-04-03 PROCEDURE — 93005 ELECTROCARDIOGRAM TRACING: CPT

## 2024-04-03 PROCEDURE — 84484 ASSAY OF TROPONIN QUANT: CPT | Performed by: EMERGENCY MEDICINE

## 2024-04-03 PROCEDURE — 36415 COLL VENOUS BLD VENIPUNCTURE: CPT | Performed by: EMERGENCY MEDICINE

## 2024-04-03 RX ORDER — INSULIN LISPRO 100 [IU]/ML
1-5 INJECTION, SOLUTION INTRAVENOUS; SUBCUTANEOUS
Status: DISCONTINUED | OUTPATIENT
Start: 2024-04-03 | End: 2024-04-06 | Stop reason: HOSPADM

## 2024-04-03 RX ADMIN — DEXTROSE 150 MG: 50 INJECTION, SOLUTION INTRAVENOUS at 18:39

## 2024-04-03 RX ADMIN — AMIODARONE HYDROCHLORIDE 1 MG/MIN: 50 INJECTION, SOLUTION INTRAVENOUS at 19:12

## 2024-04-03 NOTE — ASSESSMENT & PLAN NOTE
ICD reimplanted at the end of August of last year.  Patient received full treatment course for infection

## 2024-04-03 NOTE — ASSESSMENT & PLAN NOTE
"Lab Results   Component Value Date    HGBA1C 11.0 (H) 08/18/2023       No results for input(s): \"POCGLU\" in the last 72 hours.    Blood Sugar Average: Last 72 hrs:  Insulin sliding scale    "

## 2024-04-03 NOTE — TELEPHONE ENCOUNTER
Patient with VT/VF episodes treated with ICD shock; Directed to [facility] ED for further evaluation. JALEN Oconnell notified. Wife will provide transportation.
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
## 2024-04-03 NOTE — Clinical Note
The left coronary artery was selectively engaged, injected and visualized. Multiple views of the injected vessel were taken. Ramus post pci

## 2024-04-04 ENCOUNTER — APPOINTMENT (INPATIENT)
Dept: NON INVASIVE DIAGNOSTICS | Facility: HOSPITAL | Age: 69
DRG: 321 | End: 2024-04-04
Payer: COMMERCIAL

## 2024-04-04 PROBLEM — D72.829 LEUKOCYTOSIS: Status: RESOLVED | Noted: 2024-04-03 | Resolved: 2024-04-04

## 2024-04-04 LAB
ANION GAP SERPL CALCULATED.3IONS-SCNC: 10 MMOL/L (ref 4–13)
BASOPHILS # BLD AUTO: 0.06 THOUSANDS/ÂΜL (ref 0–0.1)
BASOPHILS NFR BLD AUTO: 1 % (ref 0–1)
BUN SERPL-MCNC: 11 MG/DL (ref 5–25)
CALCIUM SERPL-MCNC: 9 MG/DL (ref 8.4–10.2)
CHLORIDE SERPL-SCNC: 103 MMOL/L (ref 96–108)
CO2 SERPL-SCNC: 26 MMOL/L (ref 21–32)
CREAT SERPL-MCNC: 0.84 MG/DL (ref 0.6–1.3)
EOSINOPHIL # BLD AUTO: 0.26 THOUSAND/ÂΜL (ref 0–0.61)
EOSINOPHIL NFR BLD AUTO: 3 % (ref 0–6)
ERYTHROCYTE [DISTWIDTH] IN BLOOD BY AUTOMATED COUNT: 13.7 % (ref 11.6–15.1)
EST. AVERAGE GLUCOSE BLD GHB EST-MCNC: 209 MG/DL
GFR SERPL CREATININE-BSD FRML MDRD: 89 ML/MIN/1.73SQ M
GLUCOSE SERPL-MCNC: 133 MG/DL (ref 65–140)
GLUCOSE SERPL-MCNC: 142 MG/DL (ref 65–140)
GLUCOSE SERPL-MCNC: 145 MG/DL (ref 65–140)
GLUCOSE SERPL-MCNC: 237 MG/DL (ref 65–140)
GLUCOSE SERPL-MCNC: 98 MG/DL (ref 65–140)
HBA1C MFR BLD: 8.9 %
HCT VFR BLD AUTO: 38.1 % (ref 36.5–49.3)
HGB BLD-MCNC: 12.4 G/DL (ref 12–17)
IMM GRANULOCYTES # BLD AUTO: 0.03 THOUSAND/UL (ref 0–0.2)
IMM GRANULOCYTES NFR BLD AUTO: 0 % (ref 0–2)
LYMPHOCYTES # BLD AUTO: 3.61 THOUSANDS/ÂΜL (ref 0.6–4.47)
LYMPHOCYTES NFR BLD AUTO: 41 % (ref 14–44)
MCH RBC QN AUTO: 28.4 PG (ref 26.8–34.3)
MCHC RBC AUTO-ENTMCNC: 32.5 G/DL (ref 31.4–37.4)
MCV RBC AUTO: 87 FL (ref 82–98)
MONOCYTES # BLD AUTO: 0.8 THOUSAND/ÂΜL (ref 0.17–1.22)
MONOCYTES NFR BLD AUTO: 9 % (ref 4–12)
NEUTROPHILS # BLD AUTO: 4.03 THOUSANDS/ÂΜL (ref 1.85–7.62)
NEUTS SEG NFR BLD AUTO: 46 % (ref 43–75)
NRBC BLD AUTO-RTO: 0 /100 WBCS
PLATELET # BLD AUTO: 290 THOUSANDS/UL (ref 149–390)
PMV BLD AUTO: 9.3 FL (ref 8.9–12.7)
POTASSIUM SERPL-SCNC: 3.5 MMOL/L (ref 3.5–5.3)
RBC # BLD AUTO: 4.37 MILLION/UL (ref 3.88–5.62)
SODIUM SERPL-SCNC: 139 MMOL/L (ref 135–147)
WBC # BLD AUTO: 8.79 THOUSAND/UL (ref 4.31–10.16)

## 2024-04-04 PROCEDURE — 80048 BASIC METABOLIC PNL TOTAL CA: CPT | Performed by: INTERNAL MEDICINE

## 2024-04-04 PROCEDURE — 36415 COLL VENOUS BLD VENIPUNCTURE: CPT | Performed by: INTERNAL MEDICINE

## 2024-04-04 PROCEDURE — 82948 REAGENT STRIP/BLOOD GLUCOSE: CPT

## 2024-04-04 PROCEDURE — 97162 PT EVAL MOD COMPLEX 30 MIN: CPT

## 2024-04-04 PROCEDURE — 93321 DOPPLER ECHO F-UP/LMTD STD: CPT | Performed by: INTERNAL MEDICINE

## 2024-04-04 PROCEDURE — 99232 SBSQ HOSP IP/OBS MODERATE 35: CPT | Performed by: INTERNAL MEDICINE

## 2024-04-04 PROCEDURE — 4A023N8 MEASUREMENT OF CARDIAC SAMPLING AND PRESSURE, BILATERAL, PERCUTANEOUS APPROACH: ICD-10-PCS | Performed by: PHYSICIAN ASSISTANT

## 2024-04-04 PROCEDURE — 93321 DOPPLER ECHO F-UP/LMTD STD: CPT

## 2024-04-04 PROCEDURE — 93308 TTE F-UP OR LMTD: CPT | Performed by: INTERNAL MEDICINE

## 2024-04-04 PROCEDURE — 93005 ELECTROCARDIOGRAM TRACING: CPT

## 2024-04-04 PROCEDURE — 93325 DOPPLER ECHO COLOR FLOW MAPG: CPT

## 2024-04-04 PROCEDURE — 85025 COMPLETE CBC W/AUTO DIFF WBC: CPT | Performed by: INTERNAL MEDICINE

## 2024-04-04 PROCEDURE — 97166 OT EVAL MOD COMPLEX 45 MIN: CPT

## 2024-04-04 PROCEDURE — 93308 TTE F-UP OR LMTD: CPT

## 2024-04-04 PROCEDURE — 027034Z DILATION OF CORONARY ARTERY, ONE ARTERY WITH DRUG-ELUTING INTRALUMINAL DEVICE, PERCUTANEOUS APPROACH: ICD-10-PCS | Performed by: PHYSICIAN ASSISTANT

## 2024-04-04 PROCEDURE — 99223 1ST HOSP IP/OBS HIGH 75: CPT | Performed by: INTERNAL MEDICINE

## 2024-04-04 PROCEDURE — 83036 HEMOGLOBIN GLYCOSYLATED A1C: CPT | Performed by: INTERNAL MEDICINE

## 2024-04-04 PROCEDURE — 93325 DOPPLER ECHO COLOR FLOW MAPG: CPT | Performed by: INTERNAL MEDICINE

## 2024-04-04 RX ORDER — CARVEDILOL 12.5 MG/1
12.5 TABLET ORAL 2 TIMES DAILY
Status: DISCONTINUED | OUTPATIENT
Start: 2024-04-04 | End: 2024-04-05

## 2024-04-04 RX ORDER — ASPIRIN 81 MG/1
81 TABLET, CHEWABLE ORAL DAILY
Status: DISCONTINUED | OUTPATIENT
Start: 2024-04-04 | End: 2024-04-06 | Stop reason: HOSPADM

## 2024-04-04 RX ORDER — SOTALOL HYDROCHLORIDE 120 MG/1
120 TABLET ORAL 2 TIMES DAILY
Status: DISCONTINUED | OUTPATIENT
Start: 2024-04-04 | End: 2024-04-04

## 2024-04-04 RX ORDER — EZETIMIBE 10 MG/1
10 TABLET ORAL DAILY
Status: DISCONTINUED | OUTPATIENT
Start: 2024-04-04 | End: 2024-04-06 | Stop reason: HOSPADM

## 2024-04-04 RX ORDER — INSULIN LISPRO 100 [IU]/ML
15 INJECTION, SOLUTION INTRAVENOUS; SUBCUTANEOUS
Status: DISCONTINUED | OUTPATIENT
Start: 2024-04-04 | End: 2024-04-04

## 2024-04-04 RX ORDER — ATORVASTATIN CALCIUM 40 MG/1
40 TABLET, FILM COATED ORAL DAILY
Status: DISCONTINUED | OUTPATIENT
Start: 2024-04-04 | End: 2024-04-06 | Stop reason: HOSPADM

## 2024-04-04 RX ORDER — ESCITALOPRAM OXALATE 20 MG/1
20 TABLET ORAL DAILY
Status: DISCONTINUED | OUTPATIENT
Start: 2024-04-04 | End: 2024-04-06 | Stop reason: HOSPADM

## 2024-04-04 RX ORDER — ISOSORBIDE MONONITRATE 30 MG/1
30 TABLET, EXTENDED RELEASE ORAL
Status: DISCONTINUED | OUTPATIENT
Start: 2024-04-04 | End: 2024-04-06 | Stop reason: HOSPADM

## 2024-04-04 RX ORDER — INSULIN GLARGINE 100 [IU]/ML
50 INJECTION, SOLUTION SUBCUTANEOUS EVERY 12 HOURS SCHEDULED
Status: DISCONTINUED | OUTPATIENT
Start: 2024-04-04 | End: 2024-04-05

## 2024-04-04 RX ORDER — CALCIUM CARBONATE 500 MG/1
500 TABLET, CHEWABLE ORAL ONCE
Status: COMPLETED | OUTPATIENT
Start: 2024-04-04 | End: 2024-04-04

## 2024-04-04 RX ORDER — ASPIRIN 81 MG/1
324 TABLET, CHEWABLE ORAL ONCE
Status: COMPLETED | OUTPATIENT
Start: 2024-04-05 | End: 2024-04-05

## 2024-04-04 RX ORDER — SOTALOL HYDROCHLORIDE 80 MG/1
80 TABLET ORAL 2 TIMES DAILY
Status: DISCONTINUED | OUTPATIENT
Start: 2024-04-04 | End: 2024-04-04

## 2024-04-04 RX ORDER — LORAZEPAM 1 MG/1
1 TABLET ORAL DAILY PRN
Status: DISCONTINUED | OUTPATIENT
Start: 2024-04-04 | End: 2024-04-06 | Stop reason: HOSPADM

## 2024-04-04 RX ORDER — ENOXAPARIN SODIUM 100 MG/ML
40 INJECTION SUBCUTANEOUS DAILY
Status: DISCONTINUED | OUTPATIENT
Start: 2024-04-04 | End: 2024-04-04

## 2024-04-04 RX ORDER — LANOLIN ALCOHOL/MO/W.PET/CERES
6 CREAM (GRAM) TOPICAL
Status: DISCONTINUED | OUTPATIENT
Start: 2024-04-04 | End: 2024-04-06 | Stop reason: HOSPADM

## 2024-04-04 RX ORDER — FENOFIBRATE 145 MG/1
145 TABLET, COATED ORAL DAILY
Status: DISCONTINUED | OUTPATIENT
Start: 2024-04-04 | End: 2024-04-06 | Stop reason: HOSPADM

## 2024-04-04 RX ORDER — MAGNESIUM SULFATE 1 G/100ML
1 INJECTION INTRAVENOUS ONCE
Status: DISCONTINUED | OUTPATIENT
Start: 2024-04-04 | End: 2024-04-04

## 2024-04-04 RX ORDER — SPIRONOLACTONE 25 MG/1
25 TABLET ORAL DAILY
Status: DISCONTINUED | OUTPATIENT
Start: 2024-04-04 | End: 2024-04-06 | Stop reason: HOSPADM

## 2024-04-04 RX ORDER — INSULIN LISPRO 100 [IU]/ML
8 INJECTION, SOLUTION INTRAVENOUS; SUBCUTANEOUS
Status: DISCONTINUED | OUTPATIENT
Start: 2024-04-04 | End: 2024-04-06 | Stop reason: HOSPADM

## 2024-04-04 RX ADMIN — INSULIN GLARGINE 50 UNITS: 100 INJECTION, SOLUTION SUBCUTANEOUS at 08:41

## 2024-04-04 RX ADMIN — SOTALOL HYDROCHLORIDE 80 MG: 80 TABLET ORAL at 01:23

## 2024-04-04 RX ADMIN — INSULIN LISPRO 8 UNITS: 100 INJECTION, SOLUTION INTRAVENOUS; SUBCUTANEOUS at 09:39

## 2024-04-04 RX ADMIN — INSULIN LISPRO 8 UNITS: 100 INJECTION, SOLUTION INTRAVENOUS; SUBCUTANEOUS at 18:43

## 2024-04-04 RX ADMIN — CARVEDILOL 12.5 MG: 12.5 TABLET, FILM COATED ORAL at 08:40

## 2024-04-04 RX ADMIN — MELATONIN 6 MG: at 22:06

## 2024-04-04 RX ADMIN — SOTALOL HYDROCHLORIDE 120 MG: 120 TABLET ORAL at 19:52

## 2024-04-04 RX ADMIN — EZETIMIBE 10 MG: 10 TABLET ORAL at 08:40

## 2024-04-04 RX ADMIN — SOTALOL HYDROCHLORIDE 80 MG: 80 TABLET ORAL at 08:46

## 2024-04-04 RX ADMIN — INSULIN LISPRO 2 UNITS: 100 INJECTION, SOLUTION INTRAVENOUS; SUBCUTANEOUS at 12:04

## 2024-04-04 RX ADMIN — ISOSORBIDE MONONITRATE 30 MG: 30 TABLET, EXTENDED RELEASE ORAL at 22:06

## 2024-04-04 RX ADMIN — ATORVASTATIN CALCIUM 40 MG: 40 TABLET, FILM COATED ORAL at 08:41

## 2024-04-04 RX ADMIN — CARVEDILOL 12.5 MG: 12.5 TABLET, FILM COATED ORAL at 19:51

## 2024-04-04 RX ADMIN — INSULIN LISPRO 8 UNITS: 100 INJECTION, SOLUTION INTRAVENOUS; SUBCUTANEOUS at 12:04

## 2024-04-04 RX ADMIN — ASPIRIN 81 MG CHEWABLE TABLET 81 MG: 81 TABLET CHEWABLE at 08:41

## 2024-04-04 RX ADMIN — INSULIN GLARGINE 50 UNITS: 100 INJECTION, SOLUTION SUBCUTANEOUS at 22:06

## 2024-04-04 RX ADMIN — ENOXAPARIN SODIUM 40 MG: 40 INJECTION SUBCUTANEOUS at 08:41

## 2024-04-04 RX ADMIN — FENOFIBRATE 145 MG: 145 TABLET, FILM COATED ORAL at 08:40

## 2024-04-04 RX ADMIN — ESCITALOPRAM OXALATE 20 MG: 20 TABLET ORAL at 08:40

## 2024-04-04 RX ADMIN — CALCIUM CARBONATE (ANTACID) CHEW TAB 500 MG 500 MG: 500 CHEW TAB at 22:22

## 2024-04-04 RX ADMIN — SPIRONOLACTONE 25 MG: 25 TABLET ORAL at 08:41

## 2024-04-04 RX ADMIN — PERFLUTREN 0.6 ML/MIN: 6.52 INJECTION, SUSPENSION INTRAVENOUS at 15:25
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
## 2024-04-04 NOTE — ASSESSMENT & PLAN NOTE
ICD reimplanted at the end of August of 2023 following necrotizing fasciitis infection with complicating Jamarcus's gangrene.  Patient received full treatment course for infection

## 2024-04-04 NOTE — ASSESSMENT & PLAN NOTE
Lab Results   Component Value Date    HGBA1C 8.9 (H) 04/04/2024       Recent Labs     04/03/24 2112 04/04/24  0732   POCGLU 192* 145*       Blood Sugar Average: Last 72 hrs:  (P) 168.5  A1C above goal  Continue lantus 50 units BID (dec from home dose of 130 units?) and humalog 8 units TIDWM  Holding oral regimen while admitted  Titrate PRN, SSI
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
## 2024-04-04 NOTE — ASSESSMENT & PLAN NOTE
ICD reimplanted at the end of August of 2023 following necrotizing fasciitis infection with complicating Jamarcus's gangrene and bacteremia.  Patient received full treatment course for infection
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
## 2024-04-05 LAB
ANION GAP SERPL CALCULATED.3IONS-SCNC: 9 MMOL/L (ref 4–13)
AORTIC VALVE MEAN VELOCITY: 8.9 M/S
APICAL FOUR CHAMBER EJECTION FRACTION: 46 %
ATRIAL RATE: 60 BPM
ATRIAL RATE: 61 BPM
ATRIAL RATE: 64 BPM
ATRIAL RATE: 65 BPM
AV AREA BY CONTINUOUS VTI: 2.2 CM2
AV AREA PEAK VELOCITY: 1.9 CM2
AV LVOT MEAN GRADIENT: 1 MMHG
AV LVOT PEAK GRADIENT: 3 MMHG
AV MEAN GRADIENT: 4 MMHG
AV PEAK GRADIENT: 8 MMHG
AV VALVE AREA: 2.18 CM2
AV VELOCITY RATIO: 0.62
BASOPHILS # BLD AUTO: 0.06 THOUSANDS/ÂΜL (ref 0–0.1)
BASOPHILS NFR BLD AUTO: 1 % (ref 0–1)
BSA FOR ECHO PROCEDURE: 2.07 M2
BUN SERPL-MCNC: 15 MG/DL (ref 5–25)
CALCIUM SERPL-MCNC: 9.3 MG/DL (ref 8.4–10.2)
CHLORIDE SERPL-SCNC: 104 MMOL/L (ref 96–108)
CO2 SERPL-SCNC: 26 MMOL/L (ref 21–32)
CREAT SERPL-MCNC: 0.81 MG/DL (ref 0.6–1.3)
DOP CALC AO PEAK VEL: 1.39 M/S
DOP CALC AO VTI: 28.37 CM
DOP CALC LVOT AREA: 3.14 CM2
DOP CALC LVOT CARDIAC INDEX: 1.75 L/MIN/M2
DOP CALC LVOT CARDIAC OUTPUT: 3.61 L/MIN
DOP CALC LVOT DIAMETER: 2 CM
DOP CALC LVOT PEAK VEL VTI: 19.66 CM
DOP CALC LVOT PEAK VEL: 0.86 M/S
DOP CALC LVOT STROKE INDEX: 28.5 ML/M2
DOP CALC LVOT STROKE VOLUME: 61.73
E WAVE DECELERATION TIME: 294 MS
E/A RATIO: 0.65
EOSINOPHIL # BLD AUTO: 0.24 THOUSAND/ÂΜL (ref 0–0.61)
EOSINOPHIL NFR BLD AUTO: 3 % (ref 0–6)
ERYTHROCYTE [DISTWIDTH] IN BLOOD BY AUTOMATED COUNT: 13.7 % (ref 11.6–15.1)
FRACTIONAL SHORTENING: 20 (ref 28–44)
GFR SERPL CREATININE-BSD FRML MDRD: 91 ML/MIN/1.73SQ M
GLUCOSE SERPL-MCNC: 160 MG/DL (ref 65–140)
GLUCOSE SERPL-MCNC: 175 MG/DL (ref 65–140)
GLUCOSE SERPL-MCNC: 230 MG/DL (ref 65–140)
GLUCOSE SERPL-MCNC: 243 MG/DL (ref 65–140)
HCT VFR BLD AUTO: 38.8 % (ref 36.5–49.3)
HGB BLD-MCNC: 12.4 G/DL (ref 12–17)
IMM GRANULOCYTES # BLD AUTO: 0.03 THOUSAND/UL (ref 0–0.2)
IMM GRANULOCYTES NFR BLD AUTO: 0 % (ref 0–2)
INR PPP: 1.11 (ref 0.84–1.19)
INTERVENTRICULAR SEPTUM IN DIASTOLE (PARASTERNAL SHORT AXIS VIEW): 1.4 CM
INTERVENTRICULAR SEPTUM: 1.4 CM (ref 0.6–1.1)
KCT BLD-ACNC: 258 SEC (ref 89–137)
KCT BLD-ACNC: 283 SEC (ref 89–137)
LEFT ATRIUM SIZE: 4.7 CM
LEFT INTERNAL DIMENSION IN SYSTOLE: 4.4 CM (ref 2.1–4)
LEFT VENTRICLE DIASTOLIC VOLUME (MOD BIPLANE): 157 ML
LEFT VENTRICLE DIASTOLIC VOLUME INDEX (MOD BIPLANE): 75.8 ML/M2
LEFT VENTRICLE SYSTOLIC VOLUME (MOD BIPLANE): 84 ML
LEFT VENTRICLE SYSTOLIC VOLUME INDEX (MOD BIPLANE): 40.6 ML/M2
LEFT VENTRICULAR INTERNAL DIMENSION IN DIASTOLE: 5.5 CM (ref 3.5–6)
LEFT VENTRICULAR POSTERIOR WALL IN END DIASTOLE: 1.3 CM
LEFT VENTRICULAR STROKE VOLUME: 60 ML
LV EF: 46 %
LVSV (TEICH): 60 ML
LYMPHOCYTES # BLD AUTO: 3.35 THOUSANDS/ÂΜL (ref 0.6–4.47)
LYMPHOCYTES NFR BLD AUTO: 47 % (ref 14–44)
MAGNESIUM SERPL-MCNC: 1.5 MG/DL (ref 1.9–2.7)
MCH RBC QN AUTO: 28.2 PG (ref 26.8–34.3)
MCHC RBC AUTO-ENTMCNC: 32 G/DL (ref 31.4–37.4)
MCV RBC AUTO: 88 FL (ref 82–98)
MONOCYTES # BLD AUTO: 0.59 THOUSAND/ÂΜL (ref 0.17–1.22)
MONOCYTES NFR BLD AUTO: 8 % (ref 4–12)
MV PEAK A VEL: 0.69 M/S
MV PEAK E VEL: 45 CM/S
MV STENOSIS PRESSURE HALF TIME: 85 MS
MV VALVE AREA P 1/2 METHOD: 2.59
NEUTROPHILS # BLD AUTO: 3.02 THOUSANDS/ÂΜL (ref 1.85–7.62)
NEUTS SEG NFR BLD AUTO: 41 % (ref 43–75)
NRBC BLD AUTO-RTO: 0 /100 WBCS
P AXIS: -1 DEGREES
P AXIS: 32 DEGREES
P AXIS: 50 DEGREES
P AXIS: 66 DEGREES
PLATELET # BLD AUTO: 299 THOUSANDS/UL (ref 149–390)
PMV BLD AUTO: 9.4 FL (ref 8.9–12.7)
POTASSIUM SERPL-SCNC: 3.8 MMOL/L (ref 3.5–5.3)
PR INTERVAL: 166 MS
PR INTERVAL: 172 MS
PR INTERVAL: 228 MS
PR INTERVAL: 238 MS
PROTHROMBIN TIME: 14.2 SECONDS (ref 11.6–14.5)
QRS AXIS: 19 DEGREES
QRS AXIS: 20 DEGREES
QRS AXIS: 20 DEGREES
QRS AXIS: 3 DEGREES
QRSD INTERVAL: 108 MS
QRSD INTERVAL: 108 MS
QRSD INTERVAL: 110 MS
QRSD INTERVAL: 110 MS
QT INTERVAL: 472 MS
QT INTERVAL: 484 MS
QT INTERVAL: 502 MS
QT INTERVAL: 508 MS
QTC INTERVAL: 490 MS
QTC INTERVAL: 499 MS
QTC INTERVAL: 502 MS
QTC INTERVAL: 511 MS
RBC # BLD AUTO: 4.39 MILLION/UL (ref 3.88–5.62)
RIGHT VENTRICLE ID DIMENSION: 4.4 CM
SL CV LV EF: 45
SL CV PED ECHO LEFT VENTRICLE DIASTOLIC VOLUME (MOD BIPLANE) 2D: 148 ML
SL CV PED ECHO LEFT VENTRICLE SYSTOLIC VOLUME (MOD BIPLANE) 2D: 88 ML
SODIUM SERPL-SCNC: 139 MMOL/L (ref 135–147)
SPECIMEN SOURCE: ABNORMAL
SPECIMEN SOURCE: ABNORMAL
T WAVE AXIS: 29 DEGREES
T WAVE AXIS: 37 DEGREES
T WAVE AXIS: 40 DEGREES
T WAVE AXIS: 50 DEGREES
TRICUSPID ANNULAR PLANE SYSTOLIC EXCURSION: 1.3 CM
VENTRICULAR RATE: 60 BPM
VENTRICULAR RATE: 61 BPM
VENTRICULAR RATE: 64 BPM
VENTRICULAR RATE: 65 BPM
WBC # BLD AUTO: 7.29 THOUSAND/UL (ref 4.31–10.16)

## 2024-04-05 PROCEDURE — 93005 ELECTROCARDIOGRAM TRACING: CPT

## 2024-04-05 PROCEDURE — 80048 BASIC METABOLIC PNL TOTAL CA: CPT | Performed by: PHYSICIAN ASSISTANT

## 2024-04-05 PROCEDURE — 99232 SBSQ HOSP IP/OBS MODERATE 35: CPT | Performed by: INTERNAL MEDICINE

## 2024-04-05 PROCEDURE — 85610 PROTHROMBIN TIME: CPT | Performed by: PHYSICIAN ASSISTANT

## 2024-04-05 PROCEDURE — C1874 STENT, COATED/COV W/DEL SYS: HCPCS | Performed by: INTERNAL MEDICINE

## 2024-04-05 PROCEDURE — 93455 CORONARY ART/GRFT ANGIO S&I: CPT | Performed by: INTERNAL MEDICINE

## 2024-04-05 PROCEDURE — 99153 MOD SED SAME PHYS/QHP EA: CPT | Performed by: INTERNAL MEDICINE

## 2024-04-05 PROCEDURE — C1894 INTRO/SHEATH, NON-LASER: HCPCS | Performed by: INTERNAL MEDICINE

## 2024-04-05 PROCEDURE — 82948 REAGENT STRIP/BLOOD GLUCOSE: CPT

## 2024-04-05 PROCEDURE — 93010 ELECTROCARDIOGRAM REPORT: CPT | Performed by: INTERNAL MEDICINE

## 2024-04-05 PROCEDURE — 92928 PRQ TCAT PLMT NTRAC ST 1 LES: CPT | Performed by: INTERNAL MEDICINE

## 2024-04-05 PROCEDURE — C1725 CATH, TRANSLUMIN NON-LASER: HCPCS | Performed by: INTERNAL MEDICINE

## 2024-04-05 PROCEDURE — C1769 GUIDE WIRE: HCPCS | Performed by: INTERNAL MEDICINE

## 2024-04-05 PROCEDURE — 92978 ENDOLUMINL IVUS OCT C 1ST: CPT | Performed by: INTERNAL MEDICINE

## 2024-04-05 PROCEDURE — C9600 PERC DRUG-EL COR STENT SING: HCPCS | Performed by: INTERNAL MEDICINE

## 2024-04-05 PROCEDURE — 99152 MOD SED SAME PHYS/QHP 5/>YRS: CPT | Performed by: INTERNAL MEDICINE

## 2024-04-05 PROCEDURE — C1753 CATH, INTRAVAS ULTRASOUND: HCPCS | Performed by: INTERNAL MEDICINE

## 2024-04-05 PROCEDURE — 83735 ASSAY OF MAGNESIUM: CPT | Performed by: PHYSICIAN ASSISTANT

## 2024-04-05 PROCEDURE — C1887 CATHETER, GUIDING: HCPCS | Performed by: INTERNAL MEDICINE

## 2024-04-05 PROCEDURE — 85347 COAGULATION TIME ACTIVATED: CPT

## 2024-04-05 PROCEDURE — 85025 COMPLETE CBC W/AUTO DIFF WBC: CPT | Performed by: PHYSICIAN ASSISTANT

## 2024-04-05 DEVICE — XIENCE SKYPOINT™ EVEROLIMUS ELUTING CORONARY STENT SYSTEM 3.00 MM X 23 MM / RAPID-EXCHANGE
Type: IMPLANTABLE DEVICE | Site: CORONARY | Status: FUNCTIONAL
Brand: XIENCE SKYPOINT™

## 2024-04-05 RX ORDER — SODIUM CHLORIDE 9 MG/ML
75 INJECTION, SOLUTION INTRAVENOUS CONTINUOUS
Status: DISPENSED | OUTPATIENT
Start: 2024-04-05 | End: 2024-04-05

## 2024-04-05 RX ORDER — CLOPIDOGREL BISULFATE 75 MG/1
75 TABLET ORAL DAILY
Status: DISCONTINUED | OUTPATIENT
Start: 2024-04-06 | End: 2024-04-06 | Stop reason: HOSPADM

## 2024-04-05 RX ORDER — MIDAZOLAM HYDROCHLORIDE 2 MG/2ML
INJECTION, SOLUTION INTRAMUSCULAR; INTRAVENOUS CODE/TRAUMA/SEDATION MEDICATION
Status: DISCONTINUED | OUTPATIENT
Start: 2024-04-05 | End: 2024-04-05 | Stop reason: HOSPADM

## 2024-04-05 RX ORDER — INSULIN GLARGINE 100 [IU]/ML
60 INJECTION, SOLUTION SUBCUTANEOUS EVERY 12 HOURS SCHEDULED
Status: DISCONTINUED | OUTPATIENT
Start: 2024-04-05 | End: 2024-04-06 | Stop reason: HOSPADM

## 2024-04-05 RX ORDER — NITROGLYCERIN 20 MG/100ML
INJECTION INTRAVENOUS CODE/TRAUMA/SEDATION MEDICATION
Status: DISCONTINUED | OUTPATIENT
Start: 2024-04-05 | End: 2024-04-05 | Stop reason: HOSPADM

## 2024-04-05 RX ORDER — MAGNESIUM SULFATE HEPTAHYDRATE 40 MG/ML
4 INJECTION, SOLUTION INTRAVENOUS ONCE
Status: COMPLETED | OUTPATIENT
Start: 2024-04-05 | End: 2024-04-05

## 2024-04-05 RX ORDER — HEPARIN SODIUM 1000 [USP'U]/ML
INJECTION, SOLUTION INTRAVENOUS; SUBCUTANEOUS CODE/TRAUMA/SEDATION MEDICATION
Status: DISCONTINUED | OUTPATIENT
Start: 2024-04-05 | End: 2024-04-05 | Stop reason: HOSPADM

## 2024-04-05 RX ORDER — CLOPIDOGREL BISULFATE 75 MG/1
TABLET ORAL CODE/TRAUMA/SEDATION MEDICATION
Status: DISCONTINUED | OUTPATIENT
Start: 2024-04-05 | End: 2024-04-05 | Stop reason: HOSPADM

## 2024-04-05 RX ORDER — ACETAMINOPHEN 325 MG/1
650 TABLET ORAL EVERY 4 HOURS PRN
Status: DISCONTINUED | OUTPATIENT
Start: 2024-04-05 | End: 2024-04-06 | Stop reason: HOSPADM

## 2024-04-05 RX ORDER — POTASSIUM CHLORIDE 20 MEQ/1
40 TABLET, EXTENDED RELEASE ORAL ONCE
Status: COMPLETED | OUTPATIENT
Start: 2024-04-05 | End: 2024-04-05

## 2024-04-05 RX ORDER — ONDANSETRON 2 MG/ML
4 INJECTION INTRAMUSCULAR; INTRAVENOUS EVERY 6 HOURS PRN
Status: DISCONTINUED | OUTPATIENT
Start: 2024-04-05 | End: 2024-04-06 | Stop reason: HOSPADM

## 2024-04-05 RX ORDER — FENTANYL CITRATE 50 UG/ML
INJECTION, SOLUTION INTRAMUSCULAR; INTRAVENOUS CODE/TRAUMA/SEDATION MEDICATION
Status: DISCONTINUED | OUTPATIENT
Start: 2024-04-05 | End: 2024-04-05 | Stop reason: HOSPADM

## 2024-04-05 RX ORDER — SOTALOL HYDROCHLORIDE 80 MG/1
80 TABLET ORAL EVERY 12 HOURS SCHEDULED
Status: DISCONTINUED | OUTPATIENT
Start: 2024-04-05 | End: 2024-04-06 | Stop reason: HOSPADM

## 2024-04-05 RX ORDER — LIDOCAINE HYDROCHLORIDE 10 MG/ML
INJECTION, SOLUTION EPIDURAL; INFILTRATION; INTRACAUDAL; PERINEURAL CODE/TRAUMA/SEDATION MEDICATION
Status: DISCONTINUED | OUTPATIENT
Start: 2024-04-05 | End: 2024-04-05 | Stop reason: HOSPADM

## 2024-04-05 RX ORDER — SODIUM CHLORIDE 9 MG/ML
INJECTION, SOLUTION INTRAVENOUS
Status: COMPLETED | OUTPATIENT
Start: 2024-04-05 | End: 2024-04-05

## 2024-04-05 RX ORDER — CARVEDILOL 25 MG/1
25 TABLET ORAL 2 TIMES DAILY
Status: DISCONTINUED | OUTPATIENT
Start: 2024-04-05 | End: 2024-04-06 | Stop reason: HOSPADM

## 2024-04-05 RX ORDER — NITROGLYCERIN 0.4 MG/1
0.4 TABLET SUBLINGUAL
Status: DISCONTINUED | OUTPATIENT
Start: 2024-04-05 | End: 2024-04-06 | Stop reason: HOSPADM

## 2024-04-05 RX ORDER — VERAPAMIL HYDROCHLORIDE 2.5 MG/ML
INJECTION, SOLUTION INTRAVENOUS CODE/TRAUMA/SEDATION MEDICATION
Status: DISCONTINUED | OUTPATIENT
Start: 2024-04-05 | End: 2024-04-05 | Stop reason: HOSPADM

## 2024-04-05 RX ADMIN — ATORVASTATIN CALCIUM 40 MG: 40 TABLET, FILM COATED ORAL at 08:45

## 2024-04-05 RX ADMIN — CARVEDILOL 12.5 MG: 12.5 TABLET, FILM COATED ORAL at 08:46

## 2024-04-05 RX ADMIN — ASPIRIN 81 MG CHEWABLE TABLET 324 MG: 81 TABLET CHEWABLE at 08:45

## 2024-04-05 RX ADMIN — INSULIN LISPRO 8 UNITS: 100 INJECTION, SOLUTION INTRAVENOUS; SUBCUTANEOUS at 16:50

## 2024-04-05 RX ADMIN — SOTALOL HYDROCHLORIDE 80 MG: 80 TABLET ORAL at 21:15

## 2024-04-05 RX ADMIN — EZETIMIBE 10 MG: 10 TABLET ORAL at 08:45

## 2024-04-05 RX ADMIN — FENOFIBRATE 145 MG: 145 TABLET, FILM COATED ORAL at 08:45

## 2024-04-05 RX ADMIN — INSULIN LISPRO 2 UNITS: 100 INJECTION, SOLUTION INTRAVENOUS; SUBCUTANEOUS at 16:50

## 2024-04-05 RX ADMIN — CARVEDILOL 25 MG: 25 TABLET, FILM COATED ORAL at 17:00

## 2024-04-05 RX ADMIN — SPIRONOLACTONE 25 MG: 25 TABLET ORAL at 08:45

## 2024-04-05 RX ADMIN — POTASSIUM CHLORIDE 40 MEQ: 1500 TABLET, EXTENDED RELEASE ORAL at 08:45

## 2024-04-05 RX ADMIN — MELATONIN 6 MG: at 21:15

## 2024-04-05 RX ADMIN — ISOSORBIDE MONONITRATE 30 MG: 30 TABLET, EXTENDED RELEASE ORAL at 21:15

## 2024-04-05 RX ADMIN — ESCITALOPRAM OXALATE 20 MG: 20 TABLET ORAL at 08:45

## 2024-04-05 RX ADMIN — INSULIN GLARGINE 60 UNITS: 100 INJECTION, SOLUTION SUBCUTANEOUS at 21:15

## 2024-04-05 RX ADMIN — MAGNESIUM SULFATE HEPTAHYDRATE 4 G: 40 INJECTION, SOLUTION INTRAVENOUS at 08:45

## 2024-04-05 RX ADMIN — SOTALOL HYDROCHLORIDE 80 MG: 80 TABLET ORAL at 08:45

## 2024-04-05 RX ADMIN — SODIUM CHLORIDE 75 ML/HR: 0.9 INJECTION, SOLUTION INTRAVENOUS at 13:42

## 2024-04-05 RX ADMIN — INSULIN GLARGINE 50 UNITS: 100 INJECTION, SOLUTION SUBCUTANEOUS at 09:10
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
## 2024-04-05 NOTE — QUICK NOTE
Qtc 511msec this evening.  Will dc sotalol 120mg BID order right now. EP to make dosage decision for tomorrow mornings dose.     Immanuel Phan.

## 2024-04-05 NOTE — ASSESSMENT & PLAN NOTE
Lab Results   Component Value Date    HGBA1C 8.9 (H) 04/04/2024       Recent Labs     04/04/24  1154 04/04/24  1545 04/04/24 2028 04/05/24  0725   POCGLU 237* 133 142* 175*         Blood Sugar Average: Last 72 hrs:  (P) [id number]  A1C above goal  Continue to djust insulin therapy to amintain -180.
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
## 2024-04-05 NOTE — PROGRESS NOTES
Progress Note - Electrophysiology  Devin Gonzalez 68 y.o. male MRN: [id number]  Unit/Bed#: [facility] 526-01 Encounter: [id number]      Assessment:  Ventricular tachycardia with ICD shock   - advised to come to ER due to shock seen 4/3 (was VT that degenerated with ATP to VF at a rate of 273bpm that was treated with shock that was successful)  - history of shock in the past reported by patient 10 years ago with [facility] device  - maintained on beta blocker therapy of carvedilol  - antiarrhythmic therapy of sotalol 80mg twice daily prior to admission  Medtronic right sided dual chamber ICD in situ  - implanted on 8/30/2024 after bacteremia and extraction of prior Dexter ICD with concern for RA lead vegetation (had been implanted 2011)  - first device had been implanted as primary prevention  Prior bacteremia with strep anginosus  Jamarcus's gangrene  - underwent multiple washouts and debridements 8/2023  Ischemic cardiomyopathy  - maintained on GDMT of carvedilol only as low BP's noted per general cardiologist  History of CAD   - with prior CABG 1994 and stent 2015  - maintained on aspirin, carvedilol, and atorvastatin / also maintained on Imdur  Insulin dependent diabetes mellitus      Plan:  1. VT - He is to undergo cardiac catheterization later on today.  Advised that no blockages are intervened upon, may likely be able to go home later today.  However, if he is intervened on will likely require overnight stay.  He is understanding of this.    Did have QT prolongation when attempted uptitrate sotalol to 120 mg twice daily.  Therefore, will restart 80 mg this morning and he will continue this.    Instead, will attempt to increase his carvedilol.  He is on 12.5 mg twice daily at home.  As he is currently fasting will not uptitrate this morning.  However, I advised him that whether he is here later on versus home he we will need to increase his carvedilol to 25 mg twice daily and will look out for symptoms of hypotension at  "home.    Still also awaiting results of echo.    Subjective/Objective   Subjective: Patient is a pleasant 68-year-old male who has ischemic cardiomyopathy and right-sided dual-chamber ICD in place (prior extraction due to bacteremia) who was shocked appropriately for VT on 4/3 (device did give ATP but then degenerated to VF).    He was seen yesterday and advised to undergo cardiac catheterization.  After further discussions patient was agreeable and is fasting today to undergo Later on.    Did attempt to increase sotalol but there was concern for QT prolongation on EKG and this was stopped.    TELE: Sinus rhythm along with atrial pacing and intact ventricular conduction, single PVC noted    EKG: QT prolongation        Objective:  Vitals: /71   Pulse 66   Temp 97.7 °F (36.5 °C)   Resp 19   Ht 5' 8\" (1.727 m)   Wt 93 kg (205 lb)   SpO2 97%   BMI 31.17 kg/m²     Vitals:    04/04/24 0726 04/04/24 1520   Weight: 93 kg (205 lb) 93 kg (205 lb)     Orthostatic Blood Pressures      Flowsheet Row Most Recent Value   Blood Pressure 116/71 filed at 04/05/2024 0751   Patient Position - Orthostatic VS Lying filed at 04/04/2024 1145              Intake/Output Summary (Last 24 hours) at 4/5/2024 0920  Last data filed at 4/5/2024 0000  Gross per 24 hour   Intake 420 ml   Output 0 ml   Net 420 ml       Invasive Devices       Peripheral Intravenous Line  Duration             Peripheral IV 04/03/24 Left Antecubital 1 day    Peripheral IV 04/03/24 Right Antecubital 1 day                              Scheduled Meds:  Current Facility-Administered Medications   Medication Dose Route Frequency Provider Last Rate    aspirin  81 mg Oral Daily Hetul Davis, DO      atorvastatin  40 mg Oral Daily Hetul Davis, DO      carvedilol  12.5 mg Oral BID Hetul Davis, DO      escitalopram  20 mg Oral Daily Hetul Davis, DO      ezetimibe  10 mg Oral Daily Hetul Davis, DO      fenofibrate  145 mg Oral Daily Hetul Davis, DO      insulin " glargine  50 Units Subcutaneous Q12H [facility] Hetul Davis, DO      insulin lispro  1-5 Units Subcutaneous TID AC Hetul Davis, DO      insulin lispro  8 Units Subcutaneous TID With Meals Rhiannon Fernandez PA-C      isosorbide mononitrate  30 mg Oral HS Hetul Davis, DO      LORazepam  1 mg Oral Daily PRN Hetul Davis, DO      magnesium sulfate  4 g Intravenous Once Hardeep London MD 4 g (04/05/24 0845)    melatonin  6 mg Oral HS BRITTANY Ames      sotalol  80 mg Oral Q12H [facility] Libra Crews PA-C      spironolactone  25 mg Oral Daily Hetul Davis, DO       Continuous Infusions:   PRN Meds:.  LORazepam    Review of Systems:  ROS  ROS as noted above, otherwise 12 point review of systems was performed and is negative.     Physical Exam:   GEN: NAD, alert and oriented, well appearing  SKIN: dry without significant lesions or rashes  HEENT: NCAT, PERRL, EOMs intact  NECK: No JVD appreciated  CARDIOVASCULAR: regular  LUNGS: Good respiratory effort with no audible wheezes  PSYCH: Normal mood and affect  NEURO: CN ll-Xll grossly intact    Physical Exam              Lab Results: I have personally reviewed pertinent lab results.    Results from last 7 days   Lab Units 04/05/24  0623 04/04/24  0501 04/03/24  1538   WBC Thousand/uL 7.29 8.79 12.10*   HEMOGLOBIN g/dL 12.4 12.4 13.8   HEMATOCRIT % 38.8 38.1 42.3   PLATELETS Thousands/uL 299 290 360     Results from last 7 days   Lab Units 04/05/24  0623 04/04/24  0501 04/03/24  1538   POTASSIUM mmol/L 3.8 3.5 4.3   CHLORIDE mmol/L 104 103 103   CO2 mmol/L 26 26 24   BUN mg/dL 15 11 11   CREATININE mg/dL 0.81 0.84 0.81   CALCIUM mg/dL 9.3 9.0 9.7     Results from last 7 days   Lab Units 04/05/24  0623   INR  1.11     Results from last 7 days   Lab Units 04/05/24 0623   MAGNESIUM mg/dL 1.5*       Imaging: I have personally reviewed pertinent reports.    No results found for this or any previous visit.      VTE Pharmacologic Prophylaxis: Sequential compression device (Venodyne)   VTE  Mechanical Prophylaxis: sequential compression device

## 2024-04-05 NOTE — DISCHARGE INSTR - AVS FIRST PAGE
MEDICATION CHANGES:  Please INCREASE carvedilol to 25mg twice daily - please look out for symptoms of low blood pressure such as lightheadedness/dizziness.  We did NOT increase your sotalol.    We will see you in the electrophysiology (heart rhythm) office in follow up but please reach out to us at [phone] with any further questions or concerns.     Post PCI    1. Please see the post angioplasty discharge instructions.   No heavy lifting, greater than 10 lbs. or strenuous activity for 5 days.    Follow angioplasty discharge instructions.    2.Remove band aid tomorrow.  Shower and wash area- wrist gently with soap and water- beginning tomorrow. Rinse and pat dry.  Apply new water seal band aid.  Repeat this process for 5 days. No powders, creams lotions or antibiotic ointments  for 5 days.  No tub baths, hot tubs or swimming for 5 days.     3. Call [facility]'s Cardiology Office ([phone]) if you develop a fever, redness or drainage at your wrist access site.      4. No driving for 2 days    5. Do not stop aspirin or Plavix (clopiogrel) any reason without a cardiologist’s consent, or the stent could block up and cause a heart attack.    6. Stent card and book.

## 2024-04-06 VITALS
DIASTOLIC BLOOD PRESSURE: 55 MMHG | WEIGHT: 205 LBS | SYSTOLIC BLOOD PRESSURE: 102 MMHG | HEART RATE: 60 BPM | RESPIRATION RATE: 16 BRPM | BODY MASS INDEX: 31.07 KG/M2 | TEMPERATURE: 97.3 F | HEIGHT: 68 IN | OXYGEN SATURATION: 94 %

## 2024-04-06 LAB
ANION GAP SERPL CALCULATED.3IONS-SCNC: 9 MMOL/L (ref 4–13)
ATRIAL RATE: 62 BPM
BUN SERPL-MCNC: 15 MG/DL (ref 5–25)
CALCIUM SERPL-MCNC: 8.7 MG/DL (ref 8.4–10.2)
CHLORIDE SERPL-SCNC: 104 MMOL/L (ref 96–108)
CO2 SERPL-SCNC: 24 MMOL/L (ref 21–32)
CREAT SERPL-MCNC: 0.87 MG/DL (ref 0.6–1.3)
ERYTHROCYTE [DISTWIDTH] IN BLOOD BY AUTOMATED COUNT: 13.6 % (ref 11.6–15.1)
GFR SERPL CREATININE-BSD FRML MDRD: 88 ML/MIN/1.73SQ M
GLUCOSE SERPL-MCNC: 113 MG/DL (ref 65–140)
GLUCOSE SERPL-MCNC: 128 MG/DL (ref 65–140)
HCT VFR BLD AUTO: 38 % (ref 36.5–49.3)
HGB BLD-MCNC: 11.8 G/DL (ref 12–17)
MAGNESIUM SERPL-MCNC: 1.7 MG/DL (ref 1.9–2.7)
MCH RBC QN AUTO: 28.1 PG (ref 26.8–34.3)
MCHC RBC AUTO-ENTMCNC: 31.1 G/DL (ref 31.4–37.4)
MCV RBC AUTO: 91 FL (ref 82–98)
P AXIS: 34 DEGREES
PLATELET # BLD AUTO: 286 THOUSANDS/UL (ref 149–390)
PMV BLD AUTO: 9.3 FL (ref 8.9–12.7)
POTASSIUM SERPL-SCNC: 3.8 MMOL/L (ref 3.5–5.3)
PR INTERVAL: 162 MS
QRS AXIS: 18 DEGREES
QRSD INTERVAL: 112 MS
QT INTERVAL: 478 MS
QTC INTERVAL: 485 MS
RBC # BLD AUTO: 4.2 MILLION/UL (ref 3.88–5.62)
SODIUM SERPL-SCNC: 137 MMOL/L (ref 135–147)
T WAVE AXIS: 52 DEGREES
VENTRICULAR RATE: 62 BPM
WBC # BLD AUTO: 8.87 THOUSAND/UL (ref 4.31–10.16)

## 2024-04-06 PROCEDURE — 85027 COMPLETE CBC AUTOMATED: CPT | Performed by: INTERNAL MEDICINE

## 2024-04-06 PROCEDURE — 80048 BASIC METABOLIC PNL TOTAL CA: CPT | Performed by: INTERNAL MEDICINE

## 2024-04-06 PROCEDURE — 99239 HOSP IP/OBS DSCHRG MGMT >30: CPT | Performed by: INTERNAL MEDICINE

## 2024-04-06 PROCEDURE — 93005 ELECTROCARDIOGRAM TRACING: CPT

## 2024-04-06 PROCEDURE — 83735 ASSAY OF MAGNESIUM: CPT | Performed by: INTERNAL MEDICINE

## 2024-04-06 PROCEDURE — 99232 SBSQ HOSP IP/OBS MODERATE 35: CPT | Performed by: INTERNAL MEDICINE

## 2024-04-06 PROCEDURE — 93010 ELECTROCARDIOGRAM REPORT: CPT | Performed by: INTERNAL MEDICINE

## 2024-04-06 PROCEDURE — 82948 REAGENT STRIP/BLOOD GLUCOSE: CPT

## 2024-04-06 RX ORDER — CARVEDILOL 12.5 MG/1
25 TABLET ORAL 2 TIMES DAILY WITH MEALS
Qty: 180 TABLET | Refills: 0 | Status: SHIPPED | OUTPATIENT
Start: 2024-04-06

## 2024-04-06 RX ORDER — INSULIN LISPRO 100 [IU]/ML
8 INJECTION, SOLUTION INTRAVENOUS; SUBCUTANEOUS
Start: 2024-04-06

## 2024-04-06 RX ORDER — MAGNESIUM SULFATE 1 G/100ML
1 INJECTION INTRAVENOUS ONCE
Status: COMPLETED | OUTPATIENT
Start: 2024-04-06 | End: 2024-04-06

## 2024-04-06 RX ORDER — DULAGLUTIDE 3 MG/.5ML
3 INJECTION, SOLUTION SUBCUTANEOUS
Start: 2024-04-06

## 2024-04-06 RX ORDER — CLOPIDOGREL BISULFATE 75 MG/1
75 TABLET ORAL DAILY
Qty: 30 TABLET | Refills: 11 | Status: SHIPPED | OUTPATIENT
Start: 2024-04-07

## 2024-04-06 RX ADMIN — CARVEDILOL 25 MG: 25 TABLET, FILM COATED ORAL at 08:27

## 2024-04-06 RX ADMIN — FENOFIBRATE 145 MG: 145 TABLET, FILM COATED ORAL at 08:27

## 2024-04-06 RX ADMIN — INSULIN LISPRO 8 UNITS: 100 INJECTION, SOLUTION INTRAVENOUS; SUBCUTANEOUS at 07:40

## 2024-04-06 RX ADMIN — EZETIMIBE 10 MG: 10 TABLET ORAL at 08:27

## 2024-04-06 RX ADMIN — ESCITALOPRAM OXALATE 20 MG: 20 TABLET ORAL at 08:27

## 2024-04-06 RX ADMIN — ATORVASTATIN CALCIUM 40 MG: 40 TABLET, FILM COATED ORAL at 08:27

## 2024-04-06 RX ADMIN — MAGNESIUM SULFATE HEPTAHYDRATE 1 G: 1 INJECTION, SOLUTION INTRAVENOUS at 04:48

## 2024-04-06 RX ADMIN — CLOPIDOGREL BISULFATE 75 MG: 75 TABLET ORAL at 08:27

## 2024-04-06 RX ADMIN — SOTALOL HYDROCHLORIDE 80 MG: 80 TABLET ORAL at 08:27

## 2024-04-06 RX ADMIN — ASPIRIN 81 MG CHEWABLE TABLET 81 MG: 81 TABLET CHEWABLE at 08:27

## 2024-04-06 RX ADMIN — INSULIN GLARGINE 60 UNITS: 100 INJECTION, SOLUTION SUBCUTANEOUS at 08:36

## 2024-04-06 RX ADMIN — SPIRONOLACTONE 25 MG: 25 TABLET ORAL at 08:27
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
## 2024-04-08 NOTE — UTILIZATION REVIEW
NOTIFICATION OF ADMISSION DISCHARGE   This is a Notification of Discharge from [facility]. Please be advised that this patient has been discharge from our facility. Below you will find the admission and discharge date and time including the patient’s disposition.   UTILIZATION REVIEW CONTACT:  Howard Monae  Utilization   Network Utilization Review Department  Phone: [phone] x carefully listen to the prompts. All voicemails are confidential.  Email: [email or web address]     ADMISSION INFORMATION  PRESENTATION DATE: 4/3/2024  3:28 PM  OBERVATION ADMISSION DATE:   INPATIENT ADMISSION DATE: 4/3/24  6:11 PM   DISCHARGE DATE: 4/6/2024  2:58 PM   DISPOSITION:Home/Self Care    Network Utilization Review Department  ATTENTION: Please call with any questions or concerns to [phone] and carefully listen to the prompts so that you are directed to the right person. All voicemails are confidential.   For Discharge needs, contact Care Management DC Support Team at [phone] opt. [phone]  Send all requests for admission clinical reviews, approved or denied determinations and any other requests to dedicated fax number below belonging to the campus where the patient is receiving treatment. List of dedicated fax numbers for the Facilities:  FACILITY NAME UR FAX NUMBER   ADMISSION DENIALS (Administrative/Medical Necessity) [phone]   DISCHARGE SUPPORT TEAM ([facility]) [phone]   PARENT CHILD HEALTH (Maternity/NICU/Pediatrics) [phone]   [facility] [phone]   [facility] [phone]   [facility] [phone]   [facility] [phone]   [facility] [phone]   [facility] [phone]   [facility] [phone]   [facility] [phone]   [facility]  [facility] [phone]   [facility] [phone]   [facility] [phone]   [facility] [phone]

## 2024-04-24 ENCOUNTER — APPOINTMENT (OUTPATIENT)
Dept: LAB | Facility: CLINIC | Age: 69
End: 2024-04-24
Payer: COMMERCIAL

## 2024-04-24 DIAGNOSIS — F32.A DEPRESSION, UNSPECIFIED DEPRESSION TYPE: ICD-10-CM

## 2024-04-24 DIAGNOSIS — I25.10 CORONARY ARTERY DISEASE INVOLVING NATIVE CORONARY ARTERY OF NATIVE HEART WITHOUT ANGINA PECTORIS: ICD-10-CM

## 2024-04-24 DIAGNOSIS — E11.65 INADEQUATELY CONTROLLED DIABETES MELLITUS (HCC): ICD-10-CM

## 2024-04-24 DIAGNOSIS — I10 ESSENTIAL HYPERTENSION, MALIGNANT: ICD-10-CM

## 2024-04-24 DIAGNOSIS — E78.2 MIXED HYPERLIPIDEMIA: ICD-10-CM

## 2024-04-24 LAB
ANION GAP SERPL CALCULATED.3IONS-SCNC: 9 MMOL/L (ref 4–13)
BUN SERPL-MCNC: 13 MG/DL (ref 5–25)
CALCIUM SERPL-MCNC: 9.4 MG/DL (ref 8.4–10.2)
CHLORIDE SERPL-SCNC: 102 MMOL/L (ref 96–108)
CHOLEST SERPL-MCNC: 119 MG/DL
CO2 SERPL-SCNC: 28 MMOL/L (ref 21–32)
CREAT SERPL-MCNC: 0.83 MG/DL (ref 0.6–1.3)
CREAT UR-MCNC: 86.3 MG/DL
ERYTHROCYTE [DISTWIDTH] IN BLOOD BY AUTOMATED COUNT: 13.3 % (ref 11.6–15.1)
EST. AVERAGE GLUCOSE BLD GHB EST-MCNC: 226 MG/DL
GFR SERPL CREATININE-BSD FRML MDRD: 90 ML/MIN/1.73SQ M
GLUCOSE P FAST SERPL-MCNC: 188 MG/DL (ref 65–99)
HBA1C MFR BLD: 9.5 %
HCT VFR BLD AUTO: 39.6 % (ref 36.5–49.3)
HDLC SERPL-MCNC: 27 MG/DL
HGB BLD-MCNC: 12.5 G/DL (ref 12–17)
LDLC SERPL CALC-MCNC: 65 MG/DL (ref 0–100)
MCH RBC QN AUTO: 28.1 PG (ref 26.8–34.3)
MCHC RBC AUTO-ENTMCNC: 31.6 G/DL (ref 31.4–37.4)
MCV RBC AUTO: 89 FL (ref 82–98)
MICROALBUMIN UR-MCNC: <7 MG/L
MICROALBUMIN/CREAT 24H UR: <8 MG/G CREATININE (ref 0–30)
NONHDLC SERPL-MCNC: 92 MG/DL
PLATELET # BLD AUTO: 379 THOUSANDS/UL (ref 149–390)
PMV BLD AUTO: 9.6 FL (ref 8.9–12.7)
POTASSIUM SERPL-SCNC: 4.6 MMOL/L (ref 3.5–5.3)
RBC # BLD AUTO: 4.45 MILLION/UL (ref 3.88–5.62)
SODIUM SERPL-SCNC: 139 MMOL/L (ref 135–147)
TRIGL SERPL-MCNC: 136 MG/DL
WBC # BLD AUTO: 7.94 THOUSAND/UL (ref 4.31–10.16)

## 2024-04-24 PROCEDURE — 3046F HEMOGLOBIN A1C LEVEL >9.0%: CPT | Performed by: PHYSICIAN ASSISTANT

## 2024-04-24 PROCEDURE — 82043 UR ALBUMIN QUANTITATIVE: CPT

## 2024-04-24 PROCEDURE — 83036 HEMOGLOBIN GLYCOSYLATED A1C: CPT

## 2024-04-24 PROCEDURE — 80061 LIPID PANEL: CPT

## 2024-04-24 PROCEDURE — 80048 BASIC METABOLIC PNL TOTAL CA: CPT

## 2024-04-24 PROCEDURE — 82570 ASSAY OF URINE CREATININE: CPT

## 2024-04-24 PROCEDURE — 36415 COLL VENOUS BLD VENIPUNCTURE: CPT

## 2024-04-24 PROCEDURE — 85027 COMPLETE CBC AUTOMATED: CPT

## 2024-04-24 RX ORDER — ESCITALOPRAM OXALATE 20 MG/1
20 TABLET ORAL DAILY
Qty: 90 TABLET | Refills: 1 | Status: SHIPPED | OUTPATIENT
Start: 2024-04-24

## 2024-04-24 NOTE — TELEPHONE ENCOUNTER
Medication: Clopidogrel (Plavix)    Dose/Frequency: 75mg Daily    Quantity: 30 tablets    Pharmacy: [facility]    Office:   [x] PCP/Provider - Lorenzo Espinosa  [] Speciality/Provider -     Does the patient have enough for 3 days?   [x] Yes   [] No - Send as HP to POD

## 2024-04-26 RX ORDER — CLOPIDOGREL BISULFATE 75 MG/1
75 TABLET ORAL DAILY
Qty: 90 TABLET | Refills: 3 | Status: SHIPPED | OUTPATIENT
Start: 2024-04-26
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
## 2024-05-14 DIAGNOSIS — Z79.2 NEED FOR ANTIBIOTIC PROPHYLAXIS FOR DENTAL PROCEDURE: Primary | ICD-10-CM

## 2024-05-14 RX ORDER — AMOXICILLIN 500 MG/1
2000 CAPSULE ORAL ONCE
Qty: 4 CAPSULE | Refills: 0 | Status: SHIPPED | OUTPATIENT
Start: 2024-05-14 | End: 2024-05-14

## 2024-05-16 ENCOUNTER — OFFICE VISIT (OUTPATIENT)
Dept: CARDIOLOGY CLINIC | Facility: CLINIC | Age: 69
End: 2024-05-16
Payer: COMMERCIAL

## 2024-05-16 VITALS
HEIGHT: 68 IN | SYSTOLIC BLOOD PRESSURE: 112 MMHG | HEART RATE: 66 BPM | BODY MASS INDEX: 31.07 KG/M2 | WEIGHT: 205 LBS | DIASTOLIC BLOOD PRESSURE: 66 MMHG

## 2024-05-16 DIAGNOSIS — I47.20 VENTRICULAR TACHYCARDIA (HCC): Primary | ICD-10-CM

## 2024-05-16 DIAGNOSIS — Z95.810 ICD (IMPLANTABLE CARDIOVERTER-DEFIBRILLATOR) IN PLACE: ICD-10-CM

## 2024-05-16 DIAGNOSIS — I50.22 CHRONIC SYSTOLIC CHF (CONGESTIVE HEART FAILURE) (HCC): ICD-10-CM

## 2024-05-16 DIAGNOSIS — I25.5 ISCHEMIC CARDIOMYOPATHY: ICD-10-CM

## 2024-05-16 PROCEDURE — 93000 ELECTROCARDIOGRAM COMPLETE: CPT | Performed by: INTERNAL MEDICINE

## 2024-05-16 PROCEDURE — 99214 OFFICE O/P EST MOD 30 MIN: CPT | Performed by: INTERNAL MEDICINE

## 2024-05-16 RX ORDER — AMOXICILLIN 500 MG/1
CAPSULE ORAL
COMMUNITY
Start: 2024-05-14

## 2024-05-16 RX ORDER — GLUCAGON INJECTION, SOLUTION 1 MG/.2ML
INJECTION, SOLUTION SUBCUTANEOUS
COMMUNITY
Start: 2024-05-03

## 2024-05-16 RX ORDER — SEMAGLUTIDE 1.34 MG/ML
INJECTION, SOLUTION SUBCUTANEOUS
COMMUNITY
Start: 2024-04-26

## 2024-05-16 NOTE — Clinical Note
No more VT for Mr. Gonzalez! Only thing I changed was that they were doing aspirin 81mg twice daily? I told them to decrease to daily... I'm not missing something that he should be on aspirin BID correct?
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
## 2024-06-23 DIAGNOSIS — I25.5 ISCHEMIC CARDIOMYOPATHY: ICD-10-CM

## 2024-06-23 RX ORDER — CARVEDILOL 12.5 MG/1
12.5 TABLET ORAL 2 TIMES DAILY
Qty: 180 TABLET | Refills: 1 | Status: SHIPPED | OUTPATIENT
Start: 2024-06-23

## 2024-07-16 ENCOUNTER — PATIENT MESSAGE (OUTPATIENT)
Dept: CARDIOLOGY CLINIC | Facility: CLINIC | Age: 69
End: 2024-07-16

## 2024-08-15 DIAGNOSIS — I25.5 ISCHEMIC CARDIOMYOPATHY: ICD-10-CM

## 2024-08-15 RX ORDER — CARVEDILOL 12.5 MG/1
12.5 TABLET ORAL 2 TIMES DAILY
Qty: 180 TABLET | Refills: 1 | OUTPATIENT
Start: 2024-08-15

## 2024-08-15 NOTE — TELEPHONE ENCOUNTER
Medication: carvedilol 12.5mg tablet    Dose/Frequency: 1 tablet twice a day    Quantity: 180 tablets    Pharmacy: [facility]    Office:   [x] PCP/Provider - Lorenzo Espinosa MD  [] Speciality/Provider -     Does the patient have enough for 3 days?   [x] Yes   [] No - Send as HP to POD

## 2024-08-20 ENCOUNTER — OFFICE VISIT (OUTPATIENT)
Dept: CARDIOLOGY CLINIC | Facility: CLINIC | Age: 69
End: 2024-08-20
Payer: COMMERCIAL

## 2024-08-20 VITALS
OXYGEN SATURATION: 99 % | RESPIRATION RATE: 20 BRPM | SYSTOLIC BLOOD PRESSURE: 118 MMHG | DIASTOLIC BLOOD PRESSURE: 72 MMHG | BODY MASS INDEX: 32.96 KG/M2 | WEIGHT: 210 LBS | HEART RATE: 64 BPM | HEIGHT: 67 IN

## 2024-08-20 DIAGNOSIS — I47.20 VENTRICULAR TACHYCARDIA (HCC): ICD-10-CM

## 2024-08-20 DIAGNOSIS — E78.2 MIXED HYPERLIPIDEMIA: ICD-10-CM

## 2024-08-20 DIAGNOSIS — I50.22 CHRONIC SYSTOLIC CHF (CONGESTIVE HEART FAILURE) (HCC): ICD-10-CM

## 2024-08-20 DIAGNOSIS — I25.5 ISCHEMIC CARDIOMYOPATHY: ICD-10-CM

## 2024-08-20 DIAGNOSIS — I25.10 CORONARY ARTERY DISEASE INVOLVING NATIVE CORONARY ARTERY OF NATIVE HEART WITHOUT ANGINA PECTORIS: Primary | ICD-10-CM

## 2024-08-20 DIAGNOSIS — I10 BENIGN ESSENTIAL HYPERTENSION: ICD-10-CM

## 2024-08-20 PROCEDURE — 99214 OFFICE O/P EST MOD 30 MIN: CPT | Performed by: INTERNAL MEDICINE

## 2024-08-20 RX ORDER — BUSPIRONE HYDROCHLORIDE 10 MG/1
10 TABLET ORAL 3 TIMES DAILY
COMMUNITY
Start: 2024-08-14

## 2024-08-20 RX ORDER — CARVEDILOL 25 MG/1
25 TABLET ORAL 2 TIMES DAILY
Qty: 180 TABLET | Refills: 3 | Status: SHIPPED | OUTPATIENT
Start: 2024-08-20

## 2024-08-20 NOTE — ASSESSMENT & PLAN NOTE
-With PCI to ramus in April 2024  -Continue aspirin and Plavix  -Continue carvedilol 25 mg twice daily along with atorvastatin 40 mg daily, Zetia 10 mg daily, fenofibrate 145 mg daily and Lovaza 2 g twice daily  -Recommend cardiac rehabilitation for patient and was counseled on dietary and lifestyle modifications.   

## 2024-08-20 NOTE — PROGRESS NOTES
Patient ID: Devin Gonzalez is a 68 y.o. male.        Plan:      CAD (coronary artery disease)  -With PCI to ramus in April 2024  -Continue aspirin and Plavix  -Continue carvedilol 25 mg twice daily along with atorvastatin 40 mg daily, Zetia 10 mg daily, fenofibrate 145 mg daily and Lovaza 2 g twice daily  -Recommend cardiac rehabilitation for patient and was counseled on dietary and lifestyle modifications.    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    Hyperlipidemia  -Counseled on dietary and lifestyle modifications  -Continue atorvastatin 40 mg daily, Zetia 10 mg daily, fenofibrate 145 mg daily, Lovaza 2 g twice daily     Follow up Plan/Other summary comments:  -As patient overall doing reasonably well from cardiac standpoint will continue aspirin 81 mg daily, atorvastatin 40 mg daily, carvedilol 25 mg twice daily, Plavix 75 mg daily, Zetia 10 mg daily, fenofibrate 145 mg daily, sotalol 80 mg twice daily, spironolactone 25 mg daily  -Discussed with patient my recommendations for cardiac rehabilitation and he will think about this  -Will repeat fasting lipid panel and CMP in 6 months prior to next office visit  -Patient will continue Lovaza 2 g twice daily.  -Patient counseled on  dietary and lifestyle modifications including following a low-salt, low-fat, heart healthy diet with sodium restriction to less than 1800 mg of sodium daily, fluid restriction to less than 2000 mL of fluid daily and weight reduction with goal BMI less than 29  -He will monitor home blood pressure readings let our office know if significantly elevated greater than 130/80's MHG for up titration of medical therapy  -Patient should follow with electrophysiology as well for continued monitoring given ICD shock and need for up titration of medical therapy along with sotalol  -Patient counseled if he were to have any warning or alarm type symptoms he is to seek emergency medical care immediately    HPI:   -Patient is a 68-year-old male with diabetes mellitus, hypertension, coronary artery disease with history of CABG in 1994 and PCI to ramus in 2015 with known ischemic cardiomyopathy and dual-chamber ICD originally [employer] device but unfortunately 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  Patient also has obstructive sleep apnea but has not been tested in many years and has not undergone treatment.  Patient also has hyperlipidemia and medical marijuana use and has been previously followed by Dr. Lee for many years and presented to the office in February 2024 for transition of care.  Patient was again hospitalized in April 2024 with ICD shock, ventricular tachycardia and was even seen and evaluated by electrophysiology team.  He was last seen in the office in May 2024 with continuation of medical therapy including carvedilol 25 mg twice daily and sotalol 80 mg twice daily.  -He presents to the office today for follow-up.  He denies any chest pain, palpitations, lightness dizziness, loss conscious, shortness of breath, lower extremity edema,  orthopnea or bendopnea.  He has been physically active at home with no significant exertional symptoms and has not had any significant recurrence of issue since undergoing PCI.      Most recent or relevant cardiac/vascular testing:    -Cardiac catheterization 4/5/2024 showing OCT guided PCI to ramus intermedius with stable LIMA to LAD with collaterals to the RCA which is chronically totally occluded along with flush occluded SVG x 2 since 2011      -Transthoracic echocardiogram 4/4/2024 showing left-ventricular systolic function mild-moderately reduced estimated LVEF 45% with grade 1 diastolic dysfunction and hypokinesis of the inferior inferoseptal and inferolateral walls with mildly reduced right ventricular systolic function along with aortic valve sclerosis and mild mitral regurgitation      Past Surgical History:   Procedure Laterality Date    CARDIAC CATHETERIZATION N/A 4/5/2024    Procedure: Cardiac catheterization;  Surgeon: Joselyn Powell DO;  Location: [facility];  Service: Cardiology    CARDIAC CATHETERIZATION N/A 4/5/2024    Procedure: Cardiac Coronary Angiogram;  Surgeon: Joselyn Powell DO;  [facility]: [facility];  Service: Cardiology    CARDIAC CATHETERIZATION N/A 4/5/2024    Procedure: Cardiac other;  Surgeon: Joselyn Powell DO;  [facility];  Service: Cardiology    CARDIAC CATHETERIZATION N/A 4/5/2024    Procedure: Cardiac pci;  Surgeon: Joselyn Powell DO;  [facility]: [facility];  Service: Cardiology    CARDIAC DEFIBRILLATOR PLACEMENT  10/29/2009    original MDT ICD    CARDIAC ELECTROPHYSIOLOGY PROCEDURE N/A 08/24/2023    Procedure: Cardiac laser lead extraction;  Surgeon: Alan Tejeda DO;  Location: [facility];  Service: Cardiac Surgery    CARDIAC ELECTROPHYSIOLOGY PROCEDURE N/A 08/30/2023    Procedure: Cardiac icd implant right sided dual chamber;  Surgeon: Kristopher Stearns MD;  [facility]: [facility];  Service: Cardiology     CORONARY ANGIOPLASTY WITH STENT PLACEMENT  07/17/2015    s/p DALI RI    CORONARY ARTERY BYPASS GRAFT      in his 30's    DECUBITUS ULCER EXCISION Right 08/14/2023    Procedure: Washout and debridement of perineal wound;  Surgeon: Devin Ashley MD;  Location: [facility];  Service: General    ELBOW SURGERY Right     Bursitis - last assessed: Sept 15, 2016    IR PICC REPOSITION  08/18/2023    MA RMVL TRANSVNS PM ELTRD DUAL LEAD SYS N/A 08/24/2023    Procedure: REMOVAL PACEMAKER/AICD LEADS W LASER- EXTRACTION ONLY;  Surgeon: Alan Tejeda DO;  [facility]: [facility];  Service: Cardiac Surgery    VAC DRESSING APPLICATION N/A 08/16/2023    Procedure: APPLICATION VAC DRESSING;  Surgeon: Ann-Marie Jolly DO;  Location: [facility];  Service: General    WOUND DEBRIDEMENT N/A 08/13/2023    Procedure: EXCISIONAL DEBRIDEMENT Perineum;  Surgeon: Hema Cardenas DO;  Location: [facility];  Service: General    WOUND DEBRIDEMENT N/A 08/16/2023    Procedure: DEBRIDEMENT WOUND, WASH OUT, PARTIAL CLOSURE;  Surgeon: Ann-Marie Jolly DO;  Location: [facility];  Service: General       Review of Systems   Review of Systems   Constitutional:  Negative for chills, diaphoresis, fatigue and fever.   HENT:  Negative for trouble swallowing and voice change.    Eyes:  Negative for pain and redness.   Respiratory:  Negative for shortness of breath and wheezing.    Cardiovascular:  Negative for chest pain, palpitations and leg swelling.   Gastrointestinal:  Negative for abdominal pain, constipation, diarrhea, nausea and vomiting.   Genitourinary:  Negative for dysuria.   Musculoskeletal:  Positive for arthralgias. Negative for neck pain and neck stiffness.   Skin:  Negative for rash.   Neurological:  Negative for dizziness, syncope, light-headedness and headaches.   Psychiatric/Behavioral:  Negative for agitation and confusion.    All other systems reviewed and are negative.         Objective:     /72 (BP Location: Left arm, Patient  "Position: Sitting, Cuff Size: Large)   Pulse 64   Resp 20   Ht 5' 7\" (1.702 m)   Wt 95.3 kg (210 lb)   SpO2 99%   BMI 32.89 kg/m²     PHYSICAL EXAM:  Physical Exam  Vitals reviewed.   Constitutional:       General: He is not in acute distress.     Appearance: He is obese. He is not diaphoretic.   HENT:      Head: Normocephalic and atraumatic.   Eyes:      General:         Right eye: No discharge.         Left eye: No discharge.   Neck:      Comments: Trachea midline, neck obese, no significant JVD appreciated  Cardiovascular:      Rate and Rhythm: Normal rate and regular rhythm.      Heart sounds:      No friction rub.   Pulmonary:      Effort: Pulmonary effort is normal. No respiratory distress.      Breath sounds: No wheezing.   Chest:      Chest wall: No tenderness.   Abdominal:      General: Bowel sounds are normal.      Palpations: Abdomen is soft.      Tenderness: There is no abdominal tenderness. There is no rebound.   Musculoskeletal:      Right lower leg: No edema.      Left lower leg: No edema.   Skin:     General: Skin is warm and dry.   Neurological:      Mental Status: He is alert.      Comments: Awake, alert, able to answer questions appropriately, able to move extremities bilaterally.   Psychiatric:         Mood and Affect: Mood normal.         Behavior: Behavior normal.          Meds reviewed.  Current Outpatient Medications on File Prior to Visit   Medication Sig Dispense Refill    aspirin 81 MG tablet Take 1 tablet by mouth daily      atorvastatin (LIPITOR) 40 mg tablet Take 1 tablet by mouth daily      busPIRone (BUSPAR) 10 mg tablet Take 10 mg by mouth 3 (three) times a day      clopidogrel (PLAVIX) 75 mg tablet Take 1 tablet (75 mg total) by mouth daily 90 tablet 3    ezetimibe (ZETIA) 10 mg tablet Take 1 tablet by mouth daily      fenofibrate (TRICOR) 145 mg tablet Take 145 mg by mouth daily      insulin glargine (Toujeo Max SoloStar) 300 units/mL CONCENTRATED U-300 injection pen (2-unit " dial) Inject 130 Units under the skin daily at bedtime      insulin lispro (HumALOG/ADMELOG) 100 units/mL injection Inject 8 Units under the skin 3 (three) times a day with meals (Patient taking differently: Inject 8 Units under the skin 3 (three) times a day with meals As needed)      isosorbide mononitrate (IMDUR) 30 mg 24 hr tablet Take 30 mg by mouth daily at bedtime  3    LORazepam (ATIVAN) 1 mg tablet TAKE 1 TABLET DAILY AS NEEDED FOR ANXIETY 30 tablet 3    MAGNESIUM OXIDE 400 PO Take by mouth      metFORMIN (GLUCOPHAGE) 1000 MG tablet Take 1,000 mg by mouth daily with breakfast      omega-3-acid ethyl esters (LOVAZA) 1 g capsule Take 2 capsules (2 g total) by mouth 2 (two) times a day 360 capsule 3    omeprazole (PriLOSEC) 40 MG capsule Take 1 capsule (40 mg total) by mouth daily 90 capsule 1    sotalol (BETAPACE) 80 mg tablet Take 1 tablet (80 mg total) by mouth 2 (two) times a day 180 tablet 1    spironolactone (ALDACTONE) 25 mg tablet Take 1 tablet by mouth daily      [DISCONTINUED] carvedilol (COREG) 12.5 mg tablet TAKE 1 TABLET TWICE A DAY (Patient taking differently: Take 25 mg by mouth 2 (two) times a day with meals) 180 tablet 1    amoxicillin (AMOXIL) 500 mg capsule  (Patient not taking: Reported on 8/20/2024)      B-D ULTRAFINE III SHORT PEN 31G X 8 MM MISC  (Patient not taking: Reported on 9/7/2023)      BD ULTRA-FINE PEN NEEDLES 29G X 12.7MM MISC       Continuous Blood Gluc  (FreeStyle Elizabeth 2 [place]) TRACEY       Continuous Blood Gluc Sensor (FREESTYLE ELIZABETH SENSOR SYSTEM) MISC Use 1 each every 14 (fourteen) days      dulaglutide (Trulicity) 3 MG/0.5ML injection Inject 0.5 mL (3 mg total) under the skin every 7 days Pt taking 4.5 mg now (Patient not taking: Reported on 8/20/2024)      escitalopram (LEXAPRO) 20 mg tablet TAKE 1 TABLET DAILY (Patient not taking: Reported on 8/20/2024) 90 tablet 1    Gvoke HypoPen 2-Pack 1 MG/0.2ML SOAJ INJECT 1 MG UNDER THE SKIN ONE TIME AS NEEDED  "(HYOPGLYCEMIA).      Insulin Pen Needle (PEN NEEDLES 29GX1/2\") 29G X 12MM MISC       Lancets MISC test once times daily E11.65 (Patient not taking: Reported on 9/7/2023)      metFORMIN (GLUCOPHAGE) 500 mg tablet Take 1,000 mg by mouth 2 (two) times a day (Patient not taking: Reported on 8/20/2024)      Misc Natural Products (BLOOD SUGAR 360 PO) ONETOUCH ULTRA TEST STRIPS Test once daily E11.65 (Patient not taking: Reported on 2/16/2024)      Ozempic, 1 MG/DOSE, 4 MG/3ML injection pen  (Patient not taking: Reported on 8/20/2024)      Trulicity 4.5 MG/0.5ML injection  (Patient not taking: Reported on 5/16/2024)       No current facility-administered medications on file prior to visit.      Past Medical History:   Diagnosis Date    Benign essential hypertension 06/02/2011    Last Assessment & Plan:  BP stable on current meds    Chronic systolic CHF (congestive heart failure) ([facility]) 04/12/2021    Coronary atherosclerosis 04/08/2010    s/p DALI RI 7/17/2015; s/p CABG in his 30's; s/p DALI RI 4/5/2024    Generalized anxiety disorder 05/22/2012    ICD (implantable cardioverter-defibrillator) in place 12/04/2019    Ischemic cardiomyopathy 09/13/2012    s/p re-implant of single chamber medtronic ICD 8/30/2023; original implant 10/29/2009    Mixed hyperlipidemia 04/08/2010    Managed by cardiology   Last Assessment & Plan:  on lipitor 40 mg, zetia, lovaza ,LDL 83. continue    Necrotizing soft tissue infection 08/13/2023    Rectal hemorrhage     last assessed: May 29, 2015    S/P CABG (coronary artery bypass graft) 1994    Uncontrolled type 2 diabetes mellitus with hyperglycemia ([facility]) 09/15/2016    Transitioned From: Diabetes Mellitus    Ventricular tachycardia ([facility]) 04/12/2021           Social History     Tobacco Use   Smoking Status Never   Smokeless Tobacco Never     Family History   Problem Relation Age of Onset    No Known Problems Mother     Cancer Father     Coronary artery disease Father              "

## 2024-08-20 NOTE — ASSESSMENT & PLAN NOTE
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

## 2024-08-20 NOTE — ASSESSMENT & PLAN NOTE
-Patient will continue to monitor home blood pressure readings  -Will continue carvedilol 25 mg twice daily, spironolactone 25 mg daily

## 2024-08-20 NOTE — ASSESSMENT & PLAN NOTE
-Counseled on dietary and lifestyle modifications  -Continue atorvastatin 40 mg daily, Zetia 10 mg daily, fenofibrate 145 mg daily, Lovaza 2 g twice daily

## 2024-08-27 DIAGNOSIS — E78.2 MIXED HYPERLIPIDEMIA: Primary | ICD-10-CM

## 2024-08-28 RX ORDER — ATORVASTATIN CALCIUM 40 MG/1
40 TABLET, FILM COATED ORAL
Qty: 90 TABLET | Refills: 3 | Status: SHIPPED | OUTPATIENT
Start: 2024-08-28

## 2024-08-29 DIAGNOSIS — E78.2 MIXED HYPERLIPIDEMIA: Primary | ICD-10-CM

## 2024-08-29 DIAGNOSIS — I47.20 VENTRICULAR TACHYCARDIA (HCC): ICD-10-CM

## 2024-08-29 DIAGNOSIS — I25.10 CORONARY ARTERY DISEASE INVOLVING NATIVE CORONARY ARTERY OF NATIVE HEART WITHOUT ANGINA PECTORIS: ICD-10-CM

## 2024-08-29 RX ORDER — EZETIMIBE 10 MG/1
10 TABLET ORAL DAILY
Qty: 90 TABLET | Refills: 3 | Status: SHIPPED | OUTPATIENT
Start: 2024-08-29

## 2024-08-29 RX ORDER — ISOSORBIDE MONONITRATE 30 MG/1
30 TABLET, EXTENDED RELEASE ORAL
Refills: 3 | Status: CANCELLED | OUTPATIENT
Start: 2024-08-29

## 2024-08-29 RX ORDER — ISOSORBIDE MONONITRATE 30 MG/1
30 TABLET, EXTENDED RELEASE ORAL
Qty: 90 TABLET | Refills: 3 | Status: SHIPPED | OUTPATIENT
Start: 2024-08-29

## 2024-08-29 RX ORDER — EZETIMIBE 10 MG/1
10 TABLET ORAL DAILY
Status: CANCELLED | OUTPATIENT
Start: 2024-08-29

## 2024-08-29 NOTE — TELEPHONE ENCOUNTER
Please order the following medications for Devin.     -Amoxicillin 500 mg capsule for upcoming dental appointment.   [facility]      -Isosorbide Mono Er Tabs 30mg  -Ezetimibe Tabs 10mg  [facility]      Thank you,  Johana Gonzalez   [phone]

## 2024-08-29 NOTE — TELEPHONE ENCOUNTER
Medication: Isosorbide Mononitrate 30mg tablet    Dose/Frequency: 30mg daily at bedtime    Quantity: 90 tablets    Medication: ezetimibe 10mg tablets    Dose/Frequency: 1 tablet daily    Quantity: 90 tablets    Pharmacy: [facility]    Office:   [] PCP/Provider -   [x] Speciality/Provider -     Does the patient have enough for 3 days?   [x] Yes   [] No - Send as HP to POD

## 2024-09-03 ENCOUNTER — TELEPHONE (OUTPATIENT)
Dept: NON INVASIVE DIAGNOSTICS | Facility: HOSPITAL | Age: 69
End: 2024-09-03

## 2024-09-03 ENCOUNTER — APPOINTMENT (OUTPATIENT)
Dept: LAB | Facility: CLINIC | Age: 69
End: 2024-09-03
Payer: COMMERCIAL

## 2024-09-03 DIAGNOSIS — E11.65 INADEQUATELY CONTROLLED DIABETES MELLITUS (HCC): ICD-10-CM

## 2024-09-03 DIAGNOSIS — Z79.2 NEED FOR ANTIBIOTIC PROPHYLAXIS FOR DENTAL PROCEDURE: Primary | ICD-10-CM

## 2024-09-03 LAB
ALBUMIN SERPL BCG-MCNC: 4.3 G/DL (ref 3.5–5)
ALP SERPL-CCNC: 58 U/L (ref 34–104)
ALT SERPL W P-5'-P-CCNC: 18 U/L (ref 7–52)
ANION GAP SERPL CALCULATED.3IONS-SCNC: 9 MMOL/L (ref 4–13)
AST SERPL W P-5'-P-CCNC: 22 U/L (ref 13–39)
BILIRUB SERPL-MCNC: 0.49 MG/DL (ref 0.2–1)
BUN SERPL-MCNC: 11 MG/DL (ref 5–25)
CALCIUM SERPL-MCNC: 9.8 MG/DL (ref 8.4–10.2)
CHLORIDE SERPL-SCNC: 103 MMOL/L (ref 96–108)
CO2 SERPL-SCNC: 27 MMOL/L (ref 21–32)
CREAT SERPL-MCNC: 0.87 MG/DL (ref 0.6–1.3)
EST. AVERAGE GLUCOSE BLD GHB EST-MCNC: 269 MG/DL
GFR SERPL CREATININE-BSD FRML MDRD: 88 ML/MIN/1.73SQ M
GLUCOSE P FAST SERPL-MCNC: 127 MG/DL (ref 65–99)
HBA1C MFR BLD: 11 %
POTASSIUM SERPL-SCNC: 4.4 MMOL/L (ref 3.5–5.3)
PROT SERPL-MCNC: 7.7 G/DL (ref 6.4–8.4)
SODIUM SERPL-SCNC: 139 MMOL/L (ref 135–147)

## 2024-09-03 PROCEDURE — 3046F HEMOGLOBIN A1C LEVEL >9.0%: CPT | Performed by: INTERNAL MEDICINE

## 2024-09-03 PROCEDURE — 36415 COLL VENOUS BLD VENIPUNCTURE: CPT

## 2024-09-03 PROCEDURE — 80053 COMPREHEN METABOLIC PANEL: CPT

## 2024-09-03 PROCEDURE — 83036 HEMOGLOBIN GLYCOSYLATED A1C: CPT

## 2024-09-03 RX ORDER — AMOXICILLIN 500 MG/1
2000 CAPSULE ORAL ONCE
Qty: 4 CAPSULE | Refills: 1 | Status: SHIPPED | OUTPATIENT
Start: 2024-09-03 | End: 2024-09-03

## 2024-09-03 NOTE — TELEPHONE ENCOUNTER
-I was able to call and speak with patient.  Given history of bacteremia along with previous device infection with need for extraction will have patient undergo dental prophylaxis and prescription for amoxicillin 2000 mg sent to patient's pharmacy to be taken 30-60 minutes prior to dental procedure.  Patient noted understanding.

## 2024-09-23 ENCOUNTER — REMOTE DEVICE CLINIC VISIT (OUTPATIENT)
Dept: CARDIOLOGY CLINIC | Facility: CLINIC | Age: 69
End: 2024-09-23
Payer: COMMERCIAL

## 2024-09-23 DIAGNOSIS — Z95.810 AICD (AUTOMATIC CARDIOVERTER/DEFIBRILLATOR) PRESENT: Primary | ICD-10-CM

## 2024-09-23 PROCEDURE — 93296 REM INTERROG EVL PM/IDS: CPT | Performed by: INTERNAL MEDICINE

## 2024-09-23 PROCEDURE — 93295 DEV INTERROG REMOTE 1/2/MLT: CPT | Performed by: INTERNAL MEDICINE

## 2024-09-23 NOTE — PROGRESS NOTES
Results for orders placed or performed in visit on 09/23/24   Cardiac EP device report    Narrative    MDT DC ICD/ACTIVE SYSTEM IS MRI CONDITIONAL  CARELINK TRANSMISSION: BATTERY VOLTAGE ADEQUATE (10.8 YRS). AP-84%, <0.1%. ALL AVAILABLE LEAD PARAMETERS WITHIN NORMAL LIMITS. NO SIGNIFICANT HIGH RATE EPISODES. OPTI-VOL WITHIN NORMAL LIMITS. NORMAL DEVICE FUNCTION. GV

## 2024-11-09 ENCOUNTER — VBI (OUTPATIENT)
Dept: ADMINISTRATIVE | Facility: OTHER | Age: 69
End: 2024-11-09

## 2024-11-09 DIAGNOSIS — E78.49 OTHER HYPERLIPIDEMIA: Primary | ICD-10-CM

## 2024-11-09 NOTE — TELEPHONE ENCOUNTER
11/09/24 5:39 PM     Chart reviewed for Hemoglobin A1c was/were not submitted to the patient's insurance.     Jennifer Gao MA   [facility]

## 2024-11-11 RX ORDER — FENOFIBRATE 145 MG/1
145 TABLET, COATED ORAL DAILY
Qty: 90 TABLET | Refills: 3 | Status: SHIPPED | OUTPATIENT
Start: 2024-11-11

## 2024-12-27 ENCOUNTER — IN-CLINIC DEVICE VISIT (OUTPATIENT)
Dept: CARDIOLOGY CLINIC | Facility: CLINIC | Age: 69
End: 2024-12-27
Payer: COMMERCIAL

## 2024-12-27 DIAGNOSIS — Z95.810 PRESENCE OF IMPLANTABLE CARDIOVERTER-DEFIBRILLATOR (ICD): Primary | ICD-10-CM

## 2024-12-27 PROCEDURE — 93283 PRGRMG EVAL IMPLANTABLE DFB: CPT | Performed by: INTERNAL MEDICINE

## 2024-12-27 NOTE — PROGRESS NOTES
MDT DC ICD/ACTIVE SYSTEM IS MRI CONDITIONAL   DEVICE INTERROGATED IN THE [place] OFFICE:  BATTERY VOLTAGE ADEQUATE (10.6 YR.).  AP 80.5%  <0.1%.  ALL LEAD PARAMETERS WITHIN NORMAL LIMITS.  NO SIGNIFICANT HIGH RATE EPISODES.  NO PROGRAMMING CHANGES MADE TO DEVICE PARAMETERS.  OPTI-VOL WITHIN NORMAL LIMITS.  NORMAL DEVICE FUNCTION.  RG

## 2024-12-28 ENCOUNTER — RESULTS FOLLOW-UP (OUTPATIENT)
Dept: CARDIOLOGY CLINIC | Facility: CLINIC | Age: 69
End: 2024-12-28

## 2024-12-31 ENCOUNTER — APPOINTMENT (OUTPATIENT)
Dept: LAB | Facility: CLINIC | Age: 69
End: 2024-12-31
Payer: COMMERCIAL

## 2024-12-31 DIAGNOSIS — E78.2 MIXED HYPERLIPIDEMIA: ICD-10-CM

## 2024-12-31 DIAGNOSIS — E11.65 INADEQUATELY CONTROLLED DIABETES MELLITUS (HCC): ICD-10-CM

## 2024-12-31 LAB
ALBUMIN SERPL BCG-MCNC: 4.2 G/DL (ref 3.5–5)
ALP SERPL-CCNC: 69 U/L (ref 34–104)
ALT SERPL W P-5'-P-CCNC: 11 U/L (ref 7–52)
ANION GAP SERPL CALCULATED.3IONS-SCNC: 9 MMOL/L (ref 4–13)
AST SERPL W P-5'-P-CCNC: 10 U/L (ref 13–39)
BILIRUB SERPL-MCNC: 0.39 MG/DL (ref 0.2–1)
BUN SERPL-MCNC: 14 MG/DL (ref 5–25)
CALCIUM SERPL-MCNC: 9.8 MG/DL (ref 8.4–10.2)
CHLORIDE SERPL-SCNC: 100 MMOL/L (ref 96–108)
CHOLEST SERPL-MCNC: 115 MG/DL (ref ?–200)
CO2 SERPL-SCNC: 28 MMOL/L (ref 21–32)
CREAT SERPL-MCNC: 0.8 MG/DL (ref 0.6–1.3)
EST. AVERAGE GLUCOSE BLD GHB EST-MCNC: 246 MG/DL
GFR SERPL CREATININE-BSD FRML MDRD: 91 ML/MIN/1.73SQ M
GLUCOSE P FAST SERPL-MCNC: 254 MG/DL (ref 65–99)
HBA1C MFR BLD: 10.2 %
HDLC SERPL-MCNC: 27 MG/DL
LDLC SERPL CALC-MCNC: 59 MG/DL (ref 0–100)
POTASSIUM SERPL-SCNC: 4.8 MMOL/L (ref 3.5–5.3)
PROT SERPL-MCNC: 7.7 G/DL (ref 6.4–8.4)
SODIUM SERPL-SCNC: 137 MMOL/L (ref 135–147)
TRIGL SERPL-MCNC: 144 MG/DL (ref ?–150)

## 2024-12-31 PROCEDURE — 83036 HEMOGLOBIN GLYCOSYLATED A1C: CPT

## 2024-12-31 PROCEDURE — 80053 COMPREHEN METABOLIC PANEL: CPT

## 2024-12-31 PROCEDURE — 80061 LIPID PANEL: CPT

## 2024-12-31 PROCEDURE — 36415 COLL VENOUS BLD VENIPUNCTURE: CPT

## 2025-01-01 ENCOUNTER — RESULTS FOLLOW-UP (OUTPATIENT)
Dept: NON INVASIVE DIAGNOSTICS | Facility: HOSPITAL | Age: 70
End: 2025-01-01

## 2025-01-02 NOTE — RESULT ENCOUNTER NOTE
Called patient. No answer and cannot leave message. Mailbox full. Will send him a DJZt message with dr figueredo's message and I routed his labs to his PCP.

## 2025-01-16 NOTE — ASSESSMENT & PLAN NOTE
Continue Imdur/Aldactone/Coreg  Low-sodium restriction SN attempted to wake patient multiple times to eat his lunch. Pt refused to eat lunch. Pt repositioned for comfort. No needs at this time. Pt states he just wants to sleep. Bed in lowest position, side rails x 3, call light within reach.

## 2025-03-04 ENCOUNTER — OFFICE VISIT (OUTPATIENT)
Dept: CARDIOLOGY CLINIC | Facility: CLINIC | Age: 70
End: 2025-03-04
Payer: COMMERCIAL

## 2025-03-04 VITALS
HEIGHT: 67 IN | BODY MASS INDEX: 31.71 KG/M2 | SYSTOLIC BLOOD PRESSURE: 140 MMHG | WEIGHT: 202 LBS | DIASTOLIC BLOOD PRESSURE: 80 MMHG | HEART RATE: 60 BPM

## 2025-03-04 DIAGNOSIS — Z95.810 ICD (IMPLANTABLE CARDIOVERTER-DEFIBRILLATOR) IN PLACE: ICD-10-CM

## 2025-03-04 DIAGNOSIS — I47.20 VENTRICULAR TACHYCARDIA (HCC): ICD-10-CM

## 2025-03-04 DIAGNOSIS — I25.5 ISCHEMIC CARDIOMYOPATHY: ICD-10-CM

## 2025-03-04 DIAGNOSIS — G47.33 OSA (OBSTRUCTIVE SLEEP APNEA): ICD-10-CM

## 2025-03-04 DIAGNOSIS — I25.10 CORONARY ARTERY DISEASE INVOLVING NATIVE CORONARY ARTERY OF NATIVE HEART WITHOUT ANGINA PECTORIS: Primary | ICD-10-CM

## 2025-03-04 DIAGNOSIS — E11.628 TYPE 2 DIABETES MELLITUS WITH OTHER SKIN COMPLICATIONS (HCC): ICD-10-CM

## 2025-03-04 PROCEDURE — 99214 OFFICE O/P EST MOD 30 MIN: CPT | Performed by: INTERNAL MEDICINE

## 2025-03-04 RX ORDER — ALPRAZOLAM 0.5 MG/1
0.5 TABLET, EXTENDED RELEASE ORAL EVERY MORNING
COMMUNITY
Start: 2025-02-10

## 2025-03-04 RX ORDER — AMOXICILLIN 500 MG/1
CAPSULE ORAL
Qty: 4 CAPSULE | Refills: 0 | Status: SHIPPED | OUTPATIENT
Start: 2025-03-04 | End: 2025-03-04

## 2025-03-04 RX ORDER — DULAGLUTIDE 4.5 MG/.5ML
4.5 INJECTION, SOLUTION SUBCUTANEOUS WEEKLY
COMMUNITY
Start: 2024-12-30

## 2025-03-04 RX ORDER — FLUOROURACIL 50 MG/G
CREAM TOPICAL
COMMUNITY
Start: 2025-02-19

## 2025-03-04 NOTE — ASSESSMENT & PLAN NOTE
-Recent device interrogation showing no significant high rate episodes  -Would recommend follow-up with electrophysiology for continued monitoring  -Continue on sotalol 80 mg twice daily and carvedilol 25 mg twice daily.

## 2025-03-04 NOTE — PROGRESS NOTES
Patient ID: Devin Gonzalez is a 69 y.o. male.        Plan:      Assessment & Plan  Coronary artery disease involving native coronary artery of native heart without angina pectoris  -S/p bypass surgery and PCI to ramus intermedius in April 2024  -Continue aspirin 81 mg daily, Plavix 75 mg daily  -Continue Coreg 25 mg twice daily and atorvastatin 40 mg daily  -Continue to monitor  Ischemic cardiomyopathy  -LVEF 45% on echocardiographic imaging April 2024  -Counseled patient on dietary and lifestyle modifications  -Will avoid SGLT2 inhibitor therapy due to history of Jamarcus's gangrene while on this medication  -Continue Coreg 25 mg twice daily, spironolactone 25 mg daily, Imdur 30 mg daily  -Continue to monitor and patient counseled on need for sodium and fluid restricted diet.  Ventricular tachycardia (HCC)  -Recent device interrogation showing no significant high rate episodes  -Would recommend follow-up with electrophysiology for continued monitoring  -Continue on sotalol 80 mg twice daily and carvedilol 25 mg twice daily.  MARY (obstructive sleep apnea)  -Again counseled patient on recommendations for sleep medicine evaluation although declines at this time  -He was counseled on dietary and lifestyle modifications and will let us know if he changes mind.  ICD (implantable cardioverter-defibrillator) in place  -Patient will continue to follow with electrophysiology team  -Continue carvedilol 25 mg twice daily  -Given history of bacteremia will need prophylaxis prior to procedures.  Type 2 diabetes mellitus with other skin complications (HCC)    Lab Results   Component Value Date    HGBA1C 10.2 (H) 12/31/2024     -Counseled patient on dietary and lifestyle modifications  -Will continue to follow with primary care physician for treatment and recommendations.      Follow up Plan/Other summary comments:  -Lipid panel 12/31/2024 showing total cholesterol 115, triglyceride 144, HDL 27, LDL 59  -Counseled patient on  dietary and lifestyle modifications including following a low-salt, low-fat, heart healthy diet with sodium restriction to less than 1800 mg of sodium daily, DASH diet, NSAID avoidance, fluid restriction to less than 2000 mL of fluid daily and weight reduction goal of MI less than 29  -Patient will continue aspirin 81 mg daily, atorvastatin 40 mg daily, carvedilol 25 mg twice daily, Plavix 75 mg daily, Zetia 10 mg daily, fenofibrate 145 mg daily, sotalol 80 mg twice daily, Imdur 30 mg daily, spironolactone 25 mg daily, Lovaza 2 g twice daily  -Patient had Jamarcus's gangrene on SGLT2 inhibitor therapy therefore will avoid.  -Patient should follow-up with electrophysiology team for ongoing monitoring and treatment with sotalol therapy  -Will see patient in 6 months or sooner if necessary  -Patient counseled if he were to have any warning or alarm type symptoms he is to seek emergency medical care immediately.  -Would also recommend sleep medicine evaluation for evaluation of possible sleep apnea.  -Counseled patient on medical marijuana cessation if possible    HPI:   -Patient is a 69-year-old male with diabetes mellitus, hypertension, coronary artery disease with history of CABG in 1994 and PCI to ramus in 2015 with known ischemic cardiomyopathy and dual-chamber ICD originally [employer] device but unfortunately in August 2023 had Jamarcus's gangrene and sepsis infection with device infection as well requiring explantation and reimplantation of Medtronic dual-chamber ICD on right side at the time as patient was bacteremic.  Patient also has obstructive sleep apnea but has not been tested in many years and had not undergone treatment.  Patient has hyperlipidemia, medical marijuana use and had previously followed Dr. Lee for many years and presented to the office in February 2024 for transition of care.  He was hospitalized in April 2024 with ICD shock, ventricular tachycardia and was even seen and evaluated  by electrophysiology team.  At that time he underwent PCI to ramus intermedius and medical therapy was uptitrated.  He was initiated on sotalol and carvedilol was increased by electrophysiology team and presents to office today for follow-up.  -Currently in the office today patient denies any chest pain, palpitations, lightheadedness or dizziness, loss of consciousness, shortness of breath, lower extremity edema, orthopnea or bendopnea.  He notes overall doing well and the blood pressures at home are very well-controlled 120-130 mmHg systolic range with diastolics in the 70s mmHg.  He notes there is some room for improvement on dietary and lifestyle modifications but is overall doing his best.        Most recent or relevant cardiac/vascular testing:    -Cardiac catheterization 4/5/2024 showing OCT guided PCI to ramus intermedius with stable LIMA to LAD with collaterals to the RCA which she is chronically occluded along with flush occluded SVG x 2 since 2011.    -Transthoracic echocardiogram 4//2024 showing left-ventricular systolic function mildly reduced estimated LVEF 45% with hypokinesis of the inferior, inferoseptal and inferolateral walls with mildly reduced right ventricular systolic function, aortic valve sclerosis and mild mitral regurgitation    -Device interrogation 12/27/2024 showing atrially paced 80.5% of the monitored timeframe with all lead parameters within normal limits with no significant high rate episodes and OptiVol within normal limits.      Past Surgical History:   Procedure Laterality Date    CARDIAC CATHETERIZATION N/A 4/5/2024    Procedure: Cardiac catheterization;  Surgeon: Joselyn Powell DO;  [facility]: [facility];  Service: Cardiology    CARDIAC CATHETERIZATION N/A 4/5/2024    Procedure: Cardiac Coronary Angiogram;  Surgeon: Joselyn Powell DO;  [facility];  Service: Cardiology    CARDIAC CATHETERIZATION N/A 4/5/2024    Procedure: Cardiac other;   Surgeon: Joselyn Powell DO;  [facility];  Service: Cardiology    CARDIAC CATHETERIZATION N/A 4/5/2024    Procedure: Cardiac pci;  Surgeon: Joselyn Powell DO;  [facility];  Service: Cardiology    CARDIAC DEFIBRILLATOR PLACEMENT  10/29/2009    original MDT ICD    CARDIAC ELECTROPHYSIOLOGY PROCEDURE N/A 08/24/2023    Procedure: Cardiac laser lead extraction;  Surgeon: Alan Tejeda DO;  Location: [facility];  Service: Cardiac Surgery    CARDIAC ELECTROPHYSIOLOGY PROCEDURE N/A 08/30/2023    Procedure: Cardiac icd implant right sided dual chamber;  Surgeon: Kristopher Stearns MD;  [facility];  Service: Cardiology    CORONARY ANGIOPLASTY WITH STENT PLACEMENT  07/17/2015    s/p DALI RI    CORONARY ARTERY BYPASS GRAFT      in his 30's    DECUBITUS ULCER EXCISION Right 08/14/2023    Procedure: Washout and debridement of perineal wound;  Surgeon: Devin Ashley MD;  Location: [facility];  Service: General    ELBOW SURGERY Right     Bursitis - last assessed: Sept 15, 2016    IR PICC REPOSITION  08/18/2023    NM RMVL TRANSVNS PM ELTRD DUAL LEAD SYS N/A 08/24/2023    Procedure: REMOVAL PACEMAKER/AICD LEADS W LASER- EXTRACTION ONLY;  Surgeon: Alan Tejeda DO;  [facility];  Service: Cardiac Surgery    VAC DRESSING APPLICATION N/A 08/16/2023    Procedure: APPLICATION VAC DRESSING;  Surgeon: Ann-Marie Jolly DO;  Location: [facility];  Service: General    WOUND DEBRIDEMENT N/A 08/13/2023    Procedure: EXCISIONAL DEBRIDEMENT Perineum;  Surgeon: Hema Cardenas DO;  Location: [facility];  Service: General    WOUND DEBRIDEMENT N/A 08/16/2023    Procedure: DEBRIDEMENT WOUND, WASH OUT, PARTIAL CLOSURE;  Surgeon: Ann-Marie Jolly DO;  [facility]: [facility];  Service: General       Review of Systems   Review of Systems   Constitutional:  Negative for chills, diaphoresis, fatigue and fever.   HENT:  Negative for trouble swallowing and voice change.    Eyes:  Negative  "for pain and redness.   Respiratory:  Negative for shortness of breath and wheezing.    Cardiovascular:  Negative for chest pain, palpitations and leg swelling.   Gastrointestinal:  Negative for abdominal pain, constipation, diarrhea, nausea and vomiting.   Genitourinary:  Negative for dysuria.   Musculoskeletal:  Positive for arthralgias. Negative for neck pain and neck stiffness.   Skin:  Negative for rash.   Neurological:  Negative for dizziness, syncope, light-headedness and headaches.   Psychiatric/Behavioral:  Negative for agitation and confusion.    All other systems reviewed and are negative.         Objective:     /80   Pulse 60   Ht 5' 7\" (1.702 m)   Wt 91.6 kg (202 lb)   BMI 31.64 kg/m²     PHYSICAL EXAM:  Physical Exam  Vitals reviewed.   Constitutional:       General: He is not in acute distress.     Appearance: He is obese. He is not diaphoretic.   HENT:      Head: Normocephalic and atraumatic.   Eyes:      General:         Right eye: No discharge.         Left eye: No discharge.   Neck:      Comments: Trachea midline, no significant JVD appreciated  Cardiovascular:      Rate and Rhythm: Normal rate and regular rhythm.      Heart sounds:      No friction rub.   Pulmonary:      Effort: Pulmonary effort is normal. No respiratory distress.      Breath sounds: No wheezing.   Chest:      Chest wall: No tenderness.   Abdominal:      General: Bowel sounds are normal.      Palpations: Abdomen is soft.      Tenderness: There is no abdominal tenderness. There is no rebound.   Musculoskeletal:      Right lower leg: No edema.      Left lower leg: No edema.   Skin:     General: Skin is warm and dry.   Neurological:      Mental Status: He is alert.      Comments: Awake, alert, able to answer questions appropriately, able to move extremities bilaterally.   Psychiatric:         Mood and Affect: Mood normal.         Behavior: Behavior normal.            Meds reviewed.  Current Outpatient Medications on File " "Prior to Visit   Medication Sig Dispense Refill    ALPRAZolam ER (XANAX XR) 0.5 MG 24 hr tablet Take 0.5 mg by mouth every morning      aspirin 81 MG tablet Take 1 tablet by mouth daily      atorvastatin (LIPITOR) 40 mg tablet TAKE 1 TABLET NIGHTLY 90 tablet 3    BD ULTRA-FINE PEN NEEDLES 29G X 12.7MM MISC       BUSPIRONE HCL PO Take 20mg in the AM and 15mg in HS      carvedilol (COREG) 25 mg tablet Take 1 tablet (25 mg total) by mouth 2 (two) times a day 180 tablet 3    clopidogrel (PLAVIX) 75 mg tablet Take 1 tablet (75 mg total) by mouth daily 90 tablet 3    Continuous Blood Gluc  (FreeStyle [place] 2 [place]) TRACEY       Continuous Blood Gluc Sensor (FREESTYLE ELIZABETH SENSOR SYSTEM) MISC Use 1 each every 14 (fourteen) days      ezetimibe (ZETIA) 10 mg tablet Take 1 tablet (10 mg total) by mouth daily 90 tablet 3    fenofibrate (TRICOR) 145 mg tablet TAKE 1 TABLET DAILY 90 tablet 3    fluorouracil (EFUDEX) 5 % cream APPLY TO FOREARMS/HANDS TWICE A DAY FOR 3 WEEKS THEN DISCONTINUE      Gvoke HypoPen 2-Pack 1 MG/0.2ML SOAJ INJECT 1 MG UNDER THE SKIN ONE TIME AS NEEDED (HYOPGLYCEMIA).      insulin glargine (Toujeo Max SoloStar) 300 units/mL CONCENTRATED U-300 injection pen (2-unit dial) Inject 120 Units under the skin daily at bedtime      insulin lispro (HumALOG/ADMELOG) 100 units/mL injection Inject 8 Units under the skin 3 (three) times a day with meals      Insulin Pen Needle (PEN NEEDLES 29GX1/2\") 29G X 12MM MISC       isosorbide mononitrate (IMDUR) 30 mg 24 hr tablet Take 1 tablet (30 mg total) by mouth daily at bedtime 90 tablet 3    MAGNESIUM OXIDE 400 PO Take by mouth      metFORMIN (GLUCOPHAGE) 1000 MG tablet Take 1,000 mg by mouth 2 (two) times a day with meals      omega-3-acid ethyl esters (LOVAZA) 1 g capsule Take 2 capsules (2 g total) by mouth 2 (two) times a day 360 capsule 3    omeprazole (PriLOSEC) 40 MG capsule Take 1 capsule (40 mg total) by mouth daily 90 capsule 1    sotalol (BETAPACE) 80 mg " tablet Take 1 tablet (80 mg total) by mouth 2 (two) times a day 180 tablet 1    spironolactone (ALDACTONE) 25 mg tablet Take 1 tablet by mouth daily      Trulicity 4.5 MG/0.5ML SOAJ Inject 4.5 mg under the skin once a week      amoxicillin (AMOXIL) 500 mg capsule  (Patient not taking: Reported on 3/4/2025)      B-D ULTRAFINE III SHORT PEN 31G X 8 MM MISC  (Patient not taking: Reported on 3/4/2025)      Lancets MISC test once times daily E11.65 (Patient not taking: Reported on 3/4/2025)      LORazepam (ATIVAN) 1 mg tablet TAKE 1 TABLET DAILY AS NEEDED FOR ANXIETY (Patient not taking: Reported on 3/4/2025) 30 tablet 3    metFORMIN (GLUCOPHAGE) 500 mg tablet Take 1,000 mg by mouth 2 (two) times a day (Patient not taking: Reported on 3/4/2025)      Misc Natural Products (BLOOD SUGAR 360 PO) ONETOUCH ULTRA TEST STRIPS Test once daily E11.65 (Patient not taking: Reported on 3/4/2025)      Ozempic, 1 MG/DOSE, 4 MG/3ML injection pen  (Patient not taking: Reported on 3/4/2025)      Trulicity 4.5 MG/0.5ML injection  (Patient not taking: Reported on 3/4/2025)      [DISCONTINUED] dulaglutide (Trulicity) 3 MG/0.5ML injection Inject 0.5 mL (3 mg total) under the skin every 7 days Pt taking 4.5 mg now (Patient not taking: Reported on 8/20/2024)      [DISCONTINUED] escitalopram (LEXAPRO) 20 mg tablet TAKE 1 TABLET DAILY (Patient not taking: Reported on 8/20/2024) 90 tablet 1     No current facility-administered medications on file prior to visit.      Past Medical History:   Diagnosis Date    Benign essential hypertension 06/02/2011    Last Assessment & Plan:  BP stable on current meds    Chronic systolic CHF (congestive heart failure) (HCC) 04/12/2021    Coronary atherosclerosis 04/08/2010    s/p DALI RI 7/17/2015; s/p CABG in his 30's; s/p DALI RI 4/5/2024    Generalized anxiety disorder 05/22/2012    ICD (implantable cardioverter-defibrillator) in place 12/04/2019    Ischemic cardiomyopathy 09/13/2012    s/p re-implant of single  chamber medtronic ICD 8/30/2023; original implant 10/29/2009    Mixed hyperlipidemia 04/08/2010    Managed by cardiology   Last Assessment & Plan:  on lipitor 40 mg, zetia, lovaza ,LDL 83. continue    Necrotizing soft tissue infection 08/13/2023    Rectal hemorrhage     last assessed: May 29, 2015    S/P CABG (coronary artery bypass graft) 1994    Uncontrolled type 2 diabetes mellitus with hyperglycemia ([facility]) 09/15/2016    Transitioned From: Diabetes Mellitus    Ventricular tachycardia (HCC) 04/12/2021           Social History     Tobacco Use   Smoking Status Never   Smokeless Tobacco Never     Family History   Problem Relation Age of Onset    No Known Problems Mother     Cancer Father     Coronary artery disease Father

## 2025-03-04 NOTE — ASSESSMENT & PLAN NOTE
-Again counseled patient on recommendations for sleep medicine evaluation although declines at this time  -He was counseled on dietary and lifestyle modifications and will let us know if he changes mind.

## 2025-03-04 NOTE — ASSESSMENT & PLAN NOTE
-LVEF 45% on echocardiographic imaging April 2024  -Counseled patient on dietary and lifestyle modifications  -Will avoid SGLT2 inhibitor therapy due to history of Jamarcus's gangrene while on this medication  -Continue Coreg 25 mg twice daily, spironolactone 25 mg daily, Imdur 30 mg daily  -Continue to monitor and patient counseled on need for sodium and fluid restricted diet.

## 2025-03-04 NOTE — ASSESSMENT & PLAN NOTE
-Patient will continue to follow with electrophysiology team  -Continue carvedilol 25 mg twice daily  -Given history of bacteremia will need prophylaxis prior to procedures.

## 2025-03-04 NOTE — ASSESSMENT & PLAN NOTE
Lab Results   Component Value Date    HGBA1C 10.2 (H) 12/31/2024     -Counseled patient on dietary and lifestyle modifications  -Will continue to follow with primary care physician for treatment and recommendations.

## 2025-03-04 NOTE — ASSESSMENT & PLAN NOTE
-S/p bypass surgery and PCI to ramus intermedius in April 2024  -Continue aspirin 81 mg daily, Plavix 75 mg daily  -Continue Coreg 25 mg twice daily and atorvastatin 40 mg daily  -Continue to monitor

## 2025-03-05 ENCOUNTER — EVALUATION (OUTPATIENT)
Dept: PHYSICAL THERAPY | Facility: CLINIC | Age: 70
End: 2025-03-05
Payer: COMMERCIAL

## 2025-03-05 VITALS — SYSTOLIC BLOOD PRESSURE: 151 MMHG | HEART RATE: 61 BPM | DIASTOLIC BLOOD PRESSURE: 84 MMHG

## 2025-03-05 DIAGNOSIS — M75.01 ADHESIVE CAPSULITIS OF RIGHT SHOULDER: Primary | ICD-10-CM

## 2025-03-05 PROCEDURE — 97162 PT EVAL MOD COMPLEX 30 MIN: CPT | Performed by: PHYSICAL THERAPIST

## 2025-03-05 PROCEDURE — 97140 MANUAL THERAPY 1/> REGIONS: CPT | Performed by: PHYSICAL THERAPIST

## 2025-03-05 PROCEDURE — 97112 NEUROMUSCULAR REEDUCATION: CPT | Performed by: PHYSICAL THERAPIST

## 2025-03-05 NOTE — PROGRESS NOTES
PT Evaluation     Today's date: 3/5/2025  Patient name: Devin Gonzalez  : 1955  MRN: [id number]  Referring provider: Horacio Garcia, *  Dx:   Encounter Diagnosis     ICD-10-CM    1. Adhesive capsulitis of right shoulder  M75.01                      Assessment  Impairments: abnormal coordination, abnormal muscle firing, abnormal or restricted ROM, activity intolerance, impaired physical strength, lacks appropriate home exercise program, pain with function and safety issue  Functional limitations: Diffculty with reaching at and above shoulder height.  Symptom irritability: moderate    Assessment details: Devin Gonzalez is a 69 y.o. male who presents to outpatient PT with a  Adhesive capsulitis of right shoulder  (primary encounter diagnosis).  No further referral appears necessary at this time based upon examination results. Pt presents with decreased strength, ROM, balance, functional activity tolerance, and pain with movement in his right shoulder  which is  limiting his ability to perform the aforementioned functional activities.  Etiologic factors include repetitive poor body mechanics. Prognosis is good given HEP compliance and PT 2-3/wk.  Please contact me if you have any questions or recommendations.  Thank you for the opportunity to share in  Devin giordano.     Understanding of Dx/Px/POC: good     Prognosis: good    Goals  STG to be achieved in 4 weeks     1. Pt will reduce subjective pain rating by at least 50 percent the help facilitate return to PLOF  2. Pt will improve right shoulder AROM by at least 10 degrees to help promote improved functional activity tolerance   3. Pt will improve right shoulder MMT scores by at least 1/2 grade to promote improved functional activity tolerance        LTG to be achieved in 6-8 weeks   1. Pt will be complaint with HEP   2. Pt will improve right shoulder AROM to WFL, to help facilitate independence with ADL's, IADL's, and functional activities   3. Pt will  improve right shoulder Strength to WFL to help facilitate independence with ADL's, IADL's, and functional activities   4. Pt will have no limitations with ambulation to help facilitate independence at home and in the community.   5. Pt will have no limitations with stair negotiation to help facilitate independence at home and in the community           Plan  Patient would benefit from: skilled physical therapy    Planned therapy interventions: ADL training, balance, balance/weight bearing training, flexibility, functional ROM exercises, gait training, graded activity, graded exercise, graded motor, home exercise program, joint mobilization, manual therapy, neuromuscular re-education, patient education, postural training, strengthening, stretching, therapeutic activities and therapeutic exercise    Frequency: 2x week  Duration in weeks: 12  Plan of Care beginning date: 3/5/2025  Plan of Care expiration date: 2025  Treatment plan discussed with: patient, PTA and referring physician        Subjective Evaluation    History of Present Illness  Mechanism of injury: The patient is a 69 year old male who presents with CC of right shoulder pain  for the past three month. He reports new onset of decreased ER IR and abduction ROM. Consistent with adhesive capsulitis of right shoulder. He notes difficulty with walking his dog while using his. Notes increasing difficulty with any types of activities that involve the use of his right ar  Patient Goals  Patient goals for therapy: increased strength, decreased pain and increased motion  Patient goal: to increase strength in thr right arm.  Pain  Current pain ratin  At best pain ratin  At worst pain rating: 10  Quality: sharp and throbbing  Aggravating factors: lifting  Progression: improved          Objective     Active Range of Motion     Right Shoulder   Flexion: 85 degrees   Abduction: 90 degrees   External rotation BTH: Active external rotation behind the head:  reaches to occiput.   Internal rotation BTB: Active internal rotation behind the back: reaches to sacrum.     Strength/Myotome Testing     Right Shoulder     Planes of Motion   Flexion: 2   Abduction: 2   External rotation at 0°: 2   Internal rotation at 0°: 2              Precautions: Defibrillator, Hx of ischemic cardiomyopathy       Manuals 3/5            PROM to right shoulder  RR                                                   Neuro Re-Ed             MTP LTP              TB ER IR              Compass             Supine cane              UBE for postural correction.  5/5 120                                      Ther Ex             Pulley  5''20x flex scap            Finger ladder  5''10x flex scap                                                                                          Ther Activity                                       Gait Training                                       Modalities

## 2025-03-07 ENCOUNTER — TRANSCRIBE ORDERS (OUTPATIENT)
Dept: SURGERY | Facility: CLINIC | Age: 70
End: 2025-03-07

## 2025-03-07 ENCOUNTER — TELEPHONE (OUTPATIENT)
Age: 70
End: 2025-03-07

## 2025-03-07 DIAGNOSIS — C44.619 BCC (BASAL CELL CARCINOMA), ARM, LEFT: Primary | ICD-10-CM

## 2025-03-07 NOTE — TELEPHONE ENCOUNTER
[facility] called to get fax number for Dr Doherty, they are going to be faxing over a referral for patient.

## 2025-03-07 NOTE — TELEPHONE ENCOUNTER
Rep from [facility] called to check if referral was received. Called office talked to Karen I was informed fax system was down, she also stated they could email it to her [email or web address] . Advised Rep fax system was down and they can email the referral to email above.

## 2025-03-10 ENCOUNTER — OFFICE VISIT (OUTPATIENT)
Dept: PHYSICAL THERAPY | Facility: CLINIC | Age: 70
End: 2025-03-10
Payer: COMMERCIAL

## 2025-03-10 DIAGNOSIS — M75.01 ADHESIVE CAPSULITIS OF RIGHT SHOULDER: Primary | ICD-10-CM

## 2025-03-10 PROCEDURE — 97140 MANUAL THERAPY 1/> REGIONS: CPT

## 2025-03-10 PROCEDURE — 97110 THERAPEUTIC EXERCISES: CPT

## 2025-03-10 PROCEDURE — 97112 NEUROMUSCULAR REEDUCATION: CPT

## 2025-03-10 NOTE — PROGRESS NOTES
"Daily Note     Today's date: 3/10/2025  Patient name: Devin Gonzalez  : 1955  MRN: [id number]  Referring provider: Horacio Garcia, *  Dx:   Encounter Diagnosis     ICD-10-CM    1. Adhesive capsulitis of right shoulder  M75.01                      Subjective:  Pt denies any adverse effects following initial treatment.       Objective: See treatment diary below      Assessment: Tolerated treatment well. Gradual progression with exercise as outlined in POC. Patient demonstrated fatigue post treatment, exhibited good technique with therapeutic exercises, and would benefit from continued PT      Plan: Continue per plan of care.      Precautions: Defibrillator, Hx of ischemic cardiomyopathy       POC Expires Reeval for Medicare to be completed Unit LImit Auth Expiration Date PT/OT/STVisit Limit   25 By visit  na N/a 25 9    Completed on                  VISIT TRACKER  DATE 3/5 3/10           Visit # 1 2           Remaining 8 7               Manuals 3/5 3/10           PROM to right shoulder  RR MJC                                                  Neuro Re-Ed             MTP LTP   RTB  5\"  2x10           TB ER IR              Compass             Supine cane              UBE for postural correction.   120 5'/5'  120 rpm                                     Ther Ex             Pulley  5''20x flex scap 5\" x20  Flex/scap           Finger ladder  5''10x flex scap 5\" x10  Flex/scap                                                                                         Ther Activity                                       Gait Training                                       Modalities                                            "

## 2025-03-11 DIAGNOSIS — E78.2 MIXED HYPERLIPIDEMIA: ICD-10-CM

## 2025-03-12 RX ORDER — OMEGA-3-ACID ETHYL ESTERS 1 G/1
2 CAPSULE, LIQUID FILLED ORAL 2 TIMES DAILY
Qty: 360 CAPSULE | Refills: 1 | Status: SHIPPED | OUTPATIENT
Start: 2025-03-12

## 2025-03-13 ENCOUNTER — OFFICE VISIT (OUTPATIENT)
Dept: PHYSICAL THERAPY | Facility: CLINIC | Age: 70
End: 2025-03-13
Payer: COMMERCIAL

## 2025-03-13 ENCOUNTER — LAB REQUISITION (OUTPATIENT)
Dept: LAB | Facility: HOSPITAL | Age: 70
End: 2025-03-13
Payer: COMMERCIAL

## 2025-03-13 DIAGNOSIS — C44.619 BASAL CELL CARCINOMA OF SKIN OF LEFT UPPER LIMB, INCLUDING SHOULDER: ICD-10-CM

## 2025-03-13 DIAGNOSIS — C44.519 BASAL CELL CARCINOMA OF SKIN OF OTHER PART OF TRUNK: ICD-10-CM

## 2025-03-13 DIAGNOSIS — R29.898 WEAKNESS OF BOTH LOWER EXTREMITIES: ICD-10-CM

## 2025-03-13 DIAGNOSIS — D23.62 OTHER BENIGN NEOPLASM OF SKIN OF LEFT UPPER LIMB, INCLUDING SHOULDER: ICD-10-CM

## 2025-03-13 PROCEDURE — 88321 CONSLTJ&REPRT SLD PREP ELSWR: CPT | Performed by: STUDENT IN AN ORGANIZED HEALTH CARE EDUCATION/TRAINING PROGRAM

## 2025-03-13 PROCEDURE — 97140 MANUAL THERAPY 1/> REGIONS: CPT | Performed by: PHYSICAL THERAPIST

## 2025-03-13 PROCEDURE — 97112 NEUROMUSCULAR REEDUCATION: CPT | Performed by: PHYSICAL THERAPIST

## 2025-03-13 NOTE — PROGRESS NOTES
"Daily Note     Today's date: 3/13/2025  Patient name: Devin Gonzalez  : 1955  MRN: [id number]  Referring provider: Horacio Garcia, *  Dx:   Encounter Diagnosis     ICD-10-CM    1. Weakness of both lower extremities  R29.898 Ambulatory Referral to Physical Therapy                     Subjective: Pt reports he is doing well today.       Objective: See treatment diary below      Assessment: Tolerated treatment well. Patient exhibited good technique with therapeutic exercises and would benefit from continued PT      Plan: Continue per plan of care.      Precautions: Defibrillator, Hx of ischemic cardiomyopathy       POC Expires Reeval for Medicare to be completed Unit LImit Auth Expiration Date PT/OT/STVisit Limit   25 By visit  na N/a 25 9    Completed on                  VISIT TRACKER  DATE 3/5 3/10 3/13          Visit # 1 2 3          Remaining 8 7 6              Manuals 3/5 3/10 3/13          PROM to right shoulder  RR MJC RR                                                 Neuro Re-Ed             MTP LTP   RTB  5\"  2x10 Green 5''20x          TB ER IR    Green 2x10          Compass   20x each           Supine cane    10x each           UBE for postural correction.   120 5'/5'  120 rpm  120 RPM                                     Ther Ex             Pulley  5''20x flex scap 5\" x20  Flex/scap 5''20x flex /scap          Finger ladder  5''10x flex scap 5\" x10  Flex/scap 5''10x flexion scaption                                                                                         Ther Activity                                       Gait Training                                       Modalities                                              "

## 2025-03-17 ENCOUNTER — OFFICE VISIT (OUTPATIENT)
Dept: PHYSICAL THERAPY | Facility: CLINIC | Age: 70
End: 2025-03-17
Payer: COMMERCIAL

## 2025-03-17 DIAGNOSIS — M75.01 ADHESIVE CAPSULITIS OF RIGHT SHOULDER: ICD-10-CM

## 2025-03-17 DIAGNOSIS — R29.898 WEAKNESS OF BOTH LOWER EXTREMITIES: Primary | ICD-10-CM

## 2025-03-17 PROCEDURE — 97140 MANUAL THERAPY 1/> REGIONS: CPT | Performed by: PHYSICAL THERAPIST

## 2025-03-17 PROCEDURE — 97110 THERAPEUTIC EXERCISES: CPT | Performed by: PHYSICAL THERAPIST

## 2025-03-17 PROCEDURE — 97112 NEUROMUSCULAR REEDUCATION: CPT | Performed by: PHYSICAL THERAPIST

## 2025-03-17 RX ORDER — AMOXICILLIN 500 MG/1
CAPSULE ORAL
COMMUNITY
Start: 2025-03-04

## 2025-03-17 NOTE — PROGRESS NOTES
"Daily Note     Today's date: 3/17/2025  Patient name: Devin Gonzalez  : 1955  MRN: [id number]  Referring provider: Horacio Garcia, *  Dx:   Encounter Diagnosis     ICD-10-CM    1. Weakness of both lower extremities  R29.898       2. Adhesive capsulitis of right shoulder  M75.01                      Subjective: Pt doing well. Slight improvements in the right shoulder since starting skilled PT       Objective: See treatment diary below      Assessment: Tolerated treatment well. Patient exhibited good technique with therapeutic exercises and would benefit from continued PT      Plan: Continue per plan of care.      Precautions: Defibrillator, Hx of ischemic cardiomyopathy       POC Expires Reeval for Medicare to be completed Unit LImit Auth Expiration Date PT/OT/STVisit Limit   25 By visit  na N/a 25 9    Completed on                  VISIT TRACKER  DATE 3/5 3/10 3/13 3/17         Visit # 1 2 3 4         Remaining 8 7 6 5             Manuals 3/5 3/10 3/13 3/17         PROM to right shoulder  RR MJC RR RR                                                Neuro Re-Ed             MTP LTP   RTB  5\"  2x10 Green 5''20x Green 5''20x         TB ER IR    Green 2x10 Green 2x10         Compass   20x each  20x         Supine cane    10x each  10x each          UBE for postural correction.  5/ 120 5'/5'  120 rpm 5 120 RPM  5/ 120 RPM                                    Ther Ex             Pulley  5''20x flex scap 5\" x20  Flex/scap 5''20x flex /scap 5''20x flex /scap         Finger ladder  5''10x flex scap 5\" x10  Flex/scap 5''10x flexion scaption  5''10x flexion scaption                                                                                        Ther Activity                                       Gait Training                                       Modalities                                                "

## 2025-03-18 ENCOUNTER — APPOINTMENT (OUTPATIENT)
Dept: LAB | Facility: CLINIC | Age: 70
End: 2025-03-18
Payer: COMMERCIAL

## 2025-03-18 DIAGNOSIS — I47.20 VENTRICULAR TACHYCARDIA (HCC): ICD-10-CM

## 2025-03-18 DIAGNOSIS — N42.9 DISEASE OF PROSTATE: ICD-10-CM

## 2025-03-18 DIAGNOSIS — I25.10 CORONARY ARTERY DISEASE INVOLVING NATIVE CORONARY ARTERY OF NATIVE HEART WITHOUT ANGINA PECTORIS: ICD-10-CM

## 2025-03-18 DIAGNOSIS — I10 PRIMARY HYPERTENSION: Primary | ICD-10-CM

## 2025-03-18 DIAGNOSIS — I25.5 ISCHEMIC CARDIOMYOPATHY: ICD-10-CM

## 2025-03-18 LAB
ALBUMIN SERPL BCG-MCNC: 4.4 G/DL (ref 3.5–5)
ALP SERPL-CCNC: 56 U/L (ref 34–104)
ALT SERPL W P-5'-P-CCNC: 10 U/L (ref 7–52)
ANION GAP SERPL CALCULATED.3IONS-SCNC: 10 MMOL/L (ref 4–13)
AST SERPL W P-5'-P-CCNC: 12 U/L (ref 13–39)
BASOPHILS # BLD AUTO: 0.08 THOUSANDS/ÂΜL (ref 0–0.1)
BASOPHILS NFR BLD AUTO: 1 % (ref 0–1)
BILIRUB SERPL-MCNC: 0.47 MG/DL (ref 0.2–1)
BUN SERPL-MCNC: 14 MG/DL (ref 5–25)
CALCIUM SERPL-MCNC: 10 MG/DL (ref 8.4–10.2)
CHLORIDE SERPL-SCNC: 102 MMOL/L (ref 96–108)
CO2 SERPL-SCNC: 27 MMOL/L (ref 21–32)
CREAT SERPL-MCNC: 0.79 MG/DL (ref 0.6–1.3)
EOSINOPHIL # BLD AUTO: 0.39 THOUSAND/ÂΜL (ref 0–0.61)
EOSINOPHIL NFR BLD AUTO: 4 % (ref 0–6)
ERYTHROCYTE [DISTWIDTH] IN BLOOD BY AUTOMATED COUNT: 14 % (ref 11.6–15.1)
GFR SERPL CREATININE-BSD FRML MDRD: 91 ML/MIN/1.73SQ M
GLUCOSE P FAST SERPL-MCNC: 179 MG/DL (ref 65–99)
HCT VFR BLD AUTO: 40.9 % (ref 36.5–49.3)
HGB BLD-MCNC: 13.1 G/DL (ref 12–17)
IMM GRANULOCYTES # BLD AUTO: 0.04 THOUSAND/UL (ref 0–0.2)
IMM GRANULOCYTES NFR BLD AUTO: 0 % (ref 0–2)
LYMPHOCYTES # BLD AUTO: 4.85 THOUSANDS/ÂΜL (ref 0.6–4.47)
LYMPHOCYTES NFR BLD AUTO: 45 % (ref 14–44)
MCH RBC QN AUTO: 28.6 PG (ref 26.8–34.3)
MCHC RBC AUTO-ENTMCNC: 32 G/DL (ref 31.4–37.4)
MCV RBC AUTO: 89 FL (ref 82–98)
MONOCYTES # BLD AUTO: 0.76 THOUSAND/ÂΜL (ref 0.17–1.22)
MONOCYTES NFR BLD AUTO: 7 % (ref 4–12)
NEUTROPHILS # BLD AUTO: 4.58 THOUSANDS/ÂΜL (ref 1.85–7.62)
NEUTS SEG NFR BLD AUTO: 43 % (ref 43–75)
NRBC BLD AUTO-RTO: 0 /100 WBCS
PLATELET # BLD AUTO: 341 THOUSANDS/UL (ref 149–390)
PMV BLD AUTO: 10.2 FL (ref 8.9–12.7)
POTASSIUM SERPL-SCNC: 5.4 MMOL/L (ref 3.5–5.3)
PROT SERPL-MCNC: 7.8 G/DL (ref 6.4–8.4)
PSA FREE MFR SERPL: 46.24 %
PSA FREE SERPL-MCNC: 0.13 NG/ML
PSA SERPL-MCNC: 0.28 NG/ML (ref 0–4)
RBC # BLD AUTO: 4.58 MILLION/UL (ref 3.88–5.62)
SODIUM SERPL-SCNC: 139 MMOL/L (ref 135–147)
WBC # BLD AUTO: 10.7 THOUSAND/UL (ref 4.31–10.16)

## 2025-03-18 PROCEDURE — 85025 COMPLETE CBC W/AUTO DIFF WBC: CPT

## 2025-03-18 PROCEDURE — 80053 COMPREHEN METABOLIC PANEL: CPT

## 2025-03-18 PROCEDURE — 84154 ASSAY OF PSA FREE: CPT

## 2025-03-18 PROCEDURE — 36415 COLL VENOUS BLD VENIPUNCTURE: CPT

## 2025-03-18 PROCEDURE — 84153 ASSAY OF PSA TOTAL: CPT

## 2025-03-18 NOTE — PROGRESS NOTES
Name: Devin Gonzalez      : 1955      MRN: [id number]  Encounter Provider: Olivier Doherty MD  Encounter Date: 3/19/2025   Encounter department: [facility] GENERAL SURGERY [place]  :  Assessment & Plan        History of Present Illness {?Quick Links Encounters * My Last Note * Last Note in Specialty * Snapshot * Since Last Visit * History :80572}  HPI  Devin Gonzalez is a 69 y.o. male who presents with BCC on Lt upper arm   {History obtained from(Optional):06130}    Review of Systems  {Select to insert medical history sections (Optional):51945}     Objective {?Quick Links Trend Vitals * Enter New Vitals * Results Review * Timeline (Adult) * Labs * Imaging * Cardiology * Procedures * Lung Cancer Screening * Surgical eConsent :46945}  There were no vitals taken for this visit.     Physical Exam    {Administrative / Billing Section (Optional):68604}

## 2025-03-19 ENCOUNTER — CONSULT (OUTPATIENT)
Dept: SURGERY | Facility: CLINIC | Age: 70
End: 2025-03-19
Payer: COMMERCIAL

## 2025-03-19 ENCOUNTER — RESULTS FOLLOW-UP (OUTPATIENT)
Dept: CARDIOLOGY CLINIC | Facility: CLINIC | Age: 70
End: 2025-03-19

## 2025-03-19 VITALS
SYSTOLIC BLOOD PRESSURE: 126 MMHG | WEIGHT: 206 LBS | HEART RATE: 62 BPM | OXYGEN SATURATION: 96 % | TEMPERATURE: 97.7 F | BODY MASS INDEX: 32.33 KG/M2 | HEIGHT: 67 IN | DIASTOLIC BLOOD PRESSURE: 60 MMHG

## 2025-03-19 DIAGNOSIS — C44.619 BCC (BASAL CELL CARCINOMA), ARM, LEFT: ICD-10-CM

## 2025-03-19 DIAGNOSIS — C44.519 BASAL CELL CARCINOMA (BCC) OF LOWER BACK: ICD-10-CM

## 2025-03-19 DIAGNOSIS — E87.5 HYPERKALEMIA: Primary | ICD-10-CM

## 2025-03-19 DIAGNOSIS — C44.619 BASAL CELL CARCINOMA (BCC) OF LEFT UPPER ARM: Primary | ICD-10-CM

## 2025-03-19 PROCEDURE — 88305 TISSUE EXAM BY PATHOLOGIST: CPT | Performed by: PATHOLOGY

## 2025-03-19 PROCEDURE — 17260 DSTRJ MAL LES T/A/L 0.5 CM/<: CPT | Performed by: SURGERY

## 2025-03-19 PROCEDURE — 11602 EXC TR-EXT MAL+MARG 1.1-2 CM: CPT | Performed by: SURGERY

## 2025-03-19 PROCEDURE — 99203 OFFICE O/P NEW LOW 30 MIN: CPT | Performed by: SURGERY

## 2025-03-19 RX ORDER — SPIRONOLACTONE 25 MG/1
25 TABLET ORAL DAILY
Qty: 90 TABLET | Refills: 3 | Status: SHIPPED | OUTPATIENT
Start: 2025-03-19

## 2025-03-19 NOTE — ASSESSMENT & PLAN NOTE
Patient is a pleasant 69-year-old male presenting with a biopsy-proven superficial basal cell carcinoma of the lower left back.  20 seconds of cryotherapy applied to the 2 cm circumferential area of disease.    He will follow-up with dermatology for routine skin surveillance.

## 2025-03-19 NOTE — TELEPHONE ENCOUNTER
-I was able to call and speak with patient's wife about his laboratory analysis.  After discussion with her she notes he has had a high potassium diet lately which likely has been contributing.  I informed her to moderate this and we will recheck BMP in 1 week to monitor potassium level along with renal function and electrolytes.  I also informed her that they should follow-up with his primary care physician about mildly elevated white blood cell count level for additional evaluation.  Patient's wife noted understanding and was agreeable.

## 2025-03-19 NOTE — PROGRESS NOTES
Name: Devin Gonzalez      : 1955      MRN: [id number]  Encounter Provider: Olivier Doherty MD  Encounter Date: 3/19/2025   Encounter department: [facility] SURGERY [place]  :  Assessment & Plan  BCC (basal cell carcinoma), arm, left    Orders:    Ambulatory referral to General Surgery    Tissue Exam    Basal cell carcinoma (BCC) of left upper arm  Patient is a 69-year-old male presenting with a biopsy-proven basal cell carcinoma of the left arm for which definitive treatment by excisional biopsy is now indicated.    16 mm x 12 mm area of disease was excised from the left arm after obtaining informed verbal consent.         Basal cell carcinoma (BCC) of lower back  Patient is a pleasant 69-year-old male presenting with a biopsy-proven superficial basal cell carcinoma of the lower left back.  20 seconds of cryotherapy applied to the 2 cm circumferential area of disease.    He will follow-up with dermatology for routine skin surveillance.             History of Present Illness   HPI  Devin Gonzalez is a 69 y.o. male who presents with a BCC on left upper arm and had a lesion removed on left flank as well. Pt denies any issues with this lesions. Pt has med h/o of having skin cancer on his forehead and left nose. Pt also has a father who suffered from skin cancer as well. Pt has allergies to ace inhibitors and he is on Plavix and Aspirin  for blood thinners. The Plavix was last taken yesterday morning and the aspirin was last taken yesterday night. No fevers/chills. Amilia.O,MA  History obtained from: patient    Review of Systems   Constitutional:  Negative for chills and fever.   HENT:  Negative for ear pain and sore throat.    Eyes:  Negative for pain and visual disturbance.   Respiratory:  Negative for cough and shortness of breath.    Cardiovascular:  Negative for chest pain and palpitations.   Gastrointestinal:  Negative for abdominal pain and vomiting.   Genitourinary:  Negative for dysuria and  hematuria.   Musculoskeletal:  Negative for arthralgias and back pain.   Skin:  Negative for color change and rash.   Neurological:  Negative for seizures and syncope.   All other systems reviewed and are negative.    Pertinent Medical History            Medical History Reviewed by provider this encounter:     .  Past Medical History   Past Medical History:   Diagnosis Date    Benign essential hypertension 06/02/2011    Last Assessment & Plan:  BP stable on current meds    Chronic systolic CHF (congestive heart failure) ([facility]) 04/12/2021    Coronary atherosclerosis 04/08/2010    s/p DALI RI 7/17/2015; s/p CABG in his 30's; s/p DALI RI 4/5/2024    Generalized anxiety disorder 05/22/2012    ICD (implantable cardioverter-defibrillator) in place 12/04/2019    Ischemic cardiomyopathy 09/13/2012    s/p re-implant of single chamber medtronic ICD 8/30/2023; original implant 10/29/2009    Mixed hyperlipidemia 04/08/2010    Managed by cardiology   Last Assessment & Plan:  on lipitor 40 mg, zetia, lovaza ,LDL 83. continue    Necrotizing soft tissue infection 08/13/2023    Rectal hemorrhage     last assessed: May 29, 2015    S/P CABG (coronary artery bypass graft) 1994    Uncontrolled type 2 diabetes mellitus with hyperglycemia ([facility]) 09/15/2016    Transitioned From: Diabetes Mellitus    Ventricular tachycardia ([facility]) 04/12/2021     Past Surgical History:   Procedure Laterality Date    CARDIAC CATHETERIZATION N/A 4/5/2024    Procedure: Cardiac catheterization;  Surgeon: Joselyn Powell DO;  Location: [facility] CARDIAC C[facility] LAB;  Service: Cardiology    CARDIAC CATHETERIZATION N/A 4/5/2024    Procedure: Cardiac Coronary Angiogram;  Surgeon: Joselyn Powell DO;  [facility]: [facility] CARDIAC C[facility];  Service: Cardiology    CARDIAC CATHETERIZATION N/A 4/5/2024    Procedure: Cardiac other;  Surgeon: Joselyn Powell DO;  [facility] CARD[facility]AC [facility];  Service: Cardiology    CARDIAC CATHETERIZATION N/A 4/5/2024    Procedure: Cardiac pci;   Surgeon: Joselyn Powell DO;  [facility]: [facility] CARDIAC C[facility];  Service: Cardiology    CARDIAC DEFIBRILLATOR PLACEMENT  10/29/2009    original MDT ICD    CARDIAC ELECTROPHYSIOLOGY PROCEDURE N/A 08/24/2023    Procedure: Cardiac laser lead extraction;  Surgeon: Alan Tejeda DO;  Location: [facility] MAIN [facility];  Service: Cardiac Surgery    CARDIAC ELECTROPHYSIOLOGY PROCEDURE N/A 08/30/2023    Procedure: Cardiac icd implant right sided dual chamber;  Surgeon: Kristopher Stearns MD;  Location: [facility] CARDIAC C[facility];  Service: Cardiology    CORONARY ANGIOPLASTY WITH STENT PLACEMENT  07/17/2015    s/p DALI RI    CORONARY ARTERY BYPASS GRAFT      in his 30's    DECUBITUS ULCER EXCISION Right 08/14/2023    Procedure: Washout and debridement of perineal wound;  Surgeon: Devin Ashley MD;  Location: [facility] MAIN [facility];  Service: General    ELBOW SURGERY Right     Bursitis - last assessed: Sept 15, 2016    IR PICC REPOSITION  08/18/2023    ND RMVL TRANSVNS PM ELTRD DUAL LEAD SYS N/A 08/24/2023    Procedure: REMOVAL PACEMAKER/AICD LEADS W LASER- EXTRACTION ONLY;  Surgeon: Alan Tejeda DO;  Location: [facility];  Service: Cardiac Surgery    VAC DRESSING APPLICATION N/A 08/16/2023    Procedure: APPLICATION VAC DRESSING;  Surgeon: Ann-Marie Jolly DO;  Location: [facility] MAIN [facility];  Service: General    WOUND DEBRIDEMENT N/A 08/13/2023    Procedure: EXCISIONAL DEBRIDEMENT Perineum;  Surgeon: Hema Cardenas DO;  Location: [facility];  Service: General    WOUND DEBRIDEMENT N/A 08/16/2023    Procedure: DEBRIDEMENT WOUND, WASH OUT, PARTIAL CLOSURE;  Surgeon: Ann-Marie Jolly DO;  Location: [facility];  Service: General     Family History   Problem Relation Age of Onset    No Known Problems Mother     Cancer Father     Coronary artery disease Father       reports that he has never smoked. He has never used smokeless tobacco. He reports that he does not drink alcohol and does not use drugs.  Current Outpatient Medications   Medication Instructions    ALPRAZolam  "ER (XANAX XR) 0.5 mg, Every morning    amoxicillin (AMOXIL) 500 mg capsule     aspirin 81 MG tablet 1 tablet, Daily    atorvastatin (LIPITOR) 40 mg, Oral, Daily at bedtime    B-D ULTRAFINE III SHORT PEN 31G X 8 MM MISC No dose, route, or frequency recorded.    BD ULTRA-FINE PEN NEEDLES 29G X 12.7MM MISC No dose, route, or frequency recorded.    BUSPIRONE HCL PO Take 20mg in the AM and 15mg in HS    carvedilol (COREG) 25 mg, Oral, 2 times daily    clopidogrel (PLAVIX) 75 mg, Oral, Daily    Continuous Blood Gluc  (FreeStyle Elizabeth 2 [place]) TRACEY     Continuous Blood Gluc Sensor (FREESTYLE ELIZABETH SENSOR SYSTEM) MISC 1 each, Every 14 days    ezetimibe (ZETIA) 10 mg, Oral, Daily    fenofibrate (TRICOR) 145 mg, Oral, Daily    fluorouracil (EFUDEX) 5 % cream APPLY TO FOREARMS/HANDS TWICE A DAY FOR 3 WEEKS THEN DISCONTINUE    Gvoke HypoPen 2-Pack 1 MG/0.2ML SOAJ INJECT 1 MG UNDER THE SKIN ONE TIME AS NEEDED (HYOPGLYCEMIA).    insulin lispro (HUMALOG/ADMELOG) 8 Units, Subcutaneous, 3 times daily with meals    Insulin Pen Needle (PEN NEEDLES 29GX1/2\") 29G X 12MM MISC     isosorbide mononitrate (IMDUR) 30 mg, Oral, Daily at bedtime    Lancets MISC test once times daily E11.65    LORazepam (ATIVAN) 1 mg tablet TAKE 1 TABLET DAILY AS NEEDED FOR ANXIETY    MAGNESIUM OXIDE 400 PO Take by mouth    metFORMIN (GLUCOPHAGE) 1,000 mg, 2 times daily    metFORMIN (GLUCOPHAGE) 1,000 mg, 2 times daily with meals    Misc Natural Products (BLOOD SUGAR 360 PO) ONETOUCH ULTRA TEST STRIPS Test once daily E11.65    omega-3-acid ethyl esters (LOVAZA) 2 g, 2 times daily    omeprazole (PRILOSEC) 40 mg, Oral, Daily    Ozempic, 1 MG/DOSE, 4 MG/3ML injection pen     sotalol (BETAPACE) 80 mg, Oral, 2 times daily    spironolactone (ALDACTONE) 25 mg, Oral, Daily    Toujeo Max SoloStar 120 Units, Daily at bedtime    Trulicity 4.5 MG/0.5ML injection     Trulicity 4.5 mg, Weekly     Allergies   Allergen Reactions    Ace Inhibitors Cough      Current " "Outpatient Medications on File Prior to Visit   Medication Sig Dispense Refill    amoxicillin (AMOXIL) 500 mg capsule       ALPRAZolam ER (XANAX XR) 0.5 MG 24 hr tablet Take 0.5 mg by mouth every morning      aspirin 81 MG tablet Take 1 tablet by mouth daily      atorvastatin (LIPITOR) 40 mg tablet TAKE 1 TABLET NIGHTLY 90 tablet 3    B-D ULTRAFINE III SHORT PEN 31G X 8 MM MISC  (Patient not taking: Reported on 3/4/2025)      BD ULTRA-FINE PEN NEEDLES 29G X 12.7MM MISC       BUSPIRONE HCL PO Take 20mg in the AM and 15mg in HS      carvedilol (COREG) 25 mg tablet Take 1 tablet (25 mg total) by mouth 2 (two) times a day 180 tablet 3    clopidogrel (PLAVIX) 75 mg tablet Take 1 tablet (75 mg total) by mouth daily 90 tablet 3    Continuous Blood Gluc  (FreeStyle Elizabeth 2 [place]) TRACEY       Continuous Blood Gluc Sensor (FREESTYLE ELIZABETH SENSOR SYSTEM) MISC Use 1 each every 14 (fourteen) days      ezetimibe (ZETIA) 10 mg tablet Take 1 tablet (10 mg total) by mouth daily 90 tablet 3    fenofibrate (TRICOR) 145 mg tablet TAKE 1 TABLET DAILY 90 tablet 3    fluorouracil (EFUDEX) 5 % cream APPLY TO FOREARMS/HANDS TWICE A DAY FOR 3 WEEKS THEN DISCONTINUE      Gvoke HypoPen 2-Pack 1 MG/0.2ML SOAJ INJECT 1 MG UNDER THE SKIN ONE TIME AS NEEDED (HYOPGLYCEMIA).      insulin glargine (Toujeo Max SoloStar) 300 units/mL CONCENTRATED U-300 injection pen (2-unit dial) Inject 120 Units under the skin daily at bedtime      insulin lispro (HumALOG/ADMELOG) 100 units/mL injection Inject 8 Units under the skin 3 (three) times a day with meals      Insulin Pen Needle (PEN NEEDLES 29GX1/2\") 29G X 12MM MISC       isosorbide mononitrate (IMDUR) 30 mg 24 hr tablet Take 1 tablet (30 mg total) by mouth daily at bedtime 90 tablet 3    Lancets MISC test once times daily E11.65 (Patient not taking: Reported on 3/4/2025)      LORazepam (ATIVAN) 1 mg tablet TAKE 1 TABLET DAILY AS NEEDED FOR ANXIETY (Patient not taking: Reported on 3/4/2025) 30 " "tablet 3    MAGNESIUM OXIDE 400 PO Take by mouth      metFORMIN (GLUCOPHAGE) 1000 MG tablet Take 1,000 mg by mouth 2 (two) times a day with meals      metFORMIN (GLUCOPHAGE) 500 mg tablet Take 1,000 mg by mouth 2 (two) times a day (Patient not taking: Reported on 3/4/2025)      Misc Natural Products (BLOOD SUGAR 360 PO) ONETOUCH ULTRA TEST STRIPS Test once daily E11.65 (Patient not taking: Reported on 3/4/2025)      omega-3-acid ethyl esters (LOVAZA) 1 g capsule TAKE 2 CAPSULES TWICE A  capsule 1    omeprazole (PriLOSEC) 40 MG capsule Take 1 capsule (40 mg total) by mouth daily 90 capsule 1    Ozempic, 1 MG/DOSE, 4 MG/3ML injection pen  (Patient not taking: Reported on 3/4/2025)      sotalol (BETAPACE) 80 mg tablet Take 1 tablet (80 mg total) by mouth 2 (two) times a day 180 tablet 1    spironolactone (ALDACTONE) 25 mg tablet TAKE 1 TABLET DAILY 90 tablet 3    Trulicity 4.5 MG/0.5ML injection  (Patient not taking: Reported on 3/4/2025)      Trulicity 4.5 MG/0.5ML SOAJ Inject 4.5 mg under the skin once a week      [DISCONTINUED] spironolactone (ALDACTONE) 25 mg tablet Take 1 tablet by mouth daily       No current facility-administered medications on file prior to visit.      Social History     Tobacco Use    Smoking status: Never    Smokeless tobacco: Never   Vaping Use    Vaping status: Never Used   Substance and Sexual Activity    Alcohol use: No     Comment: being a social drinker noted in \"[employer]\"     Drug use: No    Sexual activity: Not on file        Objective   /60 (BP Location: Left arm, Patient Position: Sitting, Cuff Size: Standard)   Pulse 62   Temp 97.7 °F (36.5 °C) (Temporal)   Ht 5' 7\" (1.702 m)   Wt 93.4 kg (206 lb)   SpO2 96%   BMI 32.26 kg/m²      Physical Exam  Vitals and nursing note reviewed.   Constitutional:       General: He is not in acute distress.     Appearance: He is well-developed.   HENT:      Head: Normocephalic and atraumatic.   Eyes:      Conjunctiva/sclera: " "Conjunctivae normal.   Cardiovascular:      Rate and Rhythm: Normal rate and regular rhythm.      Heart sounds: No murmur heard.  Pulmonary:      Effort: Pulmonary effort is normal. No respiratory distress.      Breath sounds: Normal breath sounds.   Abdominal:      Palpations: Abdomen is soft.      Tenderness: There is no abdominal tenderness.   Musculoskeletal:         General: No swelling.      Cervical back: Neck supple.   Skin:     General: Skin is warm and dry.      Capillary Refill: Capillary refill takes less than 2 seconds.      Comments: Skin exam as described above   Neurological:      Mental Status: He is alert.   Psychiatric:         Mood and Affect: Mood normal.     Skin excision    Date/Time: 3/19/2025 1:00 PM    Performed by: Olivier Doherty MD  Authorized by: Olivier Doherty MD  Universal Protocol:  Consent: Verbal consent obtained.  Risks and benefits: risks, benefits and alternatives were discussed  Consent given by: patient  Time out: Immediately prior to procedure a \"time out\" was called to verify the correct patient, procedure, equipment, support staff and site/side marked as required.  Timeout called at: 3/19/2025 4:06 PM.  Patient understanding: patient states understanding of the procedure being performed  Patient consent: the patient's understanding of the procedure matches consent given  Site marked: the operative site was marked  Patient identity confirmed: verbally with patient    Procedure Details - Skin Excision:     Number of Lesions:  1  Lesion 1:     Body area:  Upper extremity    Upper extremity location:  L upper arm    Malignancy: malignant lesion      Skin cancer type: basal cell carcinoma          Final defect size (mm):  13    Repair type:  Linear closure    Closure complexity: simple       Repair Comments: Procedure note: With informed verbal consent under sterile conditions using aseptic technique using 1% lidocaine local anesthesia with epinephrine and bicarbonate the " "left forearm basal cell carcinoma was sharply excised with a 15 blade and the wound closed primarily with vertical mattress sutures of 3-0 nylon  Lesion Destruction    Date/Time: 3/19/2025 1:00 PM    Performed by: Olivier Doherty MD  Authorized by: Olivier Doherty MD  Universal Protocol:  Consent: Verbal consent obtained.  Risks and benefits: risks, benefits and alternatives were discussed  Consent given by: patient  Time out: Immediately prior to procedure a \"time out\" was called to verify the correct patient, procedure, equipment, support staff and site/side marked as required.  Timeout called at: 3/19/2025 4:08 PM.  Patient understanding: patient states understanding of the procedure being performed  Patient consent: the patient's understanding of the procedure matches consent given  Site marked: the operative site was marked  Patient identity confirmed: verbally with patient    Procedure Details - Lesion Destruction:     Number of Lesions:  1  Lesion 1:     Body area:  Trunk    Trunk location:  Back    Malignancy: malignant lesion      Skin cancer type: basal cell carcinoma    Destruction method: cryotherapy          Administrative Statements   I have spent a total time of 20 minutes in caring for this patient on the day of the visit/encounter including Risks and benefits of tx options.  "

## 2025-03-19 NOTE — ASSESSMENT & PLAN NOTE
Patient is a 69-year-old male presenting with a biopsy-proven basal cell carcinoma of the left arm for which definitive treatment by excisional biopsy is now indicated.    16 mm x 12 mm area of disease was excised from the left arm after obtaining informed verbal consent.

## 2025-03-20 ENCOUNTER — OFFICE VISIT (OUTPATIENT)
Dept: PHYSICAL THERAPY | Facility: CLINIC | Age: 70
End: 2025-03-20
Payer: COMMERCIAL

## 2025-03-20 DIAGNOSIS — R29.898 WEAKNESS OF BOTH LOWER EXTREMITIES: Primary | ICD-10-CM

## 2025-03-20 DIAGNOSIS — M75.01 ADHESIVE CAPSULITIS OF RIGHT SHOULDER: ICD-10-CM

## 2025-03-20 PROCEDURE — 97140 MANUAL THERAPY 1/> REGIONS: CPT

## 2025-03-20 PROCEDURE — 97112 NEUROMUSCULAR REEDUCATION: CPT

## 2025-03-20 PROCEDURE — 97110 THERAPEUTIC EXERCISES: CPT

## 2025-03-20 NOTE — PROGRESS NOTES
"Daily Note     Today's date: 3/20/2025  Patient name: Devin Gonzalez  : 1955  MRN: [id number]  Referring provider: Horacio Garcia, *  Dx:   Encounter Diagnosis     ICD-10-CM    1. Weakness of both lower extremities  R29.898       2. Adhesive capsulitis of right shoulder  M75.01           Start Time: 1334          Subjective: Pt reports he had something removed on arm at dermatologist yesterday,  no restrictions.     Objective: See treatment diary below      Assessment: Tolerated treatment well. Verbal cues for proper exercise performance. Patient demonstrated fatigue post treatment, exhibited good technique with therapeutic exercises, and would benefit from continued PT      Plan: Continue per plan of care.      Precautions: Defibrillator, Hx of ischemic cardiomyopathy       POC Expires Reeval for Medicare to be completed Unit LImit Auth Expiration Date PT/OT/STVisit Limit   25 By visit  na N/a 25 9    Completed on                  VISIT TRACKER  DATE 3/5 3/10 3/13 3/17 3/20        Visit # 1 2 3 4 5        Remaining 8 7 6 5 4            Manuals 3/5 3/10 3/13 3/17 3/20        PROM to right shoulder  RR MJC RR RR MJC                                               Neuro Re-Ed             MTP LTP   RTB  5\"  2x10 Green 5''20x Green 5''20x GTB  \" x20        TB ER IR    Green 2x10 Green 2x10 GTB  5\" x20        Compass   20x each  20x X20         Supine cane    10x each  10x each  X10 ea        UBE for postural correction.  5/ 120 5'/5'  120 rpm 5/5 120 RPM  5/5 120 RPM  5'/5'  rpm                                  Ther Ex             Pulley  5''20x flex scap 5\" x20  Flex/scap 5''20x flex /scap 5''20x flex /scap 5\" x20   Flex/scap        Finger ladder  5''10x flex scap 5\" x10  Flex/scap 5''10x flexion scaption  5''10x flexion scaption  5\" x10 ea  flex/scap                                                                                        Ther Activity                                       Gait " Training                                       Modalities

## 2025-03-24 ENCOUNTER — EVALUATION (OUTPATIENT)
Dept: PHYSICAL THERAPY | Facility: CLINIC | Age: 70
End: 2025-03-24
Payer: COMMERCIAL

## 2025-03-24 DIAGNOSIS — R29.898 WEAKNESS OF BOTH LOWER EXTREMITIES: Primary | ICD-10-CM

## 2025-03-24 DIAGNOSIS — M75.01 ADHESIVE CAPSULITIS OF RIGHT SHOULDER: ICD-10-CM

## 2025-03-24 PROCEDURE — 97140 MANUAL THERAPY 1/> REGIONS: CPT | Performed by: PHYSICAL THERAPIST

## 2025-03-24 PROCEDURE — 97112 NEUROMUSCULAR REEDUCATION: CPT | Performed by: PHYSICAL THERAPIST

## 2025-03-24 PROCEDURE — 97110 THERAPEUTIC EXERCISES: CPT | Performed by: PHYSICAL THERAPIST

## 2025-03-24 NOTE — PROGRESS NOTES
PT Re Evaluation     Today's date: 3/24/2025  Patient name: Devin Gonzalez  : 1955  MRN: [id number]  Referring provider: Horacio Garcia, *  Dx:   Encounter Diagnosis     ICD-10-CM    1. Weakness of both lower extremities  R29.898       2. Adhesive capsulitis of right shoulder  M75.01               Assessment  Impairments: abnormal coordination, abnormal muscle firing, abnormal or restricted ROM, activity intolerance, impaired physical strength, lacks appropriate home exercise program, pain with function and safety issue  Functional limitations: Diffculty with reaching at and above shoulder height.  Symptom irritability: moderate    Assessment details: Devin Gonzalez is a 69 y.o. male who presents to outpatient PT with a  Adhesive capsulitis of right shoulder, and weakness of both lower extremities  (primary encounter diagnosis).  No further referral appears necessary at this time based upon examination results. Pt presents with decreased strength, ROM, balance, functional activity tolerance, and pain with movement in his right shoulder and bilateral LE's  which is  limiting his ability to perform the aforementioned functional activities.  Etiologic factors include repetitive poor body mechanics. Prognosis is good given HEP compliance and PT 2-3/wk.  Please contact me if you have any questions or recommendations.  Thank you for the opportunity to share in  Devin giordano.     Understanding of Dx/Px/POC: good     Prognosis: good    Goals  STG to be achieved in 4 weeks     1. Pt will reduce subjective pain rating by at least 50 percent the help facilitate return to PLOF  2. Pt will improve right shoulder AROM by at least 10 degrees to help promote improved functional activity tolerance   3. Pt will improve right shoulder MMT scores by at least 1/2 grade to promote improved functional activity tolerance        LTG to be achieved in 6-8 weeks   1. Pt will be complaint with HEP   2. Pt will improve right shoulder  AROM to WFL, to help facilitate independence with ADL's, IADL's, and functional activities   3. Pt will improve right shoulder Strength to WFL to help facilitate independence with ADL's, IADL's, and functional activities   4. Pt will have no limitations with ambulation to help facilitate independence at home and in the community.   5. Pt will have no limitations with stair negotiation to help facilitate independence at home and in the community           Plan  Patient would benefit from: skilled physical therapy    Planned therapy interventions: ADL training, balance, balance/weight bearing training, flexibility, functional ROM exercises, gait training, graded activity, graded exercise, graded motor, home exercise program, joint mobilization, manual therapy, neuromuscular re-education, patient education, postural training, strengthening, stretching, therapeutic activities and therapeutic exercise    Frequency: 2x week  Duration in weeks: 12  Plan of Care beginning date: 3/5/2025  Plan of Care expiration date: 6/5/2025  Treatment plan discussed with: patient, PTA and referring physician        Subjective Evaluation    History of Present Illness  Mechanism of injury: The patient is a 69 year old male who presents with CC of right shoulder pain  for the past three month. He reports new onset of decreased ER IR and abduction ROM. Consistent with adhesive capsulitis of right shoulder. He notes difficulty with walking his dog while using his. Notes increasing difficulty with any types of activities that involve the use of his right arm    RA 3/24       Received updated PT script to begin treating for generalized weakness and muscle strengthening. PT POC Updated this session to reflect. The patient reports continued improvement of 40 percent in regards to the right shoulder, He continues to note difficulty with BTB rotation, and Hz adduction. Will send POC to Dr. Espinosa.       Patient Goals  Patient goals for therapy:  "increased strength, decreased pain and increased motion  Patient goal: to increase strength in thr right arm.  Pain     RA 3/24  Current pain ratin   4  At best pain ratin   0  At worst pain rating: 10  4   Quality: sharp and throbbing  Aggravating factors: lifting  Progression: improved          Objective     Active Range of Motion     Right Shoulder   Flexion: 85 degrees   Abduction: 90 degrees   External rotation BTH: Active external rotation behind the head: reaches to occiput.   Internal rotation BTB: Active internal rotation behind the back: reaches to sacrum.     Strength/Myotome Testing     Right Shoulder     Planes of Motion   Flexion: 2   Abduction: 2   External rotation at 0°: 2   Internal rotation at 0°: 2       RA 3/24       TUG   8 seconds No AD, Good safety     5x STS 8 seconds, Good safety     6 MWT           450 feet no AD, Shuffling gait pattern.                  Precautions: Defibrillator, Hx of ischemic cardiomyopathy       POC Expires Reeval for Medicare to be completed Unit LImit Auth Expiration Date PT/OT/STVisit Limit   25 By visit  na N/a 25 9    Completed on                  VISIT TRACKER  DATE 3/5 3/10 3/13 3/17 3/20 3/24       Visit # 1 2 3 4 5 6       Remaining 8 7 6 5 4 3           Manuals 3/5 3/10 3/13 3/17 3/20 3/24       PROM to right shoulder  RR MJC RR RR MJC RR                                              Neuro Re-Ed             MTP LTP   RTB  5\"  2x10 Green 5''20x Green 5''20x GTB  \" x20 Green 5''20x       TB ER IR    Green 2x10 Green 2x10 GTB  5\" x20 Green 5''20x       Compass   20x each  20x X20         Supine cane    10x each  10x each  X10 ea        UBE for postural correction.  5/5 120 5'/5'  120 rpm 5/5 120 RPM  5/5 120 RPM  5'/5'  rpm 5/5                                  Ther Ex             Pulley  5''20x flex scap 5\" x20  Flex/scap 5''20x flex /scap 5''20x flex /scap 5\" x20   Flex/scap 5\" x20   Flex/scap       Finger ladder  5''10x flex scap 5\" " "x10  Flex/scap 5''10x flexion scaption  5''10x flexion scaption  5\" x10 ea  flex/scap   5\" x10 ea  flex/scap       Nu Step       L3 x 10 min        HR TR       NV       Mini Squat       NV       Step up       NV       Three way       NV       Leg press      NV       Leg extension       NV       Leg Curls       NV       Ther Activity                                       Gait Training                                       Modalities                                                    "

## 2025-03-24 NOTE — LETTER
2025    Lorenzo Espinosa MD  [address]  [place] [address]    Patient: Devin Gonzalez   YOB: 1955   Date of Visit: 3/24/2025     Encounter Diagnosis     ICD-10-CM    1. Weakness of both lower extremities  R29.898       2. Adhesive capsulitis of right shoulder  M75.01           Dear Dr. Espinosa:    Thank you for your recent referral of Devin Gonzalez. Please review the attached evaluation summary from Devin's recent visit.     Please verify that you agree with the plan of care by signing the attached order.     If you have any questions or concerns, please do not hesitate to call.     I sincerely appreciate the opportunity to share in the care of one of your patients and hope to have another opportunity to work with you in the near future.       Sincerely,    Matthew Mann, PT      Referring Provider:      I certify that I have read the below Plan of Care and certify the need for these services furnished under this plan of treatment while under my care.                    Lorenzo Espinosa MD  [address]  [place] [address]  Via Fax: [phone]          PT Re Evaluation     Today's date: 3/24/2025  Patient name: Devin Gonzalez  : 1955  MRN: [id number]  Referring provider: Horacio Garcia, *  Dx:   Encounter Diagnosis     ICD-10-CM    1. Weakness of both lower extremities  R29.898       2. Adhesive capsulitis of right shoulder  M75.01               Assessment  Impairments: abnormal coordination, abnormal muscle firing, abnormal or restricted ROM, activity intolerance, impaired physical strength, lacks appropriate home exercise program, pain with function and safety issue  Functional limitations: Diffculty with reaching at and above shoulder height.  Symptom irritability: moderate    Assessment details: Devin Gonzalez is a 69 y.o. male who presents to outpatient PT with a  Adhesive capsulitis of right shoulder, and weakness of both lower extremities  (primary  encounter diagnosis).  No further referral appears necessary at this time based upon examination results. Pt presents with decreased strength, ROM, balance, functional activity tolerance, and pain with movement in his right shoulder and bilateral LE's  which is  limiting his ability to perform the aforementioned functional activities.  Etiologic factors include repetitive poor body mechanics. Prognosis is good given HEP compliance and PT 2-3/wk.  Please contact me if you have any questions or recommendations.  Thank you for the opportunity to share in  Devin care.     Understanding of Dx/Px/POC: good     Prognosis: good    Goals  STG to be achieved in 4 weeks     1. Pt will reduce subjective pain rating by at least 50 percent the help facilitate return to PLOF  2. Pt will improve right shoulder AROM by at least 10 degrees to help promote improved functional activity tolerance   3. Pt will improve right shoulder MMT scores by at least 1/2 grade to promote improved functional activity tolerance        LTG to be achieved in 6-8 weeks   1. Pt will be complaint with HEP   2. Pt will improve right shoulder AROM to WFL, to help facilitate independence with ADL's, IADL's, and functional activities   3. Pt will improve right shoulder Strength to WFL to help facilitate independence with ADL's, IADL's, and functional activities   4. Pt will have no limitations with ambulation to help facilitate independence at home and in the community.   5. Pt will have no limitations with stair negotiation to help facilitate independence at home and in the community           Plan  Patient would benefit from: skilled physical therapy    Planned therapy interventions: ADL training, balance, balance/weight bearing training, flexibility, functional ROM exercises, gait training, graded activity, graded exercise, graded motor, home exercise program, joint mobilization, manual therapy, neuromuscular re-education, patient education, postural  training, strengthening, stretching, therapeutic activities and therapeutic exercise    Frequency: 2x week  Duration in weeks: 12  Plan of Care beginning date: 3/5/2025  Plan of Care expiration date: 2025  Treatment plan discussed with: patient, PTA and referring physician        Subjective Evaluation    History of Present Illness  Mechanism of injury: The patient is a 69 year old male who presents with CC of right shoulder pain  for the past three month. He reports new onset of decreased ER IR and abduction ROM. Consistent with adhesive capsulitis of right shoulder. He notes difficulty with walking his dog while using his. Notes increasing difficulty with any types of activities that involve the use of his right arm    RA 3/24       Received updated PT script to begin treating for generalized weakness and muscle strengthening. PT POC Updated this session to reflect. The patient reports continued improvement of 40 percent in regards to the right shoulder, He continues to note difficulty with BTB rotation, and Hz adduction. Will send POC to Dr. Espinosa.       Patient Goals  Patient goals for therapy: increased strength, decreased pain and increased motion  Patient goal: to increase strength in thr right arm.  Pain     RA 3/24  Current pain ratin   4  At best pain ratin   0  At worst pain rating: 10  4   Quality: sharp and throbbing  Aggravating factors: lifting  Progression: improved          Objective     Active Range of Motion     Right Shoulder   Flexion: 85 degrees   Abduction: 90 degrees   External rotation BTH: Active external rotation behind the head: reaches to occiput.   Internal rotation BTB: Active internal rotation behind the back: reaches to sacrum.     Strength/Myotome Testing     Right Shoulder     Planes of Motion   Flexion: 2   Abduction: 2   External rotation at 0°: 2   Internal rotation at 0°: 2       RA 3/24       TUG   8 seconds No AD, Good safety     5x STS 8 seconds, Good safety  "    6 MWT           450 feet no AD, Shuffling gait pattern.                  Precautions: Defibrillator, Hx of ischemic cardiomyopathy       POC Expires Reeval for Medicare to be completed Unit LImit Auth Expiration Date PT/OT/STVisit Limit   6/5/25 By visit  na N/a 8/31/25 9    Completed on                  VISIT TRACKER  DATE 3/5 3/10 3/13 3/17 3/20 3/24       Visit # 1 2 3 4 5 6       Remaining 8 7 6 5 4 3           Manuals 3/5 3/10 3/13 3/17 3/20 3/24       PROM to right shoulder  RR MJC RR RR MJC RR                                              Neuro Re-Ed             MTP LTP   RTB  5\"  2x10 Green 5''20x Green 5''20x GTB  \" x20 Green 5''20x       TB ER IR    Green 2x10 Green 2x10 GTB  5\" x20 Green 5''20x       Compass   20x each  20x X20         Supine cane    10x each  10x each  X10 ea        UBE for postural correction.  5/5 120 5'/5'  120 rpm 5/5 120 RPM  5/5 120 RPM  5'/5'  rpm 5/5                                  Ther Ex             Pulley  5''20x flex scap 5\" x20  Flex/scap 5''20x flex /scap 5''20x flex /scap 5\" x20   Flex/scap 5\" x20   Flex/scap       Finger ladder  5''10x flex scap 5\" x10  Flex/scap 5''10x flexion scaption  5''10x flexion scaption  5\" x10 ea  flex/scap   5\" x10 ea  flex/scap       Nu Step       L3 x 10 min        HR TR       NV       Mini Squat       NV       Step up       NV       Three way       NV       Leg press      NV       Leg extension       NV       Leg Curls       NV       Ther Activity                                       Gait Training                                       Modalities                                                                    "

## 2025-03-26 ENCOUNTER — TELEPHONE (OUTPATIENT)
Age: 70
End: 2025-03-26

## 2025-03-26 ENCOUNTER — TELEPHONE (OUTPATIENT)
Dept: SURGERY | Facility: CLINIC | Age: 70
End: 2025-03-26

## 2025-03-26 PROCEDURE — 88305 TISSUE EXAM BY PATHOLOGIST: CPT | Performed by: PATHOLOGY

## 2025-03-26 NOTE — TELEPHONE ENCOUNTER
Patients spouse called in because patient was seen with his PCP who removed his stiches and said that everything is healing great. Patient canceled post op appt on 4/4/25 and will call back if they have any concerns.

## 2025-03-27 ENCOUNTER — OFFICE VISIT (OUTPATIENT)
Dept: PHYSICAL THERAPY | Facility: CLINIC | Age: 70
End: 2025-03-27
Payer: COMMERCIAL

## 2025-03-27 DIAGNOSIS — M75.01 ADHESIVE CAPSULITIS OF RIGHT SHOULDER: ICD-10-CM

## 2025-03-27 DIAGNOSIS — R29.898 WEAKNESS OF BOTH LOWER EXTREMITIES: Primary | ICD-10-CM

## 2025-03-27 PROCEDURE — 97110 THERAPEUTIC EXERCISES: CPT

## 2025-03-27 PROCEDURE — 97140 MANUAL THERAPY 1/> REGIONS: CPT

## 2025-03-27 PROCEDURE — 97112 NEUROMUSCULAR REEDUCATION: CPT

## 2025-03-27 NOTE — PROGRESS NOTES
"Daily Note     Today's date: 3/27/2025  Patient name: Devin Gonzalez  : 1955  MRN: [id number]  Referring provider: Horacio Garcia, *  Dx:   Encounter Diagnosis     ICD-10-CM    1. Weakness of both lower extremities  R29.898       2. Adhesive capsulitis of right shoulder  M75.01                      Subjective: Pt denies any adverse effects following last treatment.       Objective: See treatment diary below      Assessment: Tolerated treatment well. Progressed with exercise as outlined in POC. Patient demonstrated fatigue post treatment, exhibited good technique with therapeutic exercises, and would benefit from continued PT      Plan: Continue per plan of care.        Precautions: Defibrillator, Hx of ischemic cardiomyopathy       POC Expires Reeval for Medicare to be completed Unit LImit Auth Expiration Date PT/OT/STVisit Limit   25 By visit  na N/a 25 9    Completed on                  VISIT TRACKER  DATE 3/5 3/10 3/13 3/17 3/20 3/24 3/27      Visit # 1 2 3 4 5 6 7      Remaining 8 7 6 5 4 3 2          Manuals 3/5 3/10 3/13 3/17 3/20 3/24 (RE) 3/27      PROM to right shoulder  RR MJC RR RR MJC RR MJC                                             Neuro Re-Ed             MTP LTP   RTB  5\"  2x10 Green 5''20x Green 5''20x GTB  \" x20 Green 5''20x GTB  5\" x20 ex      TB ER IR    Green 2x10 Green 2x10 GTB  5\" x20 Green 5''20x GTB  X20 ea      Compass   20x each  20x X20         Supine cane    10x each  10x each  X10 ea        UBE for postural correction.  5/5 120 5'/5'  120 rpm 5/5 120 RPM  5/5 120 RPM  5'/5'  rpm 5/5  120 rpm  5'/5'                                Ther Ex             Pulley  5''20x flex scap 5\" x20  Flex/scap 5''20x flex /scap 5''20x flex /scap 5\" x20   Flex/scap 5\" x20   Flex/scap NV      Finger ladder  5''10x flex scap 5\" x10  Flex/scap 5''10x flexion scaption  5''10x flexion scaption  5\" x10 ea  flex/scap   5\" x10 ea  flex/scap NV      Nu Step       L3 x 10 min  L3 x10 min    "   HR TR       NV x20      Mini Squat       NV       Step up       NV 6 x10 ea      Three way       NV X10 ea B/L      Leg press      NV NV      Leg extension       NV NV      Leg Curls       NV NV      Ther Activity                                       Gait Training                                       Modalities

## 2025-03-31 ENCOUNTER — OFFICE VISIT (OUTPATIENT)
Dept: PHYSICAL THERAPY | Facility: CLINIC | Age: 70
End: 2025-03-31
Payer: COMMERCIAL

## 2025-03-31 DIAGNOSIS — M75.01 ADHESIVE CAPSULITIS OF RIGHT SHOULDER: ICD-10-CM

## 2025-03-31 DIAGNOSIS — R29.898 WEAKNESS OF BOTH LOWER EXTREMITIES: Primary | ICD-10-CM

## 2025-03-31 DIAGNOSIS — I25.5 ISCHEMIC CARDIOMYOPATHY: ICD-10-CM

## 2025-03-31 DIAGNOSIS — I47.20 V-TACH (HCC): ICD-10-CM

## 2025-03-31 DIAGNOSIS — Z95.810 ICD (IMPLANTABLE CARDIOVERTER-DEFIBRILLATOR) IN PLACE: ICD-10-CM

## 2025-03-31 PROCEDURE — 97112 NEUROMUSCULAR REEDUCATION: CPT

## 2025-03-31 PROCEDURE — 97110 THERAPEUTIC EXERCISES: CPT

## 2025-03-31 PROCEDURE — 97140 MANUAL THERAPY 1/> REGIONS: CPT

## 2025-03-31 NOTE — PROGRESS NOTES
"]Daily Note     Today's date: 3/31/2025  Patient name: Devin Gonzalez  : 1955  MRN: [id number]  Referring provider: Horacio Garcia, *  Dx:   Encounter Diagnosis     ICD-10-CM    1. Weakness of both lower extremities  R29.898       2. Adhesive capsulitis of right shoulder  M75.01                      Subjective: Pt denies any adverse effects following last PT treatment.       Objective: See treatment diary below      Assessment: Tolerated treatment well. Gradual progression with exercises as outlined in POC. Patient demonstrated fatigue post treatment, exhibited good technique with therapeutic exercises, and would benefit from continued PT      Plan: Continue per plan of care.      Precautions: Defibrillator, Hx of ischemic cardiomyopathy       POC Expires Reeval for Medicare to be completed Unit LImit Auth Expiration Date PT/OT/STVisit Limit   25 By visit  na N/a 25 9    Completed on 3/24                 VISIT TRACKER  DATE 3/5 3/10 3/13 3/17 3/20 3/24 (RE) 3/27 3/31     Visit # 1 2 3 4 5 6 7 8     Remaining 8 7 6 5 4 3 2 1         Manuals 3/5 3/10 3/13 3/17 3/20 3/24 (RE) 3/27 3/31     PROM to right shoulder  RR MJC RR RR MJC RR MJC MJC                                            Neuro Re-Ed             MTP LTP   RTB  5\"  2x10 Green 5''20x Green 5''20x GTB  \" x20 Green 5''20x GTB  5\" x20 ex Blue 5\" x20     TB ER IR    Green 2x10 Green 2x10 GTB  5\" x20 Green 5''20x GTB  X20 ea Blue 5\" x20 ea     Compass   20x each  20x X20         Supine cane    10x each  10x each  X10 ea        UBE for postural correction.  5/5 120 5'/5'  120 rpm 5/5 120 RPM  5/5 120 RPM  5'/5'  rpm 5/5  120 rpm  5'/5' 120 rpm  5'/5'                               Ther Ex             Pulley  5''20x flex scap 5\" x20  Flex/scap 5''20x flex /scap 5''20x flex /scap 5\" x20   Flex/scap 5\" x20   Flex/scap NV 5\" x20 flex/  scap     Finger ladder  5''10x flex scap 5\" x10  Flex/scap 5''10x flexion scaption  5''10x flexion scaption  5\" " "x10 ea  flex/scap   5\" x10 ea  flex/scap NV      Nu Step       L3 x 10 min  L3 x10 min L4 x10 min     HR TR       NV x20 x20     Mini Squat       NV       Step up       NV 6 x10 ea 6\" 2x10 ea     Three way       NV X10 ea B/L X10 ea B/L     Leg press      NV NV 90# 2x10     Leg extension       NV NV 20# 2x10     Leg Curls       NV NV 30# 2x10     Ther Activity                                       Gait Training                                       Modalities                                                      "

## 2025-04-01 ENCOUNTER — REMOTE DEVICE CLINIC VISIT (OUTPATIENT)
Dept: CARDIOLOGY CLINIC | Facility: CLINIC | Age: 70
End: 2025-04-01
Payer: COMMERCIAL

## 2025-04-01 ENCOUNTER — RESULTS FOLLOW-UP (OUTPATIENT)
Dept: CARDIOLOGY CLINIC | Facility: CLINIC | Age: 70
End: 2025-04-01

## 2025-04-01 DIAGNOSIS — Z95.810 PRESENCE OF AUTOMATIC CARDIOVERTER/DEFIBRILLATOR (AICD): Primary | ICD-10-CM

## 2025-04-01 PROCEDURE — 93295 DEV INTERROG REMOTE 1/2/MLT: CPT | Performed by: INTERNAL MEDICINE

## 2025-04-01 PROCEDURE — 93296 REM INTERROG EVL PM/IDS: CPT | Performed by: INTERNAL MEDICINE

## 2025-04-01 RX ORDER — SOTALOL HYDROCHLORIDE 80 MG/1
80 TABLET ORAL 2 TIMES DAILY
Qty: 180 TABLET | Refills: 3 | Status: SHIPPED | OUTPATIENT
Start: 2025-04-01

## 2025-04-01 NOTE — PROGRESS NOTES
Results for orders placed or performed in visit on 04/01/25   Cardiac EP device report    Narrative    MDT DC ICD/ACTIVE SYSTEM IS MRI CONDITIONAL  CARELINK TRANSMISSION: BATTERY VOLTAGE ADEQUATE. (10.3 YRS) AP 90%  1%. ALL AVAILABLE LEAD PARAMETERS WITHIN NORMAL LIMITS. NO SIGNIFICANT HIGH RATE EPISODES. SINGLE .9/HR. OPTI-VOL WITHIN NORMAL LIMITS. NORMAL DEVICE FUNCTION.---CAMPOS

## 2025-04-02 ENCOUNTER — TELEPHONE (OUTPATIENT)
Age: 70
End: 2025-04-02

## 2025-04-02 NOTE — TELEPHONE ENCOUNTER
Patient's wife called to confirm his appointment on Friday 4/4/25 with Mikhail was canceled. His PCP removed the stitches and everything looks good. No need to reschedule.

## 2025-04-03 ENCOUNTER — OFFICE VISIT (OUTPATIENT)
Dept: PHYSICAL THERAPY | Facility: CLINIC | Age: 70
End: 2025-04-03
Payer: COMMERCIAL

## 2025-04-03 DIAGNOSIS — M75.01 ADHESIVE CAPSULITIS OF RIGHT SHOULDER: ICD-10-CM

## 2025-04-03 DIAGNOSIS — R29.898 WEAKNESS OF BOTH LOWER EXTREMITIES: Primary | ICD-10-CM

## 2025-04-03 PROCEDURE — 97112 NEUROMUSCULAR REEDUCATION: CPT

## 2025-04-03 PROCEDURE — 97140 MANUAL THERAPY 1/> REGIONS: CPT

## 2025-04-03 PROCEDURE — 97110 THERAPEUTIC EXERCISES: CPT

## 2025-04-03 NOTE — PROGRESS NOTES
"Daily Note     Today's date: 4/3/2025  Patient name: Devin Gonzalez  : 1955  MRN: [id number]  Referring provider: Horacio Garcia, *  Dx:   Encounter Diagnosis     ICD-10-CM    1. Weakness of both lower extremities  R29.898       2. Adhesive capsulitis of right shoulder  M75.01                      Subjective: Pt reports R shoulder is feeling better. Denies any adverse effects following last treatment.        Objective: See treatment diary below      Assessment: Tolerated treatment well. Patient demonstrated fatigue post treatment, exhibited good technique with therapeutic exercises, and would benefit from continued PT, progressing as tolerated.       Plan: Continue per plan of care.      Precautions: Defibrillator, Hx of ischemic cardiomyopathy       POC Expires Reeval for Medicare to be completed Unit LImit Auth Expiration Date PT/OT/STVisit Limit   25 By visit  16 N/a 25 9    Completed on 3/24                 VISIT TRACKER  DATE 3/5 3/10 3/13 3/17 3/20 3/24 (RE) 3/27 3/31 4/3    Visit # 1 2 3 4 5 6 7 8 8    Remaining 8 7 6 5 4 3 9 8 7        Manuals 3/5 3/10 3/13 3/17 3/20 3/24 (RE) 3/27 3/31 4/3    PROM to right shoulder  RR MJC RR RR MJC RR MJC MJC MJC                                           Neuro Re-Ed             MTP LTP   RTB  5\"  2x10 Green 5''20x Green 5''20x GTB  \" x20 Green 5''20x GTB  5\" x20 ex Blue 5\" x20 Blue   5\" x20    TB ER IR    Green 2x10 Green 2x10 GTB  5\" x20 Green 5''20x GTB  X20 ea Blue 5\" x20 ea Blue   5\" x20    Compass   20x each  20x X20         Supine cane    10x each  10x each  X10 ea        UBE for postural correction.  5/5 120 5'/5'  120 rpm 5/5 120 RPM  5/5 120 RPM  5'/5'  rpm 5/5  120 rpm  5'/5' 120 rpm  5'/5' 120 rpm  5/5'                              Ther Ex             Pulley  5''20x flex scap 5\" x20  Flex/scap 5''20x flex /scap 5''20x flex /scap 5\" x20   Flex/scap 5\" x20   Flex/scap NV 5\" x20 flex/  scap 5\" x20 flex/scap    Finger ladder  5''10x flex scap " "5\" x10  Flex/scap 5''10x flexion scaption  5''10x flexion scaption  5\" x10 ea  flex/scap   5\" x10 ea  flex/scap NV      Nu Step       L3 x 10 min  L3 x10 min L4 x10 min L4 x10 min    HR TR       NV x20 x20 x20    Mini Squat       NV       Step up       NV 6 x10 ea 6\" 2x10 ea 6\" x20    Three way       NV X10 ea B/L X10 ea B/L 2x10 ea B/L    Leg press      NV NV 90# 2x10 90#   2x10  100# NV?    Leg extension       NV NV 20# 2x10 20#   2x10    Leg Curls       NV NV 30# 2x10 30#   2x10    Ther Activity                                       Gait Training                                       Modalities                                                        "

## 2025-04-07 ENCOUNTER — VBI (OUTPATIENT)
Dept: ADMINISTRATIVE | Facility: OTHER | Age: 70
End: 2025-04-07

## 2025-04-07 ENCOUNTER — OFFICE VISIT (OUTPATIENT)
Dept: PHYSICAL THERAPY | Facility: CLINIC | Age: 70
End: 2025-04-07
Payer: COMMERCIAL

## 2025-04-07 DIAGNOSIS — M75.01 ADHESIVE CAPSULITIS OF RIGHT SHOULDER: ICD-10-CM

## 2025-04-07 DIAGNOSIS — R29.898 WEAKNESS OF BOTH LOWER EXTREMITIES: Primary | ICD-10-CM

## 2025-04-07 PROCEDURE — 97112 NEUROMUSCULAR REEDUCATION: CPT

## 2025-04-07 PROCEDURE — 97110 THERAPEUTIC EXERCISES: CPT

## 2025-04-07 PROCEDURE — 97140 MANUAL THERAPY 1/> REGIONS: CPT

## 2025-04-07 NOTE — PROGRESS NOTES
"Daily Note     Today's date: 2025  Patient name: Devin Gonzalez  : 1955  MRN: [id number]  Referring provider: Horacio Garcia, *  Dx:   Encounter Diagnosis     ICD-10-CM    1. Weakness of both lower extremities  R29.898       2. Adhesive capsulitis of right shoulder  M75.01           Start Time: 1259  Stop Time: 1352  Total time in clinic (min): 53 minutes    Subjective: Patient reports he's doing well but complains of general Rt shoulder tightness.       Objective: See treatment diary below      Assessment: Continued focusing on general strength and conditioning therex as noted below. Patient demonstrates good technique and recall of exercises; occasional verbal cues needed for form maintenance. Good tolerance to manual therapy but exhibits capsular tightness. Patient would benefit from continued PT to improve level of function.       Plan: Continue per POC. Increase reps/resistance as tolerated.      Precautions: Defibrillator, Hx of ischemic cardiomyopathy       POC Expires Reeval for Medicare to be completed Unit LImit Auth Expiration Date PT/OT/STVisit Limit   25 By visit  16 N/a 25 9    Completed on 3/24                 VISIT TRACKER  DATE 3/5 3/10 3/13 3/17 3/20 3/24 (RE) 3/27 3/31 4/3 4/7   Visit # 1 2 3 4 5 6 7 8 8 9   Remaining 8 7 6 5 4 3 9 8 7 6       Manuals 3/5 3/10 3/13 3/17 3/20 3/24 (RE) 3/27 3/31 4/3 4/7   PROM to right shoulder  RR MJC RR RR MJC RR MJC MJC MJC                                           Neuro Re-Ed             MTP LTP   RTB  5\"  2x10 Green 5''20x Green 5''20x GTB  \" x20 Green 5''20x GTB  5\" x20 ex Blue 5\" x20 Blue   5\" x20 Blue   5\" x20   TB ER IR    Green 2x10 Green 2x10 GTB  5\" x20 Green 5''20x GTB  X20 ea Blue 5\" x20 ea Blue   5\" x20 Blue   5\" x20   Compass   20x each  20x X20         Supine cane    10x each  10x each  X10 ea        UBE for postural correction.   120 5'/5'  120 rpm 55 120 RPM  55 120 RPM  5'/5'  rpm 5/5  120 rpm  5'/5' 120 " "rpm  5'/5' 120 rpm  5/5' 120 rpm  5/5'                             Ther Ex             Pulley  5''20x flex scap 5\" x20  Flex/scap 5''20x flex /scap 5''20x flex /scap 5\" x20   Flex/scap 5\" x20   Flex/scap NV 5\" x20 flex/  scap 5\" x20 flex/scap 5\" x20 flex/scap   Finger ladder  5''10x flex scap 5\" x10  Flex/scap 5''10x flexion scaption  5''10x flexion scaption  5\" x10 ea  flex/scap   5\" x10 ea  flex/scap NV      Nu Step       L3 x 10 min  L3 x10 min L4 x10 min L4 x10 min L4 x10 min   HR TR       NV x20 x20 x20 x20   Mini Squat       NV       Step up       NV 6 x10 ea 6\" 2x10 ea 6\" x20 6\" x20   Three way       NV X10 ea B/L X10 ea B/L 2x10 ea B/L 2x10 ea B/L   Leg press      NV NV 90# 2x10 90#   2x10  100# NV? 100# 2x10   Leg extension       NV NV 20# 2x10 20#   2x10 20#   2x10   Leg Curls       NV NV 30# 2x10 30#   2x10 30#   2x10   Ther Activity                                       Gait Training                                       Modalities                                                          "

## 2025-04-07 NOTE — TELEPHONE ENCOUNTER
04/07/25 3:50 PM     Chart reviewed for Hemoglobin A1c was/were not submitted to the patient's insurance.     Adelia Malone MA   [facility]

## 2025-04-10 ENCOUNTER — APPOINTMENT (OUTPATIENT)
Dept: PHYSICAL THERAPY | Facility: CLINIC | Age: 70
End: 2025-04-10
Payer: COMMERCIAL

## 2025-04-12 DIAGNOSIS — Z79.2 NEED FOR ANTIBIOTIC PROPHYLAXIS FOR DENTAL PROCEDURE: Primary | ICD-10-CM

## 2025-04-14 ENCOUNTER — OFFICE VISIT (OUTPATIENT)
Dept: PHYSICAL THERAPY | Facility: CLINIC | Age: 70
End: 2025-04-14
Payer: COMMERCIAL

## 2025-04-14 DIAGNOSIS — M75.01 ADHESIVE CAPSULITIS OF RIGHT SHOULDER: ICD-10-CM

## 2025-04-14 DIAGNOSIS — R29.898 WEAKNESS OF BOTH LOWER EXTREMITIES: Primary | ICD-10-CM

## 2025-04-14 PROCEDURE — 97110 THERAPEUTIC EXERCISES: CPT

## 2025-04-14 PROCEDURE — 97140 MANUAL THERAPY 1/> REGIONS: CPT

## 2025-04-14 PROCEDURE — 97112 NEUROMUSCULAR REEDUCATION: CPT

## 2025-04-14 RX ORDER — AMOXICILLIN 500 MG/1
CAPSULE ORAL
Qty: 4 CAPSULE | Refills: 0 | OUTPATIENT
Start: 2025-04-14

## 2025-04-14 NOTE — TELEPHONE ENCOUNTER
Please deny.  Patient wants to go through local pharmacy.  Corrected pharmacy with script to follow.

## 2025-04-14 NOTE — PROGRESS NOTES
"Daily Note     Today's date: 2025  Patient name: Devin Gonzalez  : 1955  MRN: [id number]  Referring provider: Horacio Garcia, *  Dx:   Encounter Diagnosis     ICD-10-CM    1. Weakness of both lower extremities  R29.898       2. Adhesive capsulitis of right shoulder  M75.01                      Subjective: Pt denied R shoulder pain prior to PT.      Objective: See treatment diary below      Assessment: Tolerated treatment well. Patient demonstrated fatigue post treatment, exhibited good technique with therapeutic exercises, and would benefit from continued PT      Plan: Continue per plan of care.  Progress treatment as tolerated.       Precautions: Defibrillator, Hx of ischemic cardiomyopathy       POC Expires Reeval for Medicare to be completed Unit LImit Auth Expiration Date PT/OT/STVisit Limit   25 By visit  16 N/a 25 9    Completed on 3/24                 VISIT TRACKER  DATE 4/14 3/10 3/13 3/17 3/20 3/24 (RE) 3/27 3/31 4/3 4/7   Visit # 10 2 3 4 5 6 7 8 8 9   Remaining 5 7 6 5 4 3 9 8 7 6       Manuals 4/14  3/13 3/17 3/20 3/24 (RE) 3/27 3/31 4/3 47   PROM to right shoulder  TM  RR RR MJC RR MJC MJC MJC                                           Neuro Re-Ed             MTP LTP  Blue   5\" x20  Green 5''20x Green 5''20x GTB  \" x20 Green 5''20x GTB  5\" x20 ex Blue 5\" x20 Blue   5\" x20 Blue   5\" x20   TB ER IR  Blue   5\" x20  Green 2x10 Green 2x10 GTB  5\" x20 Green 5''20x GTB  X20 ea Blue 5\" x20 ea Blue   5\" x20 Blue   5\" x20   Compass   20x each  20x X20         Supine cane    10x each  10x each  X10 ea        UBE for postural correction.  5/5 120 rpm  5/5 120 RPM  5/5 120 RPM  5'/5'  rpm 5/5  120 rpm  5'/5' 120 rpm  5'/5' 120 rpm  5/5' 120 rpm  5/5'                             Ther Ex             Pulley  5''20x flex/ scap  5''20x flex /scap 5''20x flex /scap 5\" x20   Flex/scap 5\" x20   Flex/scap NV 5\" x20 flex/  scap 5\" x20 flex/scap 5\" x20 flex/scap   Finger ladder    5''10x flexion " "scaption  5''10x flexion scaption  5\" x10 ea  flex/scap   5\" x10 ea  flex/scap NV      Nu Step  L5 x10 min     L3 x 10 min  L3 x10 min L4 x10 min L4 x10 min L4 x10 min   HR TR  x20     NV x20 x20 x20 x20   Mini Squat       NV       Step up  6\" x20     NV 6 x10 ea 6\" 2x10 ea 6\" x20 6\" x20   Three way  2x10 ea B/L     NV X10 ea B/L X10 ea B/L 2x10 ea B/L 2x10 ea B/L   Leg press 100# 3x10     NV NV 90# 2x10 90#   2x10  100# NV? 100# 2x10   Leg extension 20#   3x10      NV NV 20# 2x10 20#   2x10 20#   2x10   Leg Curls 30#   3x10      NV NV 30# 2x10 30#   2x10 30#   2x10   Ther Activity                                       Gait Training                                       Modalities                                                            "

## 2025-04-17 ENCOUNTER — OFFICE VISIT (OUTPATIENT)
Dept: PHYSICAL THERAPY | Facility: CLINIC | Age: 70
End: 2025-04-17
Payer: COMMERCIAL

## 2025-04-17 DIAGNOSIS — M75.01 ADHESIVE CAPSULITIS OF RIGHT SHOULDER: ICD-10-CM

## 2025-04-17 DIAGNOSIS — R29.898 WEAKNESS OF BOTH LOWER EXTREMITIES: Primary | ICD-10-CM

## 2025-04-17 PROCEDURE — 97110 THERAPEUTIC EXERCISES: CPT | Performed by: PHYSICAL THERAPIST

## 2025-04-17 PROCEDURE — 97140 MANUAL THERAPY 1/> REGIONS: CPT | Performed by: PHYSICAL THERAPIST

## 2025-04-17 PROCEDURE — 97112 NEUROMUSCULAR REEDUCATION: CPT | Performed by: PHYSICAL THERAPIST

## 2025-04-17 RX ORDER — AMOXICILLIN 500 MG/1
CAPSULE ORAL
Qty: 4 CAPSULE | Refills: 2 | OUTPATIENT
Start: 2025-04-17 | End: 2025-04-17

## 2025-04-17 NOTE — PROGRESS NOTES
"Daily Note     Today's date: 2025  Patient name: Devin Gonzalez  : 1955  MRN: [id number]  Referring provider: Horacio Garcia, *  Dx:   Encounter Diagnosis     ICD-10-CM    1. Weakness of both lower extremities  R29.898       2. Adhesive capsulitis of right shoulder  M75.01                      Subjective: Pt reports he is ok today.       Objective: See treatment diary below      Assessment: Tolerated treatment well. Patient exhibited good technique with therapeutic exercises and would benefit from continued PT      Plan: Continue per plan of care.      Precautions: Defibrillator, Hx of ischemic cardiomyopathy       POC Expires Reeval for Medicare to be completed Unit LImit Auth Expiration Date PT/OT/STVisit Limit   25 By visit  16 N/a 25 9    Completed on 3/24                 VISIT TRACKER  DATE 4/14 4/17 3/13 3/17 3/20 3/24 (RE) 3/27 3/31 4/3 4   Visit # 10 11 3 4 5 6 7 8 8 9   Remaining 5 6 6 5 4 3 9 8 7 6       Manuals 4/14 4/17 3/13 3/17 3/20 3/24 (RE) 3/27 3/31 4/3 4/7   PROM to right shoulder  TM RR RR RR MJC RR MJC MJC MJC                                           Neuro Re-Ed             MTP LTP  Blue   5\" x20 Blue   5\" x20 Green 5''20x Green 5''20x GTB  \" x20 Green 5''20x GTB  5\" x20 ex Blue 5\" x20 Blue   5\" x20 Blue   5\" x20   TB ER IR  Blue   5\" x20 Blue   5\" x20 Green 2x10 Green 2x10 GTB  5\" x20 Green 5''20x GTB  X20 ea Blue 5\" x20 ea Blue   5\" x20 Blue   5\" x20   Compass   20x each  20x X20         Supine cane    10x each  10x each  X10 ea        UBE for postural correction.  5/5 120 rpm 5/5 120 rpm 5/5 120 RPM  5/5 120 RPM  5'/5'  rpm 5/5  120 rpm  5'/5' 120 rpm  5'/5' 120 rpm  5/5' 120 rpm  5/5'                             Ther Ex             Pulley  5''20x flex/ scap 5''20x flex/ scap 5''20x flex /scap 5''20x flex /scap 5\" x20   Flex/scap 5\" x20   Flex/scap NV 5\" x20 flex/  scap 5\" x20 flex/scap 5\" x20 flex/scap   Finger ladder   5''10x flexion scaption  5''10x flexion " "scaption  5''10x flexion scaption  5\" x10 ea  flex/scap   5\" x10 ea  flex/scap NV      Nu Step  L5 x10 min L5 x 10 min     L3 x 10 min  L3 x10 min L4 x10 min L4 x10 min L4 x10 min   HR TR  x20 x20    NV x20 x20 x20 x20   Mini Squat       NV       Step up  6\" x20 6''20x    NV 6 x10 ea 6\" 2x10 ea 6\" x20 6\" x20   Three way  2x10 ea B/L 2x10 B/L     NV X10 ea B/L X10 ea B/L 2x10 ea B/L 2x10 ea B/L   Leg press 100# 3x10 100# 3x10    NV NV 90# 2x10 90#   2x10  100# NV? 100# 2x10   Leg extension 20#   3x10 20#   3x10     NV NV 20# 2x10 20#   2x10 20#   2x10   Leg Curls 30#   3x10 30#   3x10     NV NV 30# 2x10 30#   2x10 30#   2x10   Ther Activity                                       Gait Training                                       Modalities                                                              "

## 2025-04-22 ENCOUNTER — OFFICE VISIT (OUTPATIENT)
Dept: PHYSICAL THERAPY | Facility: CLINIC | Age: 70
End: 2025-04-22
Payer: COMMERCIAL

## 2025-04-22 DIAGNOSIS — M75.01 ADHESIVE CAPSULITIS OF RIGHT SHOULDER: ICD-10-CM

## 2025-04-22 DIAGNOSIS — R29.898 WEAKNESS OF BOTH LOWER EXTREMITIES: Primary | ICD-10-CM

## 2025-04-22 PROCEDURE — 97110 THERAPEUTIC EXERCISES: CPT

## 2025-04-22 PROCEDURE — 97140 MANUAL THERAPY 1/> REGIONS: CPT

## 2025-04-22 NOTE — PROGRESS NOTES
"Daily Note     Today's date: 2025  Patient name: Devin Gonzalez  : 1955  MRN: [id number]  Referring provider: Horacio Garcia, *  Dx:   Encounter Diagnosis     ICD-10-CM    1. Weakness of both lower extremities  R29.898       2. Adhesive capsulitis of right shoulder  M75.01                      Subjective: Pt reports he notices improvement with PT, less pain R shoulder. Pt would like to get back to throwing a baseball to his grandchildren.       Objective: See treatment diary below      Assessment: Tolerated treatment well. Gradual progression with exercises for UE and LE strengthening. Patient demonstrated fatigue post treatment, exhibited good technique with therapeutic exercises, and would benefit from continued PT      Plan: Continue per plan of care.      Precautions: Defibrillator, Hx of ischemic cardiomyopathy       POC Expires Reeval for Medicare to be completed Unit LImit Auth Expiration Date PT/OT/STVisit Limit   25 By visit  16 N/a 25 9    Completed on 3/24                 VISIT TRACKER  DATE 4/14 4/17 4/22 3/17 3/20 3/24 (RE) 3/27 3/31 4/3 4/   Visit # 11 12 13 4 5 6 7 8 9 10   Remaining 5 4 3 5 4 3 9 8 7 6       Manuals 4/14 4/17 4/22 3/17 3/20 3/24 (RE) 3/27 3/31 4/3 4/7   PROM to right shoulder  TM RR MJC RR MJC RR MJC MJC MJC                                           Neuro Re-Ed             MTP LTP  Blue   5\" x20 Blue   5\" x20 Blue 5''20x Green 5''20x GTB  \" x20 Green 5''20x GTB  5\" x20 ex Blue 5\" x20 Blue   5\" x20 Blue   5\" x20   TB ER IR  Blue   5\" x20 Blue   5\" x20 Blue 2x10 Green 2x10 GTB  5\" x20 Green 5''20x GTB  X20 ea Blue 5\" x20 ea Blue   5\" x20 Blue   5\" x20   Compass    20x X20         Supine cane     10x each  X10 ea        UBE for postural correction.  5/5 120 rpm 5/5 120 rpm 5/5 100 RPM  5/5 120 RPM  5'/5'  rpm 5/5  120 rpm  5'/5' 120 rpm  5'/5' 120 rpm  5/5' 120 rpm  5/5'                             Ther Ex             Pulley  5''20x flex/ scap 5''20x flex/ " "scap 5''20x flex /scap 5''20x flex /scap 5\" x20   Flex/scap 5\" x20   Flex/scap NV 5\" x20 flex/  scap 5\" x20 flex/scap 5\" x20 flex/scap   Finger ladder   5''10x flexion scaption  5''10x flexion scaption  5''10x flexion scaption  5\" x10 ea  flex/scap   5\" x10 ea  flex/scap NV      Nu Step  L5 x10 min L5 x 10 min  L5 x10 min   L3 x 10 min  L3 x10 min L4 x10 min L4 x10 min L4 x10 min   HR TR  x20 x20    NV x20 x20 x20 x20   Mini Squat       NV       Step up  6\" x20 6''20x    NV 6 x10 ea 6\" 2x10 ea 6\" x20 6\" x20   Three way  2x10 ea B/L 2x10 B/L     NV X10 ea B/L X10 ea B/L 2x10 ea B/L 2x10 ea B/L   Leg press 100# 3x10 100# 3x10 110# 3x10   NV NV 90# 2x10 90#   2x10  100# NV? 100# 2x10   Leg extension 20#   3x10 20#   3x10 20# 3x10    NV NV 20# 2x10 20#   2x10 20#   2x10   Leg Curls 30#   3x10 30#   3x10 40# 3x10    NV NV 30# 2x10 30#   2x10 30#   2x10   Ther Activity                                       Gait Training                                       Modalities                                                                "

## 2025-04-24 ENCOUNTER — OFFICE VISIT (OUTPATIENT)
Dept: PHYSICAL THERAPY | Facility: CLINIC | Age: 70
End: 2025-04-24
Payer: COMMERCIAL

## 2025-04-24 DIAGNOSIS — R29.898 WEAKNESS OF BOTH LOWER EXTREMITIES: Primary | ICD-10-CM

## 2025-04-24 DIAGNOSIS — M75.01 ADHESIVE CAPSULITIS OF RIGHT SHOULDER: ICD-10-CM

## 2025-04-24 PROCEDURE — 97140 MANUAL THERAPY 1/> REGIONS: CPT

## 2025-04-24 PROCEDURE — 97112 NEUROMUSCULAR REEDUCATION: CPT

## 2025-04-24 PROCEDURE — 97110 THERAPEUTIC EXERCISES: CPT

## 2025-04-24 NOTE — PROGRESS NOTES
"Daily Note     Today's date: 2025  Patient name: Devin Gonzalez  : 1955  MRN: [id number]  Referring provider: Horacio Garcia, *  Dx:   Encounter Diagnosis     ICD-10-CM    1. Weakness of both lower extremities  R29.898       2. Adhesive capsulitis of right shoulder  M75.01                      Subjective: Pt reports noticed improvement shoulder since starting therapy, feeling stronger. States he was able to do a lot of yard work this morning.       Objective: See treatment diary below      Assessment: Tolerated treatment well. Gradual progression with exercise for UE/LE strengthening.  Patient demonstrated fatigue post treatment, exhibited good technique with therapeutic exercises, and would benefit from continued PT      Plan: Continue per plan of care.      Precautions: Defibrillator, Hx of ischemic cardiomyopathy       POC Expires Reeval for Medicare to be completed Unit LImit Auth Expiration Date PT/OT/STVisit Limit   25 By visit  16 N/a 25 9    Completed on 3/24                 VISIT TRACKER  DATE 4/14 4/17 4/22 4/24 3/20 3/24 (RE) 3/27 3/31 4/3 4   Visit # 11 12 13 14 5 6 7 8 9 10   Remaining 5 4 3 2 4 3 9 8 7 6       Manuals 4/14 4/17 4/22 4/24 3/20 3/24 (RE) 3/27 3/31 4/3 4/7   PROM to right shoulder  TM RR [facility] RR [facility]                                           Neuro Re-Ed             MTP LTP  Blue   5\" x20 Blue   5\" x20 Blue 5''20x Blue  5''30x GTB  \" x20 Green 5''20x GTB  5\" x20 ex Blue 5\" x20 Blue   5\" x20 Blue   5\" x20   TB ER IR  Blue   5\" x20 Blue   5\" x20 Blue 2x10 Blue  3x10 GTB  5\" x20 Green 5''20x GTB  X20 ea Blue 5\" x20 ea Blue   5\" x20 Blue   5\" x20   Compass     X20         Supine cane      X10 ea        UBE for postural correction.  5/5 120 rpm 5/5 120 rpm 5/5 100 RPM  5/5 100 RPM  5'/5'  rpm 5/5 100 rpm 120 rpm  5'/5' 120 rpm  5'/5' 120 rpm  5/5' 120 rpm  5/5'                             Ther Ex             Pulley  5''20x flex/ scap 5''20x flex/ scap " "5''20x flex /scap 5''20x flex /scap 5\" x20   Flex/scap 5\" x20   Flex/scap NV 5\" x20 flex/  scap 5\" x20 flex/scap 5\" x20 flex/scap   Finger ladder   5''10x flexion scaption  5''10x flexion scaption  5''10x flexion scaption  5\" x10 ea  flex/scap   5\" x10 ea  flex/scap NV      Nu Step  L5 x10 min L5 x 10 min  L5 x10 min L5 x10 min  L3 x 10 min  L3 x10 min L4 x10 min L4 x10 min L4 x10 min   HR TR  x20 x20    NV x20 x20 x20 x20   Mini Squat       NV       Step up  6\" x20 6''20x    NV 6 x10 ea 6\" 2x10 ea 6\" x20 6\" x20   Three way  2x10 ea B/L 2x10 B/L     NV X10 ea B/L X10 ea B/L 2x10 ea B/L 2x10 ea B/L   Leg press 100# 3x10 100# 3x10 110# 3x10 110#   3x10  NV NV 90# 2x10 90#   2x10  100# NV? 100# 2x10   Leg extension 20#   3x10 20#   3x10 20# 3x10 30#  2x10   NV NV 20# 2x10 20#   2x10 20#   2x10   Leg Curls 30#   3x10 30#   3x10 40# 3x10 40#   3x10   NV NV 30# 2x10 30#   2x10 30#   2x10   Ther Activity                                       Gait Training                                       Modalities                                                                  "

## 2025-04-25 ENCOUNTER — VBI (OUTPATIENT)
Dept: ADMINISTRATIVE | Facility: OTHER | Age: 70
End: 2025-04-25

## 2025-04-25 NOTE — TELEPHONE ENCOUNTER
04/25/25 12:09 PM    The patient was called and has declined completing a Cologuard test, please discuss with the patient at next visit.    Thank you.  Alie Martinez  [facility]

## 2025-04-28 ENCOUNTER — OFFICE VISIT (OUTPATIENT)
Dept: PHYSICAL THERAPY | Facility: CLINIC | Age: 70
End: 2025-04-28
Payer: COMMERCIAL

## 2025-04-28 DIAGNOSIS — M75.01 ADHESIVE CAPSULITIS OF RIGHT SHOULDER: ICD-10-CM

## 2025-04-28 DIAGNOSIS — R29.898 WEAKNESS OF BOTH LOWER EXTREMITIES: Primary | ICD-10-CM

## 2025-04-28 PROCEDURE — 97112 NEUROMUSCULAR REEDUCATION: CPT

## 2025-04-28 PROCEDURE — 97110 THERAPEUTIC EXERCISES: CPT

## 2025-04-28 PROCEDURE — 97140 MANUAL THERAPY 1/> REGIONS: CPT

## 2025-04-28 NOTE — PROGRESS NOTES
"Daily Note     Today's date: 2025  Patient name: Devin Gonzalez  : 1955  MRN: [id number]  Referring provider: Horacio Garcia, *  Dx:   Encounter Diagnosis     ICD-10-CM    1. Weakness of both lower extremities  R29.898       2. Adhesive capsulitis of right shoulder  M75.01                      Subjective: Pt denied pain at rest.       Objective: See treatment diary below      Assessment: Tolerated treatment well. Patient demonstrated fatigue post treatment, exhibited good technique with therapeutic exercises, and would benefit from continued PT      Plan: Continue per plan of care.  Progress note during next visit.      Precautions: Defibrillator, Hx of ischemic cardiomyopathy       POC Expires Reeval for Medicare to be completed Unit LImit Auth Expiration Date PT/OT/STVisit Limit   25 By visit  16 N/a 25 9    Completed on 3/24                 VISIT TRACKER  DATE 4/14 4/17 4/22 4/24 4/28  3/27 3/31 4/3 4/7   Visit # 11 12 13 14 15  7 8 9 10   Remaining 5 4 3 2 1  9 8 7 6       Manuals 4/14 4/17 4/22 4/24 4/28  3/27 3/31 4/3 4/7   PROM to right shoulder  TM RR MJC MJC TM  MJC MJC MJC                                           Neuro Re-Ed             MTP LTP  Blue   5\" x20 Blue   5\" x20 Blue 5''20x Blue  5''30x Blue  5''30x  GTB  5\" x20 ex Blue 5\" x20 Blue   5\" x20 Blue   5\" x20   TB ER IR  Blue   5\" x20 Blue   5\" x20 Blue 2x10 Blue  3x10 Blue  3x10  GTB  X20 ea Blue 5\" x20 ea Blue   5\" x20 Blue   5\" x20   Compass             Supine cane              UBE for postural correction.  5/5 120 rpm 5/5 120 rpm 5/5 100 RPM  5/5 100 RPM  5/5 100 RPM   120 rpm  5'/5' 120 rpm  5'/5' 120 rpm  5/5' 120 rpm  5/5'                             Ther Ex             Pulley  5''20x flex/ scap 5''20x flex/ scap 5''20x flex /scap 5''20x flex /scap 5''20x flex /scap  NV 5\" x20 flex/  scap 5\" x20 flex/scap 5\" x20 flex/scap   Finger ladder   5''10x flexion scaption  5''10x flexion scaption  5''10x flexion scaption  " "5''10x flexion scaption   NV      Nu Step  L5 x10 min L5 x 10 min  L5 x10 min L5 x10 min L5 x10 min  L3 x10 min L4 x10 min L4 x10 min L4 x10 min   HR TR  x20 x20     x20 x20 x20 x20   Mini Squat              Step up  6\" x20 6''20x     6 x10 ea 6\" 2x10 ea 6\" x20 6\" x20   Three way  2x10 ea B/L 2x10 B/L      X10 ea B/L X10 ea B/L 2x10 ea B/L 2x10 ea B/L   Leg press 100# 3x10 100# 3x10 110# 3x10 110#   3x10 110#   3x10  NV 90# 2x10 90#   2x10  100# NV? 100# 2x10   Leg extension 20#   3x10 20#   3x10 20# 3x10 30#  2x10 30#  3x10  NV 20# 2x10 20#   2x10 20#   2x10   Leg Curls 30#   3x10 30#   3x10 40# 3x10 40#   3x10 40#   3x10  NV 30# 2x10 30#   2x10 30#   2x10   Ther Activity                                       Gait Training                                       Modalities                                                                    "

## 2025-05-01 ENCOUNTER — EVALUATION (OUTPATIENT)
Dept: PHYSICAL THERAPY | Facility: CLINIC | Age: 70
End: 2025-05-01
Payer: COMMERCIAL

## 2025-05-01 DIAGNOSIS — M75.01 ADHESIVE CAPSULITIS OF RIGHT SHOULDER: ICD-10-CM

## 2025-05-01 DIAGNOSIS — R29.898 WEAKNESS OF BOTH LOWER EXTREMITIES: Primary | ICD-10-CM

## 2025-05-01 PROCEDURE — 97140 MANUAL THERAPY 1/> REGIONS: CPT | Performed by: PHYSICAL THERAPIST

## 2025-05-01 PROCEDURE — 97110 THERAPEUTIC EXERCISES: CPT | Performed by: PHYSICAL THERAPIST

## 2025-05-01 PROCEDURE — 97112 NEUROMUSCULAR REEDUCATION: CPT | Performed by: PHYSICAL THERAPIST

## 2025-05-01 NOTE — PROGRESS NOTES
PT Progress Note     Today's date: 2025  Patient name: Devin Gonzalez  : 1955  MRN: [id number]  Referring provider: Horacio Garcia, *  Dx:   Encounter Diagnosis     ICD-10-CM    1. Weakness of both lower extremities  R29.898       2. Adhesive capsulitis of right shoulder  M75.01                  Assessment  Impairments: abnormal coordination, abnormal muscle firing, abnormal or restricted ROM, activity intolerance, impaired physical strength, lacks appropriate home exercise program, pain with function and safety issue  Functional limitations: Diffculty with reaching at and above shoulder height.  Symptom irritability: moderate    Assessment details: Devin Gonzalez is a 69 y.o. male who presents to outpatient PT with a  Adhesive capsulitis of right shoulder, and weakness of both lower extremities  (primary encounter diagnosis).  No further referral appears necessary at this time based upon examination results. Pt presents with decreased strength, ROM, balance, functional activity tolerance, and pain with movement in his right shoulder and bilateral LE's  which is  limiting his ability to perform the aforementioned functional activities.  Etiologic factors include repetitive poor body mechanics. Prognosis is good given HEP compliance and PT 2-3/wk.  Please contact me if you have any questions or recommendations.  Thank you for the opportunity to share in  Devin care.     RA     Devin Gonzalez has demonstrated decreased pain, increased strength, increased range of motion, and increased activity tolerance since starting physical therapy services. He reports an overall improvement of 70% thus far in regards to his right shoulder. Notes difficulty with BTB rotation.   He continues  to present with pain, decreased strength, decreased range of motion, and decreased activity tolerance and would benefit from additional skilled physical therapy interventions to address impairments and maximize  function.          Understanding of Dx/Px/POC: good     Prognosis: good    Goals  STG to be achieved in 4 weeks     1. Pt will reduce subjective pain rating by at least 50 percent the help facilitate return to PLOF  Met   2. Pt will improve right shoulder AROM by at least 10 degrees to help promote improved functional activity tolerance   Met   3. Pt will improve right shoulder MMT scores by at least 1/2 grade to promote improved functional activity tolerance   Met        LTG to be achieved in 6-8 weeks   1. Pt will be complaint with HEP   Met   2. Pt will improve right shoulder AROM to WFL, to help facilitate independence with ADL's, IADL's, and functional activities   Progressing   3. Pt will improve right shoulder Strength to WFL to help facilitate independence with ADL's, IADL's, and functional activities   Progressing   4. Pt will have no limitations with ambulation to help facilitate independence at home and in the community.  Progressing   5. Pt will have no limitations with stair negotiation to help facilitate independence at home and in the community   Progressing           Plan  Patient would benefit from: skilled physical therapy    Planned therapy interventions: ADL training, balance, balance/weight bearing training, flexibility, functional ROM exercises, gait training, graded activity, graded exercise, graded motor, home exercise program, joint mobilization, manual therapy, neuromuscular re-education, patient education, postural training, strengthening, stretching, therapeutic activities and therapeutic exercise    Frequency: 2x week  Duration in weeks: 12  Plan of Care beginning date: 3/5/2025  Plan of Care expiration date: 6/5/2025  Treatment plan discussed with: patient, PTA and referring physician        Subjective Evaluation    History of Present Illness  Mechanism of injury: The patient is a 69 year old male who presents with CC of right shoulder pain  for the past three month. He reports new  onset of decreased ER IR and abduction ROM. Consistent with adhesive capsulitis of right shoulder. He notes difficulty with walking his dog while using his. Notes increasing difficulty with any types of activities that involve the use of his right arm    RA 3/24       Received updated PT script to begin treating for generalized weakness and muscle strengthening. PT POC Updated this session to reflect. The patient reports continued improvement of 40 percent in regards to the right shoulder, He continues to note difficulty with BTB rotation, and Hz adduction. Will send POC to Dr. Espinosa.       Patient Goals  Patient goals for therapy: increased strength, decreased pain and increased motion  Patient goal: to increase strength in thr right arm.  Pain     RA 3/24  RA 5/1  Current pain ratin   4   0  At best pain ratin   0   0  At worst pain rating: 10  4    0  Quality: sharp and throbbing  Aggravating factors: lifting  Progression: improved          Objective     Active Range of Motion     Right Shoulder   Flexion: 85 degrees    160 deg  Abduction: 90 degrees      145 deg   External rotation BTH: Active external rotation behind the head: reaches to occiput.   RA  occiput   Internal rotation BTB: Active internal rotation behind the back: reaches to sacrum.   RA  Right buttocks    Strength/Myotome Testing     Right Shoulder     Planes of Motion       Flexion: 2      4-/5  Abduction: 2      4-/5   External rotation at 0°: 2    4-/5  Internal rotation at 0°: 2    4-/5       RA 3/24       TUG   8 seconds No AD, Good safety     5x STS 8 seconds, Good safety     6 MWT           450 feet no AD, Shuffling gait pattern.            Precautions: Defibrillator, Hx of ischemic cardiomyopathy       POC Expires Reeval for Medicare to be completed Unit LImit Auth Expiration Date PT/OT/STVisit Limit   25 By visit  16 N/a 25 9    Completed on 3/24                 VISIT TRACKER  DATE   "4/24 4/28 5/1  3/27 3/31 4/3 4/7   Visit # 11 12 13 14 15 RE 7 8 9 10   Remaining 5 4 3 2 1 9 9 8 7 6       Manuals 4/14 4/17 4/22 4/24 4/28  5/1 3/31 4/3 4/7   PROM to right shoulder  TM RR MJC MJC TM  RR MJC MJC                                           Neuro Re-Ed             MTP LTP  Blue   5\" x20 Blue   5\" x20 Blue 5''20x Blue  5''30x Blue  5''30x  Blue   5\" x20 ex Blue 5\" x20 Blue   5\" x20 Blue   5\" x20   TB ER IR  Blue   5\" x20 Blue   5\" x20 Blue 2x10 Blue  3x10 Blue  3x10  Blue   X20 ea Blue 5\" x20 ea Blue   5\" x20 Blue   5\" x20   Compass             Supine cane              UBE for postural correction.  5/5 120 rpm 5/5 120 rpm 5/5 100 RPM  5/5 100 RPM  5/5 100 RPM   120 rpm  5'/5' 120 rpm  5'/5' 120 rpm  5/5' 120 rpm  5/5'                             Ther Ex             Pulley  5''20x flex/ scap 5''20x flex/ scap 5''20x flex /scap 5''20x flex /scap 5''20x flex /scap  5\" x20 flex/  scap 5\" x20 flex/  scap 5\" x20 flex/scap 5\" x20 flex/scap   Finger ladder   5''10x flexion scaption  5''10x flexion scaption  5''10x flexion scaption  5''10x flexion scaption   5''10x flexion scaption       Nu Step  L5 x10 min L5 x 10 min  L5 x10 min L5 x10 min L5 x10 min  L3 x10 min L4 x10 min L4 x10 min L4 x10 min   HR TR  x20 x20     x20 x20 x20 x20   Mini Squat              Step up  6\" x20 6''20x     6 x10 ea 6\" 2x10 ea 6\" x20 6\" x20   Three way  2x10 ea B/L 2x10 B/L      X10 ea B/L X10 ea B/L 2x10 ea B/L 2x10 ea B/L   Leg press 100# 3x10 100# 3x10 110# 3x10 110#   3x10 110#   3x10  110#   3x10 90# 2x10 90#   2x10  100# NV? 100# 2x10   Leg extension 20#   3x10 20#   3x10 20# 3x10 30#  2x10 30#  3x10  30#  3x10 20# 2x10 20#   2x10 20#   2x10   Leg Curls 30#   3x10 30#   3x10 40# 3x10 40#   3x10 40#   3x10  40#   3x10 30# 2x10 30#   2x10 30#   2x10   Ther Activity                                       Gait Training                                       Modalities                                                                    "

## 2025-05-05 ENCOUNTER — OFFICE VISIT (OUTPATIENT)
Dept: PHYSICAL THERAPY | Facility: CLINIC | Age: 70
End: 2025-05-05
Payer: COMMERCIAL

## 2025-05-05 DIAGNOSIS — M75.01 ADHESIVE CAPSULITIS OF RIGHT SHOULDER: ICD-10-CM

## 2025-05-05 DIAGNOSIS — R29.898 WEAKNESS OF BOTH LOWER EXTREMITIES: Primary | ICD-10-CM

## 2025-05-05 PROCEDURE — 97140 MANUAL THERAPY 1/> REGIONS: CPT

## 2025-05-05 PROCEDURE — 97112 NEUROMUSCULAR REEDUCATION: CPT

## 2025-05-05 PROCEDURE — 97110 THERAPEUTIC EXERCISES: CPT

## 2025-05-05 NOTE — PROGRESS NOTES
"Daily Note     Today's date: 2025  Patient name: Devin Gonzalez  : 1955  MRN: [id number]  Referring provider: Horacio Garcia, *  Dx:   Encounter Diagnosis     ICD-10-CM    1. Weakness of both lower extremities  R29.898       2. Adhesive capsulitis of right shoulder  M75.01           Start Time: 1115  Stop Time: 1200  Total time in clinic (min): 45 minutes    Subjective: Patient states shoulder is improving.       Objective: See treatment diary below. Patient arrived 15 min, but was accommodated.       Assessment: Tolerated treatment well. Patient demonstrated fatigue post treatment and would benefit from continued PT. Modified session 2* to time constraints 2* to late arrival. Good shoulder motion noted in all planes.      Plan: Continue per plan of care.      Precautions: Defibrillator, Hx of ischemic cardiomyopathy       POC Expires Reeval for Medicare to be completed Unit LImit Auth Expiration Date PT/OT/STVisit Limit   25 By visit  16 N/a 25 9    Completed on 3/24                 VISIT TRACKER  DATE 4/14 4/17 4/22 4/24 4/28 5/1  5/5  4/3 4/7   Visit # 11 12 13 14 15 RE 17  9 10   Remaining 5 4 3 2 1 9 8  7 6       Manuals    PROM to right shoulder  TM RR MJC MJC TM  RR LQ                                            Neuro Re-Ed             MTP LTP  Blue   5\" x20 Blue   5\" x20 Blue 5''20x Blue  5''30x Blue  5''30x  Blue   5\" x20 ex Blue  5''30x  Blue   5\" x20   TB ER IR  Blue   5\" x20 Blue   5\" x20 Blue 2x10 Blue  3x10 Blue  3x10  Blue   X20 ea Blue   X20 ea  Blue   5\" x20   Compass             Supine cane              UBE for postural correction.  5/5 120 rpm 5/5 120 rpm 5/5 100 RPM  5/5 100 RPM  5/5 100 RPM   120 rpm  5'/5' 120 rpm  5'/5'  120 rpm  5/5'                             Ther Ex             Pulley  5''20x flex/ scap 5''20x flex/ scap 5''20x flex /scap 5''20x flex /scap 5''20x flex /scap  5\" x20 flex/  scap 5\" x20 flex/  scap  5\" x20 " "flex/scap   Finger ladder   5''10x flexion scaption  5''10x flexion scaption  5''10x flexion scaption  5''10x flexion scaption   5''10x flexion scaption  5''10x flexion scaption      Nu Step  L5 x10 min L5 x 10 min  L5 x10 min L5 x10 min L5 x10 min  L3 x10 min L5 x5 min  L4 x10 min   HR TR  x20 x20     x20   x20   Mini Squat              Step up  6\" x20 6''20x     6 x10 ea NT NV  6\" x20   Three way  2x10 ea B/L 2x10 B/L      X10 ea B/L NT NV  2x10 ea B/L   Leg press 100# 3x10 100# 3x10 110# 3x10 110#   3x10 110#   3x10  110#   3x10 110#   3x10  100# 2x10   Leg extension 20#   3x10 20#   3x10 20# 3x10 30#  2x10 30#  3x10  30#  3x10 30#  3x10  20#   2x10   Leg Curls 30#   3x10 30#   3x10 40# 3x10 40#   3x10 40#   3x10  40#   3x10 30#   3x10  30#   2x10   Ther Activity                                       Gait Training                                       Modalities                                                                        "

## 2025-05-07 ENCOUNTER — OFFICE VISIT (OUTPATIENT)
Dept: PHYSICAL THERAPY | Facility: CLINIC | Age: 70
End: 2025-05-07
Payer: COMMERCIAL

## 2025-05-07 DIAGNOSIS — M75.01 ADHESIVE CAPSULITIS OF RIGHT SHOULDER: ICD-10-CM

## 2025-05-07 DIAGNOSIS — R29.898 WEAKNESS OF BOTH LOWER EXTREMITIES: Primary | ICD-10-CM

## 2025-05-07 DIAGNOSIS — I25.10 CORONARY ARTERY DISEASE INVOLVING NATIVE CORONARY ARTERY OF NATIVE HEART WITHOUT ANGINA PECTORIS: ICD-10-CM

## 2025-05-07 PROCEDURE — 97140 MANUAL THERAPY 1/> REGIONS: CPT

## 2025-05-07 PROCEDURE — 97110 THERAPEUTIC EXERCISES: CPT

## 2025-05-07 PROCEDURE — 97112 NEUROMUSCULAR REEDUCATION: CPT

## 2025-05-07 RX ORDER — CLOPIDOGREL BISULFATE 75 MG/1
75 TABLET ORAL DAILY
Qty: 90 TABLET | Refills: 3 | Status: SHIPPED | OUTPATIENT
Start: 2025-05-07

## 2025-05-07 NOTE — PROGRESS NOTES
"Daily Note     Today's date: 2025  Patient name: Devin Gonzalez  : 1955  MRN: [id number]  Referring provider: Horacio Garcia, *  Dx:   Encounter Diagnosis     ICD-10-CM    1. Weakness of both lower extremities  R29.898       2. Adhesive capsulitis of right shoulder  M75.01           Start Time: 1155  Stop Time: 1253  Total time in clinic (min): 58 minutes    Subjective: The patient states that his shoulder hurts when he rests his arm on his belly or lifts it overhead, which he rates at 6-7/10.      Objective: See treatment diary below      Assessment: Tolerated treatment well. The patient was able to complete all exercises with no increased complaints of pain. Patient exhibited good technique with therapeutic exercises and would benefit from continued PT      Plan: Continue per plan of care.      Precautions: Defibrillator, Hx of ischemic cardiomyopathy       POC Expires Reeval for Medicare to be completed Unit LImit Auth Expiration Date PT/OT/STVisit Limit   25 By visit  16 N/a 25 9    Completed on 3/24                 VISIT TRACKER  DATE 4/14 4/17 4/22 4/24 4/28 5/1  5/5 5/7 4/3 4/7   Visit # 11 12 13 14 15 RE 17 18 9 10   Remaining 5 4 3 2 1 9 8 7 7 6       Manuals    PROM to right shoulder  TM RR MJC MJC TM  RR LQ AW                                           Neuro Re-Ed             MTP LTP  Blue   5\" x20 Blue   5\" x20 Blue 5''20x Blue  5''30x Blue  5''30x  Blue   5\" x20 ex Blue  5''30x Blue 5\" x20 Blue   5\" x20   TB ER IR  Blue   5\" x20 Blue   5\" x20 Blue 2x10 Blue  3x10 Blue  3x10  Blue   X20 ea Blue   X20 ea Blue x20 ea Blue   5\" x20   Compass             Supine cane              UBE for postural correction.  5/5 120 rpm 5/5 120 rpm 5/5 100 RPM  5/5 100 RPM  5/5 100 RPM   120 rpm  5'/5' 120 rpm  5'/5' 120 rpm 5'/5' 120 rpm  5/5'                             Ther Ex             Pulley  5''20x flex/ scap 5''20x flex/ scap 5''20x flex /scap 5''20x " "flex /scap 5''20x flex /scap  5\" x20 flex/  scap 5\" x20 flex/  scap 5\" x20 flex/scap 5\" x20 flex/scap   Finger ladder   5''10x flexion scaption  5''10x flexion scaption  5''10x flexion scaption  5''10x flexion scaption   5''10x flexion scaption  5''10x flexion scaption  5\" x10 flexion/scaption    Nu Step  L5 x10 min L5 x 10 min  L5 x10 min L5 x10 min L5 x10 min  L3 x10 min L5 x5 min L5 x10 min L4 x10 min   HR TR  x20 x20     x20   x20   Mini Squat              Step up  6\" x20 6''20x     6 x10 ea NT NV  6\" x20   Three way  2x10 ea B/L 2x10 B/L      X10 ea B/L NT NV  2x10 ea B/L   Leg press 100# 3x10 100# 3x10 110# 3x10 110#   3x10 110#   3x10  110#   3x10 110#   3x10 110# 3x10 100# 2x10   Leg extension 20#   3x10 20#   3x10 20# 3x10 30#  2x10 30#  3x10  30#  3x10 30#  3x10 30# 3x10 20#   2x10   Leg Curls 30#   3x10 30#   3x10 40# 3x10 40#   3x10 40#   3x10  40#   3x10 30#   3x10 40# 3x10 30#   2x10   Ther Activity                                       Gait Training                                       Modalities                                                                          "

## 2025-05-12 ENCOUNTER — OFFICE VISIT (OUTPATIENT)
Dept: PHYSICAL THERAPY | Facility: CLINIC | Age: 70
End: 2025-05-12
Payer: COMMERCIAL

## 2025-05-12 DIAGNOSIS — R29.898 WEAKNESS OF BOTH LOWER EXTREMITIES: Primary | ICD-10-CM

## 2025-05-12 DIAGNOSIS — M75.01 ADHESIVE CAPSULITIS OF RIGHT SHOULDER: ICD-10-CM

## 2025-05-12 PROCEDURE — 97110 THERAPEUTIC EXERCISES: CPT

## 2025-05-12 PROCEDURE — 97140 MANUAL THERAPY 1/> REGIONS: CPT

## 2025-05-12 PROCEDURE — 97112 NEUROMUSCULAR REEDUCATION: CPT

## 2025-05-12 NOTE — PROGRESS NOTES
"Daily Note     Today's date: 2025  Patient name: Devin Gonzalez  : 1955  MRN: [id number]  Referring provider: Horacio Garcia, *  Dx:   Encounter Diagnosis     ICD-10-CM    1. Weakness of both lower extremities  R29.898       2. Adhesive capsulitis of right shoulder  M75.01                      Subjective: Pt offered no new comments regarding his symptoms.       Objective: See treatment diary below      Assessment: Tolerated treatment well. Patient demonstrated fatigue post treatment, exhibited good technique with therapeutic exercises, and would benefit from continued PT      Plan: Continue per plan of care.  Progress treatment as tolerated.       Precautions: Defibrillator, Hx of ischemic cardiomyopathy       POC Expires Reeval for Medicare to be completed Unit LImit Auth Expiration Date PT/OT/STVisit Limit   25 By visit  16 N/a 25 9    Completed on 3/24                 VISIT TRACKER  DATE    Visit # 11 12 13 14 15 RE 17 18 19   Remaining 5 4 3 2 1 9 8 7 6       Manuals    PROM to right shoulder  TM RR MJC MJC TM  RR LQ AW TM                                          Neuro Re-Ed             MTP LTP  Blue   5\" x20 Blue   5\" x20 Blue 5''20x Blue  5''30x Blue  5''30x  Blue   5\" x20 ex Blue  5''30x Blue 5\" x20 Blue  5''30x   TB ER IR  Blue   5\" x20 Blue   5\" x20 Blue 2x10 Blue  3x10 Blue  3x10  Blue   X20 ea Blue   X20 ea Blue x20 ea Blue  3x10   Compass             Supine cane              UBE for postural correction.  5/5 120 rpm 5/5 120 rpm 5/5 100 RPM  5/5 100 RPM  5/5 100 RPM   120 rpm  5'/5' 120 rpm  5'/5' 120 rpm 5'/5' 100 rpm 5'/5'                             Ther Ex             Pulley  5''20x flex/ scap 5''20x flex/ scap 5''20x flex /scap 5''20x flex /scap 5''20x flex /scap  5\" x20 flex/  scap 5\" x20 flex/  scap 5\" x20 flex/scap 5\" x20 flex/scap   Finger ladder   5''10x flexion scaption  5''10x " "flexion scaption  5''10x flexion scaption  5''10x flexion scaption   5''10x flexion scaption  5''10x flexion scaption  5\" x10 flexion/scaption 5\" x10 flexion/scaption   Nu Step  L5 x10 min L5 x 10 min  L5 x10 min L5 x10 min L5 x10 min  L3 x10 min L5 x5 min L5 x10 min L5 x10 min   HR TR  x20 x20     x20      Mini Squat              Step up  6\" x20 6''20x     6 x10 ea NT NV     Three way  2x10 ea B/L 2x10 B/L      X10 ea B/L NT NV     Leg press 100# 3x10 100# 3x10 110# 3x10 110#   3x10 110#   3x10  110#   3x10 110#   3x10 110# 3x10 110# 3x10   Leg extension 20#   3x10 20#   3x10 20# 3x10 30#  2x10 30#  3x10  30#  3x10 30#  3x10 30# 3x10 30# 3x10   Leg Curls 30#   3x10 30#   3x10 40# 3x10 40#   3x10 40#   3x10  40#   3x10 30#   3x10 40# 3x10 40# 3x10   Ther Activity                                       Gait Training                                       Modalities                                                                            "

## 2025-05-14 ENCOUNTER — APPOINTMENT (OUTPATIENT)
Dept: PHYSICAL THERAPY | Facility: CLINIC | Age: 70
End: 2025-05-14
Payer: COMMERCIAL

## 2025-05-20 ENCOUNTER — APPOINTMENT (OUTPATIENT)
Dept: PHYSICAL THERAPY | Facility: CLINIC | Age: 70
End: 2025-05-20
Payer: COMMERCIAL

## 2025-05-22 ENCOUNTER — APPOINTMENT (OUTPATIENT)
Dept: PHYSICAL THERAPY | Facility: CLINIC | Age: 70
End: 2025-05-22
Payer: COMMERCIAL

## 2025-05-27 ENCOUNTER — APPOINTMENT (OUTPATIENT)
Dept: PHYSICAL THERAPY | Facility: CLINIC | Age: 70
End: 2025-05-27
Payer: COMMERCIAL

## 2025-05-29 ENCOUNTER — APPOINTMENT (OUTPATIENT)
Dept: PHYSICAL THERAPY | Facility: CLINIC | Age: 70
End: 2025-05-29
Payer: COMMERCIAL

## 2025-06-03 ENCOUNTER — APPOINTMENT (OUTPATIENT)
Dept: RADIOLOGY | Facility: CLINIC | Age: 70
End: 2025-06-03
Payer: COMMERCIAL

## 2025-06-03 ENCOUNTER — APPOINTMENT (OUTPATIENT)
Dept: RADIOLOGY | Facility: CLINIC | Age: 70
End: 2025-06-03
Attending: INTERNAL MEDICINE
Payer: COMMERCIAL

## 2025-06-03 DIAGNOSIS — R06.2 WHEEZING: ICD-10-CM

## 2025-06-03 PROCEDURE — 71046 X-RAY EXAM CHEST 2 VIEWS: CPT

## 2025-06-04 ENCOUNTER — APPOINTMENT (OUTPATIENT)
Dept: LAB | Facility: CLINIC | Age: 70
End: 2025-06-04
Payer: COMMERCIAL

## 2025-06-04 ENCOUNTER — TRANSCRIBE ORDERS (OUTPATIENT)
Dept: LAB | Facility: CLINIC | Age: 70
End: 2025-06-04

## 2025-06-04 DIAGNOSIS — R06.00 DYSPNEA, UNSPECIFIED TYPE: ICD-10-CM

## 2025-06-04 DIAGNOSIS — R06.00 DYSPNEA, UNSPECIFIED TYPE: Primary | ICD-10-CM

## 2025-06-04 LAB — BNP SERPL-MCNC: 354 PG/ML (ref 0–100)

## 2025-06-04 PROCEDURE — 36415 COLL VENOUS BLD VENIPUNCTURE: CPT

## 2025-06-04 PROCEDURE — 83880 ASSAY OF NATRIURETIC PEPTIDE: CPT

## 2025-07-01 ENCOUNTER — REMOTE DEVICE CLINIC VISIT (OUTPATIENT)
Dept: CARDIOLOGY CLINIC | Facility: CLINIC | Age: 70
End: 2025-07-01
Payer: COMMERCIAL

## 2025-07-01 DIAGNOSIS — Z95.810 PRESENCE OF AUTOMATIC CARDIOVERTER/DEFIBRILLATOR (AICD): Primary | ICD-10-CM

## 2025-07-01 PROCEDURE — 93295 DEV INTERROG REMOTE 1/2/MLT: CPT | Performed by: INTERNAL MEDICINE

## 2025-07-01 PROCEDURE — 93296 REM INTERROG EVL PM/IDS: CPT | Performed by: INTERNAL MEDICINE

## 2025-07-01 NOTE — PROGRESS NOTES
Results for orders placed or performed in visit on 07/01/25   Cardiac EP device report    Narrative    MDT DC ICD/ACTIVE SYSTEM IS MRI CONDITIONAL  CARELINK TRANSMISSION: BATTERY VOLTAGE ADEQUATE. (10.1 YRS) AP 91%  1%. ALL AVAILABLE LEAD PARAMETERS WITHIN NORMAL LIMITS. NO SIGNIFICANT HIGH RATE EPISODES. SINGLE PVC COUNT 346.9/HOUR; PVC RUNS 6/HOUR. OPTI-VOL WITHIN NORMAL LIMITS. NORMAL DEVICE FUNCTION.---CAMPOS

## 2025-07-08 DIAGNOSIS — I25.5 ISCHEMIC CARDIOMYOPATHY: ICD-10-CM

## 2025-07-10 RX ORDER — CARVEDILOL 25 MG/1
25 TABLET ORAL 2 TIMES DAILY
Qty: 180 TABLET | Refills: 1 | Status: SHIPPED | OUTPATIENT
Start: 2025-07-10

## 2025-07-22 DIAGNOSIS — E78.2 MIXED HYPERLIPIDEMIA: ICD-10-CM

## 2025-07-24 RX ORDER — ATORVASTATIN CALCIUM 40 MG/1
40 TABLET, FILM COATED ORAL
Qty: 90 TABLET | Refills: 1 | Status: SHIPPED | OUTPATIENT
Start: 2025-07-24

## (undated) DEVICE — CATH DIAG 5FR IMPULSE 100CM FR4

## (undated) DEVICE — DRAPE EQUIPMENT RF WAND

## (undated) DEVICE — 3M™ STERI-DRAPE™ UNDER BUTTOCKS DRAPE WITH POUCH 1084: Brand: STERI-DRAPE™

## (undated) DEVICE — 3M™ STERI-STRIP™ REINFORCED ADHESIVE SKIN CLOSURES, R1547, 1/2 IN X 4 IN (12 MM X 100 MM), 6 STRIPS/ENVELOPE: Brand: 3M™ STERI-STRIP™

## (undated) DEVICE — PLUMEPEN PRO 10FT

## (undated) DEVICE — INVIEW CLEAR LEGGINGS: Brand: CONVERTORS

## (undated) DEVICE — CHLORAPREP HI-LITE 26ML ORANGE

## (undated) DEVICE — SUT MONOCRYL 4-0 PS-2 18 IN Y496G

## (undated) DEVICE — GLOVE INDICATOR PI UNDERGLOVE SZ 7 BLUE

## (undated) DEVICE — GLOVE SRG BIOGEL 8

## (undated) DEVICE — IV EXTENSION TUBING 33 IN

## (undated) DEVICE — OCT DRAGONFLY OPTIS IMAGING KIT

## (undated) DEVICE — GLOVE SRG BIOGEL ECLIPSE 8

## (undated) DEVICE — INTENDED FOR TISSUE SEPARATION, AND OTHER PROCEDURES THAT REQUIRE A SHARP SURGICAL BLADE TO PUNCTURE OR CUT.: Brand: BARD-PARKER ® CARBON RIB-BACK BLADES

## (undated) DEVICE — GLOVE SRG BIOGEL ECLIPSE 7

## (undated) DEVICE — DRESSING XEROFORM 5 X 9

## (undated) DEVICE — HYDROGEN PEROXIDE 3 PCT 4OZ

## (undated) DEVICE — LIGHT HANDLE COVER SLEEVE DISP BLUE STELLAR

## (undated) DEVICE — CATH DIAG 5FR IMPULSE 100CM FL3.5

## (undated) DEVICE — DRAPE SHEET THREE QUARTER

## (undated) DEVICE — TUBING SUCTION 5MM X 12 FT

## (undated) DEVICE — 4-PORT MANIFOLD: Brand: NEPTUNE 2

## (undated) DEVICE — 3M™ V.A.C.® GRANUFOAM™ DRESSING KIT, M8275052, MEDIUM: Brand: 3M™ V.A.C.® GRANUFOAM™

## (undated) DEVICE — DGW .035 FC J3MM 260CM TEF: Brand: EMERALD

## (undated) DEVICE — V.A.C. DRAPE: Brand: V.A.C.®

## (undated) DEVICE — GLOVE INDICATOR UNDERGLOVE SZ 6 BLUE

## (undated) DEVICE — BETHLEHEM UNIVERSAL MINOR GEN: Brand: CARDINAL HEALTH

## (undated) DEVICE — MEDI-VAC YANK SUCT HNDL W/TPRD BULBOUS TIP: Brand: CARDINAL HEALTH

## (undated) DEVICE — CULTURE TUBE AEROBIC

## (undated) DEVICE — 40583 XL ADVANCED TRENDELENBURG POSITIONING KIT: Brand: 40583 XL ADVANCED TRENDELENBURG POSITIONING KIT

## (undated) DEVICE — Device: Brand: LLD EZ LEAD LOCKING DEVICE

## (undated) DEVICE — LASER BRIDGE ACCESSORY KIT

## (undated) DEVICE — ABDOMINAL PAD: Brand: DERMACEA

## (undated) DEVICE — BETHLEHEM UNIVERSAL OUTPATIENT: Brand: CARDINAL HEALTH

## (undated) DEVICE — 3000CC GUARDIAN II: Brand: GUARDIAN

## (undated) DEVICE — TRAY FOLEY 16FR SURESTEP TEMP SENS URIMETER STAT LOK

## (undated) DEVICE — Device: Brand: GLIDELIGHT LASER SHEATH

## (undated) DEVICE — DRAPE SURGIKIT SADDLE BAG

## (undated) DEVICE — Device: Brand: TIGHTRAIL SUB-C ROTATING DILATOR SHEATH

## (undated) DEVICE — 3M™ TEGADERM™ TRANSPARENT FILM DRESSING FRAME STYLE, 1626W, 4 IN X 4-3/4 IN (10 CM X 12 CM), 50/CT 4CT/CASE: Brand: 3M™ TEGADERM™

## (undated) DEVICE — ANTIBACTERIAL UNDYED BRAIDED (POLYGLACTIN 910), SYNTHETIC ABSORBABLE SUTURE: Brand: COATED VICRYL

## (undated) DEVICE — SUT SILK 2 60 IN SA8H

## (undated) DEVICE — BALLOON NC EUPHORA 3.5 X 15MM

## (undated) DEVICE — 3M™ IOBAN™ 2 ANTIMICROBIAL INCISE DRAPE 6651EZ: Brand: IOBAN™ 2

## (undated) DEVICE — PREMIUM DRY TRAY LF: Brand: MEDLINE INDUSTRIES, INC.

## (undated) DEVICE — CARDIOVASCULAR SPLIT DRAPE: Brand: CONVERTORS

## (undated) DEVICE — SYRINGE 10ML SLIP TIP LF

## (undated) DEVICE — GLOVE INDICATOR PI UNDERGLOVE SZ 8 BLUE

## (undated) DEVICE — PENCIL ELECTROSURG E-Z CLEAN -0035H

## (undated) DEVICE — GAUZE SPONGES,USP TYPE VII GAUZE, 12 PLY: Brand: CURITY

## (undated) DEVICE — STRL BETHLACCESS CATHETER PACK: Brand: CARDINAL HEALTH

## (undated) DEVICE — SPONGE LAP 18 X 18 IN STRL RFD

## (undated) DEVICE — DEFIB ADULT ELECTRODE CARDINAL

## (undated) DEVICE — CULTURE TUBE ANAEROBIC

## (undated) DEVICE — TIGHTRAIL MINI ROTATING DILATOR SHEATH: Brand: TIGHTRAIL MINI

## (undated) DEVICE — BRIDGE OCCLUSION CATHETER - 80 MM LENGTH BALLOON: Brand: BRIDGE™ OCCLUSION BALLOON

## (undated) DEVICE — SPONGE LAP 18 X 18 IN

## (undated) DEVICE — INTRO SHEATH PEEL AWAY 7FR

## (undated) DEVICE — SUT SILK 0 CT-1 30 IN 424H

## (undated) DEVICE — 4F TEMPO AQUA .038 INCHES 125CM VERT: Brand: TEMPO AQUA

## (undated) DEVICE — PERCUTANEOUS ENTRY THINWALL NEEDLE  ONE-PART: Brand: COOK

## (undated) DEVICE — KERLIX BANDAGE ROLL: Brand: KERLIX

## (undated) DEVICE — LEAD CUTTER: Brand: LEAD CUTTER

## (undated) DEVICE — DISPOSABLE EQUIPMENT COVER: Brand: LARGE TOWEL DRAPE

## (undated) DEVICE — 3M™ IOBAN™ 2 ANTIMICROBIAL INCISE DRAPE 6650EZ: Brand: IOBAN™ 2

## (undated) DEVICE — PANNUS RETENTION SYSTEM

## (undated) DEVICE — CATH F4 INF IM 100CM: Brand: INFINITI

## (undated) DEVICE — TIBURON SPLIT SHEET: Brand: CONVERTORS

## (undated) DEVICE — INTENDED FOR TISSUE SEPARATION, AND OTHER PROCEDURES THAT REQUIRE A SHARP SURGICAL BLADE TO PUNCTURE OR CUT.: Brand: BARD-PARKER SAFETY BLADES SIZE 15, STERILE

## (undated) DEVICE — RUNTHROUGH NS EXTRA FLOPPY PTCA GUIDEWIRE: Brand: RUNTHROUGH

## (undated) DEVICE — Device

## (undated) DEVICE — GLOVE SRG BIOGEL ORTHOPEDIC 7.5

## (undated) DEVICE — VAC CANISTER 500ML

## (undated) DEVICE — SNAP KOVER: Brand: UNBRANDED

## (undated) DEVICE — RADIFOCUS TORQUE DEVICE MULTI-TORQUE VISE: Brand: RADIFOCUS TORQUE DEVICE

## (undated) DEVICE — GLOVE SRG BIOGEL 5.5

## (undated) DEVICE — CATH DIAG 5FR IMPULSE 100CM FL4

## (undated) DEVICE — SCD SEQUENTIAL COMPRESSION COMFORT SLEEVE LARGE KNEE LENGTH: Brand: KENDALL SCD

## (undated) DEVICE — THE VASC BAND HEMOSTAT IS A COMPRESSION DEVICE TO ASSIST HEMOSTASIS OF ARTERIAL, VENOUS AND HEMODIALYSIS PERCUTANEOUS ACCESS SITES.: Brand: VASC BAND™ HEMOSTAT

## (undated) DEVICE — GUIDEWIRE WHOLEY .035 145 CM FLOP STR TIP

## (undated) DEVICE — CATH GUIDE LAUNCHER 6FR EBU 3.5

## (undated) DEVICE — BULB SYRINGE,IRRIGATION WITH PROTECTIVE CAP: Brand: DOVER

## (undated) DEVICE — DRAPE C-ARM BAG/DOME XR ELSTIC OPEN LF

## (undated) DEVICE — POV-IOD SOLUTION 4OZ BT

## (undated) DEVICE — SUT VICRYL PLUS 2-0 CTB-1 27 IN VCPB259H

## (undated) DEVICE — INTRO SHEATH PEEL AWAY 9 FR

## (undated) DEVICE — GAUZE SPONGES,16 PLY: Brand: CURITY

## (undated) DEVICE — GLIDESHEATH BASIC HYDROPHILIC COATED INTRODUCER SHEATH: Brand: GLIDESHEATH

## (undated) DEVICE — PAD GROUNDING ADULT

## (undated) DEVICE — DRESSING AQUACEL AG 3.5 X 6 IN

## (undated) DEVICE — LEAD 6935M62 QUATTRO SECURE S MRI US
Type: IMPLANTABLE DEVICE | Site: HEART | Status: NON-FUNCTIONAL
Brand: SPRINT QUATTRO SECURE S MRI™ SURESCAN™